# Patient Record
Sex: MALE | Race: WHITE | Employment: OTHER | URBAN - METROPOLITAN AREA
[De-identification: names, ages, dates, MRNs, and addresses within clinical notes are randomized per-mention and may not be internally consistent; named-entity substitution may affect disease eponyms.]

---

## 2017-04-16 PROBLEM — I20.9 ISCHEMIC CHEST PAIN (HCC): Status: ACTIVE | Noted: 2017-04-16

## 2017-04-16 PROBLEM — I21.4 NSTEMI (NON-ST ELEVATED MYOCARDIAL INFARCTION) (HCC): Status: ACTIVE | Noted: 2017-04-16

## 2017-05-08 ENCOUNTER — OFFICE VISIT (OUTPATIENT)
Dept: CARDIOLOGY CLINIC | Age: 72
End: 2017-05-08

## 2017-05-08 ENCOUNTER — TELEPHONE (OUTPATIENT)
Dept: PULMONOLOGY | Age: 72
End: 2017-05-08

## 2017-05-08 VITALS
HEIGHT: 69 IN | HEART RATE: 80 BPM | WEIGHT: 315 LBS | SYSTOLIC BLOOD PRESSURE: 130 MMHG | DIASTOLIC BLOOD PRESSURE: 84 MMHG | OXYGEN SATURATION: 95 % | BODY MASS INDEX: 46.65 KG/M2

## 2017-05-08 DIAGNOSIS — R06.2 WHEEZING WITHOUT DIAGNOSIS OF ASTHMA: Primary | ICD-10-CM

## 2017-05-08 DIAGNOSIS — E66.01 MORBID OBESITY, UNSPECIFIED OBESITY TYPE (HCC): ICD-10-CM

## 2017-05-08 DIAGNOSIS — R06.2 WHEEZING: ICD-10-CM

## 2017-05-08 DIAGNOSIS — I25.5 ISCHEMIC CARDIOMYOPATHY: ICD-10-CM

## 2017-05-08 DIAGNOSIS — I25.10 CORONARY ARTERY DISEASE INVOLVING NATIVE CORONARY ARTERY OF NATIVE HEART WITHOUT ANGINA PECTORIS: ICD-10-CM

## 2017-05-08 DIAGNOSIS — I10 ESSENTIAL HYPERTENSION: ICD-10-CM

## 2017-05-08 DIAGNOSIS — I50.21 ACUTE SYSTOLIC HEART FAILURE (HCC): ICD-10-CM

## 2017-05-08 DIAGNOSIS — Z87.891 FORMER SMOKER: Primary | ICD-10-CM

## 2017-05-08 PROCEDURE — 1123F ACP DISCUSS/DSCN MKR DOCD: CPT | Performed by: INTERNAL MEDICINE

## 2017-05-08 PROCEDURE — G8598 ASA/ANTIPLAT THER USED: HCPCS | Performed by: INTERNAL MEDICINE

## 2017-05-08 PROCEDURE — 3017F COLORECTAL CA SCREEN DOC REV: CPT | Performed by: INTERNAL MEDICINE

## 2017-05-08 PROCEDURE — 1036F TOBACCO NON-USER: CPT | Performed by: INTERNAL MEDICINE

## 2017-05-08 PROCEDURE — 4040F PNEUMOC VAC/ADMIN/RCVD: CPT | Performed by: INTERNAL MEDICINE

## 2017-05-08 PROCEDURE — 99214 OFFICE O/P EST MOD 30 MIN: CPT | Performed by: INTERNAL MEDICINE

## 2017-05-08 PROCEDURE — 1111F DSCHRG MED/CURRENT MED MERGE: CPT | Performed by: INTERNAL MEDICINE

## 2017-05-08 PROCEDURE — G8427 DOCREV CUR MEDS BY ELIG CLIN: HCPCS | Performed by: INTERNAL MEDICINE

## 2017-05-08 PROCEDURE — G8417 CALC BMI ABV UP PARAM F/U: HCPCS | Performed by: INTERNAL MEDICINE

## 2017-08-07 RX ORDER — METOPROLOL SUCCINATE 100 MG/1
100 TABLET, EXTENDED RELEASE ORAL DAILY
Qty: 30 TABLET | Refills: 3 | Status: SHIPPED | OUTPATIENT
Start: 2017-08-07 | End: 2017-12-08 | Stop reason: SDUPTHER

## 2017-11-07 RX ORDER — TICAGRELOR 90 MG/1
TABLET ORAL
Qty: 60 TABLET | Refills: 5 | Status: SHIPPED | OUTPATIENT
Start: 2017-11-07 | End: 2018-05-04 | Stop reason: SDUPTHER

## 2017-11-09 ENCOUNTER — OFFICE VISIT (OUTPATIENT)
Dept: CARDIOLOGY CLINIC | Age: 72
End: 2017-11-09

## 2017-11-09 VITALS
DIASTOLIC BLOOD PRESSURE: 90 MMHG | OXYGEN SATURATION: 95 % | WEIGHT: 315 LBS | SYSTOLIC BLOOD PRESSURE: 154 MMHG | HEART RATE: 82 BPM | HEIGHT: 69 IN | BODY MASS INDEX: 46.65 KG/M2

## 2017-11-09 DIAGNOSIS — Z87.891 FORMER SMOKER: ICD-10-CM

## 2017-11-09 DIAGNOSIS — I25.5 ISCHEMIC CARDIOMYOPATHY: ICD-10-CM

## 2017-11-09 DIAGNOSIS — I10 ESSENTIAL HYPERTENSION: ICD-10-CM

## 2017-11-09 DIAGNOSIS — I50.22 CHRONIC SYSTOLIC HEART FAILURE (HCC): ICD-10-CM

## 2017-11-09 DIAGNOSIS — N18.9 CHRONIC KIDNEY DISEASE, UNSPECIFIED CKD STAGE: ICD-10-CM

## 2017-11-09 DIAGNOSIS — I25.10 CORONARY ARTERY DISEASE INVOLVING NATIVE CORONARY ARTERY OF NATIVE HEART WITHOUT ANGINA PECTORIS: Primary | ICD-10-CM

## 2017-11-09 PROCEDURE — 3017F COLORECTAL CA SCREEN DOC REV: CPT | Performed by: INTERNAL MEDICINE

## 2017-11-09 PROCEDURE — G8427 DOCREV CUR MEDS BY ELIG CLIN: HCPCS | Performed by: INTERNAL MEDICINE

## 2017-11-09 PROCEDURE — G8598 ASA/ANTIPLAT THER USED: HCPCS | Performed by: INTERNAL MEDICINE

## 2017-11-09 PROCEDURE — 99214 OFFICE O/P EST MOD 30 MIN: CPT | Performed by: INTERNAL MEDICINE

## 2017-11-09 PROCEDURE — 1036F TOBACCO NON-USER: CPT | Performed by: INTERNAL MEDICINE

## 2017-11-09 PROCEDURE — 4040F PNEUMOC VAC/ADMIN/RCVD: CPT | Performed by: INTERNAL MEDICINE

## 2017-11-09 PROCEDURE — G8417 CALC BMI ABV UP PARAM F/U: HCPCS | Performed by: INTERNAL MEDICINE

## 2017-11-09 PROCEDURE — 1123F ACP DISCUSS/DSCN MKR DOCD: CPT | Performed by: INTERNAL MEDICINE

## 2017-11-09 PROCEDURE — G8484 FLU IMMUNIZE NO ADMIN: HCPCS | Performed by: INTERNAL MEDICINE

## 2017-11-09 NOTE — PROGRESS NOTES
3    lisinopril (PRINIVIL;ZESTRIL) 5 MG tablet Take 5 mg by mouth daily      NIFEdipine (ADALAT CC) 30 MG CR tablet Take 60 mg by mouth 2 times daily       aspirin 81 MG chewable tablet Take 81 mg by mouth daily.  furosemide (LASIX) 40 MG tablet Take 40 mg by mouth daily.  atorvastatin (LIPITOR) 20 MG tablet Take 20 mg by mouth daily.  Multiple Vitamins-Minerals (THERAPEUTIC MULTIVITAMIN-MINERALS) tablet Take 1 tablet by mouth nightly.  Glucosamine-Chondroit-Vit C-Mn (GLUCOSAMINE CHONDR 1500 COMPLX PO) Take 1 each by mouth 2 times daily Indications: 2700 mg        No current facility-administered medications for this visit. Review of Systems:    Constitutional: negative  Eyes: negative  Ears, nose, mouth, throat, and face: negative  Respiratory: negative  Cardiovascular: negative  Gastrointestinal: negative  Genitourinary:negative  Integument/breast: negative  Hematologic/lymphatic: negative  Musculoskeletal:negative  Neurological: negative  Behavioral/Psych: negative  Endocrine: negative  Allergic/Immunologic: negative     Physical Exam:   Vitals:    11/09/17 1446   BP: (!) 154/90   Site: Right Arm   Position: Sitting   Pulse: 82   SpO2: 95%   Weight: (!) 344 lb (156 kg)   Height: 5' 9\" (1.753 m)     Wt Readings from Last 3 Encounters:   05/08/17 (!) 340 lb (154.2 kg)   04/18/17 (!) 350 lb 5 oz (158.9 kg)   04/03/15 (!) 328 lb 0.7 oz (148.8 kg)       Constitutional: He is oriented to person, place, and time. He appears well-developed and well-nourished. In no acute distress. Head: Normocephalic and atraumatic. Pupils equal and round. Neck: Neck supple. No JVP or carotid bruit appreciated. No mass and no thyromegaly present. No lymphadenopathy present. Cardiovascular: Normal rate. Normal heart sounds. Exam reveals no gallop and no friction rub. No murmur heard. Pulmonary/Chest: Effort normal and breath sounds normal. No respiratory distress.  He has no wheezes, rhonchi or rales.     Abdominal: Soft, non-tender. Bowel sounds are normal. He exhibits no organomegaly, mass or bruit. Extremities: mil BLE edema, no cyanosis, or clubbing. Pulses are 2+ radial/dorsalis pedis/posterior tibial/carotid bilaterally. Neurological: Awake  alert and orientedNo gross cranial nerve deficit. Coordination normal.     Skin: Skin is warm and dry. There is no rash or diaphoresis. Psychiatric: He has a normal mood and affect. His speech is normal and behavior is normal.     Lab Review:    Lab Results   Component Value Date    TRIG 152 04/17/2017    HDL 31 04/17/2017    LDLCALC 76 04/17/2017    LABVLDL 30 04/17/2017      Lab Results   Component Value Date    BUN 25 04/18/2017    CREATININE 2.3 04/18/2017     Lab Results   Component Value Date    WBC 11.5 04/17/2017    RBC 4.21 04/17/2017    HGB 13.5 04/17/2017    HCT 40.8 04/17/2017    MCV 96.9 04/17/2017    MCH 32.1 04/17/2017    MCHC 33.2 04/17/2017    RDW 15.1 04/17/2017     04/17/2017    MPV 9.0 04/17/2017     Lab Results   Component Value Date     04/18/2017    K 3.7 04/18/2017     04/18/2017    CO2 27 04/18/2017    BUN 25 04/18/2017    LABALBU 2.9 04/17/2017    CREATININE 2.3 04/18/2017    CALCIUM 8.6 04/18/2017    GFRAA 34 04/18/2017    LABGLOM 28 04/18/2017    GLUCOSE 123 04/18/2017       Lab Results   Component Value Date    TROPONINI 0.34 04/16/2017    TROPONINI <0.01 04/02/2015    TROPONINI <0.01 01/30/2015       Image Review:     ECHO:4/17/17   Summary   Appears to have normal left ventricular size and wall thickness. Mildly   decreased left ventricular systolic function with an estimated ejection fraction of 40%.   Mid distal anterior wall hypokinesis. The apex is akinetic.   Normal right ventricular size and function. Cath:4/16/17  ANGIOGRAPHIC FINDINGS:  1. Left main coronary comes from the left coronary cusp, has FERMÍN-3 flow, no significant stenosis.   2. The left anterior descending artery comes from the left main coronary  artery, is occluded in the proximal portion and mid portion has around 70%  stenosis present and distal also 70% stenosis present. 3. The left circumflex comes from the left main coronary artery and gives  rise to obtuse marginal branches. The mid portion has approximately 60% stenosis. 4. The right coronary artery comes from the right coronary cusp, giving rise  to posterior descending artery and posterolateral branch. The proximal mid  portion has 60% stenosis present. INTERVENTION PERFORMED: We intervened on the proximal left anterior  descending artery, placed a stent 3.0 x 18 post-dilated to 3.9 mm. A DRUG COATED XIENCE ALPINE stent was placed in the LAD      Assessment/Plan:     -CAD: Ischemic CP with NSTEMI and dynamic ECG changes of evolving MI  involving anterior wall   4/16/17 We intervened on the proximal left anterior descending artery, placed a stent 3.0 x 18 post-dilated to 3.9 mm  No further SOB or chest pain   Dual antiplatelet therapy with aspirin and Brilinta  No abnormal bruising or bleeding   Risk factor modification again discussed   Aerobic exercise daily working up to a minimum of 30 minutes daily  Currently walking for exercise at home. He did not participate in Cardiac Rehab, Phase II and joined the [facility]  He is going 2-3 times per week, walking on off days   Uses cane for walking aid     -Ischemic Cardiomyopathy/systolic heart failure:   Appears to be euvolemic today   Weight has been stable   Discussed heart failure education and given in AVS  Call with weight gain, worsening GIRALDO, PND, orthopnea, edema  Will need to repeat echocardiogram now, we will call with results     -HTN: BP control. Elevated today. Home SBP averging 130's and refuses to have the MA recheck his BP today before he leaves.      -CKD: Managed per Dr. Neris Olson.     -Morbid obesity: Needs weight reduction. We have again discussed this at length. Education given.      -Former Smoker: quit 1984    Return in follow up in 6 months     Thank you very much for allowing me to participate in the care of your patient. Please do not hesitate to contact me if you have any questions.     Sincerely,    Catrina Smith MD, MPH      [address], [address] [place] [address] [address]  Ph: [phone]  Fax: [phone]

## 2017-11-09 NOTE — PATIENT INSTRUCTIONS
will not have any pain from an echocardiogram. You may have a brief, sharp pain if an intravenous (IV) needle is placed in a vein in your arm. · No electricity passes through your body during the test. There is no danger of getting an electrical shock. · You do not receive any radiation. What happens after the test?  · You will probably be able to go home right away. · You can go back to your usual activities right away. Follow-up care is a key part of your treatment and safety. Be sure to make and go to all appointments, and call your doctor if you are having problems. It's also a good idea to keep a list of the medicines you take. Ask your doctor when you can expect to have your test results. Where can you learn more? Go to [email or web address] org and sign in to your [employer] account. Enter E130 in the [employer] box to learn more about \"Transthoracic Echocardiogram: About This Test.\"     If you do not have an account, please click on the \"Sign Up Now\" link. Current as of: April 3, 2017  Content Version: 11.3  © 3580-6552 [employer]. Care instructions adapted under license by [facility] ([facility]). If you have questions about a medical condition or this instruction, always ask your healthcare professional. [address] any warranty or liability for your use of this information.

## 2017-11-14 ENCOUNTER — HOSPITAL ENCOUNTER (OUTPATIENT)
Dept: NON INVASIVE DIAGNOSTICS | Age: 72
Discharge: HOME | End: 2017-11-14
Attending: INTERNAL MEDICINE | Admitting: INTERNAL MEDICINE

## 2017-11-14 DIAGNOSIS — I50.22 CHRONIC SYSTOLIC CONGESTIVE HEART FAILURE (HCC): ICD-10-CM

## 2017-11-14 DIAGNOSIS — I25.5 ISCHEMIC CARDIOMYOPATHY: ICD-10-CM

## 2017-11-14 DIAGNOSIS — I50.22 CHRONIC SYSTOLIC HEART FAILURE (HCC): ICD-10-CM

## 2017-11-14 LAB
LV EF: 50 %
LVEF MODALITY: NORMAL

## 2017-12-08 RX ORDER — METOPROLOL SUCCINATE 100 MG/1
100 TABLET, EXTENDED RELEASE ORAL DAILY
Qty: 30 TABLET | Refills: 5 | Status: SHIPPED | OUTPATIENT
Start: 2017-12-08 | End: 2018-06-04 | Stop reason: SDUPTHER

## 2018-05-04 RX ORDER — TICAGRELOR 90 MG/1
TABLET ORAL
Qty: 60 TABLET | Refills: 0 | Status: SHIPPED | OUTPATIENT
Start: 2018-05-04 | End: 2018-06-04 | Stop reason: SDUPTHER

## 2018-06-05 RX ORDER — METOPROLOL SUCCINATE 100 MG/1
100 TABLET, EXTENDED RELEASE ORAL DAILY
Qty: 90 TABLET | Refills: 0 | Status: SHIPPED | OUTPATIENT
Start: 2018-06-05 | End: 2018-09-01 | Stop reason: SDUPTHER

## 2018-06-06 RX ORDER — TICAGRELOR 90 MG/1
TABLET ORAL
Qty: 60 TABLET | Refills: 3 | Status: SHIPPED | OUTPATIENT
Start: 2018-06-06 | End: 2018-06-14 | Stop reason: ALTCHOICE

## 2018-06-14 ENCOUNTER — TELEPHONE (OUTPATIENT)
Dept: CARDIOLOGY CLINIC | Age: 73
End: 2018-06-14

## 2018-06-14 DIAGNOSIS — R60.9 EDEMA, UNSPECIFIED TYPE: ICD-10-CM

## 2018-06-14 DIAGNOSIS — I25.5 ISCHEMIC CARDIOMYOPATHY: ICD-10-CM

## 2018-06-14 DIAGNOSIS — I50.23 ACUTE ON CHRONIC SYSTOLIC HEART FAILURE (HCC): ICD-10-CM

## 2018-06-14 DIAGNOSIS — R06.02 SOB (SHORTNESS OF BREATH): Primary | ICD-10-CM

## 2018-06-14 DIAGNOSIS — R79.89 ELEVATED BRAIN NATRIURETIC PEPTIDE (BNP) LEVEL: ICD-10-CM

## 2018-06-14 RX ORDER — CLOPIDOGREL BISULFATE 75 MG/1
75 TABLET ORAL DAILY
Qty: 30 TABLET | Refills: 3 | Status: SHIPPED | OUTPATIENT
Start: 2018-06-14 | End: 2018-10-02 | Stop reason: SDUPTHER

## 2018-06-15 DIAGNOSIS — I42.9 CARDIOMYOPATHY, UNSPECIFIED TYPE (HCC): Primary | ICD-10-CM

## 2018-06-15 DIAGNOSIS — R60.9 EDEMA, UNSPECIFIED TYPE: ICD-10-CM

## 2018-06-15 DIAGNOSIS — R06.02 SOB (SHORTNESS OF BREATH): ICD-10-CM

## 2018-06-15 LAB
ANION GAP SERPL CALCULATED.3IONS-SCNC: 21 MMOL/L (ref 3–16)
BUN BLDV-MCNC: 29 MG/DL (ref 7–20)
CALCIUM SERPL-MCNC: 9 MG/DL (ref 8.3–10.6)
CHLORIDE BLD-SCNC: 99 MMOL/L (ref 99–110)
CO2: 24 MMOL/L (ref 21–32)
CREAT SERPL-MCNC: 2.8 MG/DL (ref 0.8–1.3)
GFR AFRICAN AMERICAN: 27
GFR NON-AFRICAN AMERICAN: 22
GLUCOSE BLD-MCNC: 126 MG/DL (ref 70–99)
HCT VFR BLD CALC: 44.6 % (ref 40.5–52.5)
HEMOGLOBIN: 15.1 G/DL (ref 13.5–17.5)
MCH RBC QN AUTO: 31.9 PG (ref 26–34)
MCHC RBC AUTO-ENTMCNC: 33.8 G/DL (ref 31–36)
MCV RBC AUTO: 94.4 FL (ref 80–100)
PDW BLD-RTO: 15.1 % (ref 12.4–15.4)
PLATELET # BLD: 264 K/UL (ref 135–450)
PMV BLD AUTO: 8.6 FL (ref 5–10.5)
POTASSIUM SERPL-SCNC: 3.8 MMOL/L (ref 3.5–5.1)
PRO-BNP: 1353 PG/ML (ref 0–124)
RBC # BLD: 4.72 M/UL (ref 4.2–5.9)
SODIUM BLD-SCNC: 144 MMOL/L (ref 136–145)
WBC # BLD: 9.1 K/UL (ref 4–11)

## 2018-08-07 ENCOUNTER — HOSPITAL ENCOUNTER (OUTPATIENT)
Dept: NON INVASIVE DIAGNOSTICS | Age: 73
Discharge: HOME | End: 2018-08-07
Attending: INTERNAL MEDICINE | Admitting: INTERNAL MEDICINE

## 2018-08-07 DIAGNOSIS — R06.02 SOB (SHORTNESS OF BREATH): ICD-10-CM

## 2018-08-07 DIAGNOSIS — I50.23 ACUTE ON CHRONIC SYSTOLIC HEART FAILURE (HCC): ICD-10-CM

## 2018-08-07 DIAGNOSIS — R60.9 EDEMA, UNSPECIFIED TYPE: ICD-10-CM

## 2018-08-07 DIAGNOSIS — I50.23 ACUTE ON CHRONIC SYSTOLIC CONGESTIVE HEART FAILURE (HCC): ICD-10-CM

## 2018-08-07 DIAGNOSIS — I25.5 ISCHEMIC CARDIOMYOPATHY: ICD-10-CM

## 2018-08-07 DIAGNOSIS — R79.89 ELEVATED BRAIN NATRIURETIC PEPTIDE (BNP) LEVEL: ICD-10-CM

## 2018-08-07 LAB
LV EF: 53 %
LVEF MODALITY: NORMAL

## 2018-09-06 RX ORDER — METOPROLOL SUCCINATE 100 MG/1
TABLET, EXTENDED RELEASE ORAL
Qty: 90 TABLET | Refills: 0 | Status: SHIPPED | OUTPATIENT
Start: 2018-09-06 | End: 2018-11-20 | Stop reason: SDUPTHER

## 2018-10-05 ENCOUNTER — TELEPHONE (OUTPATIENT)
Dept: CARDIOLOGY CLINIC | Age: 73
End: 2018-10-05

## 2018-10-05 RX ORDER — CLOPIDOGREL BISULFATE 75 MG/1
TABLET ORAL
Qty: 30 TABLET | Refills: 0 | Status: SHIPPED | OUTPATIENT
Start: 2018-10-05 | End: 2018-10-31 | Stop reason: SDUPTHER

## 2018-10-05 NOTE — TELEPHONE ENCOUNTER
Medication Refill    When was your last appointment with cardiology?  (if 1year or longer, please schedule an appointment)    Medication needing refilled: Plavix     Doseage of the medication: 75 mg     How are you taking this medication (QD, BID, TID, QID, PRN):  Take 1 tablet by mouth daily    Patient want a 30 or 90 day supply called in:    Which Pharmacy are we sending the medication to:    [facility]/pharmacy [address] [place] [address], [address] [place] [address] [phone]      Jeremías wanted to know if we could put 4 refills on it, so he doesn't have to call every month.

## 2018-11-01 RX ORDER — CLOPIDOGREL BISULFATE 75 MG/1
TABLET ORAL
Qty: 30 TABLET | Refills: 2 | Status: SHIPPED | OUTPATIENT
Start: 2018-11-01 | End: 2019-01-11 | Stop reason: SDUPTHER

## 2018-11-21 RX ORDER — METOPROLOL SUCCINATE 100 MG/1
TABLET, EXTENDED RELEASE ORAL
Qty: 30 TABLET | Refills: 0 | Status: SHIPPED | OUTPATIENT
Start: 2018-11-21 | End: 2018-12-27 | Stop reason: SDUPTHER

## 2018-12-27 RX ORDER — METOPROLOL SUCCINATE 100 MG/1
TABLET, EXTENDED RELEASE ORAL
Qty: 30 TABLET | Refills: 0 | Status: SHIPPED | OUTPATIENT
Start: 2018-12-27 | End: 2019-01-11 | Stop reason: SDUPTHER

## 2019-01-11 ENCOUNTER — OFFICE VISIT (OUTPATIENT)
Dept: CARDIOLOGY CLINIC | Age: 74
End: 2019-01-11
Payer: MEDICARE

## 2019-01-11 VITALS
WEIGHT: 315 LBS | DIASTOLIC BLOOD PRESSURE: 74 MMHG | HEART RATE: 73 BPM | HEIGHT: 69 IN | BODY MASS INDEX: 46.65 KG/M2 | OXYGEN SATURATION: 94 % | SYSTOLIC BLOOD PRESSURE: 128 MMHG

## 2019-01-11 DIAGNOSIS — I25.10 CORONARY ARTERY DISEASE INVOLVING NATIVE CORONARY ARTERY OF NATIVE HEART WITHOUT ANGINA PECTORIS: Primary | ICD-10-CM

## 2019-01-11 PROCEDURE — G8417 CALC BMI ABV UP PARAM F/U: HCPCS | Performed by: INTERNAL MEDICINE

## 2019-01-11 PROCEDURE — 1123F ACP DISCUSS/DSCN MKR DOCD: CPT | Performed by: INTERNAL MEDICINE

## 2019-01-11 PROCEDURE — 4040F PNEUMOC VAC/ADMIN/RCVD: CPT | Performed by: INTERNAL MEDICINE

## 2019-01-11 PROCEDURE — 1036F TOBACCO NON-USER: CPT | Performed by: INTERNAL MEDICINE

## 2019-01-11 PROCEDURE — G8427 DOCREV CUR MEDS BY ELIG CLIN: HCPCS | Performed by: INTERNAL MEDICINE

## 2019-01-11 PROCEDURE — 99213 OFFICE O/P EST LOW 20 MIN: CPT | Performed by: INTERNAL MEDICINE

## 2019-01-11 PROCEDURE — G8484 FLU IMMUNIZE NO ADMIN: HCPCS | Performed by: INTERNAL MEDICINE

## 2019-01-11 PROCEDURE — 93000 ELECTROCARDIOGRAM COMPLETE: CPT | Performed by: INTERNAL MEDICINE

## 2019-01-11 PROCEDURE — 1101F PT FALLS ASSESS-DOCD LE1/YR: CPT | Performed by: INTERNAL MEDICINE

## 2019-01-11 PROCEDURE — G8598 ASA/ANTIPLAT THER USED: HCPCS | Performed by: INTERNAL MEDICINE

## 2019-01-11 PROCEDURE — 3017F COLORECTAL CA SCREEN DOC REV: CPT | Performed by: INTERNAL MEDICINE

## 2019-01-11 RX ORDER — METOPROLOL SUCCINATE 100 MG/1
100 TABLET, EXTENDED RELEASE ORAL DAILY
Qty: 90 TABLET | Refills: 3 | Status: SHIPPED | OUTPATIENT
Start: 2019-01-11 | End: 2020-01-14

## 2019-01-11 RX ORDER — CLOPIDOGREL BISULFATE 75 MG/1
75 TABLET ORAL DAILY
Qty: 90 TABLET | Refills: 3 | Status: SHIPPED | OUTPATIENT
Start: 2019-01-11 | End: 2020-01-14

## 2019-06-06 ENCOUNTER — HOSPITAL ENCOUNTER (EMERGENCY)
Age: 74
Discharge: HOME | End: 2019-06-06
Payer: MEDICARE

## 2019-06-06 VITALS
RESPIRATION RATE: 18 BRPM | SYSTOLIC BLOOD PRESSURE: 166 MMHG | TEMPERATURE: 97 F | DIASTOLIC BLOOD PRESSURE: 76 MMHG | HEART RATE: 76 BPM | OXYGEN SATURATION: 95 %

## 2019-06-06 DIAGNOSIS — R04.0 EPISTAXIS: Primary | ICD-10-CM

## 2019-06-06 DIAGNOSIS — R03.0 ELEVATED BLOOD PRESSURE READING: ICD-10-CM

## 2019-06-06 LAB
BASOPHILS ABSOLUTE: 0.1 K/UL (ref 0–0.2)
BASOPHILS RELATIVE PERCENT: 0.9 %
EOSINOPHILS ABSOLUTE: 0.3 K/UL (ref 0–0.6)
EOSINOPHILS RELATIVE PERCENT: 3.5 %
HCT VFR BLD CALC: 41.3 % (ref 40.5–52.5)
HEMOGLOBIN: 14 G/DL (ref 13.5–17.5)
LYMPHOCYTES ABSOLUTE: 0.7 K/UL (ref 1–5.1)
LYMPHOCYTES RELATIVE PERCENT: 7 %
MCH RBC QN AUTO: 31.5 PG (ref 26–34)
MCHC RBC AUTO-ENTMCNC: 33.8 G/DL (ref 31–36)
MCV RBC AUTO: 93.1 FL (ref 80–100)
MONOCYTES ABSOLUTE: 0.7 K/UL (ref 0–1.3)
MONOCYTES RELATIVE PERCENT: 6.7 %
NEUTROPHILS ABSOLUTE: 8.1 K/UL (ref 1.7–7.7)
NEUTROPHILS RELATIVE PERCENT: 81.9 %
PDW BLD-RTO: 14.5 % (ref 12.4–15.4)
PLATELET # BLD: 249 K/UL (ref 135–450)
PMV BLD AUTO: 8.6 FL (ref 5–10.5)
RBC # BLD: 4.44 M/UL (ref 4.2–5.9)
WBC # BLD: 9.9 K/UL (ref 4–11)

## 2019-06-06 PROCEDURE — 99283 EMERGENCY DEPT VISIT LOW MDM: CPT

## 2019-06-06 PROCEDURE — 85025 COMPLETE CBC W/AUTO DIFF WBC: CPT

## 2019-06-06 PROCEDURE — 4500000023 HC ED LEVEL 3 PROCEDURE

## 2019-06-06 PROCEDURE — 6370000000 HC RX 637 (ALT 250 FOR IP): Performed by: PHYSICIAN ASSISTANT

## 2019-06-06 RX ORDER — OXYMETAZOLINE HYDROCHLORIDE 0.05 G/100ML
2 SPRAY NASAL ONCE
Status: COMPLETED | OUTPATIENT
Start: 2019-06-06 | End: 2019-06-06

## 2019-06-06 RX ORDER — OXYMETAZOLINE HYDROCHLORIDE 0.05 G/100ML
SPRAY NASAL
Qty: 1 BOTTLE | Refills: 0 | Status: SHIPPED | OUTPATIENT
Start: 2019-06-06 | End: 2020-09-25

## 2019-06-06 RX ADMIN — SILVER NITRATE APPLICATORS 1 EACH: 25; 75 STICK TOPICAL at 11:37

## 2019-06-06 RX ADMIN — OXYMETAZOLINE HYDROCHLORIDE 2 SPRAY: 5 SPRAY NASAL at 10:32

## 2019-06-06 ASSESSMENT — ENCOUNTER SYMPTOMS
VOMITING: 0
NAUSEA: 0

## 2019-06-06 NOTE — ED PROVIDER NOTES
[facility]  eMERGENCY dEPARTMENT eNCOUnter        Evaluated by advanced practice provider    Pt Name: Jose Ramon Dominguez  MRN: [id number]  [place] 1945  Date of evaluation: 6/6/2019  Provider: Bertin Alexander Dr       Chief Complaint   Patient presents with    Epistaxis     Right side nose bleed for 2.5 hours. HISTORY OF PRESENT ILLNESS  (Location/Symptom, Timing/Onset, Context/Setting, Quality, Duration, Modifying Factors, Severity.)   Jose Ramon Dominguez is a 69 y.o. male who presents to the emergency department or nosebleed from the right nostril. He reports his been intermittently going on for 2 weeks. He can usually get it to stop by putting a cotton ball up there. Reports at 7:30 AM this morning though he took a shower and he was getting the water out of his nose and it started bleeding. He reports he can't get it to stop. He does take aspirin 81 and Plavix. Denies fever chills nausea vomiting. Nursing Notes were reviewed and I agree. REVIEW OF SYSTEMS    (2-9 systems for level 4, 10 or more for level 5)     Review of Systems   Constitutional: Negative for chills and fever. HENT: Positive for nosebleeds. Gastrointestinal: Negative for nausea and vomiting. Except as noted above the remainder of the review of systems was reviewed and negative. PAST MEDICAL HISTORY         Diagnosis Date    Arthritis     Bell's palsy     Cancer ([facility] [address]     Hypertension     Influenza B 4/2/15    Kidney failure     sees Dr Shasta Miller     History reviewed. No pertinent surgical history.     CURRENT MEDICATIONS       Discharge Medication List as of 6/6/2019 11:38 AM      CONTINUE these medications which have NOT CHANGED    Details   clopidogrel (PLAVIX) 75 MG tablet Take 1 tablet by mouth daily, Disp-90 tablet, R-3Normal      metoprolol succinate (TOPROL XL) 100 MG extended release tablet Take 1 tablet by mouth daily, Disp-90 tablet, R-3Normal      lisinopril (PRINIVIL;ZESTRIL) 5 MG tablet Take 5 mg by mouth dailyHistorical Med      NIFEdipine (ADALAT CC) 30 MG CR tablet Take 60 mg by mouth 2 times daily Historical Med      aspirin 81 MG chewable tablet Take 81 mg by mouth daily. furosemide (LASIX) 40 MG tablet Take 40 mg by mouth daily. atorvastatin (LIPITOR) 20 MG tablet Take 20 mg by mouth daily. Multiple Vitamins-Minerals (THERAPEUTIC MULTIVITAMIN-MINERALS) tablet Take 1 tablet by mouth nightly. Glucosamine-Chondroit-Vit C-Mn (GLUCOSAMINE CHONDR 1500 COMPLX PO) Take 1 each by mouth 2 times daily Indications: 2700 mg Historical Med             ALLERGIES     Patient has no known allergies. FAMILY HISTORY     History reviewed. No pertinent family history. No family status information on file. SOCIAL HISTORY      reports that he quit smoking about 33 years ago. He does not have any smokeless tobacco history on file. He reports that he does not drink alcohol or use drugs. PHYSICAL EXAM    (up to 7 for level 4, 8 or more for level 5)     ED Triage Vitals [06/06/19 0947]   BP Temp Temp Source Pulse Resp SpO2 Height Weight   (!) 166/76 97 °F (36.1 °C) Oral 76 18 95 % -- --       Physical Exam   Constitutional: He is oriented to person, place, and time. He appears well-developed and well-nourished. No distress. HENT:   Head: Normocephalic and atraumatic. Nose: Nose normal.   Small amount of active bleeding from the anterior right nasal septum. No posterior bleeding. No bleeding from left nasal septum    No blood in posterior oropharynx   Eyes: EOM are normal.   Neck: Normal range of motion. Neck supple. Pulmonary/Chest: Effort normal. No respiratory distress. Musculoskeletal: Normal range of motion. Neurological: He is alert and oriented to person, place, and time. Skin: Skin is warm and dry. He is not diaphoretic. Psychiatric: He has a normal mood and affect.  His bleeding. Discussed the patient to stick nothing up his nose until he sees ENT. Follow-up next few days with ENT. Return for worsening. He agreed and understood. CONSULTS:  None    PROCEDURES:  Epistaxis Mgmt  Date/Time: 6/6/2019 7:11 PM  Performed by: SIGRID Arvizu  Authorized by: SIGRID Arvizu     Consent:     Consent obtained:  Verbal    Consent given by:  Patient    Risks discussed:  Bleeding and pain  Anesthesia (see MAR for exact dosages): Anesthesia method:  None  Procedure details:     Treatment site:  R septum    Treatment method:  Silver nitrate (afrin and nasal clamp)    Treatment complexity:  Limited  Post-procedure details:     Assessment:  Bleeding stopped    Patient tolerance of procedure: Tolerated well, no immediate complications        FINAL IMPRESSION      1. Epistaxis    2.  Elevated blood pressure reading          DISPOSITION/PLAN   DISPOSITION Decision To Discharge 06/06/2019 11:35:23 AM      PATIENT REFERRED TO:  Nicholas Alvarenga MD  [address] [facility] [address] [facility]  [phone]    Schedule an appointment as soon as possible for a visit in 2 days  for reevaluation    Iraida Brewer MD  [facility] [facility] [address]  [facility] [phone]    Schedule an appointment as soon as possible for a visit   for reevaluation of your blood pressure      DISCHARGE MEDICATIONS:  Discharge Medication List as of 6/6/2019 11:38 AM      START taking these medications    Details   oxymetazoline (12 HOUR NASAL SPRAY) 0.05 % nasal spray Spray 2 sprays in nostril if bleeding returns and apply nasal clamp for 15 minutes, Disp-1 Bottle, R-0Print             (Please note that portions of this note werecompleted with a voice recognition program.  Efforts were made to edit the dictations but occasionally words are mis-transcribed.)    Luis Carlos Huitron, [address] [place] [address]  06/06/19 9887

## 2019-06-06 NOTE — ED NOTES
Ambulated patient in department with no nose bleed. [place], [place] made aware.      Kaley Mendosa RN  06/06/19 0257

## 2019-06-06 NOTE — ED NOTES
D/C: Order noted for d/c. Pt confirmed d/c paperwork and one prescription have correct name. Discharge and education instructions reviewed with patient. Teach-back successful. Pt verbalized understanding and signed d/c papers. Pt denied questions at this time. No acute distress noted. Patient instructed to follow-up as noted - return to emergency department if symptoms worsen. Patient verbalized understanding. Discharged per EDMD with discharge instructions. Pt discharged to private vehicle. Patient stable upon departure. Thanked patient for choosing [facility] for care.         Rufina Crowe RN  06/06/19 1502

## 2019-06-11 ENCOUNTER — OFFICE VISIT (OUTPATIENT)
Dept: ENT CLINIC | Age: 74
End: 2019-06-11
Payer: MEDICARE

## 2019-06-11 VITALS
DIASTOLIC BLOOD PRESSURE: 80 MMHG | SYSTOLIC BLOOD PRESSURE: 128 MMHG | HEIGHT: 69 IN | TEMPERATURE: 98.6 F | HEART RATE: 74 BPM | WEIGHT: 315 LBS | BODY MASS INDEX: 46.65 KG/M2

## 2019-06-11 DIAGNOSIS — R04.0 ANTERIOR EPISTAXIS: Primary | ICD-10-CM

## 2019-06-11 DIAGNOSIS — Z79.01 LONG TERM CURRENT USE OF ANTICOAGULANT THERAPY: ICD-10-CM

## 2019-06-11 PROCEDURE — G8427 DOCREV CUR MEDS BY ELIG CLIN: HCPCS | Performed by: OTOLARYNGOLOGY

## 2019-06-11 PROCEDURE — 1123F ACP DISCUSS/DSCN MKR DOCD: CPT | Performed by: OTOLARYNGOLOGY

## 2019-06-11 PROCEDURE — G8598 ASA/ANTIPLAT THER USED: HCPCS | Performed by: OTOLARYNGOLOGY

## 2019-06-11 PROCEDURE — 3017F COLORECTAL CA SCREEN DOC REV: CPT | Performed by: OTOLARYNGOLOGY

## 2019-06-11 PROCEDURE — 30901 CONTROL OF NOSEBLEED: CPT | Performed by: OTOLARYNGOLOGY

## 2019-06-11 PROCEDURE — 4004F PT TOBACCO SCREEN RCVD TLK: CPT | Performed by: OTOLARYNGOLOGY

## 2019-06-11 PROCEDURE — 99203 OFFICE O/P NEW LOW 30 MIN: CPT | Performed by: OTOLARYNGOLOGY

## 2019-06-11 PROCEDURE — 4040F PNEUMOC VAC/ADMIN/RCVD: CPT | Performed by: OTOLARYNGOLOGY

## 2019-06-11 PROCEDURE — G8417 CALC BMI ABV UP PARAM F/U: HCPCS | Performed by: OTOLARYNGOLOGY

## 2019-06-11 NOTE — PROGRESS NOTES
CHIEF COMPLAINT: Epistaxis    HISTORY OF PRESENT ILLNESS:  69 y.o. male who presents with several weeks of epistaxis. Right sided. Intermittent. Mostly anterior. Usually stops on it own. 3 days ago had to go to ED for cautery. No nasal obstruction. No facial pain. On plavix for ASCVD with stent. PAST MEDICAL HISTORY:   Social History     Tobacco Use   Smoking Status Former Smoker    Last attempt to quit: 1986    Years since quittin.3   Tobacco Comment    will not start                                                     Social History     Substance and Sexual Activity   Alcohol Use No                                                    Current Outpatient Medications:     oxymetazoline (12 HOUR NASAL SPRAY) 0.05 % nasal spray, Spray 2 sprays in nostril if bleeding returns and apply nasal clamp for 15 minutes, Disp: 1 Bottle, Rfl: 0    clopidogrel (PLAVIX) 75 MG tablet, Take 1 tablet by mouth daily, Disp: 90 tablet, Rfl: 3    metoprolol succinate (TOPROL XL) 100 MG extended release tablet, Take 1 tablet by mouth daily, Disp: 90 tablet, Rfl: 3    lisinopril (PRINIVIL;ZESTRIL) 5 MG tablet, Take 5 mg by mouth daily, Disp: , Rfl:     NIFEdipine (ADALAT CC) 30 MG CR tablet, Take 60 mg by mouth 2 times daily , Disp: , Rfl:     aspirin 81 MG chewable tablet, Take 81 mg by mouth daily. , Disp: , Rfl:     furosemide (LASIX) 40 MG tablet, Take 40 mg by mouth daily. , Disp: , Rfl:     atorvastatin (LIPITOR) 20 MG tablet, Take 20 mg by mouth daily. , Disp: , Rfl:     Multiple Vitamins-Minerals (THERAPEUTIC MULTIVITAMIN-MINERALS) tablet, Take 1 tablet by mouth nightly., Disp: , Rfl:     Glucosamine-Chondroit-Vit C-Mn (GLUCOSAMINE CHONDR 1500 COMPLX PO), Take 1 each by mouth 2 times daily Indications: 2700 mg , Disp: , Rfl:                                                  Past Medical History:   Diagnosis Date    Arthritis     Bell's palsy     Cancer ([facility] [address]     Hypertension     Influenza B 4/2/15   

## 2019-06-12 ENCOUNTER — TELEPHONE (OUTPATIENT)
Dept: ENT CLINIC | Age: 74
End: 2019-06-12

## 2019-06-12 NOTE — TELEPHONE ENCOUNTER
Patient was seen at [address] [place] [address],[address] on 6/11/19. Patient had cauterization for nosebleeds. Patient called today that he is still having a small nosebleed and is running down the back of the throat. Please return call.  Patient is setting up a follow up for next tues at [address] [place] [address]

## 2020-01-14 RX ORDER — CLOPIDOGREL BISULFATE 75 MG/1
TABLET ORAL
Qty: 90 TABLET | Refills: 0 | Status: SHIPPED | OUTPATIENT
Start: 2020-01-14 | End: 2020-04-01

## 2020-01-14 RX ORDER — METOPROLOL SUCCINATE 100 MG/1
TABLET, EXTENDED RELEASE ORAL
Qty: 90 TABLET | Refills: 0 | Status: SHIPPED | OUTPATIENT
Start: 2020-01-14 | End: 2020-04-01

## 2020-04-02 RX ORDER — METOPROLOL SUCCINATE 100 MG/1
TABLET, EXTENDED RELEASE ORAL
Qty: 90 TABLET | Refills: 0 | Status: SHIPPED | OUTPATIENT
Start: 2020-04-02 | End: 2020-07-01

## 2020-04-02 RX ORDER — CLOPIDOGREL BISULFATE 75 MG/1
TABLET ORAL
Qty: 90 TABLET | Refills: 0 | Status: SHIPPED | OUTPATIENT
Start: 2020-04-02 | End: 2020-07-01

## 2020-07-01 RX ORDER — METOPROLOL SUCCINATE 100 MG/1
TABLET, EXTENDED RELEASE ORAL
Qty: 90 TABLET | Refills: 3 | Status: ON HOLD | OUTPATIENT
Start: 2020-07-01 | End: 2021-01-27 | Stop reason: HOSPADM

## 2020-07-01 RX ORDER — CLOPIDOGREL BISULFATE 75 MG/1
TABLET ORAL
Qty: 90 TABLET | Refills: 3 | Status: SHIPPED | OUTPATIENT
Start: 2020-07-01 | End: 2021-06-30

## 2020-09-25 ENCOUNTER — APPOINTMENT (OUTPATIENT)
Dept: GENERAL RADIOLOGY | Age: 75
DRG: 291 | End: 2020-09-25
Payer: MEDICARE

## 2020-09-25 ENCOUNTER — HOSPITAL ENCOUNTER (INPATIENT)
Age: 75
LOS: 2 days | Discharge: HOME | DRG: 291 | End: 2020-09-27
Attending: HOSPITALIST | Admitting: HOSPITALIST
Payer: MEDICARE

## 2020-09-25 DIAGNOSIS — I50.9 ACUTE CONGESTIVE HEART FAILURE, UNSPECIFIED HEART FAILURE TYPE (HCC): Primary | ICD-10-CM

## 2020-09-25 DIAGNOSIS — N18.4 CHRONIC KIDNEY DISEASE, STAGE IV (SEVERE) (HCC): ICD-10-CM

## 2020-09-25 DIAGNOSIS — I50.43 ACUTE ON CHRONIC COMBINED SYSTOLIC AND DIASTOLIC CHF (CONGESTIVE HEART FAILURE) (HCC): ICD-10-CM

## 2020-09-25 PROBLEM — R06.02 SOB (SHORTNESS OF BREATH): Status: ACTIVE | Noted: 2020-09-25

## 2020-09-25 LAB
A/G RATIO: 1.1 (ref 1.1–2.2)
ALBUMIN SERPL-MCNC: 3.7 G/DL (ref 3.4–5)
ALP BLD-CCNC: 100 U/L (ref 40–129)
ALT SERPL-CCNC: 17 U/L (ref 10–40)
ANION GAP SERPL CALCULATED.3IONS-SCNC: 12 MMOL/L (ref 3–16)
AST SERPL-CCNC: 15 U/L (ref 15–37)
BASOPHILS ABSOLUTE: 0.1 K/UL (ref 0–0.2)
BASOPHILS RELATIVE PERCENT: 0.9 %
BILIRUB SERPL-MCNC: <0.2 MG/DL (ref 0–1)
BUN BLDV-MCNC: 48 MG/DL (ref 7–20)
CALCIUM SERPL-MCNC: 9.1 MG/DL (ref 8.3–10.6)
CHLORIDE BLD-SCNC: 104 MMOL/L (ref 99–110)
CO2: 26 MMOL/L (ref 21–32)
CREAT SERPL-MCNC: 4.3 MG/DL (ref 0.8–1.3)
EKG ATRIAL RATE: 89 BPM
EKG DIAGNOSIS: NORMAL
EKG P AXIS: 70 DEGREES
EKG P-R INTERVAL: 168 MS
EKG Q-T INTERVAL: 398 MS
EKG QRS DURATION: 76 MS
EKG QTC CALCULATION (BAZETT): 484 MS
EKG R AXIS: -36 DEGREES
EKG T AXIS: 68 DEGREES
EKG VENTRICULAR RATE: 89 BPM
EOSINOPHILS ABSOLUTE: 0.4 K/UL (ref 0–0.6)
EOSINOPHILS RELATIVE PERCENT: 4.7 %
GFR AFRICAN AMERICAN: 16
GFR NON-AFRICAN AMERICAN: 14
GLOBULIN: 3.3 G/DL
GLUCOSE BLD-MCNC: 104 MG/DL (ref 70–99)
HCT VFR BLD CALC: 34.3 % (ref 40.5–52.5)
HEMOGLOBIN: 11.3 G/DL (ref 13.5–17.5)
LYMPHOCYTES ABSOLUTE: 0.5 K/UL (ref 1–5.1)
LYMPHOCYTES RELATIVE PERCENT: 5 %
MCH RBC QN AUTO: 30.9 PG (ref 26–34)
MCHC RBC AUTO-ENTMCNC: 33 G/DL (ref 31–36)
MCV RBC AUTO: 93.7 FL (ref 80–100)
MONOCYTES ABSOLUTE: 0.6 K/UL (ref 0–1.3)
MONOCYTES RELATIVE PERCENT: 6.4 %
NEUTROPHILS ABSOLUTE: 7.9 K/UL (ref 1.7–7.7)
NEUTROPHILS RELATIVE PERCENT: 83 %
PDW BLD-RTO: 15.8 % (ref 12.4–15.4)
PLATELET # BLD: 278 K/UL (ref 135–450)
PMV BLD AUTO: 8.3 FL (ref 5–10.5)
POTASSIUM REFLEX MAGNESIUM: 3.8 MMOL/L (ref 3.5–5.1)
PRO-BNP: 5889 PG/ML (ref 0–449)
RBC # BLD: 3.66 M/UL (ref 4.2–5.9)
SODIUM BLD-SCNC: 142 MMOL/L (ref 136–145)
TOTAL PROTEIN: 7 G/DL (ref 6.4–8.2)
TROPONIN: 0.03 NG/ML
TSH SERPL DL<=0.05 MIU/L-ACNC: 1.6 UIU/ML (ref 0.27–4.2)
WBC # BLD: 9.5 K/UL (ref 4–11)

## 2020-09-25 PROCEDURE — 6360000002 HC RX W HCPCS: Performed by: PHYSICIAN ASSISTANT

## 2020-09-25 PROCEDURE — 93005 ELECTROCARDIOGRAM TRACING: CPT | Performed by: PHYSICIAN ASSISTANT

## 2020-09-25 PROCEDURE — 84484 ASSAY OF TROPONIN QUANT: CPT

## 2020-09-25 PROCEDURE — 99223 1ST HOSP IP/OBS HIGH 75: CPT | Performed by: INTERNAL MEDICINE

## 2020-09-25 PROCEDURE — 94761 N-INVAS EAR/PLS OXIMETRY MLT: CPT

## 2020-09-25 PROCEDURE — 83880 ASSAY OF NATRIURETIC PEPTIDE: CPT

## 2020-09-25 PROCEDURE — 6360000002 HC RX W HCPCS: Performed by: INTERNAL MEDICINE

## 2020-09-25 PROCEDURE — 84443 ASSAY THYROID STIM HORMONE: CPT

## 2020-09-25 PROCEDURE — 2580000003 HC RX 258: Performed by: HOSPITALIST

## 2020-09-25 PROCEDURE — 93010 ELECTROCARDIOGRAM REPORT: CPT | Performed by: INTERNAL MEDICINE

## 2020-09-25 PROCEDURE — 6370000000 HC RX 637 (ALT 250 FOR IP): Performed by: HOSPITALIST

## 2020-09-25 PROCEDURE — 85025 COMPLETE CBC W/AUTO DIFF WBC: CPT

## 2020-09-25 PROCEDURE — 80053 COMPREHEN METABOLIC PANEL: CPT

## 2020-09-25 PROCEDURE — 99285 EMERGENCY DEPT VISIT HI MDM: CPT

## 2020-09-25 PROCEDURE — 71046 X-RAY EXAM CHEST 2 VIEWS: CPT

## 2020-09-25 PROCEDURE — 2700000000 HC OXYGEN THERAPY PER DAY

## 2020-09-25 PROCEDURE — 6370000000 HC RX 637 (ALT 250 FOR IP): Performed by: INTERNAL MEDICINE

## 2020-09-25 PROCEDURE — 1200000000 HC SEMI PRIVATE

## 2020-09-25 RX ORDER — ATORVASTATIN CALCIUM 20 MG/1
20 TABLET, FILM COATED ORAL NIGHTLY
Status: DISCONTINUED | OUTPATIENT
Start: 2020-09-25 | End: 2020-09-27 | Stop reason: HOSPADM

## 2020-09-25 RX ORDER — NIFEDIPINE 60 MG/1
60 TABLET, EXTENDED RELEASE ORAL 2 TIMES DAILY
Status: DISCONTINUED | OUTPATIENT
Start: 2020-09-25 | End: 2020-09-27 | Stop reason: HOSPADM

## 2020-09-25 RX ORDER — FUROSEMIDE 10 MG/ML
20 INJECTION INTRAMUSCULAR; INTRAVENOUS ONCE
Status: COMPLETED | OUTPATIENT
Start: 2020-09-25 | End: 2020-09-25

## 2020-09-25 RX ORDER — LISINOPRIL 5 MG/1
5 TABLET ORAL DAILY
Status: DISCONTINUED | OUTPATIENT
Start: 2020-09-25 | End: 2020-09-25

## 2020-09-25 RX ORDER — ACETAMINOPHEN 650 MG/1
650 SUPPOSITORY RECTAL EVERY 6 HOURS PRN
Status: DISCONTINUED | OUTPATIENT
Start: 2020-09-25 | End: 2020-09-27 | Stop reason: HOSPADM

## 2020-09-25 RX ORDER — CLOPIDOGREL BISULFATE 75 MG/1
75 TABLET ORAL DAILY
Status: DISCONTINUED | OUTPATIENT
Start: 2020-09-26 | End: 2020-09-27 | Stop reason: HOSPADM

## 2020-09-25 RX ORDER — ACETAMINOPHEN 325 MG/1
650 TABLET ORAL EVERY 6 HOURS PRN
Status: DISCONTINUED | OUTPATIENT
Start: 2020-09-25 | End: 2020-09-27 | Stop reason: HOSPADM

## 2020-09-25 RX ORDER — POLYETHYLENE GLYCOL 3350 17 G/17G
17 POWDER, FOR SOLUTION ORAL DAILY PRN
Status: DISCONTINUED | OUTPATIENT
Start: 2020-09-25 | End: 2020-09-27 | Stop reason: HOSPADM

## 2020-09-25 RX ORDER — ASPIRIN 81 MG/1
243 TABLET, CHEWABLE ORAL ONCE
Status: DISCONTINUED | OUTPATIENT
Start: 2020-09-25 | End: 2020-09-25

## 2020-09-25 RX ORDER — METOPROLOL SUCCINATE 50 MG/1
100 TABLET, EXTENDED RELEASE ORAL DAILY
Status: DISCONTINUED | OUTPATIENT
Start: 2020-09-25 | End: 2020-09-27 | Stop reason: HOSPADM

## 2020-09-25 RX ORDER — ASPIRIN 81 MG/1
81 TABLET, CHEWABLE ORAL DAILY
Status: DISCONTINUED | OUTPATIENT
Start: 2020-09-26 | End: 2020-09-27 | Stop reason: HOSPADM

## 2020-09-25 RX ORDER — HEPARIN SODIUM 5000 [USP'U]/ML
5000 INJECTION, SOLUTION INTRAVENOUS; SUBCUTANEOUS EVERY 8 HOURS
Status: DISCONTINUED | OUTPATIENT
Start: 2020-09-26 | End: 2020-09-27 | Stop reason: HOSPADM

## 2020-09-25 RX ORDER — ONDANSETRON 2 MG/ML
4 INJECTION INTRAMUSCULAR; INTRAVENOUS EVERY 6 HOURS PRN
Status: DISCONTINUED | OUTPATIENT
Start: 2020-09-25 | End: 2020-09-27 | Stop reason: HOSPADM

## 2020-09-25 RX ORDER — FUROSEMIDE 10 MG/ML
80 INJECTION INTRAMUSCULAR; INTRAVENOUS ONCE
Status: COMPLETED | OUTPATIENT
Start: 2020-09-25 | End: 2020-09-25

## 2020-09-25 RX ORDER — M-VIT,TX,IRON,MINS/CALC/FOLIC 27MG-0.4MG
1 TABLET ORAL NIGHTLY
Status: DISCONTINUED | OUTPATIENT
Start: 2020-09-25 | End: 2020-09-27 | Stop reason: HOSPADM

## 2020-09-25 RX ORDER — METOPROLOL SUCCINATE 25 MG/1
25 TABLET, EXTENDED RELEASE ORAL DAILY
Status: CANCELLED | OUTPATIENT
Start: 2020-09-25

## 2020-09-25 RX ORDER — METOPROLOL SUCCINATE 50 MG/1
100 TABLET, EXTENDED RELEASE ORAL DAILY
Status: DISCONTINUED | OUTPATIENT
Start: 2020-09-26 | End: 2020-09-25

## 2020-09-25 RX ORDER — PROMETHAZINE HYDROCHLORIDE 25 MG/1
12.5 TABLET ORAL EVERY 6 HOURS PRN
Status: DISCONTINUED | OUTPATIENT
Start: 2020-09-25 | End: 2020-09-27 | Stop reason: HOSPADM

## 2020-09-25 RX ORDER — SODIUM CHLORIDE 0.9 % (FLUSH) 0.9 %
10 SYRINGE (ML) INJECTION PRN
Status: DISCONTINUED | OUTPATIENT
Start: 2020-09-25 | End: 2020-09-27 | Stop reason: HOSPADM

## 2020-09-25 RX ORDER — SODIUM CHLORIDE 0.9 % (FLUSH) 0.9 %
10 SYRINGE (ML) INJECTION EVERY 12 HOURS SCHEDULED
Status: DISCONTINUED | OUTPATIENT
Start: 2020-09-25 | End: 2020-09-27 | Stop reason: HOSPADM

## 2020-09-25 RX ADMIN — MULTIPLE VITAMINS W/ MINERALS TAB 1 TABLET: TAB at 21:46

## 2020-09-25 RX ADMIN — NIFEDIPINE 60 MG: 60 TABLET, EXTENDED RELEASE ORAL at 21:46

## 2020-09-25 RX ADMIN — ATORVASTATIN CALCIUM 20 MG: 20 TABLET, FILM COATED ORAL at 21:46

## 2020-09-25 RX ADMIN — METOPROLOL SUCCINATE 100 MG: 50 TABLET, EXTENDED RELEASE ORAL at 17:21

## 2020-09-25 RX ADMIN — FUROSEMIDE 20 MG: 10 INJECTION, SOLUTION INTRAMUSCULAR; INTRAVENOUS at 11:15

## 2020-09-25 RX ADMIN — Medication 10 ML: at 21:48

## 2020-09-25 RX ADMIN — FUROSEMIDE 80 MG: 10 INJECTION, SOLUTION INTRAMUSCULAR; INTRAVENOUS at 17:21

## 2020-09-25 ASSESSMENT — PAIN SCALES - GENERAL
PAINLEVEL_OUTOF10: 0

## 2020-09-25 ASSESSMENT — ENCOUNTER SYMPTOMS
NAUSEA: 0
SHORTNESS OF BREATH: 1
COUGH: 0
VOMITING: 0
CHEST TIGHTNESS: 1
ABDOMINAL PAIN: 0

## 2020-09-25 NOTE — PROGRESS NOTES
Aware of HF RN consult. Pt just getting admitted Friday afternoon. Cardiology consult pending. Echo pending. Will defer meeting with patient at this time. HF measures initiated: daily weights, I/O, and sodium / fluid restricted diet. HF careplan is current. HF instructions have been added to AVS.     A follow up has been arranged for Wed 9/30 at 3:20pm with Dawna Ford NP in the event the pt would be discharged over the weekend. HF RNs will continue to follow and assist with transition of care.

## 2020-09-25 NOTE — H&P
Hospitalist  History and Physical    Patient:  Sujata Hudson  MRN: [id number]  PCP: Dianne Steward MD    CHIEF COMPLAINT:  Chest pain, SOB      HISTORY OF PRESENT ILLNESS:   The patient Sujata Hudson is a 77 y.o.male with medical history significant for CHF, coronary artery disease, hypertension, chronic kidney disease. Patient presented to the emergency room with worsening shortness of breath and lower extremity swelling. Patient reports that his shortness of breath has been getting worse over the last 3 weeks. Patient denies significant weight gain. Patient reports that his pulse ox 78% at home. Patient developed pressure-like substernal chest pain today and decided to come to the emergency room. Past Medical History:        Diagnosis Date    Arthritis     Bell's palsy     Cancer ([facility] [address]     Hypertension     Influenza B 4/2/15    Kidney failure     sees Dr Vira Hutson        Past Surgical History:    History reviewed. No pertinent surgical history. Medications Prior to Admission:    Prior to Admission medications    Medication Sig Start Date End Date Taking? Authorizing Provider   clopidogrel (PLAVIX) 75 MG tablet TAKE 1 TABLET BY MOUTH EVERY DAY 7/1/20  Yes Edi Jones MD   metoprolol succinate (TOPROL XL) 100 MG extended release tablet TAKE 1 TABLET BY MOUTH EVERY DAY 7/1/20  Yes Edi Jones MD   lisinopril (PRINIVIL;ZESTRIL) 5 MG tablet Take 5 mg by mouth daily   Yes Historical Provider, MD   NIFEdipine (ADALAT CC) 60 MG extended release tablet Take 60 mg by mouth 2 times daily    Yes Historical Provider, MD   aspirin 81 MG chewable tablet Take 81 mg by mouth daily. Yes Historical Provider, MD   furosemide (LASIX) 40 MG tablet Take 40 mg by mouth daily. Yes Historical Provider, MD   atorvastatin (LIPITOR) 20 MG tablet Take 20 mg by mouth daily.    Yes Historical Provider, MD   Multiple Vitamins-Minerals (THERAPEUTIC MULTIVITAMIN-MINERALS) tablet Take 1 tablet by mouth nightly. Yes Historical Provider, MD   Glucosamine-Chondroit-Vit C-Mn (GLUCOSAMINE CHONDR 1500 COMPLX PO) Take 1 each by mouth 2 times daily Indications: 2700 mg    Yes Historical Provider, MD       Allergies:  Patient has no known allergies. Social History:   TOBACCO:   reports that he quit smoking about 34 years ago. He has never used smokeless tobacco.  ETOH:   reports no history of alcohol use. Family History:   Patient denies history of CHF in the family        REVIEW OF SYSTEMS:     patients reported symptoms are in BOLD all other symptoms are negative. CONSTITUTIONAL:      fatigue, fever, chills or night sweats, recent weight gain, recent wt loss, insomnia,  General weakness, poor appetite, muscle aches and pains    HEAD: headache, dizziness    EYES:      blurriness,  double vision, dryness,  discharge, irritation,diplopia    EARS:      hearing loss, vertigo, ear discharge,  Earache. Ringing in the ears. NOSE:      Rhinorrhea, sneezing, epistaxis. Discharge, sinusitis,     MOUTH/THROAT:         sore throat, mouth ulcers, Hoarseness    RESPIRATORY:        Shortness of breath, wheezing,  cough, sputum, hemoptysis, obstructive sleep apnea,    CARDIOVASCULAR :      chest pain, palpitations, dyspnea on exercise, Lower extrimity edema (swelling), chest tightness    GASTROINTESTINAL:       Dysphagia, Poor appetite,  Nausea, Vomiting, diarrhea, heartburn, abdominal pain. Blood in the stools, hematemesis. Pain with swallowing, constipation    GENITOURINARY:       Urinary frequency, hesitancy,  urgency, Dysuria, hematuria,  Urinary Incontinence. Urinary Retention. GYNECOLOGICAL: vaginal bleeding , vaginal discharge, menopause    MUSCULOSKELETAL:       joint swelling or stiffness, joint pain, muscle pain, balance problems, low back pain. NEUROLOGICAL:      Gait problems. Tremor. Dizziness. Pain and paresthesias, weakness in extremities.  Seizures, memory loss    PSYCHLOGICAL:        Anxiety, depression    SKIN :      Rashes ulcers, skin color changes, easy bruisability, lymphadenopathy      Physical Exam:      Vitals: /75   Pulse 81   Temp 98.4 °F (36.9 °C) (Oral)   Resp 20   Ht 5' 9.02\" (1.753 m)   Wt (!) 340 lb 13.3 oz (154.6 kg)   SpO2 94%   BMI 50.31 kg/m²     Gen:          Alert and oriented x 3  Eyes: PERRL. No sclera icterus. No conjunctival injection. ENT: No discharge. Pharynx clear. External appearance of ears and nose normal.  Neck: Trachea midline. No obvious mass. Resp: Decreased breath sounds bilaterally  CV: Regular rate. Regular rhythm. No murmur or rub. No edema. GI: Non-tender. Non-distended. No hernia. Skin: Warm, dry, normal texture and turgor. Lymph: No cervical LAD. No supraclavicular LAD. M/S: / Ext. No cyanosis. No clubbing. No joint deformity. Neuro: Moves all four extremities. CN 2-12 tested, no deficits noted. Peripheral pulses and capillary refill is intact. CBC:   Recent Labs     09/25/20  1000   WBC 9.5   HGB 11.3*        BMP:    Recent Labs     09/25/20  1000      K 3.8      CO2 26   BUN 48*   CREATININE 4.3*   GLUCOSE 104*     Hepatic:   Recent Labs     09/25/20  1000   AST 15   ALT 17   BILITOT <0.2   ALKPHOS 100     Troponin:   Recent Labs     09/25/20  1000   TROPONINI 0.03*     BNP: No results for input(s): BNP in the last 72 hours. INR: No results for input(s): INR in the last 72 hours. Lab Results   Component Value Date    LABA1C 6.2 08/15/2019           No results for input(s): CKTOTAL in the last 72 hours. -----------------------------------------------------------------    XR CHEST (2 VW)   Bilateral pulmonary disease most likely due to edema,   less likely infection. Small amount of pleural fluid.            Assessment / Plan     Acute on chronic diastolic CHF  Continue on diuretics  Strict intake output maintenance  Fluid and salt restriction and daily weight  Echocardiogram  Consult to cardiology    Essential primary hypertension I10  Continue patient on home medication    Hyperlipidemia  E78.5  No current evidence of Rhabdomyolysis or other adverse effects. Continue statin therapy while in the hospital    Coronary artery disease  Continue on aspirin statin and Plavix    DVT and GI prophylaxis      Full Code      Timur Zapata M.D    This note was transcribed using [id number] [facility]. Please disregard any translational errors.

## 2020-09-25 NOTE — ED PROVIDER NOTES
and shortness of breath. Negative for cough. Cardiovascular: Positive for chest pain. Gastrointestinal: Negative for abdominal pain, nausea and vomiting. Genitourinary: Negative. Musculoskeletal: Negative for arthralgias and myalgias. Skin: Negative. Neurological: Negative for dizziness and light-headedness. Psychiatric/Behavioral: Negative for behavioral problems and confusion. Except as noted above the remainder of the review of systems was reviewed and negative. PAST MEDICAL HISTORY         Diagnosis Date    Arthritis     Bell's palsy     Cancer ([facility] [address]     Hypertension     Influenza B 4/2/15    Kidney failure     sees Dr Hugo Knight     History reviewed. No pertinent surgical history. CURRENT MEDICATIONS     [unfilled]    ALLERGIES     Patient has no known allergies. FAMILY HISTORY     History reviewed. No pertinent family history. No family status information on file. SOCIAL HISTORY      reports that he quit smoking about 34 years ago. He has never used smokeless tobacco. He reports that he does not drink alcohol or use drugs. PHYSICAL EXAM    (up to 7 for level 4, 8 or more for level 5)     ED Triage Vitals [09/25/20 0934]   Enc Vitals Group      BP (!) 158/89      Pulse 90      Resp 20      Temp 97.4 °F (36.3 °C)      Temp Source Oral      SpO2 100 %      Weight       Height       Head Circumference       Peak Flow       Pain Score       Pain Loc       Pain Edu? Excl. in [address]? Physical Exam  Vitals signs reviewed. Constitutional:       Appearance: He is well-developed. HENT:      Head: Normocephalic and atraumatic. Neck:      Musculoskeletal: Normal range of motion and neck supple. Cardiovascular:      Rate and Rhythm: Normal rate and regular rhythm. Heart sounds: Murmur present.    Pulmonary:      Comments: Diminished breath sounds throughout, mild tachypnea  Musculoskeletal: Normal range of motion. Skin:     General: Skin is warm. Neurological:      General: No focal deficit present. Mental Status: He is alert and oriented to person, place, and time. Psychiatric:         Mood and Affect: Mood normal.         Behavior: Behavior normal.           DIAGNOSTIC RESULTS     EKG: All EKG's are interpreted by the Emergency Department Physician who either signs or Co-signs this chart in the absence of a cardiologist.    RADIOLOGY:   Non-plain film images such as CT, Ultrasound and MRI are read by the radiologist. Plain radiographic images are visualized and preliminarilyinterpreted by the emergency physician with the below findings:    Interpretation per the Radiologist below,if available at the time of this note:    XR CHEST (2 VW)   Final Result   Bilateral pulmonary disease most likely due to edema, less likely infection. Small amount of pleural fluid. Hilar enlargement favored to be due to vascular congestion and enlargement.                LABS:  Labs Reviewed   CBC WITH AUTO DIFFERENTIAL - Abnormal; Notable for the following components:       Result Value    RBC 3.66 (*)     Hemoglobin 11.3 (*)     Hematocrit 34.3 (*)     RDW 15.8 (*)     Neutrophils Absolute 7.9 (*)     Lymphocytes Absolute 0.5 (*)     All other components within normal limits    Narrative:     Performed at:  [facility]  [address] [place], [place] [address]   Phone [phone]   COMPREHENSIVE METABOLIC PANEL W/ REFLEX TO MG FOR LOW K - Abnormal; Notable for the following components:    Glucose 104 (*)     BUN 48 (*)     CREATININE 4.3 (*)     GFR Non- 14 (*)     GFR  16 (*)     All other components within normal limits    Narrative:     Performed at:  [facility]  [address] [place], [place] [address]   Phone [phone]   BRAIN NATRIURETIC PEPTIDE - Abnormal; Notable for the following components: Pro-BNP 5,889 (*)     All other components within normal limits    Narrative:     Performed at:  [facility]  [address] [place] [address]   Phone [phone]   TROPONIN - Abnormal; Notable for the following components:    Troponin 0.03 (*)     All other components within normal limits    Narrative:     Performed at:  [facility]  [address] [place] [address]   Phone [phone]   TSH WITHOUT REFLEX       All other labs were within normal range or not returned as of this dictation. EMERGENCY DEPARTMENT COURSE and DIFFERENTIAL DIAGNOSIS/MDM:   Vitals:    Vitals:    09/25/20 1045 09/25/20 1215 09/25/20 1309 09/25/20 1333   BP: 133/73 (!) 142/85 132/75    Pulse: 96 80 81    Resp: 28 18 19 20   Temp:  97.5 °F (36.4 °C) 98.4 °F (36.9 °C)    TempSrc:  Oral Oral    SpO2: 98% 96% 97% 94%   Weight:       Height:           MDM     Patient presents the ED with HPI noted above. Oxygen saturation had a percent on room air upon arrival however patient is on 4 L supplemental oxygen. The patient titrated to 2 L supplemental oxygen. He is afebrile and nontoxic-appearing. Differential diagnosis includes ACS, arrhythmia, CHF, lung cancer, pneumonia, COVID 23, PE, pneumothorax, other. EKG showed no STEMI. Please see my attendings note regarding official interpretation. Troponin elevated 0.03. BNP elevated at 5889. Patient has a document history of CHF. Chest x-ray shows bilateral pulmonary disease most likely due to edema, less likely infection. Small amount of pleural fluid. Hilar enlargement favored to be due to vascular congestion. Suspect CHF exacerbation do not suspect acute history and exam. Patient has acute on chronic renal disease with a creatinine of 4.3. Baseline creatinine documented at 3.3 per his PCPs note. Given this patient only given 20 iv Lasix. Remainder of labs as above.   Patient admitted for further inpatient treatment, workup and evaluation regarding CHF exacerbation and acute on chronic kidney disease. Dr. Cee Otero kindly admitted patient. CONSULTS:  IP CONSULT TO CARDIOLOGY  IP CONSULT TO HEART FAILURE NURSE/COORDINATOR  IP CONSULT TO DIETITIAN    PROCEDURES:  Procedures    FINAL IMPRESSION      1. Acute congestive heart failure, unspecified heart failure type ([facility] [address]    2. Acute on chronic combined systolic and diastolic CHF (congestive heart failure) ([facility] [address]          DISPOSITION/PLAN   [unfilled]    PATIENT REFERRED TO:  No follow-up provider specified.     DISCHARGE MEDICATIONS:  Current Discharge Medication List          (Please note that portions of this note were completed with a voice recognition program.  Efforts were made to edit the dictations but occasionally words are mis-transcribed.)    [address] [facility]          [address] [place], [place]  09/25/20 8207

## 2020-09-25 NOTE — ED NOTES
O2 down to 2L/min nasal cannula per [facility]. SpO2 at 98% on 2 L/min nasal cannula.      Lori Carbone RN  09/25/20 2902

## 2020-09-25 NOTE — CONSULTS
Nephrology ([facility]) Consult Note    Marco Hendrickson is a 77 y.o. male whom we were asked to see for chest discomfort x 2 weeks and increased dyspnea x 3 weeks. He was worried about O2 sat 78% on RA at home; however, EMS found him to be 90% on RA. The patient is just ambivalent and inconsistent as to what he wants to do. He isn't concerned about living or dying and yet he completely self-isolated himself for 6 months; one would almost interpret this action as someone who really wants to live and not get sick. Apparently, he has been indecisive about whether or not to pursue dialysis. Yet, he accuses Dr. Dejuan Alvarez of \"not doing anything\" even though it sounds like they were trying to clarify what he is willing or not willing to do. He is really focused on the cost of tests and procedures, but  I explained we cannot manage differently (better or worse) depending upon someone's financial abilities,and while I completely understand that perspective, he isn't even willing to see how much something would cost him (I.e. how much insurance would cover). Past Medical History:  CKD4   - sees Dr. Dejuan Alvarez   - last seen 11/19 Cr 3.8  CAD  HTN  HLP  MGUS   - apparently, he refused followed with Dr. Prudencio Quintana    Review of System:  Otherwise unremarkable    Allergies:  Patient has no known allergies. Medications:  Current medications reviewed. Social History:  former tobacco  Family Medical History:  Negative for kidney disase    Physical Exam:  Blood pressure 132/75, pulse 81, temperature 98.4 °F (36.9 °C), temperature source Oral, resp. rate 20, height 5' 9.02\" (1.753 m), weight (!) 340 lb 13.3 oz (154.6 kg), SpO2 94 %.     General:  NAD, A+Ox3, ill-appearing, obese body habitus  HEENT:  deferred due to pandemic  Neck:  deferred  Chest:  deferred  CV:  deferred  Abdomen:  deferred  Extremities:  2+ edema  Neurological:  deferred  Lymphatics:  deferred  Skin:  No rash, no jaundice  Psychiatric:  poor insight and judgement, moderate recall    Laboratory Studies:  Lab Results   Component Value Date/Time     09/25/2020 10:00 AM    K 3.8 09/25/2020 10:00 AM     09/25/2020 10:00 AM    CO2 26 09/25/2020 10:00 AM    BUN 48 (H) 09/25/2020 10:00 AM    CREATININE 4.3 (H) 09/25/2020 10:00 AM    CALCIUM 9.1 09/25/2020 10:00 AM    PHOS 4.6 11/05/2019 07:12 AM    MG 2.20 11/05/2019 07:12 AM     Lab Results   Component Value Date/Time    WBC 9.5 09/25/2020 10:00 AM    HGB 11.3 (L) 09/25/2020 10:00 AM    HCT 34.3 (L) 09/25/2020 10:00 AM     09/25/2020 10:00 AM       Assessment/Plan:  Reviewed old records and labs. 1) KAIDEN on CKD4 vs. progression of CKD4   - sees Dr. Nicole Carlson   - patient needs to clearly decide upon whether or not he wants to do dialysis and live because he needs to be prepared for dialysis, but because \"for now\" he is not willing to proceed, I will not proceed with any preparations   - he refuses to be make definitive statements, so \"for now\", he is not willing to do dialysis.    - d/c lisinopril as he is a poor candidate given his non-compliance with follow-up   - I attempted to explain the benefit of routine follow-up with Dr. Nicole Carlson, but it is ultimately up to the patient    2) dyspnea   - repeat echo   - 08/07/18 echo pretty unremarkable   - lasix 20 mg IV x 1 is pretty insignificant given his severe CKD, and d/w Dr. Cecilia Carbajal, and I am okay with some diuretics tonight until echo result is available as he is total body volume overloaded   - given his subacute presentation, he likely has venous stasis vs. obesity-related hypoventilation syndrome vs. JUAN C vs. deconditioning   - cardiology consulted    3) morbid obesity   - needs to lose weight    4) MGUS   - reinforced follow-up with hematology    5) non-compliance   - poor prognosis    I explained that we will respect whatever limitations he places on his care  and that I am not here to persuade him to do dialysis or not, but he needs to make clear where and what those boundaries are; qualifying statements with \"for now\" does in no way help him or us because it introduces uncertainty and how are we to manage his care if he might change his mind tomorrow. Does he have major depression?

## 2020-09-25 NOTE — PROGRESS NOTES
Fall assessment completed. Pt steady on feet but is dyspneic with exertion. Explained risks of falling due to ambulating without assistance. Pt states he understands. Pt refusing bed/chair alarm at this time but states he will call if he needs help. Will continue to reassess, closely monitor and perform hourly safety checks.

## 2020-09-25 NOTE — ED NOTES
Bed: B-10  Expected date: 9/25/20  Expected time: 9:16 AM  Means of arrival: [facility]  Comments:  SOB/chest Pain     Jake Marinelli RN  09/25/20 9648

## 2020-09-26 LAB
ANION GAP SERPL CALCULATED.3IONS-SCNC: 13 MMOL/L (ref 3–16)
BUN BLDV-MCNC: 47 MG/DL (ref 7–20)
CALCIUM SERPL-MCNC: 9 MG/DL (ref 8.3–10.6)
CHLORIDE BLD-SCNC: 103 MMOL/L (ref 99–110)
CHOLESTEROL, TOTAL: 107 MG/DL (ref 0–199)
CO2: 25 MMOL/L (ref 21–32)
CREAT SERPL-MCNC: 4.5 MG/DL (ref 0.8–1.3)
GFR AFRICAN AMERICAN: 16
GFR NON-AFRICAN AMERICAN: 13
GLUCOSE BLD-MCNC: 107 MG/DL (ref 70–99)
HCT VFR BLD CALC: 35.2 % (ref 40.5–52.5)
HDLC SERPL-MCNC: 34 MG/DL (ref 40–60)
HEMOGLOBIN: 11.4 G/DL (ref 13.5–17.5)
LDL CHOLESTEROL CALCULATED: 59 MG/DL
LV EF: 40 %
LVEF MODALITY: NORMAL
MAGNESIUM: 2.3 MG/DL (ref 1.8–2.4)
MCH RBC QN AUTO: 30.3 PG (ref 26–34)
MCHC RBC AUTO-ENTMCNC: 32.3 G/DL (ref 31–36)
MCV RBC AUTO: 93.8 FL (ref 80–100)
PDW BLD-RTO: 15.7 % (ref 12.4–15.4)
PLATELET # BLD: 286 K/UL (ref 135–450)
PMV BLD AUTO: 8.5 FL (ref 5–10.5)
POTASSIUM SERPL-SCNC: 3.7 MMOL/L (ref 3.5–5.1)
RBC # BLD: 3.75 M/UL (ref 4.2–5.9)
SODIUM BLD-SCNC: 141 MMOL/L (ref 136–145)
TRIGL SERPL-MCNC: 71 MG/DL (ref 0–150)
VLDLC SERPL CALC-MCNC: 14 MG/DL
WBC # BLD: 9 K/UL (ref 4–11)

## 2020-09-26 PROCEDURE — 80048 BASIC METABOLIC PNL TOTAL CA: CPT

## 2020-09-26 PROCEDURE — 6370000000 HC RX 637 (ALT 250 FOR IP): Performed by: HOSPITALIST

## 2020-09-26 PROCEDURE — 6370000000 HC RX 637 (ALT 250 FOR IP): Performed by: INTERNAL MEDICINE

## 2020-09-26 PROCEDURE — 99232 SBSQ HOSP IP/OBS MODERATE 35: CPT | Performed by: NURSE PRACTITIONER

## 2020-09-26 PROCEDURE — 36415 COLL VENOUS BLD VENIPUNCTURE: CPT

## 2020-09-26 PROCEDURE — 1200000000 HC SEMI PRIVATE

## 2020-09-26 PROCEDURE — 83735 ASSAY OF MAGNESIUM: CPT

## 2020-09-26 PROCEDURE — 6360000002 HC RX W HCPCS: Performed by: HOSPITALIST

## 2020-09-26 PROCEDURE — 94761 N-INVAS EAR/PLS OXIMETRY MLT: CPT

## 2020-09-26 PROCEDURE — 2580000003 HC RX 258: Performed by: HOSPITALIST

## 2020-09-26 PROCEDURE — 6360000002 HC RX W HCPCS: Performed by: INTERNAL MEDICINE

## 2020-09-26 PROCEDURE — 93306 TTE W/DOPPLER COMPLETE: CPT

## 2020-09-26 PROCEDURE — 2700000000 HC OXYGEN THERAPY PER DAY

## 2020-09-26 PROCEDURE — 85027 COMPLETE CBC AUTOMATED: CPT

## 2020-09-26 PROCEDURE — 80061 LIPID PANEL: CPT

## 2020-09-26 RX ORDER — FUROSEMIDE 10 MG/ML
40 INJECTION INTRAMUSCULAR; INTRAVENOUS DAILY
Status: DISCONTINUED | OUTPATIENT
Start: 2020-09-26 | End: 2020-09-27

## 2020-09-26 RX ADMIN — HEPARIN SODIUM 5000 UNITS: 5000 INJECTION INTRAVENOUS; SUBCUTANEOUS at 06:28

## 2020-09-26 RX ADMIN — HEPARIN SODIUM 5000 UNITS: 5000 INJECTION INTRAVENOUS; SUBCUTANEOUS at 22:09

## 2020-09-26 RX ADMIN — CLOPIDOGREL BISULFATE 75 MG: 75 TABLET ORAL at 09:37

## 2020-09-26 RX ADMIN — FUROSEMIDE 40 MG: 10 INJECTION, SOLUTION INTRAMUSCULAR; INTRAVENOUS at 15:40

## 2020-09-26 RX ADMIN — ASPIRIN 81 MG: 81 TABLET, CHEWABLE ORAL at 09:37

## 2020-09-26 RX ADMIN — Medication 10 ML: at 22:13

## 2020-09-26 RX ADMIN — HEPARIN SODIUM 5000 UNITS: 5000 INJECTION INTRAVENOUS; SUBCUTANEOUS at 14:36

## 2020-09-26 RX ADMIN — Medication 10 ML: at 09:37

## 2020-09-26 RX ADMIN — MULTIPLE VITAMINS W/ MINERALS TAB 1 TABLET: TAB at 22:09

## 2020-09-26 RX ADMIN — NIFEDIPINE 60 MG: 60 TABLET, EXTENDED RELEASE ORAL at 22:11

## 2020-09-26 RX ADMIN — ATORVASTATIN CALCIUM 20 MG: 20 TABLET, FILM COATED ORAL at 22:09

## 2020-09-26 RX ADMIN — NIFEDIPINE 60 MG: 60 TABLET, EXTENDED RELEASE ORAL at 09:37

## 2020-09-26 RX ADMIN — METOPROLOL SUCCINATE 100 MG: 50 TABLET, EXTENDED RELEASE ORAL at 09:37

## 2020-09-26 ASSESSMENT — PAIN SCALES - GENERAL
PAINLEVEL_OUTOF10: 0

## 2020-09-26 NOTE — PROGRESS NOTES
This nurse along with a second nurse atempted to perform admission skin assessment. Pt declined. Pt states \"my skin is good, I have no wounds, just take my word for it. \" Educated patient on the importance of admission skin assessment. Pt states he understands. Will continue to monitor.

## 2020-09-26 NOTE — CONSULTS
[facility]           [address] [place] [address]                                  CONSULTATION    PATIENT NAME: Surekha Timmons                          :        1945  MED REC NO:   [id number]                          ROOM:       [id number]  ACCOUNT NO:   [de-identified]                           ADMIT DATE: 2020  PROVIDER:     Kang Gerardo MD    CARDIOLOGY CONSULTATION    CONSULT DATE:  2020    HISTORY OF PRESENT ILLNESS:  This is a 50-year-old morbidly obese male  with a history of chronic kidney disease, hypertension, presented to the  hospital with worsening shortness of breath for the last two to three  weeks. He said he has been noticing trouble breathing with minimal  amount of exertion and he also has noticed his heart racing and he feels  his heartbeat in his ears. His symptoms got progressively worse to the  point that he decided to come to the hospital.  In the emergency room,  his workup raised the suspicion of heart failure. He also was found to  have a worsening of his already abnormal renal function. He apparently  had some chest tightness though he denied any diaphoresis, any radiation  of his pain to his arm or to his jaws. He does have some difficulty  sleeping, though he attributes this to him taking frequent naps during  the day time. He had no dizziness, lightheadedness or syncope. REVIEW OF SYSTEMS:  Please see HPI. All other systems are reviewed and  they are negative. PAST MEDICAL HISTORY:  1. History of chronic kidney disease stage IV. He was seen by Dr. Deonte Larsen in the past.  2.  History of hypertension. 3.  History of hyperlipidemia. 4.  Obesity with BMI of 50.31 kg/m2. SOCIAL HISTORY:  Denies any smoking or alcohol abuse. FAMILY HISTORY:  One brother  with a congenital heart disease at a  young age. PAST SURGICAL HISTORY:  Negative.     MEDICINES AND ALLERGIES:  Have been reviewed. PHYSICAL EXAMINATION:  VITAL SIGNS:  His pulse is 96, blood pressure 173/90, respirations are  21, oxygen saturation 94%. CONSTITUTIONAL:  He is alert and oriented. HEENT EXAMINATION:  His neck is short but supple. I am unable to  appreciate any jugular venous pulse. He does have bilateral carotid  bruit versus a transmitted murmur from the chest.  CARDIAC EXAMINATION:  Reveals normal S1. S2 is slightly soft. He has a  loud 5-4/2 systolic murmur across the whole precordium. The A2  component of the second heart sound is slightly soft but present. LUNG EXAMINATION:  Reveals diminished breath sounds in the lower lung  fields. ABDOMEN:  Distended. Bowel sounds are diminished but present. I could  not appreciate any organomegaly. EXTREMITIES:  Show 2+ lower extremity edema. NEUROLOGICAL:  He is alert, oriented. Cranial nerves II through XII  intact. No focal deficit. SKIN:  Shows no rashes. LABORATORY DATA:  Sodium is 142, potassium is 3.8, chloride 104, bicarb  26, his BUN is 48, creatinine 4.3. His proBNP is 5889. Troponin is  0.03. Hemoglobin 11.3, hematocrit is 34.3.  TSH is 1.6. His EKG showed sinus rhythm, prior anteroseptal myocardial infarction,  rule out prior inferior wall myocardial infarction with isolated PVC. IMPRESSION:  1. This is a 71-year-old male who has presented with worsening  shortness of breath which is probably multifactorial.  He does have  evidence of fluid overload by chest x-ray and congestive heart failure  is a certain possibility. He is morbidly obese and deconditioned which  certainly contributes also to his shortness of breath. He also has a  systolic murmur which raises the possibility of aortic valve disease  which all certainly can contribute to his shortness of breath. Sleep  apnea is another possibility. 2.  Systolic murmur suspicious for significant aortic valve disease. 3.  Palpitation of undetermined cause.   Underlying cardiac

## 2020-09-26 NOTE — PLAN OF CARE
Problem: Falls - Risk of:  Goal: Will remain free from falls  Description: Will remain free from falls  9/26/2020 0012 by Joycelyn Galvan RN  Outcome: Ongoing  9/25/2020 2016 by Isabela Goff  Outcome: Ongoing  Goal: Absence of physical injury  Description: Absence of physical injury  9/26/2020 0012 by Joycelyn Galvan RN  Outcome: Ongoing  9/25/2020 2016 by Isabela Goff  Outcome: Ongoing     Problem: OXYGENATION/RESPIRATORY FUNCTION  Goal: Patient will maintain patent airway  9/26/2020 0012 by Joycelyn Galvan RN  Outcome: Ongoing  9/25/2020 2016 by Isabela Goff  Outcome: Ongoing  Goal: Patient will achieve/maintain normal respiratory rate/effort  Description: Respiratory rate and effort will be within normal limits for the patient  9/26/2020 0012 by Joycelyn Galvan RN  Outcome: Ongoing  9/25/2020 2016 by Isabela Goff  Outcome: Ongoing     Problem: HEMODYNAMIC STATUS  Goal: Patient has stable vital signs and fluid balance  9/26/2020 0012 by Joycelyn Galvan RN  Outcome: Ongoing  9/25/2020 2016 by Isabela Goff  Outcome: Ongoing     Problem: FLUID AND ELECTROLYTE IMBALANCE  Goal: Fluid and electrolyte balance are achieved/maintained  9/26/2020 0012 by Joycelyn Galvan RN  Outcome: Ongoing  9/25/2020 2016 by Isabela Goff  Outcome: Ongoing     Problem: ACTIVITY INTOLERANCE/IMPAIRED MOBILITY  Goal: Mobility/activity is maintained at optimum level for patient  9/26/2020 0012 by Joycelyn Galvan RN  Outcome: Ongoing  9/25/2020 2016 by Isabela Goff  Outcome: Ongoing

## 2020-09-26 NOTE — PROGRESS NOTES
[facility]   Daily Progress Note      Admit Date:  9/25/2020    CC: SOB    HPI:   Rosemary Crespo is a 77 y.o. male with PMH CAD, SHF/ICM CKD4 (Dr. Analy Cordova), HTN, HLP, DM, former smoker,  obesity. Admitted to [facility] with SOB and chest discomfort for several weeks. Occurs with exertion. Associated with \"racing heart. \"  CXR indicates pulm edema and HF. He was given IV lasix and Neph consulted. ECHO pending. Today he ambulates in the room. He remains on 2L NC. He C/O SOB with exertion and feels he is unable to take a deep breath. He denies CP. He has BLE edema which he says has been present for about 3 yrs. Review of Systems:   General: Denies fever, chills, +fatigue  Skin: Denies skin changes, rash, itching, lesions.   HEENT: Denies headache, dizziness, vision changes, nosebleeds, sore throat, nasal drainage  RESP: Denies cough, sputum, dyspnea, wheeze, snoring  CARD: Denies palpitations,  murmur  GI:Denies nausea, vomiting, heartburn, loss of appetite, change in bowels  : Denies frequency, pain, incontinence, polyuria  VASC: Denies claudication, leg cramps, clots  MUSC/SKEL: Denies pain, stiffness, arthritis  PSYCH: Denies anxiety, depression, stress  NEURO: Denies numbness, tingling, weakness,change in mood or memory  HEME: Denies abn bruising, bleeding, anemia  ENDO: Denies intolerance to heat, cold, excessive thirst or hunger, hx thyroid disease    Objective:   BP (!) 151/80   Pulse 86   Temp 98.6 °F (37 °C) (Oral)   Resp 21   Ht 5' 9.02\" (1.753 m)   Wt (!) 326 lb 4.5 oz (148 kg)   SpO2 95%   BMI 48.16 kg/m²        Intake/Output Summary (Last 24 hours) at 9/26/2020 1005  Last data filed at 9/26/2020 5555  Gross per 24 hour   Intake 100 ml   Output 2425 ml   Net -2325 ml     I/O since adm: Neg 2.1L    WEIGHT:Admit Weight: (!) 340 lb 13.3 oz (154.6 kg)         Today  Weight: (!) 326 lb 4.5 oz (148 kg)   DRY WEIGHT:  Wt Readings from Last 3 Encounters:   09/26/20 (!) 326 lb 4.5 oz (148 kg) 06/11/19 (!) 338 lb (153.3 kg)   01/11/19 (!) 334 lb (151.5 kg)       Physical Exam:  GEN: Appears obese, no acute distress  SKIN: Pink, warm, dry. Nails without clubbing. HEENT: PERRLA. Normocephalic, atraumatic. Neck supple. No adenopathy. LUNG: AP diameter normal. Diminished bases with fine crackles. Respiratory effort normal.  HEART: S1S2 A/R. No JVD. No carotid bruit. + systolic murmur, No rub or gallop. ABD: Soft, nontender. +BS X 4 quads. No hepatomegaly. EXT: Radial and pedal pulses 2+ and symmetric. Without varicosities. 2+ pedal to knees bilateral edema. MUSCSKEL: Good ROM X4 extremities. No deformity. NEURO: A/O X3. Calm and cooperative. Telemetry: NSR HR 74    Medications:    aspirin  81 mg Oral Daily    atorvastatin  20 mg Oral Nightly    clopidogrel  75 mg Oral Daily    therapeutic multivitamin-minerals  1 tablet Oral Nightly    NIFEdipine  60 mg Oral BID    sodium chloride flush  10 mL Intravenous 2 times per day    heparin (porcine)  5,000 Units Subcutaneous Q8H    metoprolol succinate  100 mg Oral Daily       sodium chloride flush, acetaminophen **OR** acetaminophen, polyethylene glycol, promethazine **OR** ondansetron    Lab Data: I have reviewed all labs below today.    CBC:   Recent Labs     09/25/20  1000 09/26/20  0603   WBC 9.5 9.0   HGB 11.3* 11.4*   HCT 34.3* 35.2*   MCV 93.7 93.8    286     BMP:   Recent Labs     09/25/20  1000 09/26/20  0603    141   K 3.8 3.7    103   CO2 26 25   BUN 48* 47*   CREATININE 4.3* 4.5*     GLUCOSE:   Recent Labs     09/25/20  1000 09/26/20  0603   GLUCOSE 104* 107*     LIVER PROFILE:   Lab Results   Component Value Date    AST 15 09/25/2020    ALT 17 09/25/2020    LABALBU 3.7 09/25/2020    BILIDIR <0.2 11/05/2019    BILITOT <0.2 09/25/2020    ALKPHOS 100 09/25/2020     PT/INR:   Lab Results   Component Value Date    PROTIME 10.9 01/27/2015    INR 1.01 01/27/2015     APTT:   Lab Results   Component Value Date    APTT 24.4 04/16/2017     Pro-BNP:    Lab Results   Component Value Date    PROBNP 5,889 09/25/2020    PROBNP 1,353 06/15/2018    PROBNP 259 04/02/2015     Parameters:   > 450 pg/mL under age 48  > 900 pg/mL ages 54-65  > 1800 pg/mL over age 76    ENZYMES:  Lab Results   Component Value Date    KHLNXUT 480 01/30/2015    TROPONINI 0.03 09/25/2020     FASTING LIPID PANEL:  Lab Results   Component Value Date    CHOL 107 09/26/2020    HDL 34 09/26/2020    LDLCALC 59 09/26/2020    TRIG 71 09/26/2020       Diagnostics:    EKG: Sinus rhythm with occasional Premature ventricular complexes and Fusion complexesLeft axis deviationLow voltage QRSInferior infarct (cited on or before 16-APR-2017)Cannot rule out Anterior infarct (cited on or before 16-APR-2017)Prolonged QTAbnormal ECGWhen compared with ECG of 16-APR-2017 10:46,Questionable change in initial forces of Septal leadspremature ventricular complex is now seenQT has lengthenedConfirmed by Vernon, [address] [place] [address] ([id number]) on 9/25/2020 9:47:18 PM     ECHO: Pending     8/2018:  Summary   Suboptimal parasternal image quality. Ejection fraction is visually estimated to be 50-55%. There is hypokinesis of the distal anteroseptal wall. Right ventricular size and function are normal.   Mildly dilated left atrium. There are no significant valvular abnormalities appreciated in this study. 4/17/17   Summary   Appears to have normal left ventricular size and wall thickness. Mildly   decreased left ventricular systolic function with an estimated ejection fraction of 40%.   Mid distal anterior wall hypokinesis. The apex is akinetic.   Normal right ventricular size and function. Stress Test:     Cardiac Angiography: 4/19/2017  ANGIOGRAPHIC FINDINGS:  1. Left main coronary comes from the left coronary cusp, has FERMÍN-3 flow, no  significant stenosis.   2.  The left anterior descending artery comes from the left main coronary  artery, is occluded in the proximal portion and mid portion has exercise, weight loss, smoking cessation, stress reduction  Cardiac Rehab: Phase 2    3.) Hypertension:   Goal BP <130/80.  Continue med management  Non pharmacologic interventions include:   -weight loss  -heart healthy low sodium and low fat diet that consist of mostly fruits, vegetables and grains (Dash diet)  -limited amount of alcohol (no more than 1 drink/day for women, 2 drinks/day for men)  -regular physical activity  -no smoking  -stress reduction    Electronically signed by DOC Rutledge CNP on 9/26/2020 at 10:05 AM

## 2020-09-26 NOTE — PLAN OF CARE
Problem: Falls - Risk of:  Goal: Will remain free from falls  Description: Will remain free from falls  Outcome: Ongoing  Goal: Absence of physical injury  Description: Absence of physical injury  Outcome: Ongoing     Problem: OXYGENATION/RESPIRATORY FUNCTION  Goal: Patient will maintain patent airway  Outcome: Ongoing  Goal: Patient will achieve/maintain normal respiratory rate/effort  Description: Respiratory rate and effort will be within normal limits for the patient  Outcome: Ongoing     Problem: HEMODYNAMIC STATUS  Goal: Patient has stable vital signs and fluid balance  Outcome: Ongoing     Problem: FLUID AND ELECTROLYTE IMBALANCE  Goal: Fluid and electrolyte balance are achieved/maintained  Outcome: Ongoing     Problem: ACTIVITY INTOLERANCE/IMPAIRED MOBILITY  Goal: Mobility/activity is maintained at optimum level for patient  Outcome: Ongoing

## 2020-09-26 NOTE — PROGRESS NOTES
Hospitalist Progress Note      PCP: Gisel Moore MD    Chief Complaint. Presented to hospital for shortness of breath, bilateral lower extremity swelling    Date of Admission: 9/25/2020      Subjective:   denies chest pain, nausea, vomiting, shortness of breath, fever or chills. mention feels overall better    Medications:  Reviewed    Infusion Medications   Scheduled Medications    aspirin  81 mg Oral Daily    atorvastatin  20 mg Oral Nightly    clopidogrel  75 mg Oral Daily    therapeutic multivitamin-minerals  1 tablet Oral Nightly    NIFEdipine  60 mg Oral BID    sodium chloride flush  10 mL Intravenous 2 times per day    heparin (porcine)  5,000 Units Subcutaneous Q8H    metoprolol succinate  100 mg Oral Daily     PRN Meds: sodium chloride flush, acetaminophen **OR** acetaminophen, polyethylene glycol, promethazine **OR** ondansetron      Intake/Output Summary (Last 24 hours) at 9/26/2020 1454  Last data filed at 9/26/2020 0383  Gross per 24 hour   Intake 100 ml   Output 2175 ml   Net -2075 ml       Physical Exam Performed:    BP (!) 143/75   Pulse 66   Temp 99 °F (37.2 °C) (Oral)   Resp 18   Ht 5' 9.02\" (1.753 m)   Wt (!) 326 lb 4.5 oz (148 kg)   SpO2 94%   BMI 48.16 kg/m²     General appearance: No apparent distress  HEENT:  Conjunctivae/corneas clear. Neck: Supple, with full range of motion. Respiratory: Left lower lung crackles noted, right lung clear, no wheezing bilaterally  Cardiovascular: Regular rate and rhythm with normal S1/S2 without murmurs or rubs  Abdomen: Soft, non-tender, non-distended, normal bowel sounds. Musculoskeletal: Significant bilateral lower extremity edema noted  Neurologic:  without any focal sensory/motor deficits.  grossly non-focal.  Psychiatric: Alert and oriented, Normal mood  Peripheral Pulses: +2 palpable, equal bilaterally       Labs:   Recent Labs     09/25/20  1000 09/26/20  0603   WBC 9.5 9.0   HGB 11.3* 11.4*   HCT 34.3* 35.2*

## 2020-09-26 NOTE — CONSULTS
(please see cardiology consult note from Dr. Saundra Park on 9/25 and progress note from Erwin Montano NP on 9/26).

## 2020-09-26 NOTE — PROGRESS NOTES
[facility]  Nephrology   Progress Note        We are following the patient for CKD Stage 4 Fluid overload     SUBJECTIVE:  Feels better Notes edema is improving  Still requiring O2   UOP 2100 ml/24 hours  Family at bedside    OBJECTIVE:     PHYSICAL:    TEMPERATURE:  Current - Temp: 99 °F (37.2 °C);  Max - Temp  Av.6 °F (37 °C)  Min: 98.3 °F (36.8 °C)  Max: 99 °F (37.2 °C)  RESPIRATIONS RANGE: Resp  Av.8  Min: 16  Max: 21  PULSE RANGE: Pulse  Av  Min: 65  Max: 87  BLOOD PRESSURE RANGE:  Systolic (52RKI), MSM:513 , Min:143 , EHU:000   ; Diastolic (35TXI), XT, Min:75, Max:92    PULSE OXIMETRY RANGE: SpO2  Av.7 %  Min: 92 %  Max: 97 %  24HR INTAKE/OUTPUT:      Intake/Output Summary (Last 24 hours) at 2020 1514  Last data filed at 2020 5928  Gross per 24 hour   Intake 100 ml   Output 2025 ml   Net -1925 ml       CONSTITUTIONAL:  awake, alert, cooperative, no apparent distress, and appears stated age  HEENT:  Lids and lashes normal, pupils equal, round and reactive to light  NECK:  Supple, symmetrical, trachea midline, no adenopathy  LUNGS:  No increased work of breathing, good air exchange, clear to auscultation bilaterally, no crackles or wheezing  CARDIOVASCULAR:  Normal apical impulse, regular rate and rhythm, normal S1 and S2  ABDOMEN:  No scars, normal bowel sounds, soft, non-distended, non-tender  NEUROLOGIC:  alert, oriented, normal speech, no focal findings or movement disorder noted  SKIN: no bruising or bleeding  EXTREMITIES: 1+ bilateral pedal edema    Medications         Data      CBC:   Recent Labs     20  1000 20  0603   WBC 9.5 9.0   RBC 3.66* 3.75*   HGB 11.3* 11.4*   HCT 34.3* 35.2*    286     BMP:    Recent Labs     20  1000 20  0603    141   K 3.8 3.7    103   CO2 26 25   BUN 48* 47*   CREATININE 4.3* 4.5*   CALCIUM 9.1 9.0   GLUCOSE 104* 107*     Phosphorus:  No results for input(s): PHOS in the last 72 hours.   Magnesium:    Recent Labs     09/26/20  0603   MG 2.30         ASSESSMENT     Patient Active Problem List   Diagnosis    [facility]STEMI (non-ST elevated myocardial infarction) ([facility]    Ischemic chest pain (HCC)    SOB (shortness of breath)       PLAN    1) KAIDEN on CKD4 vs. progression of CKD4              - sees Dr. Nicole Carlson   Cr stable at 4.5 mg He has decided not to pursue dialysis and understands the consequences Will continue conservative Rx      2) dyspnea ECHO noted EF 40% severe LVH and diastolic dysfunction              - cardiology consulted Will continue IV Lasix Avoid ACE-I/ARB     3) morbid obesity              - needs to lose weight     4) MGUS              - reinforced follow-up with hematology      Josiane Fox MD, [address]

## 2020-09-27 VITALS
HEART RATE: 80 BPM | OXYGEN SATURATION: 91 % | TEMPERATURE: 98.5 F | SYSTOLIC BLOOD PRESSURE: 132 MMHG | DIASTOLIC BLOOD PRESSURE: 77 MMHG | WEIGHT: 315 LBS | RESPIRATION RATE: 20 BRPM | BODY MASS INDEX: 46.65 KG/M2 | HEIGHT: 69 IN

## 2020-09-27 PROBLEM — I10 HYPERTENSION, ESSENTIAL, BENIGN: Status: ACTIVE | Noted: 2019-11-07

## 2020-09-27 PROBLEM — I50.43 ACUTE ON CHRONIC COMBINED SYSTOLIC AND DIASTOLIC CONGESTIVE HEART FAILURE (HCC): Status: ACTIVE | Noted: 2020-09-27

## 2020-09-27 PROBLEM — I50.33 ACUTE ON CHRONIC DIASTOLIC CHF (CONGESTIVE HEART FAILURE) (HCC): Status: ACTIVE | Noted: 2020-09-27

## 2020-09-27 PROBLEM — E66.01 MORBID OBESITY WITH BMI OF 45.0-49.9, ADULT (HCC): Status: ACTIVE | Noted: 2020-09-27

## 2020-09-27 PROBLEM — N18.4 CHRONIC KIDNEY DISEASE, STAGE IV (SEVERE) (HCC): Status: ACTIVE | Noted: 2019-11-07

## 2020-09-27 LAB
ANION GAP SERPL CALCULATED.3IONS-SCNC: 15 MMOL/L (ref 3–16)
BUN BLDV-MCNC: 58 MG/DL (ref 7–20)
CALCIUM SERPL-MCNC: 8.8 MG/DL (ref 8.3–10.6)
CHLORIDE BLD-SCNC: 105 MMOL/L (ref 99–110)
CO2: 23 MMOL/L (ref 21–32)
CREAT SERPL-MCNC: 4.7 MG/DL (ref 0.8–1.3)
GFR AFRICAN AMERICAN: 15
GFR NON-AFRICAN AMERICAN: 12
GLUCOSE BLD-MCNC: 111 MG/DL (ref 70–99)
POTASSIUM SERPL-SCNC: 3.8 MMOL/L (ref 3.5–5.1)
PRO-BNP: 5854 PG/ML (ref 0–449)
SODIUM BLD-SCNC: 143 MMOL/L (ref 136–145)

## 2020-09-27 PROCEDURE — 36415 COLL VENOUS BLD VENIPUNCTURE: CPT

## 2020-09-27 PROCEDURE — 83880 ASSAY OF NATRIURETIC PEPTIDE: CPT

## 2020-09-27 PROCEDURE — 2580000003 HC RX 258: Performed by: HOSPITALIST

## 2020-09-27 PROCEDURE — 6370000000 HC RX 637 (ALT 250 FOR IP): Performed by: INTERNAL MEDICINE

## 2020-09-27 PROCEDURE — 6360000002 HC RX W HCPCS: Performed by: HOSPITALIST

## 2020-09-27 PROCEDURE — 99232 SBSQ HOSP IP/OBS MODERATE 35: CPT | Performed by: NURSE PRACTITIONER

## 2020-09-27 PROCEDURE — 80048 BASIC METABOLIC PNL TOTAL CA: CPT

## 2020-09-27 PROCEDURE — 6360000002 HC RX W HCPCS: Performed by: INTERNAL MEDICINE

## 2020-09-27 PROCEDURE — 6370000000 HC RX 637 (ALT 250 FOR IP): Performed by: HOSPITALIST

## 2020-09-27 PROCEDURE — 94761 N-INVAS EAR/PLS OXIMETRY MLT: CPT

## 2020-09-27 RX ORDER — FUROSEMIDE 40 MG/1
40 TABLET ORAL 2 TIMES DAILY
Status: DISCONTINUED | OUTPATIENT
Start: 2020-09-28 | End: 2020-09-27 | Stop reason: HOSPADM

## 2020-09-27 RX ORDER — FUROSEMIDE 40 MG/1
40 TABLET ORAL 2 TIMES DAILY
Qty: 60 TABLET | Refills: 3 | Status: ON HOLD | OUTPATIENT
Start: 2020-09-27 | End: 2021-01-27 | Stop reason: HOSPADM

## 2020-09-27 RX ADMIN — METOPROLOL SUCCINATE 100 MG: 50 TABLET, EXTENDED RELEASE ORAL at 08:13

## 2020-09-27 RX ADMIN — HEPARIN SODIUM 5000 UNITS: 5000 INJECTION INTRAVENOUS; SUBCUTANEOUS at 05:57

## 2020-09-27 RX ADMIN — FUROSEMIDE 40 MG: 10 INJECTION, SOLUTION INTRAMUSCULAR; INTRAVENOUS at 08:13

## 2020-09-27 RX ADMIN — Medication 10 ML: at 08:13

## 2020-09-27 RX ADMIN — CLOPIDOGREL BISULFATE 75 MG: 75 TABLET ORAL at 08:13

## 2020-09-27 RX ADMIN — ASPIRIN 81 MG: 81 TABLET, CHEWABLE ORAL at 08:13

## 2020-09-27 RX ADMIN — NIFEDIPINE 60 MG: 60 TABLET, EXTENDED RELEASE ORAL at 08:13

## 2020-09-27 ASSESSMENT — PAIN SCALES - GENERAL
PAINLEVEL_OUTOF10: 0
PAINLEVEL_OUTOF10: 0

## 2020-09-27 NOTE — PLAN OF CARE
Problem: Falls - Risk of:  Goal: Will remain free from falls  Description: Will remain free from falls  9/27/2020 0110 by Cisco Mackey RN  Outcome: Ongoing  9/26/2020 2009 by Tyrone Burger  Outcome: Ongoing  Goal: Absence of physical injury  Description: Absence of physical injury  9/27/2020 0110 by Cisco Mackey RN  Outcome: Ongoing  9/26/2020 2009 by Tyrone Burger  Outcome: Ongoing     Problem: OXYGENATION/RESPIRATORY FUNCTION  Goal: Patient will maintain patent airway  9/27/2020 0110 by Cisco Mackey RN  Outcome: Ongoing  9/26/2020 2009 by Tyrone Burger  Outcome: Ongoing  Goal: Patient will achieve/maintain normal respiratory rate/effort  Description: Respiratory rate and effort will be within normal limits for the patient  9/27/2020 0110 by Cisco Mackey RN  Outcome: Ongoing  9/26/2020 2009 by Tyrone Burger  Outcome: Ongoing     Problem: HEMODYNAMIC STATUS  Goal: Patient has stable vital signs and fluid balance  9/27/2020 0110 by Cisco Mackey RN  Outcome: Ongoing  9/26/2020 2009 by Tyrone Burger  Outcome: Ongoing     Problem: FLUID AND ELECTROLYTE IMBALANCE  Goal: Fluid and electrolyte balance are achieved/maintained  9/27/2020 0110 by Cisco Mackey RN  Outcome: Ongoing  9/26/2020 2009 by Tyrone Burger  Outcome: Ongoing     Problem: ACTIVITY INTOLERANCE/IMPAIRED MOBILITY  Goal: Mobility/activity is maintained at optimum level for patient  9/27/2020 0110 by Cisco Mackey RN  Outcome: Ongoing  9/26/2020 2009 by Tyrone Burger  Outcome: Ongoing

## 2020-09-27 NOTE — PROGRESS NOTES
[facility]  Nephrology   Progress Note        We are following the patient for CKD Stage 4 Fluid overload     SUBJECTIVE:  Feels better Notes edema is improved  On RA  UOP 1500 ml/24 hours  Wants to go home    OBJECTIVE:     PHYSICAL:    TEMPERATURE:  Current - Temp: 98.5 °F (36.9 °C);  Max - Temp  Av.3 °F (36.8 °C)  Min: 97.9 °F (36.6 °C)  Max: 98.7 °F (37.1 °C)  RESPIRATIONS RANGE: Resp  Av.1  Min: 16  Max: 21  PULSE RANGE: Pulse  Av.1  Min: 71  Max: 80  BLOOD PRESSURE RANGE:  Systolic (98BYX), IML:594 , Min:123 , PZK:253   ; Diastolic (55YYA), YADIEL:01, Min:65, Max:77    PULSE OXIMETRY RANGE: SpO2  Av.8 %  Min: 91 %  Max: 95 %  24HR INTAKE/OUTPUT:      Intake/Output Summary (Last 24 hours) at 2020 1433  Last data filed at 2020 0548  Gross per 24 hour   Intake 355 ml   Output 1300 ml   Net -945 ml       CONSTITUTIONAL:  awake, alert, cooperative, no apparent distress, and appears stated age  HEENT:  Lids and lashes normal, pupils equal, round and reactive to light  NECK:  Supple, symmetrical, trachea midline, no adenopathy  LUNGS:  No increased work of breathing, good air exchange, clear to auscultation bilaterally, no crackles or wheezing  CARDIOVASCULAR:  Normal apical impulse, regular rate and rhythm, normal S1 and S2  ABDOMEN:  No scars, normal bowel sounds, soft, non-distended, non-tender  NEUROLOGIC:  alert, oriented, normal speech, no focal findings or movement disorder noted  SKIN: no bruising or bleeding  EXTREMITIES: trace bilateral pedal edema ace wraps in place    Medications         Data      CBC:   Recent Labs     20  1000 20  0603   WBC 9.5 9.0   RBC 3.66* 3.75*   HGB 11.3* 11.4*   HCT 34.3* 35.2*    286     BMP:    Recent Labs     20  1000 20  0603 20  0545    141 143   K 3.8 3.7 3.8    103 105   CO2 26 25 23   BUN 48* 47* 58*   CREATININE 4.3* 4.5* 4.7*   CALCIUM 9.1 9.0 8.8   GLUCOSE 104* 107* 111* Phosphorus:  No results for input(s): PHOS in the last 72 hours.   Magnesium:    Recent Labs     09/26/20  0603   MG 2.30         ASSESSMENT     Patient Active Problem List   Diagnosis    [facility] ([facility]    Ischemic chest pain (HCC)    SOB (shortness of breath)    Acute on chronic diastolic [facility] (congestive heart failure) ([facility])    Chronic kidney disease, stage IV (severe) ([facility]    Hypertension, essential, benign    Morbid obesity with BMI of 45.0-49.9, adult ([facility]    Acute on chronic combined systolic and diastolic congestive heart failure (HCC)       PLAN    1) KAIDEN on CKD4 vs. progression of CKD4              - sees Dr. Ryne Gordon   Cr stable at 4.7 mg He has decided not to pursue dialysis and understands the consequences Will continue conservative Rx OK for discharge from renal SP Follow up with Dr Ryne Gordon 1-2 weeks He wants a virtual visit     2) dyspnea ECHO noted EF 40% severe LVH and diastolic dysfunction              - cardiology consulted Wt down to 326 lbs  Avoid ACE-I/ARB Stressed FR      3) morbid obesity              - needs to lose weight     4) MGUS              - reinforced follow-up with hematology      Fredy Farias MD, [address]

## 2020-09-27 NOTE — PROGRESS NOTES
Weight: (!) 326 lb 4.5 oz (148 kg)   DRY WEIGHT:  Wt Readings from Last 3 Encounters:   09/27/20 (!) 326 lb 4.5 oz (148 kg)   06/11/19 (!) 338 lb (153.3 kg)   01/11/19 (!) 334 lb (151.5 kg)       Physical Exam:  GEN: Appears obese, no acute distress  SKIN: Pink, warm, dry. Nails without clubbing. HEENT: PERRLA. Normocephalic, atraumatic. Neck supple. No adenopathy. LUNG: AP diameter normal. Diminished bases with fine crackles. Respiratory effort normal.  HEART: S1S2 A/R. No JVD. No carotid bruit. + systolic murmur, No rub or gallop. ABD: Soft, nontender. +BS X 4 quads. No hepatomegaly. EXT: Radial and pedal pulses 2+ and symmetric. Without varicosities. 2+ pedal to knees bilateral edema. MUSCSKEL: Good ROM X4 extremities. No deformity. NEURO: A/O X3. Calm and cooperative. Telemetry: NSR HR 74    Medications:    furosemide  40 mg Intravenous Daily    aspirin  81 mg Oral Daily    atorvastatin  20 mg Oral Nightly    clopidogrel  75 mg Oral Daily    therapeutic multivitamin-minerals  1 tablet Oral Nightly    NIFEdipine  60 mg Oral BID    sodium chloride flush  10 mL Intravenous 2 times per day    heparin (porcine)  5,000 Units Subcutaneous Q8H    metoprolol succinate  100 mg Oral Daily       sodium chloride flush, acetaminophen **OR** acetaminophen, polyethylene glycol, promethazine **OR** ondansetron    Lab Data: I have reviewed all labs below today.    CBC:   Recent Labs     09/25/20  1000 09/26/20  0603   WBC 9.5 9.0   HGB 11.3* 11.4*   HCT 34.3* 35.2*   MCV 93.7 93.8    286     BMP:   Recent Labs     09/25/20  1000 09/26/20  0603 09/27/20  0545    141 143   K 3.8 3.7 3.8    103 105   CO2 26 25 23   BUN 48* 47* 58*   CREATININE 4.3* 4.5* 4.7*     GLUCOSE:   Recent Labs     09/25/20  1000 09/26/20  0603 09/27/20  0545   GLUCOSE 104* 107* 111*     LIVER PROFILE:   Lab Results   Component Value Date    AST 15 09/25/2020    ALT 17 09/25/2020    LABALBU 3.7 09/25/2020    BILIDIR <0.2 11/05/2019    BILITOT <0.2 09/25/2020    ALKPHOS 100 09/25/2020     PT/INR:   Lab Results   Component Value Date    PROTIME 10.9 01/27/2015    INR 1.01 01/27/2015     APTT:   Lab Results   Component Value Date    APTT 24.4 04/16/2017     Pro-BNP:    Lab Results   Component Value Date    PROBNP 5,854 09/27/2020    PROBNP 5,889 09/25/2020    PROBNP 1,353 06/15/2018     Parameters:   > 450 pg/mL under age 48  > 900 pg/mL ages 54-65  > 1800 pg/mL over age 76    ENZYMES:  Lab Results   Component Value Date    SSXSJEK 190 01/30/2015    TROPONINI 0.03 09/25/2020     FASTING LIPID PANEL:  Lab Results   Component Value Date    CHOL 107 09/26/2020    HDL 34 09/26/2020    LDLCALC 59 09/26/2020    TRIG 71 09/26/2020       Diagnostics:    EKG: Sinus rhythm with occasional Premature ventricular complexes and Fusion complexesLeft axis deviationLow voltage QRSInferior infarct (cited on or before 16-APR-2017)Cannot rule out Anterior infarct (cited on or before 16-APR-2017)Prolonged QTAbnormal ECGWhen compared with ECG of 16-APR-2017 10:46,Questionable change in initial forces of Septal leadspremature ventricular complex is now seenQT has lengthenedConfirmed by Vernon, [address] [place] [address] ([id number]) on 9/25/2020 9:47:18 PM     ECHO: 9/26/20   Summary   Ejection fraction is visually estimated to be 40%. There is severe hypokinesis of the entire apical myocardium and and   hypokinesis of the distal septal wall. There is severe concentric left ventricular hypertrophy. Grade II diastolic dysfunction with elevated LV filling pressures. Mild mitral regurgitation is present. No evidence of mitral stenosis. Mild calcification of the mitral valve noted. Moderate to severe aortic stenosis with a peak velocity of 437m/s and a mean   pressure gradient of 50mmHg. Mild aortic regurgitation. Moderately dilated right ventricle. Right ventricular systolic function is normal .     8/2018:  Summary   Suboptimal parasternal image quality. difficult to manage long term with CKD and declining HD. NYHA Class III   Stage 3  Diuretic: defer to Neph  Beta Blocker: Toprol XL  ACEi/ARB/ARNI: none - CKD  Aldosterone Antagonist: none CKD  SGLT2 Inhibitor: none CKD  2gm Na diet, daily weight, 64 oz fluid restriction  Avoid NSAIDS and other nephrotoxic meds  Cardiac Rehab:   ICD: Not indicated for EF>35%  [facility] Referral: outpatient  HF RN referral for education YES  Palliative Care consult for re admit <30 days or Stage IV: NA       2.) CAD s/p [place] to LAD 2018: Stable, denies CP today. Mild troponin elevation X1 could also be R/T HF. Difficult to do ischemic work up/ LHC with creat 4.7. DAPT: asa, plavix  Beta Blocker: Toprol XL  Lipid management/high intensity statin: lipitor  Risk factor management: high blood pressure, high cholesterol, Diabetes, smoking, obesity, family hx  Lifestyle modification: Heart healthy diet, regular exercise, weight loss, smoking cessation, stress reduction  Cardiac Rehab: Phase 2    3.) Hypertension:   Goal BP <130/80. Continue med management  Non pharmacologic interventions include:   -weight loss  -heart healthy low sodium and low fat diet that consist of mostly fruits, vegetables and grains (Dash diet)  -limited amount of alcohol (no more than 1 drink/day for women, 2 drinks/day for men)  -regular physical activity  -no smoking  -stress reduction    Patient is requesting to leave today. He has agreed to 7 day follow up if virtual visit. He is scheduled on 9/30. I will request our office change to virtual either with myself or PCP.      Electronically signed by DOC Le CNP on 9/27/2020 at 1:33 PM

## 2020-09-27 NOTE — DISCHARGE SUMMARY
Hospitalist Discharge Summary    Patient ID:  Rosemary Crespo  [id number]  77 y.o.  1945    Admit date: 9/25/2020    Discharge date: 9/27/2020    Disposition: home    Admission Diagnoses:   Patient Active Problem List   Diagnosis    NSTEMI (non-ST elevated myocardial infarction) ([facility]    Ischemic chest pain (HCC)    SOB (shortness of breath)    Acute on chronic diastolic CHF (congestive heart failure) ([facility]    Chronic kidney disease, stage IV (severe) ([facility]    Hypertension, essential, benign    Morbid obesity with BMI of 45.0-49.9, adult ([facility]    Acute on chronic combined systolic and diastolic congestive heart failure ([facility]       Discharge Diagnoses: Principal Problem:    Acute on chronic combined systolic and diastolic congestive heart failure (HCC)  Active Problems:    SOB (shortness of breath)    Acute on chronic diastolic CHF (congestive heart failure) (HCC)    Chronic kidney disease, stage IV (severe) ([facility])    Hypertension, essential, benign    Morbid obesity with BMI of 45.0-49.9, adult ([facility]  Resolved Problems:    * No resolved hospital problems. *      Code Status:  Full Code    Condition:  Stable    Discharge Diet: Diet:  DIET LOW SODIUM 2 GM; 1500 ml    PCP to do list:  Follow CKDIV closely as well as CHF compliance    Hospital Course: 67yo WM with PMHX sig for CHF, chronic diastolic, HT, CKDIV presents with worsening SOB and lower ext swelling. His SOB has been worse over last 3 weeks and pt did have acute resp failure with hypoxia as well as chest pressure and CHF exacerbation on day of admission. PT did have aggressive diuresis and lost 3.2L of fluid since admission. He is still on IV lasix, but states that he is going home today no matter what. He still has leg edema, but is off of oxygen. ECHO shows EF decreased to 40% with grade II diastolic dysfunction as well as hypokinesis of anterior wall which is not new.   PT has CKDIV with creatinine of 4.7 and is refusing to entertain HD at this time. He will be changed to oral lasix and discharged home otherwise he is threatening to leave AMA. He is on room air at this time and is close to discharge. Will increase lasix to 40mg bid, but suspect pt will present again as outpt. Unsure of follow up and his refusal of dialysis, which he reiterated to me is concerning. Discharge Medications:   Current Discharge Medication List        Current Discharge Medication List      CONTINUE these medications which have CHANGED    Details   furosemide (LASIX) 40 MG tablet Take 1 tablet by mouth 2 times daily  Qty: 60 tablet, Refills: 3           Current Discharge Medication List      CONTINUE these medications which have NOT CHANGED    Details   clopidogrel (PLAVIX) 75 MG tablet TAKE 1 TABLET BY MOUTH EVERY DAY  Qty: 90 tablet, Refills: 3      metoprolol succinate (TOPROL XL) 100 MG extended release tablet TAKE 1 TABLET BY MOUTH EVERY DAY  Qty: 90 tablet, Refills: 3      NIFEdipine (ADALAT CC) 60 MG extended release tablet Take 60 mg by mouth 2 times daily       aspirin 81 MG chewable tablet Take 81 mg by mouth daily. atorvastatin (LIPITOR) 20 MG tablet Take 20 mg by mouth daily. Multiple Vitamins-Minerals (THERAPEUTIC MULTIVITAMIN-MINERALS) tablet Take 1 tablet by mouth nightly.       Glucosamine-Chondroit-Vit C-Mn (GLUCOSAMINE CHONDR 1500 COMPLX PO) Take 1 each by mouth 2 times daily Indications: 2700 mg            Current Discharge Medication List      STOP taking these medications       oxymetazoline (12 HOUR NASAL SPRAY) 0.05 % nasal spray Comments:   Reason for Stopping:         lisinopril (PRINIVIL;ZESTRIL) 5 MG tablet Comments:   Reason for Stopping:                   Procedures: none    Assessment on Discharge: Stable, improved     Discharge ROS:  A complete review of systems was asked and negative except for denies SOB, still with leg edema    Discharge Exam:  /77   Pulse 80   Temp 98.5 °F (36.9 °C) Resp 20   Ht 5' 9.02\" (1.753 m)   Wt (!) 326 lb 4.5 oz (148 kg)   SpO2 91%   BMI 48.16 kg/m²     Gen: NAD  HEENT: NC/AT, moist mucous membranes, no oropharyngeal erythema or exudate  Neck: supple, trachea midline, no anterior cervical or SC LAD  Heart:  Normal s1/s2, RRR, no murmurs, gallops, or rubs. 3+ leg edema  Lungs:  diminished bilaterally, no wheeze,no rales or rhonchi, no use of accessory muscles, few basilar crackles  Abd: bowel sounds present, soft, nontender, nondistended, no masses  Extrem:  No clubbing, cyanosis,  3+ leg edema apparently improved  Skin: no lesion or masses  Psych:  A & O x3  Neuro: grossly intact, moves all four extremities    Pertinent Studies During Hospital Stay:  Radiology:  Xr Chest (2 Vw)    Result Date: 9/25/2020  EXAMINATION: TWO XRAY VIEWS OF THE CHEST 9/25/2020 10:16 am COMPARISON: April 16, 2017 HISTORY: ORDERING SYSTEM PROVIDED HISTORY: sob TECHNOLOGIST PROVIDED HISTORY: Reason for exam:->sob Reason for Exam: SOB Acuity: Acute Type of Exam: Initial FINDINGS: Diffuse reticulonodular opacity is present in both lungs. There is a small amount of pleural fluid with slight blunting of the costophrenic angles. No pneumothorax. There is prominence of the hilar shadows similar to the comparison most compatible with vascular enlargement. Bilateral pulmonary disease most likely due to edema, less likely infection. Small amount of pleural fluid. Hilar enlargement favored to be due to vascular congestion and enlargement.            Last Labs on Discharge:     Recent Results (from the past 24 hour(s))   Basic Metabolic Panel    Collection Time: 09/27/20  5:45 AM   Result Value Ref Range    Sodium 143 136 - 145 mmol/L    Potassium 3.8 3.5 - 5.1 mmol/L    Chloride 105 99 - 110 mmol/L    CO2 23 21 - 32 mmol/L    Anion Gap 15 3 - 16    Glucose 111 (H) 70 - 99 mg/dL    BUN 58 (H) 7 - 20 mg/dL    CREATININE 4.7 (H) 0.8 - 1.3 mg/dL    GFR Non- 12 (A) >60    GFR  American 15 (A) >60    Calcium 8.8 8.3 - 10.6 mg/dL   Brain Natriuretic Peptide    Collection Time: 09/27/20  5:45 AM   Result Value Ref Range    Pro-BNP 5,854 (H) 0 - 449 pg/mL         Follow up: with Osmar Quintana MD    Note that over 30 minutes was spent in preparing discharge papers, discussing discharge with patient, medication review, etc.    Thank you Osmar Quintana MD for the opportunity to be involved in this patient's care. If you have any questions or concerns please feel free to contact me at [phone].     Electronically signed by Jerad Ayala MD on 9/27/2020 at 2:31 PM

## 2020-09-28 ENCOUNTER — TELEPHONE (OUTPATIENT)
Dept: CARDIOLOGY CLINIC | Age: 75
End: 2020-09-28

## 2020-09-28 NOTE — TELEPHONE ENCOUNTER
Patient discharged from [facility]. Has appointment scheduled with Ardean Aschoff. Patient does NOT want to come in for appointment. Please call patient to change to virtual appointment with Ardean Aschoff or confirm he has follow up appointment with PCP. Patient needs to be followed for abnormal labs.     Thanks

## 2020-09-29 PROBLEM — R80.9 PROTEINURIA: Status: ACTIVE | Noted: 2019-11-07

## 2020-09-29 PROBLEM — G51.0 BELL'S PALSY: Status: ACTIVE | Noted: 2019-11-07

## 2020-09-29 PROBLEM — E78.5 HYPERLIPEMIA: Status: ACTIVE | Noted: 2019-11-07

## 2020-09-29 RX ORDER — ALBUTEROL SULFATE 90 UG/1
1 AEROSOL, METERED RESPIRATORY (INHALATION) PRN
COMMUNITY
End: 2020-09-30

## 2020-09-29 RX ORDER — LISINOPRIL 5 MG/1
5 TABLET ORAL DAILY
COMMUNITY
Start: 2020-05-15 | End: 2020-09-30

## 2020-09-30 ENCOUNTER — VIRTUAL VISIT (OUTPATIENT)
Dept: CARDIOLOGY CLINIC | Age: 75
End: 2020-09-30
Payer: MEDICARE

## 2020-09-30 PROCEDURE — 99214 OFFICE O/P EST MOD 30 MIN: CPT | Performed by: NURSE PRACTITIONER

## 2020-09-30 NOTE — PROGRESS NOTES
[address]  [address] [facility] [id number]  [phone]    PrimaryCare Doctor:  Cami Guzman MD  Primary Cardiologist: Dr. Christy Del Castillo    Type of visit: Virtual visit using HIPAA-compliant videoconferencing technology, limited physical exam.     Chief Complaint   Patient presents with    Follow-Up from Hospital     no cardiac issues      History of Present Illness:  Marielle Kiser is a 77 y.o. male with PMH CAD, SHF/ICM CKD4 (Dr. Tessa Baltazar), HTN, HLP, DM, former smoker,  obesity. Admitted to [facility] with SOB and chest discomfort for several weeks. Occurs with exertion. Associated with \"racing heart. \"  CXR indicates pulm edema and HF. He was given IV lasix and Neph consulted. + systolic murmur> ECHO showed mod to severe AS. Patient scheduled for virtual visit with [facility] cardiology for heart failure and aortic stenosis. He is feeling much better. Energy level improved. Continues with mild SOB that occurs with activity. Improves with rest.   His weight is down 5 lbs since discharge. 326lbs>321lbs. He says he is trying to lose weight. Denies any associated LH or dizziness. His BLE edema is significantly improved. Home /68. He is planning on following up with Dr. Licha Montiel in 1-2 weeks. Review of Systems:   General: Denies fever, chills, +fatigue  Skin: Denies skin changes, rash, itching, lesions.   HEENT: Denies headache, dizziness, vision changes, nosebleeds, sore throat, nasal drainage  RESP: Denies cough, sputum, dyspnea, wheeze, snoring  CARD: Denies palpitations,  Murmur, chest pain  GI:Denies nausea, vomiting, heartburn, loss of appetite, change in bowels  : Denies frequency, pain, incontinence, polyuria  VASC: Denies claudication, leg cramps, clots  MUSC/SKEL: Denies pain, stiffness, arthritis  PSYCH: Denies anxiety, depression, stress  NEURO: Denies numbness, tingling, weakness,change in mood or memory  HEME: Denies abn bruising, bleeding, anemia  ENDO: Denies intolerance to heat, cold, excessive thirst or hunger, hx thyroid disease      Wt Readings from Last 3 Encounters:   09/27/20 (!) 326 lb 4.5 oz (148 kg)   06/11/19 (!) 338 lb (153.3 kg)   01/11/19 (!) 334 lb (151.5 kg)        Past Medical History:   has a past medical history of Arthritis, Bell's palsy, Cancer ([facility] CHF (congestive heart failure) ([facility] Hyperlipidemia, Hypertension, Influenza B, Kidney failure, Nosebleed, and Radiation. Surgical History:   has no past surgical history on file. Social History:   reports that he quit smoking about 34 years ago. He has never used smokeless tobacco. He reports that he does not drink alcohol or use drugs. Family History:   No family history on file. HomeMedications:  Prior to Admission medications    Medication Sig Start Date End Date Taking? Authorizing Provider   furosemide (LASIX) 40 MG tablet Take 1 tablet by mouth 2 times daily 9/27/20  Yes Lon Rojas MD   clopidogrel (PLAVIX) 75 MG tablet TAKE 1 TABLET BY MOUTH EVERY DAY 7/1/20  Yes Nanci Zimmerman MD   metoprolol succinate (TOPROL XL) 100 MG extended release tablet TAKE 1 TABLET BY MOUTH EVERY DAY 7/1/20  Yes Nanci Zimmerman MD   NIFEdipine (ADALAT CC) 60 MG extended release tablet Take 60 mg by mouth 2 times daily    Yes Historical Provider, MD   aspirin 81 MG chewable tablet Take 81 mg by mouth daily. Yes Historical Provider, MD   atorvastatin (LIPITOR) 20 MG tablet Take 20 mg by mouth daily. Yes Historical Provider, MD   Multiple Vitamins-Minerals (THERAPEUTIC MULTIVITAMIN-MINERALS) tablet Take 1 tablet by mouth nightly. Yes Historical Provider, MD   Glucosamine-Chondroit-Vit C-Mn (GLUCOSAMINE CHONDR 1500 COMPLX PO) Take 1 each by mouth 2 times daily Indications: 2700 mg    Yes Historical Provider, MD        Allergies:  Patient has no known allergies. Physical Exam:   GEN: Appears well, no acute distress  SKIN: Pink, warm, dry.    LUNG: Respiratory effort normal. No visualized signs of difficulty breathing or respiratory distress  ABD: Nondistended. EXT: Mild edema. MUSCSKEL: Good ROM X4 extremities. No deformity. NEURO: A/O X3. Calm and cooperative. LABS: Results reviewed with patient today. CBC:   Lab Results   Component Value Date    WBC 9.0 09/26/2020    WBC 9.5 09/25/2020    WBC 8.5 11/05/2019    RBC 3.75 09/26/2020    RBC 3.66 09/25/2020    RBC 4.50 11/05/2019    HGB 11.4 09/26/2020    HGB 11.3 09/25/2020    HGB 14.0 11/05/2019    HCT 35.2 09/26/2020    HCT 34.3 09/25/2020    HCT 42.2 11/05/2019    MCV 93.8 09/26/2020    MCV 93.7 09/25/2020    MCV 93.7 11/05/2019    RDW 15.7 09/26/2020    RDW 15.8 09/25/2020    RDW 14.5 11/05/2019     09/26/2020     09/25/2020     11/05/2019     BMP:  Lab Results   Component Value Date     09/27/2020     09/26/2020     09/25/2020    K 3.8 09/27/2020    K 3.7 09/26/2020    K 3.8 09/25/2020    K 4.1 11/05/2019     09/27/2020     09/26/2020     09/25/2020    CO2 23 09/27/2020    CO2 25 09/26/2020    CO2 26 09/25/2020    PHOS 4.6 11/05/2019    PHOS 5.0 08/15/2019    PHOS 4.6 05/15/2019    BUN 58 09/27/2020    BUN 47 09/26/2020    BUN 48 09/25/2020    CREATININE 4.7 09/27/2020    CREATININE 4.5 09/26/2020    CREATININE 4.3 09/25/2020     BNP:   Lab Results   Component Value Date    PROBNP 5,854 09/27/2020    PROBNP 5,889 09/25/2020    PROBNP 1,353 06/15/2018     Iron Studies:  No results found for: TIBC, FERRITIN  GLUCOSE: No results for input(s): GLUCOSE in the last 72 hours.   LIVER PROFILE:   Lab Results   Component Value Date    AST 15 09/25/2020    ALT 17 09/25/2020    LABALBU 3.7 09/25/2020    BILIDIR <0.2 11/05/2019    BILITOT <0.2 09/25/2020    ALKPHOS 100 09/25/2020     PT/INR:   Lab Results   Component Value Date    PROTIME 10.9 01/27/2015    INR 1.01 01/27/2015     Cardiac Enzymes:  Lab Results   Component Value Date    CKTOTAL 477 01/30/2015 TROPONINI 0.03 09/25/2020     FASTING LIPID PANEL:  Lab Results   Component Value Date    CHOL 107 09/26/2020    HDL 34 09/26/2020    LDLCALC 59 09/26/2020    TRIG 71 09/26/2020       Cardiac Imaging: Reports reviewed with patient today. EKG: Sinus rhythm with occasional Premature ventricular complexes and Fusion complexesLeft axis deviationLow voltage QRSInferior infarct (cited on or before 16-APR-2017)Cannot rule out Anterior infarct (cited on or before 16-APR-2017)Prolonged QTAbnormal ECGWhen compared with ECG of 16-APR-2017 10:46,Questionable change in initial forces of Septal leadspremature ventricular complex is now seenQT has lengthenedConfirmed by Vernon, [address] [place] [address] [id number]) on 9/25/2020 9:47:18 PM      ECHO: 9/26/20   Summary   Ejection fraction is visually estimated to be 40%.  Brena Sovereign is severe hypokinesis of the entire apical myocardium and and   hypokinesis of the distal septal wall.   There is severe concentric left ventricular hypertrophy.   Grade II diastolic dysfunction with elevated LV filling pressures.   Mild mitral regurgitation is present.   No evidence of mitral stenosis.   Mild calcification of the mitral valve noted.   Moderate to severe aortic stenosis with a peak velocity of 437m/s and a mean   pressure gradient of 50mmHg.   Mild aortic regurgitation.   Moderately dilated right ventricle.   Right ventricular systolic function is normal .      8/2018:  Summary   Suboptimal parasternal image quality.   Ejection fraction is visually estimated to be 50-55%.   There is hypokinesis of the distal anteroseptal wall.   Right ventricular size and function are normal.   Mildly dilated left atrium.   There are no significant valvular abnormalities appreciated in this study.     4/17/17   Summary   Appears to have normal left ventricular size and wall thickness. Mildly   decreased left ventricular systolic function with an estimated ejection fraction of 40%.   Mid distal anterior wall hypokinesis.  The apex is akinetic.   Normal right ventricular size and function.     Stress Test:      Cardiac Angiography: 4/19/2017  ANGIOGRAPHIC FINDINGS:  1.  Left main coronary comes from the left coronary cusp, has FERMÍN-3 flow, no  significant stenosis. 2.  The left anterior descending artery comes from the left main coronary  artery, is occluded in the proximal portion and mid portion has around 70%  stenosis present and distal also 70% stenosis present. 3.  The left circumflex comes from the left main coronary artery and gives  rise to obtuse marginal branches.  The mid portion has approximately 60%  stenosis. 4.  The right coronary artery comes from the right coronary cusp, giving rise  to posterior descending artery and posterolateral branch.  The proximal mid  portion has 60% stenosis present.     INTERVENTION PERFORMED:  We intervened on the proximal left anterior  descending artery, placed a stent 3.0 x 18 post-dilated to 3.9 mm. A DRUG COATED XIENCE ALPINE stent was placed in the LAD       Assessment/Plan:    1.) Acute on chronic combined diastolic and Systolic heart failure, EF 40% mod - severe AS. + Systolic murmur. Improved. Decreased SOB, edema and weight down 5 lbs. Will be difficult to manage long term with CKD and declining HD. he will follow up with Neph. BMP. BNP ordered today but will wait to get done until he sees Neph. NYHA Class III              Stage 3  Diuretic: defer to Neph- lasix 40mg BID  Beta Blocker: Toprol XL  ACEi/ARB/ARNI: none - CKD  Aldosterone Antagonist: none CKD  SGLT2 Inhibitor: none CKD  2gm Na diet, daily weight, 64 oz fluid restriction  Avoid NSAIDS and other nephrotoxic meds  Cardiac Rehab:   ICD: Not indicated for EF>35%  [facility] Referral: outpatient  HF RN referral for education YES  Palliative Care consult for re admit <30 days or Stage IV: NA        2.) CAD s/p EVERETT to LAD 2018: Stable, denies CP today. Mild troponin elevation X1 could also be R/T HF.  Difficult to do ischemic work up/ LHC with creat 4.7. DAPT: asa, plavix  Beta Blocker: Toprol XL  Lipid management/high intensity statin: lipitor  Risk factor management: high blood pressure, high cholesterol, Diabetes, smoking, obesity, family hx  Lifestyle modification: Heart healthy diet, regular exercise, weight loss, smoking cessation, stress reduction  Cardiac Rehab: Phase 2     3.) Hypertension:   Goal BP <130/80. Met. Continue med management  Non pharmacologic interventions include:   -weight loss  -heart healthy low sodium and low fat diet that consist of mostly fruits, vegetables and grains (Dash diet)  -limited amount of alcohol (no more than 1 drink/day for women, 2 drinks/day for men)  -regular physical activity  -no smoking  -stress reduction      Jeremías Cooley is a 77 y.o. male being evaluated by a Virtual Visit (video visit) encounter to address concerns as mentioned above. A caregiver was present when appropriate. Due to this being a TeleHealth encounter (During [facility]- public health emergency), evaluation of the following organ systems was limited: Vitals/Constitutional/EENT/Resp/CV/GI//MS/Neuro/Skin/Heme-Lymph-Imm. Pursuant to the emergency declaration under the [facility], [address] [place] [address] authority and the [employer] Act, this Virtual Visit was conducted with patient's (and/or legal guardian's) consent, to reduce the patient's risk of exposure to COVID-19 and provide necessary medical care. The patient (and/or legal guardian) has also been advised to contact this office for worsening conditions or problems, and seek emergency medical treatment and/or call 911 if deemed necessary. Instructions:   1. Medications: Continue current meds  2. Labs: BNP, BMP  3. Follow up: 1 month  4. Daily weight: Call for increase 3 lbs/day or 5 lbs/week. 5. 2 gm sodium diet:  6. Fluid Restriction: 64 oz.   7.Referred to [facility] for Education:  Declines    I appreciate the opportunity of cooperating in the care of this individual.    STEPHANY Metcalf CNP, 9/30/2020,4:33 PM

## 2020-10-02 NOTE — CONSULTS
HF RN consult was received from Dr Tracy Vasquez as part of HF order set. Pt presented to ED with c/o CP x 2 weeks. He endorsed SOB x 3 weeks. His sat was 78% on RA at home per his report. EMS found it to be 90% upon their arrival.  Pt endorsed orthopnea and exhibited 2+ LE edema. Pt has known systolic HF and follows with [facility]. He was last seen in office on 1/11/2019 by Dr Ray Clancy. Weight at that time was 334#  ProBNP on admission was 5889. CXR showed edema. Weight was 340# (ED). Cardiology and nephrology were consulted. Dr Bernadine Cali noted venous stasis vs obesity related hypoventilation syndrome vs JUAN C vs deconditioning. At close of office hours on Friday, 9/25, cardiology and echo was still pending. Echo was completed Sat 9/26 showing EF 40% with severe LVH and grade 2 DD. Pt also had moderate to severe AS noted. Pt was treated with IV bolus Lasix. He opts for conservative treatment. He requested to be discharged on Sun 9/27/2020. He agreed to follow up visits only if they could be done virtually. HF RN had confirmed HF measures in place : daily weights, I/O, and sodium/ fluid restricted diet. HF careplan was current. HF instructions were added to AVS.  Pt received >60 mins of documented HF education provided by bedside staff. Follow up had been arranged in advance and  NP converted it to a VV on 9/30 which was acceptable to the patient.

## 2020-11-03 ENCOUNTER — VIRTUAL VISIT (OUTPATIENT)
Dept: CARDIOLOGY CLINIC | Age: 75
End: 2020-11-03
Payer: MEDICARE

## 2020-11-03 PROBLEM — I50.42 CHRONIC COMBINED SYSTOLIC AND DIASTOLIC CONGESTIVE HEART FAILURE (HCC): Status: ACTIVE | Noted: 2020-09-27

## 2020-11-03 PROCEDURE — 1036F TOBACCO NON-USER: CPT | Performed by: NURSE PRACTITIONER

## 2020-11-03 PROCEDURE — G8417 CALC BMI ABV UP PARAM F/U: HCPCS | Performed by: NURSE PRACTITIONER

## 2020-11-03 PROCEDURE — 1123F ACP DISCUSS/DSCN MKR DOCD: CPT | Performed by: NURSE PRACTITIONER

## 2020-11-03 PROCEDURE — G8427 DOCREV CUR MEDS BY ELIG CLIN: HCPCS | Performed by: NURSE PRACTITIONER

## 2020-11-03 PROCEDURE — 4040F PNEUMOC VAC/ADMIN/RCVD: CPT | Performed by: NURSE PRACTITIONER

## 2020-11-03 PROCEDURE — 99214 OFFICE O/P EST MOD 30 MIN: CPT | Performed by: NURSE PRACTITIONER

## 2020-11-03 PROCEDURE — 3017F COLORECTAL CA SCREEN DOC REV: CPT | Performed by: NURSE PRACTITIONER

## 2020-11-03 PROCEDURE — G8484 FLU IMMUNIZE NO ADMIN: HCPCS | Performed by: NURSE PRACTITIONER

## 2020-11-03 NOTE — PATIENT INSTRUCTIONS
Instructions:   1. Medications: Continue current meds  2. Labs: BNP, BMP  3. Follow up: 1 month- agrees to VV. 4. Daily weight: Call for increase 3 lbs/day or 5 lbs/week. 5. 2 gm sodium diet:  6. Fluid Restriction: 64 oz. 7.Referred to [facility] for Education:  Declines  8.  Event monitor

## 2020-11-10 ENCOUNTER — TELEPHONE (OUTPATIENT)
Dept: CARDIOLOGY CLINIC | Age: 75
End: 2020-11-10

## 2020-11-10 NOTE — TELEPHONE ENCOUNTER
Called and spoke with patient and explained Goldie had ordered an event monitor due to patient not able to come into the office. Spoke with Goldie and she was okay with patient only wearing the monitor for 2 weeks. Explained this to patient but he was very upset stating this is an inconvenience. Okay to only enroll patient for 48 hours?

## 2020-11-10 NOTE — TELEPHONE ENCOUNTER
Patient was not able to leave his house to come into office. Therefore, only option was for event monitor. Fine to keep on for 2 weeks or he can decline. Other option is to come not office for 48 hr monitor.

## 2020-11-10 NOTE — TELEPHONE ENCOUNTER
Lon called and wanted to talk to Goldie about his event monitor and did not elaborate any further.      Jeremías can be reached at [phone]

## 2020-11-16 PROCEDURE — 93228 REMOTE 30 DAY ECG REV/REPORT: CPT | Performed by: INTERNAL MEDICINE

## 2020-11-20 DIAGNOSIS — I50.22 CHRONIC SYSTOLIC HEART FAILURE (HCC): ICD-10-CM

## 2020-11-20 LAB
ANION GAP SERPL CALCULATED.3IONS-SCNC: 14 MMOL/L (ref 3–16)
BUN BLDV-MCNC: 48 MG/DL (ref 7–20)
CALCIUM SERPL-MCNC: 8.6 MG/DL (ref 8.3–10.6)
CHLORIDE BLD-SCNC: 102 MMOL/L (ref 99–110)
CO2: 25 MMOL/L (ref 21–32)
CREAT SERPL-MCNC: 4.6 MG/DL (ref 0.8–1.3)
GFR AFRICAN AMERICAN: 15
GFR NON-AFRICAN AMERICAN: 13
GLUCOSE BLD-MCNC: 106 MG/DL (ref 70–99)
POTASSIUM SERPL-SCNC: 3.9 MMOL/L (ref 3.5–5.1)
PRO-BNP: 8262 PG/ML (ref 0–449)
SODIUM BLD-SCNC: 141 MMOL/L (ref 136–145)

## 2020-12-02 NOTE — PROGRESS NOTES
[facility]      Cardiology Progress Note    Jeremías Shipman  1945    December 3, 2020      CC: \"I have been in self isolation. \"     HPI:  The patient is 80 y.o. male with a past medical history significant for HTN, CKD with Cr of 2.0 came into [facility] with chest pain started 4/15/17. Today, he is here to be evaluated for his increased BNP. He does have to rest with walking due to shortness of breath. He has been contemplating dialysis but is deciding on palliative care. Patient denies exertional chest pain/pressure, dyspnea at rest, GIRALDO, PND, orthopnea, palpitations, lightheadedness, weight changes, changes in LE edema, and syncope. Past Medical History:   Diagnosis Date    Arthritis     Bell's palsy     [facility] ([facility] [address] [facility]     CHF (congestive heart failure) ([facility] [address] [facility]     Hyperlipidemia     Hypertension     Influenza B 4/2/15    Kidney failure     sees Dr Bridger Perdomo      History reviewed. No pertinent surgical history. Family History   Problem Relation Age of Onset    Cancer Mother     Cancer Father      Social History     Tobacco Use    Smoking status: Former Smoker     Last attempt to quit: 1986     Years since quittin.8    Smokeless tobacco: Never Used    Tobacco comment: will not start    Substance Use Topics    Alcohol use: No    Drug use: No       No Known Allergies  Current Outpatient Medications   Medication Sig Dispense Refill    furosemide (LASIX) 40 MG tablet Take 1 tablet by mouth 2 times daily 60 tablet 3    clopidogrel (PLAVIX) 75 MG tablet TAKE 1 TABLET BY MOUTH EVERY DAY 90 tablet 3    metoprolol succinate (TOPROL XL) 100 MG extended release tablet TAKE 1 TABLET BY MOUTH EVERY DAY 90 tablet 3    NIFEdipine (ADALAT CC) 60 MG extended release tablet Take 60 mg by mouth 2 times daily       aspirin 81 MG chewable tablet Take 81 mg by mouth daily.  atorvastatin (LIPITOR) 20 MG tablet Take 20 mg by mouth daily.       Multiple Vitamins-Minerals (THERAPEUTIC MULTIVITAMIN-MINERALS) tablet Take 1 tablet by mouth nightly.  Glucosamine-Chondroit-Vit C-Mn (GLUCOSAMINE CHONDR 1500 COMPLX PO) Take 1 each by mouth 2 times daily Indications: 2700 mg        No current facility-administered medications for this visit. Review of Systems:    Constitutional: negative  Eyes: negative  Ears, nose, mouth, throat, and face: negative  Respiratory: negative  Cardiovascular: negative  Gastrointestinal: negative  Genitourinary:negative  Integument/breast: negative  Hematologic/lymphatic: negative  Musculoskeletal:negative  Neurological: negative  Behavioral/Psych: negative  Endocrine: negative  Allergic/Immunologic: negative     Physical Exam:   Vitals:    12/03/20 1402   BP: 134/70   Pulse: 73   Temp: 97.7 °F (36.5 °C)   SpO2: 92%   Weight: (!) 313 lb 3.2 oz (142.1 kg)   Height: 5' (1.524 m)     Wt Readings from Last 3 Encounters:   12/03/20 (!) 313 lb 3.2 oz (142.1 kg)   09/27/20 (!) 326 lb 4.5 oz (148 kg)   06/11/19 (!) 338 lb (153.3 kg)       Constitutional: He is oriented to person, place, and time. He appears well-developed and well-nourished. In no acute distress. Head: Normocephalic and atraumatic. Pupils equal and round. Neck: Neck supple. No JVP or carotid bruit appreciated. No mass and no thyromegaly present. No lymphadenopathy present. Cardiovascular: Normal rate. Normal heart sounds. Exam reveals no gallop and no friction rub. No murmur heard. Pulmonary/Chest: Effort normal and breath sounds normal. No respiratory distress. He has no wheezes, rhonchi or rales. Abdominal: Soft, non-tender. Bowel sounds are normal. He exhibits no organomegaly, mass or bruit. Extremities: mil BLE edema, no cyanosis, or clubbing. Pulses are 2+ radial/dorsalis pedis/posterior tibial/carotid bilaterally. Neurological: Awake  alert and orientedNo gross cranial nerve deficit. Coordination normal.     Skin: Skin is warm and dry. There is no rash or diaphoresis. Psychiatric: He has a normal mood and affect. His speech is normal and behavior is normal.     Lab Review: all labs reviewed   Lab Results   Component Value Date    TRIG 71 09/26/2020    HDL 34 09/26/2020    LDLCALC 59 09/26/2020    LABVLDL 14 09/26/2020      Lab Results   Component Value Date    BUN 48 11/20/2020    CREATININE 4.6 11/20/2020     Lab Results   Component Value Date    WBC 9.0 09/26/2020    RBC 3.75 09/26/2020    HGB 11.4 09/26/2020    HCT 35.2 09/26/2020    MCV 93.8 09/26/2020    MCH 30.3 09/26/2020    MCHC 32.3 09/26/2020    RDW 15.7 09/26/2020     09/26/2020    MPV 8.5 09/26/2020     Lab Results   Component Value Date     11/20/2020    K 3.9 11/20/2020    K 3.8 09/25/2020     11/20/2020    CO2 25 11/20/2020    BUN 48 11/20/2020    LABALBU 3.7 09/25/2020    CREATININE 4.6 11/20/2020    CALCIUM 8.6 11/20/2020    GFRAA 15 11/20/2020    LABGLOM 13 11/20/2020    GLUCOSE 106 11/20/2020       Lab Results   Component Value Date    TROPONINI 0.03 09/25/2020    TROPONINI 0.34 04/16/2017    TROPONINI <0.01 04/02/2015       Image Review: all imaging reviewed     ECHO:4/17/17   Summary   Appears to have normal left ventricular size and wall thickness. Mildly   decreased left ventricular systolic function with an estimated ejection fraction of 40%.   Mid distal anterior wall hypokinesis. The apex is akinetic.   Normal right ventricular size and function. Cath:4/16/17  ANGIOGRAPHIC FINDINGS:  1. Left main coronary comes from the left coronary cusp, has FERMÍN-3 flow, no significant stenosis. 2. The left anterior descending artery comes from the left main coronary  artery, is occluded in the proximal portion and mid portion has around 70%  stenosis present and distal also 70% stenosis present. 3. The left circumflex comes from the left main coronary artery and gives  rise to obtuse marginal branches. The mid portion has approximately 60% stenosis.   4. The right coronary artery comes from the right coronary cusp, giving rise  to posterior descending artery and posterolateral branch. The proximal mid  portion has 60% stenosis present. INTERVENTION PERFORMED: We intervened on the proximal left anterior  descending artery, placed a stent 3.0 x 18 post-dilated to 3.9 mm. A DRUG COATED XIENCE ALPINE stent was placed in the LAD    ECHO: 8/7/18  Summary  Suboptimal parasternal image quality. Ejection fraction is visually estimated to be 50-55%. There is hypokinesis of the distal anteroseptal wall. Right ventricular size and function are normal.  Mildly dilated left atrium. There are no significant valvular abnormalities appreciated in this study.     ECHO  9/26/2020  Ejection fraction is visually estimated to be 40%. There is severe hypokinesis of the entire apical myocardium and and  hypokinesis of the distal septal wall. There is severe concentric left ventricular hypertrophy. Grade II diastolic dysfunction with elevated LV filling pressures. Mild mitral regurgitation is present. No evidence of mitral stenosis. Mild calcification of the mitral valve noted. Moderate to severe aortic stenosis with a peak velocity of 437m/s and a mean pressure gradient of 50mmHg. Mild aortic regurgitation. Moderately dilated right ventricle. Right ventricular systolic function is normal .     Assessment/Plan:     Coronary artery disease Ischemic CP with NSTEMI  MI involving anterior wall   --4/16/17 We intervened on the proximal left anterior descending artery, placed a stent 3.0 x 18 post-dilated to 3.9 mm  Asymptomatic. Continue Asa, Plavix, B-blocker and statin therapy. Ischemic Cardiomyopathy/systolic heart failure:   Elevated BNP most like due to kidney disease. Appears compensated on exam today. Continue B-blocker and Asa. Will repeat echocardiogram, BMP and BNP. He may benefit from stress test in the future. Hypertension  Controlled.  Continue current medical

## 2020-12-03 ENCOUNTER — OFFICE VISIT (OUTPATIENT)
Dept: CARDIOLOGY CLINIC | Age: 75
End: 2020-12-03
Payer: MEDICARE

## 2020-12-03 VITALS
TEMPERATURE: 97.7 F | HEART RATE: 73 BPM | OXYGEN SATURATION: 92 % | BODY MASS INDEX: 61.49 KG/M2 | SYSTOLIC BLOOD PRESSURE: 134 MMHG | DIASTOLIC BLOOD PRESSURE: 70 MMHG | WEIGHT: 313.2 LBS | HEIGHT: 60 IN

## 2020-12-03 PROCEDURE — 4040F PNEUMOC VAC/ADMIN/RCVD: CPT | Performed by: INTERNAL MEDICINE

## 2020-12-03 PROCEDURE — G8484 FLU IMMUNIZE NO ADMIN: HCPCS | Performed by: INTERNAL MEDICINE

## 2020-12-03 PROCEDURE — G8427 DOCREV CUR MEDS BY ELIG CLIN: HCPCS | Performed by: INTERNAL MEDICINE

## 2020-12-03 PROCEDURE — 1123F ACP DISCUSS/DSCN MKR DOCD: CPT | Performed by: INTERNAL MEDICINE

## 2020-12-03 PROCEDURE — G8417 CALC BMI ABV UP PARAM F/U: HCPCS | Performed by: INTERNAL MEDICINE

## 2020-12-03 PROCEDURE — 99213 OFFICE O/P EST LOW 20 MIN: CPT | Performed by: INTERNAL MEDICINE

## 2020-12-03 PROCEDURE — 3017F COLORECTAL CA SCREEN DOC REV: CPT | Performed by: INTERNAL MEDICINE

## 2020-12-03 PROCEDURE — 1036F TOBACCO NON-USER: CPT | Performed by: INTERNAL MEDICINE

## 2020-12-03 NOTE — PATIENT INSTRUCTIONS
Patient Education        Heart-Healthy Diet: Care Instructions  Your Care Instructions     A heart-healthy diet has lots of vegetables, fruits, nuts, beans, and whole grains, and is low in salt. It limits foods that are high in saturated fat, such as meats, cheeses, and fried foods. It may be hard to change your diet, but even small changes can lower your risk of heart attack and heart disease. Follow-up care is a key part of your treatment and safety. Be sure to make and go to all appointments, and call your doctor if you are having problems. It's also a good idea to know your test results and keep a list of the medicines you take. How can you care for yourself at home? Watch your portions  · Learn what a serving is. A \"serving\" and a \"portion\" are not always the same thing. Make sure that you are not eating larger portions than are recommended. For example, a serving of pasta is ½ cup. A serving size of meat is 2 to 3 ounces. A 3-ounce serving is about the size of a deck of cards. Measure serving sizes until you are good at [place]" them. Keep in mind that restaurants often serve portions that are 2 or 3 times the size of one serving. · To keep your energy level up and keep you from feeling hungry, eat often but in smaller portions. · Eat only the number of calories you need to stay at a healthy weight. If you need to lose weight, eat fewer calories than your body burns (through exercise and other physical activity). Eat more fruits and vegetables  · Eat a variety of fruit and vegetables every day. Dark green, deep orange, red, or yellow fruits and vegetables are especially good for you. Examples include spinach, carrots, peaches, and berries. · Keep carrots, celery, and other veggies handy for snacks. Buy fruit that is in season and store it where you can see it so that you will be tempted to eat it. · Cook dishes that have a lot of veggies in them, such as stir-fries and soups.   Limit saturated and trans fat  · Read food labels, and try to avoid saturated and trans fats. They increase your risk of heart disease. · Use olive or canola oil when you cook. · Bake, broil, grill, or steam foods instead of frying them. · Choose lean meats instead of high-fat meats such as hot dogs and sausages. Cut off all visible fat when you prepare meat. · Eat fish, skinless poultry, and meat alternatives such as soy products instead of high-fat meats. Soy products, such as tofu, may be especially good for your heart. · Choose low-fat or fat-free milk and dairy products. Eat foods high in fiber  · Eat a variety of grain products every day. Include whole-grain foods that have lots of fiber and nutrients. Examples of whole-grain foods include oats, whole wheat bread, and brown rice. · Buy whole-grain breads and cereals, instead of white bread or pastries. Limit salt and sodium  · Limit how much salt and sodium you eat to help lower your blood pressure. · Taste food before you salt it. Add only a little salt when you think you need it. With time, your taste buds will adjust to less salt. · Eat fewer snack items, fast foods, and other high-salt, processed foods. Check food labels for the amount of sodium in packaged foods. · Choose low-sodium versions of canned goods (such as soups, vegetables, and beans). Limit sugar  · Limit drinks and foods with added sugar. These include candy, desserts, and soda pop. Limit alcohol  · Limit alcohol to no more than 2 drinks a day for men and 1 drink a day for women. Too much alcohol can cause health problems. When should you call for help? Watch closely for changes in your health, and be sure to contact your doctor if:    · You would like help planning heart-healthy meals. Where can you learn more? Go to https://[email or web address].[email or web address]. org and sign in to your [employer] account.  Enter V137 in the nothingGrinder box to learn more about \"Heart-Healthy Diet: Care Instructions. \"     If you do not have an account, please click on the \"Sign Up Now\" link. Current as of: August 22, 2019               Content Version: 12.6  © 2584-3048 [employer]. Care instructions adapted under license by [facility] ([facility]). If you have questions about a medical condition or this instruction, always ask your healthcare professional. [address] any warranty or liability for your use of this information.

## 2021-01-18 ENCOUNTER — APPOINTMENT (OUTPATIENT)
Dept: GENERAL RADIOLOGY | Age: 76
DRG: 319 | End: 2021-01-18
Payer: MEDICARE

## 2021-01-18 ENCOUNTER — HOSPITAL ENCOUNTER (INPATIENT)
Age: 76
LOS: 9 days | Discharge: HOME | DRG: 319 | End: 2021-01-27
Attending: INTERNAL MEDICINE | Admitting: INTERNAL MEDICINE
Payer: MEDICARE

## 2021-01-18 DIAGNOSIS — N28.9 ACUTE ON CHRONIC RENAL INSUFFICIENCY: ICD-10-CM

## 2021-01-18 DIAGNOSIS — J81.0 ACUTE PULMONARY EDEMA (HCC): ICD-10-CM

## 2021-01-18 DIAGNOSIS — E87.6 HYPOKALEMIA: ICD-10-CM

## 2021-01-18 DIAGNOSIS — I50.9 ACUTE ON CHRONIC CONGESTIVE HEART FAILURE, UNSPECIFIED HEART FAILURE TYPE (HCC): Primary | ICD-10-CM

## 2021-01-18 DIAGNOSIS — N18.9 ACUTE ON CHRONIC RENAL INSUFFICIENCY: ICD-10-CM

## 2021-01-18 DIAGNOSIS — J90 PLEURAL EFFUSION: ICD-10-CM

## 2021-01-18 DIAGNOSIS — J96.01 ACUTE RESPIRATORY FAILURE WITH HYPOXIA (HCC): ICD-10-CM

## 2021-01-18 PROBLEM — I50.43 CHF (CONGESTIVE HEART FAILURE), NYHA CLASS I, ACUTE ON CHRONIC, COMBINED (HCC): Status: ACTIVE | Noted: 2021-01-18

## 2021-01-18 LAB
A/G RATIO: 1 (ref 1.1–2.2)
ALBUMIN SERPL-MCNC: 3.4 G/DL (ref 3.4–5)
ALBUMIN SERPL-MCNC: 3.6 G/DL (ref 3.4–5)
ALBUMIN SERPL-MCNC: 3.6 G/DL (ref 3.4–5)
ALP BLD-CCNC: 98 U/L (ref 40–129)
ALT SERPL-CCNC: 10 U/L (ref 10–40)
ANION GAP SERPL CALCULATED.3IONS-SCNC: 14 MMOL/L (ref 3–16)
ANION GAP SERPL CALCULATED.3IONS-SCNC: 16 MMOL/L (ref 3–16)
ANION GAP SERPL CALCULATED.3IONS-SCNC: 17 MMOL/L (ref 3–16)
AST SERPL-CCNC: 12 U/L (ref 15–37)
BASE EXCESS VENOUS: -1.5 MMOL/L
BASOPHILS ABSOLUTE: 0.1 K/UL (ref 0–0.2)
BASOPHILS RELATIVE PERCENT: 1 %
BILIRUB SERPL-MCNC: 0.4 MG/DL (ref 0–1)
BUN BLDV-MCNC: 63 MG/DL (ref 7–20)
BUN BLDV-MCNC: 64 MG/DL (ref 7–20)
BUN BLDV-MCNC: 66 MG/DL (ref 7–20)
CALCIUM SERPL-MCNC: 8.9 MG/DL (ref 8.3–10.6)
CALCIUM SERPL-MCNC: 9 MG/DL (ref 8.3–10.6)
CALCIUM SERPL-MCNC: 9.1 MG/DL (ref 8.3–10.6)
CARBOXYHEMOGLOBIN: 1.7 %
CHLORIDE BLD-SCNC: 102 MMOL/L (ref 99–110)
CHLORIDE BLD-SCNC: 96 MMOL/L (ref 99–110)
CHLORIDE BLD-SCNC: 99 MMOL/L (ref 99–110)
CO2: 24 MMOL/L (ref 21–32)
CO2: 25 MMOL/L (ref 21–32)
CO2: 28 MMOL/L (ref 21–32)
CREAT SERPL-MCNC: 5 MG/DL (ref 0.8–1.3)
CREAT SERPL-MCNC: 5.2 MG/DL (ref 0.8–1.3)
CREAT SERPL-MCNC: 5.5 MG/DL (ref 0.8–1.3)
EKG ATRIAL RATE: 81 BPM
EKG DIAGNOSIS: NORMAL
EKG P AXIS: 84 DEGREES
EKG P-R INTERVAL: 174 MS
EKG Q-T INTERVAL: 436 MS
EKG QRS DURATION: 112 MS
EKG QTC CALCULATION (BAZETT): 506 MS
EKG R AXIS: -25 DEGREES
EKG T AXIS: 59 DEGREES
EKG VENTRICULAR RATE: 81 BPM
EOSINOPHILS ABSOLUTE: 0.4 K/UL (ref 0–0.6)
EOSINOPHILS RELATIVE PERCENT: 3.8 %
GFR AFRICAN AMERICAN: 12
GFR AFRICAN AMERICAN: 13
GFR AFRICAN AMERICAN: 14
GFR NON-AFRICAN AMERICAN: 10
GFR NON-AFRICAN AMERICAN: 11
GFR NON-AFRICAN AMERICAN: 11
GLOBULIN: 3.6 G/DL
GLUCOSE BLD-MCNC: 103 MG/DL (ref 70–99)
GLUCOSE BLD-MCNC: 116 MG/DL (ref 70–99)
GLUCOSE BLD-MCNC: 119 MG/DL (ref 70–99)
HCO3 VENOUS: 24 MMOL/L (ref 23–29)
HCT VFR BLD CALC: 30.3 % (ref 40.5–52.5)
HEMOGLOBIN: 9.6 G/DL (ref 13.5–17.5)
LYMPHOCYTES ABSOLUTE: 0.7 K/UL (ref 1–5.1)
LYMPHOCYTES RELATIVE PERCENT: 6.4 %
MAGNESIUM: 2.3 MG/DL (ref 1.8–2.4)
MCH RBC QN AUTO: 28.3 PG (ref 26–34)
MCHC RBC AUTO-ENTMCNC: 31.8 G/DL (ref 31–36)
MCV RBC AUTO: 88.9 FL (ref 80–100)
METHEMOGLOBIN VENOUS: 0.6 %
MONOCYTES ABSOLUTE: 0.9 K/UL (ref 0–1.3)
MONOCYTES RELATIVE PERCENT: 9 %
NEUTROPHILS ABSOLUTE: 8.3 K/UL (ref 1.7–7.7)
NEUTROPHILS RELATIVE PERCENT: 79.8 %
O2 CONTENT, VEN: 8 ML/DL
O2 SAT, VEN: 71 %
O2 THERAPY: NORMAL
PCO2, VEN: 42 MMHG (ref 40–50)
PDW BLD-RTO: 16.6 % (ref 12.4–15.4)
PH VENOUS: 7.36 (ref 7.35–7.45)
PHOSPHORUS: 4.5 MG/DL (ref 2.5–4.9)
PHOSPHORUS: 5.2 MG/DL (ref 2.5–4.9)
PLATELET # BLD: 310 K/UL (ref 135–450)
PMV BLD AUTO: 8.4 FL (ref 5–10.5)
PO2, VEN: 40 MMHG
POTASSIUM REFLEX MAGNESIUM: 2.9 MMOL/L (ref 3.5–5.1)
POTASSIUM SERPL-SCNC: 3.4 MMOL/L (ref 3.5–5.1)
POTASSIUM SERPL-SCNC: 3.5 MMOL/L (ref 3.5–5.1)
PRO-BNP: ABNORMAL PG/ML (ref 0–449)
RBC # BLD: 3.41 M/UL (ref 4.2–5.9)
SARS-COV-2, NAAT: NOT DETECTED
SODIUM BLD-SCNC: 138 MMOL/L (ref 136–145)
SODIUM BLD-SCNC: 139 MMOL/L (ref 136–145)
SODIUM BLD-SCNC: 144 MMOL/L (ref 136–145)
TCO2 CALC VENOUS: 25 MMOL/L
TOTAL PROTEIN: 7.2 G/DL (ref 6.4–8.2)
TROPONIN: 0.04 NG/ML
WBC # BLD: 10.4 K/UL (ref 4–11)

## 2021-01-18 PROCEDURE — 83735 ASSAY OF MAGNESIUM: CPT

## 2021-01-18 PROCEDURE — 6370000000 HC RX 637 (ALT 250 FOR IP): Performed by: INTERNAL MEDICINE

## 2021-01-18 PROCEDURE — 2700000000 HC OXYGEN THERAPY PER DAY

## 2021-01-18 PROCEDURE — 6370000000 HC RX 637 (ALT 250 FOR IP): Performed by: PHYSICIAN ASSISTANT

## 2021-01-18 PROCEDURE — 83880 ASSAY OF NATRIURETIC PEPTIDE: CPT

## 2021-01-18 PROCEDURE — 84484 ASSAY OF TROPONIN QUANT: CPT

## 2021-01-18 PROCEDURE — 93010 ELECTROCARDIOGRAM REPORT: CPT | Performed by: INTERNAL MEDICINE

## 2021-01-18 PROCEDURE — 99223 1ST HOSP IP/OBS HIGH 75: CPT | Performed by: INTERNAL MEDICINE

## 2021-01-18 PROCEDURE — 2060000000 HC ICU INTERMEDIATE R&B

## 2021-01-18 PROCEDURE — U0002 COVID-19 LAB TEST NON-CDC: HCPCS

## 2021-01-18 PROCEDURE — 93005 ELECTROCARDIOGRAM TRACING: CPT | Performed by: PHYSICIAN ASSISTANT

## 2021-01-18 PROCEDURE — 85025 COMPLETE CBC W/AUTO DIFF WBC: CPT

## 2021-01-18 PROCEDURE — 94760 N-INVAS EAR/PLS OXIMETRY 1: CPT

## 2021-01-18 PROCEDURE — 36415 COLL VENOUS BLD VENIPUNCTURE: CPT

## 2021-01-18 PROCEDURE — 6360000002 HC RX W HCPCS: Performed by: INTERNAL MEDICINE

## 2021-01-18 PROCEDURE — 82803 BLOOD GASES ANY COMBINATION: CPT

## 2021-01-18 PROCEDURE — 6360000002 HC RX W HCPCS: Performed by: PHYSICIAN ASSISTANT

## 2021-01-18 PROCEDURE — 80053 COMPREHEN METABOLIC PANEL: CPT

## 2021-01-18 PROCEDURE — 99285 EMERGENCY DEPT VISIT HI MDM: CPT

## 2021-01-18 PROCEDURE — 71045 X-RAY EXAM CHEST 1 VIEW: CPT

## 2021-01-18 PROCEDURE — 2580000003 HC RX 258: Performed by: INTERNAL MEDICINE

## 2021-01-18 RX ORDER — SODIUM CHLORIDE 0.9 % (FLUSH) 0.9 %
10 SYRINGE (ML) INJECTION EVERY 12 HOURS SCHEDULED
Status: DISCONTINUED | OUTPATIENT
Start: 2021-01-18 | End: 2021-01-25 | Stop reason: SDUPTHER

## 2021-01-18 RX ORDER — METOLAZONE 5 MG/1
5 TABLET ORAL DAILY
Status: DISCONTINUED | OUTPATIENT
Start: 2021-01-18 | End: 2021-01-22

## 2021-01-18 RX ORDER — POTASSIUM CHLORIDE 7.45 MG/ML
10 INJECTION INTRAVENOUS ONCE
Status: DISCONTINUED | OUTPATIENT
Start: 2021-01-18 | End: 2021-01-18

## 2021-01-18 RX ORDER — FUROSEMIDE 10 MG/ML
40 INJECTION INTRAMUSCULAR; INTRAVENOUS ONCE
Status: COMPLETED | OUTPATIENT
Start: 2021-01-18 | End: 2021-01-18

## 2021-01-18 RX ORDER — POLYETHYLENE GLYCOL 3350 17 G/17G
17 POWDER, FOR SOLUTION ORAL DAILY PRN
Status: DISCONTINUED | OUTPATIENT
Start: 2021-01-18 | End: 2021-01-27 | Stop reason: HOSPADM

## 2021-01-18 RX ORDER — HEPARIN SODIUM 5000 [USP'U]/ML
5000 INJECTION, SOLUTION INTRAVENOUS; SUBCUTANEOUS EVERY 8 HOURS SCHEDULED
Status: DISCONTINUED | OUTPATIENT
Start: 2021-01-18 | End: 2021-01-27 | Stop reason: HOSPADM

## 2021-01-18 RX ORDER — ONDANSETRON 2 MG/ML
4 INJECTION INTRAMUSCULAR; INTRAVENOUS EVERY 6 HOURS PRN
Status: DISCONTINUED | OUTPATIENT
Start: 2021-01-18 | End: 2021-01-27 | Stop reason: HOSPADM

## 2021-01-18 RX ORDER — METOPROLOL SUCCINATE 25 MG/1
25 TABLET, EXTENDED RELEASE ORAL DAILY
Status: DISCONTINUED | OUTPATIENT
Start: 2021-01-19 | End: 2021-01-27 | Stop reason: HOSPADM

## 2021-01-18 RX ORDER — ASPIRIN 81 MG/1
81 TABLET, CHEWABLE ORAL DAILY
Status: DISCONTINUED | OUTPATIENT
Start: 2021-01-19 | End: 2021-01-27 | Stop reason: HOSPADM

## 2021-01-18 RX ORDER — PROMETHAZINE HYDROCHLORIDE 25 MG/1
12.5 TABLET ORAL EVERY 6 HOURS PRN
Status: DISCONTINUED | OUTPATIENT
Start: 2021-01-18 | End: 2021-01-27 | Stop reason: HOSPADM

## 2021-01-18 RX ORDER — CLOPIDOGREL BISULFATE 75 MG/1
75 TABLET ORAL DAILY
Status: DISCONTINUED | OUTPATIENT
Start: 2021-01-19 | End: 2021-01-27 | Stop reason: HOSPADM

## 2021-01-18 RX ORDER — FUROSEMIDE 10 MG/ML
60 INJECTION INTRAMUSCULAR; INTRAVENOUS 2 TIMES DAILY
Status: DISCONTINUED | OUTPATIENT
Start: 2021-01-18 | End: 2021-01-22

## 2021-01-18 RX ORDER — POTASSIUM CHLORIDE 20 MEQ/1
40 TABLET, EXTENDED RELEASE ORAL ONCE
Status: COMPLETED | OUTPATIENT
Start: 2021-01-18 | End: 2021-01-18

## 2021-01-18 RX ORDER — SODIUM CHLORIDE 0.9 % (FLUSH) 0.9 %
10 SYRINGE (ML) INJECTION PRN
Status: DISCONTINUED | OUTPATIENT
Start: 2021-01-18 | End: 2021-01-25 | Stop reason: SDUPTHER

## 2021-01-18 RX ORDER — ACETAMINOPHEN 650 MG/1
650 SUPPOSITORY RECTAL EVERY 6 HOURS PRN
Status: DISCONTINUED | OUTPATIENT
Start: 2021-01-18 | End: 2021-01-27 | Stop reason: HOSPADM

## 2021-01-18 RX ORDER — ATORVASTATIN CALCIUM 20 MG/1
20 TABLET, FILM COATED ORAL DAILY
Status: DISCONTINUED | OUTPATIENT
Start: 2021-01-19 | End: 2021-01-27 | Stop reason: HOSPADM

## 2021-01-18 RX ORDER — ACETAMINOPHEN 325 MG/1
650 TABLET ORAL EVERY 6 HOURS PRN
Status: DISCONTINUED | OUTPATIENT
Start: 2021-01-18 | End: 2021-01-26 | Stop reason: SDUPTHER

## 2021-01-18 RX ADMIN — POTASSIUM CHLORIDE 40 MEQ: 20 TABLET, EXTENDED RELEASE ORAL at 08:30

## 2021-01-18 RX ADMIN — METOLAZONE 5 MG: 5 TABLET ORAL at 16:47

## 2021-01-18 RX ADMIN — FUROSEMIDE 40 MG: 10 INJECTION, SOLUTION INTRAMUSCULAR; INTRAVENOUS at 08:30

## 2021-01-18 RX ADMIN — HEPARIN SODIUM 5000 UNITS: 5000 INJECTION INTRAVENOUS; SUBCUTANEOUS at 20:47

## 2021-01-18 RX ADMIN — SODIUM CHLORIDE, PRESERVATIVE FREE 10 ML: 5 INJECTION INTRAVENOUS at 20:47

## 2021-01-18 RX ADMIN — FUROSEMIDE 60 MG: 10 INJECTION, SOLUTION INTRAMUSCULAR; INTRAVENOUS at 16:47

## 2021-01-18 ASSESSMENT — PAIN SCALES - GENERAL
PAINLEVEL_OUTOF10: 0
PAINLEVEL_OUTOF10: 0

## 2021-01-18 ASSESSMENT — ENCOUNTER SYMPTOMS
SHORTNESS OF BREATH: 1
SORE THROAT: 0
BACK PAIN: 0
ABDOMINAL PAIN: 0
NAUSEA: 0
VOMITING: 0
COUGH: 0

## 2021-01-18 NOTE — ED NOTES
Report called to [facility], RN.  Okay to transport at this time     Lisa Estrella RN  01/18/21 3615

## 2021-01-18 NOTE — PROGRESS NOTES
4 Eyes Skin Assessment     NAME:  Kim Ramires OF BIRTH:  1945  MEDICAL RECORD NUMBER:  [id number]    The patient is being assess for  Admission    I agree that 2 RN's have performed a thorough Head to Toe Skin Assessment on the patient. ALL assessment sites listed below have been assessed. Areas assessed by both nurses:    Head, Face, Ears, Shoulders, Back, Chest, Arms, Elbows, Hands, Sacrum. Buttock, Coccyx, Ischium and Legs. Feet and Heels        Does the Patient have a Wound?  No noted wound(s)       Bryn Prevention initiated:  No   Wound Care Orders initiated:  No    Pressure Injury (Stage 3,4, Unstageable, DTI, NWPT, and Complex wounds) if present place consult order under [de-identified] No    New and Established Ostomies if present place consult order under : No      Nurse 1 eSignature: Electronically signed by Loyda De RN on 1/18/21 at 5:20 PM EST    **SHARE this note so that the co-signing nurse is able to place an eSignature**    Nurse 2 eSignature: Electronically signed by Tracy Whitfield RN on 1/18/21 at 5:22 PM EST       '

## 2021-01-18 NOTE — ED PROVIDER NOTES
Per my interpretation:    Electrocardiogram (ECG) 1/18/2021 5:45 AM  RATE: normal  RHYTHM: normal sinus with PACs  AXIS: normal  INTERVALS:  ms  ST-T WAVE CHANGES: No evidence of ST segment elevation, nonspecific ST changes  Prior for comparison 9/25/2020     Ang Gutiérrez MD  01/18/21 2250

## 2021-01-18 NOTE — ACP (ADVANCE CARE PLANNING)
Advance Care Planning     Advance Care Planning Activator (Inpatient)  Conversation Note      Date of ACP Conversation: 1/18/2021    Conversation Conducted with: Patient with Decision Making Capacity    ACP Activator: [address] Maker:     Current Designated Health Care Decision Maker:   Primary Decision Maker: Kellie Campbell Child - [phone]    Secondary Decision Maker: Catarino Batres - Brother/Sister - [phone]  Validated with patient    Care Preferences    Ventilation: \"If you were in your present state of health and suddenly became very ill and were unable to breathe on your own, what would your preference be about the use of a ventilator (breathing machine) if it were available to you? \"      Would the patient desire the use of ventilator (breathing machine)?: NO    \"If your health worsens and it becomes clear that your chance of recovery is unlikely, what would your preference be about the use of a ventilator (breathing machine) if it were available to you? \"     Would the patient desire the use of ventilator (breathing machine)?: NO      Resuscitation  \"CPR works best to restart the heart when there is a sudden event, like a heart attack, in someone who is otherwise healthy. Unfortunately, CPR does not typically restart the heart for people who have serious health conditions or who are very sick. \"    \"In the event your heart stopped as a result of an underlying serious health condition, would you want attempts to be made to restart your heart (answer \"yes\" for attempt to resuscitate) or would you prefer a natural death (answer \"no\" for do not attempt to resuscitate)? \" no       [x] Yes   [] No   Educated Patient / [facility] regarding differences between Advance Directives and portable DNR orders.     Length of ACP Conversation in minutes:  10  Conversation Outcomes:  [x] ACP discussion completed  [] Existing advance directive reviewed with patient; no changes to patient's previously recorded wishes  [x] New Advance Directive completed  [] Portable Do Not Rescitate prepared for Provider review and signature  [] POLST/POST/MOLST/MOST prepared for Provider review and signature      Follow-up plan:    [] Schedule follow-up conversation to continue planning  [] Referred individual to Provider for additional questions/concerns   [] Advised patient/agent/surrogate to review completed ACP document and update if needed with changes in condition, patient preferences or care setting    [x] This note routed to one or more involved healthcare providers

## 2021-01-18 NOTE — CARE COORDINATION
INITIAL CASE MANAGEMENT ASSESSMENT    Reviewed chart, met with patient to assess possible discharge needs. Explained Case Management role/services. Living Situation: Lives alone in a house with 3 GERMAIN. ADLs: Independent     DME: [address] [place]( has walker that was his wife's, he doesn't use)    PT/OT Recs: TBD     Active Services: None     Transportation: Patient is active , will have friend pick him up at discharge. [facility]ations[facility] on [address] and [address]    PCP: Charito Mitchell MD      HD/PD: trying to decide if he wants to start dialysis. PLAN/COMMENTS: patient wants to return home at discharge. May need dialysis spot arranged. List of [facility] [place] [facility] provided. Completed Advanced Directives with patient. Advanced Care Planning completed. SW/CM provided contact information for patient or family to call with any questions. SW/CM will follow and assist as needed. The Plan for Transition of Care is related to the following treatment goals: return home. The Patient  was provided with a choice of provider and agrees   with the discharge plan. [x] Yes [] No    Freedom of choice list was provided with basic dialogue that supports the patient's individualized plan of care/goals, treatment preferences and shares the quality data associated with the providers.  [x] Yes [] No

## 2021-01-18 NOTE — PROGRESS NOTES
Medication Reconciliation    List of medications patient is currently taking is complete. Source of information: 1. Conversation with patient at bedside                                       2. EPIC records      Allergies  Patient has no known allergies. Notes regarding home medications:   1. Patient received all of his morning home medications prior to arrival to the emergency department this AM at ~ 0500.     Gucci Veloz PharmD, BCPS  1/18/2021 11:44 AM

## 2021-01-18 NOTE — CONSULTS
[facility]  Cardiology Consult Note        CC:    Shortness of breath         HPI:   This is a 77 y.o. male with history of renal insufficiency, coronary artery disease congestive heart failure presents with several days history of shortness of breath orthopnea hypoxia on ambulation. 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 Severe aortic stenosis by recent echo. He also has severe renal insufficiency with a creatinine of 5      Past Medical History:   Diagnosis Date    Arthritis     Bell's palsy     Cancer ([address]     CHF (congestive heart failure) (HCC)     Hyperlipidemia     Hypertension     Influenza B 4/2/15    Kidney failure     sees Dr Dangelo Lord       No past surgical history on file.    Family History   Problem Relation Age of Onset    Cancer Mother     Cancer Father       Social History     Tobacco Use    Smoking status: Former Smoker     Quit date: 1986     Years since quittin.9    Smokeless tobacco: Never Used    Tobacco comment: will not start    Substance Use Topics    Alcohol use: No    Drug use: No     No Known Allergies   [START ON 2021] aspirin  81 mg Oral Daily    [START ON 2021] atorvastatin  20 mg Oral Daily    [START ON 2021] clopidogrel  75 mg Oral Daily    [START ON 2021] metoprolol succinate  25 mg Oral Daily    furosemide  60 mg Intravenous BID    metOLazone  5 mg Oral Daily    sodium chloride flush  10 mL Intravenous 2 times per day    heparin (porcine)  5,000 Units Subcutaneous 3 times per day       Review of Systems -   Constitutional: Negative for weight gain/loss; malaise, fever  Respiratory: Negative for Asthma;  cough and hemoptysis  Cardiovascular: Negative for palpitations,dizziness   Gastrointestinal: Negative for abd.pain; constipation/diarrhea;    Genitourinary: Negative for stones; hematuria; frequency hesitancy  Integumentt: Negative for rash or pruritis  Hematologic/lymphatic: Negative for blood dyscrasia; leukemia/lymphoma  Musculoskeletal: Negative for Connective tissue disease  Neurological:  Negative for Seizure   Behavioral/Psych:Negative for Bipolar disorder, Schizophrenia; Dementia  Endocrine: negative for thyroid, parathyroid disease      Intake/Output Summary (Last 24 hours) at 1/18/2021 1802  Last data filed at 1/18/2021 1717  Gross per 24 hour   Intake --   Output 300 ml   Net -300 ml       Physical Examination:    /79   Pulse 78   Temp 97.4 °F (36.3 °C) (Oral)   Resp 18   Ht 5' 9\" (1.753 m)   Wt (!) 310 lb 13.6 oz (141 kg)   SpO2 (!) 87% Comment: pt started on 2L/min  BMI 45.90 kg/m²    HEENT:  Face: Atraumatic, Conjunctiva: Pale; non icteric,  Mucous Memb:  Moist, No thyromegaly or Lymphadenopathy  Respiratory:  Resp Assessment: normal, Resp Auscultation: clear   Cardiovascular:   Auscultation:  loud systolic murmur aortic area  Abdomen: Soft, non-tender, Normal bowel sounds,  No organomegaly  Extremities: No Cyanosis or Clubbing; Edema none  Neurological: Oriented to time, place, and person, Non-anxious  Psychiatric: Normal mood and affect  Skin: Warm and dry,  No rash seen      Current Facility-Administered Medications: [START ON 1/19/2021] aspirin chewable tablet 81 mg, 81 mg, Oral, Daily  [START ON 1/19/2021] atorvastatin (LIPITOR) tablet 20 mg, 20 mg, Oral, Daily  [START ON 1/19/2021] clopidogrel (PLAVIX) tablet 75 mg, 75 mg, Oral, Daily  [START ON 1/19/2021] metoprolol succinate (TOPROL XL) extended release tablet 25 mg, 25 mg, Oral, Daily  furosemide (LASIX) injection 60 mg, 60 mg, Intravenous, BID  metOLazone (ZAROXOLYN) tablet 5 mg, 5 mg, Oral, Daily  sodium chloride flush 0.9 % injection 10 mL, 10 mL, Intravenous, 2 times per day  sodium chloride flush 0.9 % injection 10 mL, 10 mL, Intravenous, PRN  promethazine (PHENERGAN) tablet 12.5 mg, 12.5 mg, Oral, Q6H PRN **OR** ondansetron (ZOFRAN) injection 4 mg, 4 mg, Intravenous, Q6H PRN  polyethylene glycol (GLYCOLAX) packet 17 g, 17 g, Oral, Daily PRN  acetaminophen (TYLENOL) tablet 650 mg, 650 mg, Oral, Q6H PRN **OR** acetaminophen (TYLENOL) suppository 650 mg, 650 mg, Rectal, Q6H PRN  heparin (porcine) injection 5,000 Units, 5,000 Units, Subcutaneous, 3 times per day      Labs:   Recent Labs     01/18/21  0540   WBC 10.4   HGB 9.6*   HCT 30.3*        Recent Labs     01/18/21  0540 01/18/21  1619    139   K 2.9* 3.5   CO2 25 24   BUN 66* 63*   CREATININE 5.0* 5.2*   GLUCOSE 119* 103*     No results for input(s): BNP in the last 72 hours. No results for input(s): PROTIME, INR in the last 72 hours. No results for input(s): APTT in the last 72 hours. Recent Labs     01/18/21  0540   TROPONINI 0.04*     Lab Results   Component Value Date    HDL 34 09/26/2020    LDLCALC 59 09/26/2020    TRIG 71 09/26/2020     Recent Labs     01/18/21  0540 01/18/21  1619   AST 12*  --    ALT 10  --    LABALBU 3.6 3.6         EKG:   Sinus rhythm with Premature atrial complexes with Aberrant conduction  Anterior infarct    Chest X-Ray:      ECHO:   Ejection fraction is visually estimated to be 40%. There is severe hypokinesis of the entire apical myocardium and and   hypokinesis of the distal septal wall. There is severe concentric left ventricular hypertrophy. Grade II diastolic dysfunction with elevated LV filling pressures. Mild mitral regurgitation is present. No evidence of mitral stenosis. Mild calcification of the mitral valve noted. Moderate to severe aortic stenosis with a peak velocity of 437m/s and a mean  pressure gradient of 50mmHg. Mild aortic regurgitation. Moderately dilated right ventricle. Right ventricular systolic function is normal .    Corornary angiogram  & Intervention: 4/2017  1. Left main coronary comes from the left coronary cusp, has FERMÍN-3 flow, no significant stenosis.   2. The left anterior descending artery comes from the left main coronary  artery, is occluded in the proximal portion and mid portion has around 70%  stenosis present and distal also 70% stenosis present. 3.  The left circumflex comes from the left main coronary artery and gives  rise to obtuse marginal branches. The mid portion has approximately 60%  stenosis. 4.  The right coronary artery comes from the right coronary cusp, giving rise  to posterior descending artery and posterolateral branch. The proximal mid  portion has 60% stenosis present.     INTERVENTION PERFORMED:  We intervened on the proximal left anterior  descending artery, placed a stent 3.0 x 18 post-dilated to 3.9 mm.     ASSESSMENT AND PLAN:      Acute on chronic combined systolic and diastolic heart failure  Severe aortic stenosis  Severe renal insufficiency  Profound anemia  History of previous MI and known CAD      78-year-old patient with a previous anterior infarct,  known coronary disease,  presenting with shortness of breath  Patient known to have EF of 40%  Last echo also showed severe aortic stenosis with a mean gradient of 50 mmHg  And at the patient has severe renal failure with a creatinine of 5  Anemia with hematocrit of 30    Patient has heart failure due to aortic stenosis and LV dysfunction and renal failure  Agree with high-dose IV Lasix for diuresis  Will need to have right and left heart catheterization followed by TAVR procedure,  once the heart failure is resolved      Chris Cyr M.D  1/18/2021

## 2021-01-18 NOTE — ED PROVIDER NOTES
[address] [facility]      Pt Name: Home Carrion  MRN: [id number]  [place] 1945  Date of evaluation: 1/18/2021  Provider: Areli Gonzales PA-C    This patient was not seen and evaluated by the attending physician Luci Nielson MD.    [address] [place] [address]       Chief Complaint   Patient presents with    Shortness of Breath         CRITICAL CARE TIME   Total Critical Care time was 35 minutes, excluding separately reportable procedures. There was a high probability of clinically significant/life threatening deterioration in the patient's condition which required my urgent intervention. HISTORYOF PRESENT ILLNESS  (Location/Symptom, Timing/Onset, Context/Setting, Quality, Duration, Modifying Factors, Severity.)   Home Carrion is a 77 y.o. male with a past medical history of CHF, hyperlipidemia, hypertension, CKD, NSTEMI, obesity who presents to the emergency department complaining of shortness of breath. The patient reports for the past 3 to 4 days he has had progressively worsening shortness of breath especially on exertion and with laying flat. He states he actually never lays flat due to his history of CHF. He has been checking his oxygen and finding it to be 88% at rest and lower with exertion. He reports increased swelling in his legs bilaterally. He takes 40 mg Lasix twice daily and has not contacted his cardiologist or increased the dose. This morning the patient had an episode where he had a really hard time catching his breath and had very shallow breathing and this scared him and so he called 911. He denies any chest pain, abdominal pain, nausea, vomiting, cough or fever. Nursing Notes were reviewedand I agree. REVIEW OF SYSTEMS    (2-9 systems for level 4, 10 or more forlevel 5)     Review of Systems   Constitutional: Negative for chills and fever. HENT: Negative for sore throat.     Respiratory: Positive for shortness of about 34 years ago. He has never used smokeless tobacco. He reports that he does not drink alcohol or use drugs. PHYSICAL EXAM    (up to 7 for level 4, 8 or more for level 5)     ED Triage Vitals   BP Temp Temp Source Pulse Resp SpO2 Height Weight   21 0600 21 0600 21 0600 21 0600 21 0600 21 0530 -- --   (!) 112/58 98.6 °F (37 °C) Oral 74 17 (!) 88 %         Physical Exam  Vitals signs and nursing note reviewed. Constitutional:       General: He is not in acute distress. Appearance: He is well-developed. He is obese. HENT:      Head: Normocephalic and atraumatic. Neck:      Musculoskeletal: Neck supple. Cardiovascular:      Rate and Rhythm: Normal rate and regular rhythm. Heart sounds: Normal heart sounds. Pulmonary:      Effort: Pulmonary effort is normal. No respiratory distress. Breath sounds: Examination of the right-lower field reveals decreased breath sounds. Examination of the left-lower field reveals decreased breath sounds and rales. Decreased breath sounds and rales present. Musculoskeletal: Normal range of motion. Right lower leg: Edema (3+) present. Left lower leg: Edema (3+) present. Skin:     General: Skin is warm and dry. Neurological:      Mental Status: He is alert and oriented to person, place, and time. Psychiatric:         Behavior: Behavior normal.            DIAGNOSTIC RESULTS     EK-lead EKG interpreted as sinus rhythm with PACs and QT prolongation with a rate of 81 bpm. No ST elevation or depression on my review. Please see Dr. Simental Artist note for full interpretation.      RADIOLOGY:   Non-plain film images such as CT, Ultrasound and MRI are read by the radiologist.Plain radiographic images are visualized and preliminarily interpreted by Nani Mcintyre PA-C with the below findings:        Interpretation per the Radiologist below, if available at the time of this note:    XR CHEST PORTABLE   Final Result 1. Right pleural effusion with right basilar atelectasis or pneumonia. 2. Pulmonary edema.              LABS:  Labs Reviewed   CBC WITH AUTO DIFFERENTIAL - Abnormal; Notable for the following components:       Result Value    RBC 3.41 (*)     Hemoglobin 9.6 (*)     Hematocrit 30.3 (*)     RDW 16.6 (*)     Neutrophils Absolute 8.3 (*)     Lymphocytes Absolute 0.7 (*)     All other components within normal limits    Narrative:     Performed at:  [address] [address]   Phone [phone]   COMPREHENSIVE METABOLIC PANEL W/ REFLEX TO MG FOR LOW K - Abnormal; Notable for the following components:    Potassium reflex Magnesium 2.9 (*)     Chloride 96 (*)     Anion Gap 17 (*)     Glucose 119 (*)     BUN 66 (*)     CREATININE 5.0 (*)     GFR Non- 11 (*)     GFR African American 14 (*)     Albumin/Globulin Ratio 1.0 (*)     AST 12 (*)     All other components within normal limits    Narrative:     Claro tel. [phone],  Chemistry results called to and read back by Yaneth Tracy RN, 01/18/2021  07:26, by Courtney De La Cruz  Performed at:  [facility]  [address] [facility] [address]   Phone [phone]   BRAIN NATRIURETIC PEPTIDE - Abnormal; Notable for the following components:    Pro-BNP 14,429 (*)     All other components within normal limits    Narrative:     Claro tel. [phone],  Chemistry results called to and read back by Yaneth Tracy RN, 01/18/2021  07:26, by Courtney De La Cruz  Performed at:  [address] [address]   Phone [phone]   TROPONIN - Abnormal; Notable for the following components:    Troponin 0.04 (*)     All other components within normal limits    Narrative:     Claro tel. [phone],  Chemistry results called to and read back by Yaneth Tracy RN, 01/18/2021  07:26, by Courtney De La Cruz  Performed at:  [facility]  [address] [address]   Phone [phone]   BLOOD GAS, VENOUS    Narrative:     Performed at:  [facility]  [address] [address]   Phone [phone]    Narrative:     Performed at:  [facility]  [address] [address]   Phone [phone]   MAGNESIUM    Narrative:     Sanjuana Mayte tel. [phone],  Chemistry results called to and read back by Nicole Guthrie RN, 01/18/2021  07:26, by Alison Lane  Performed at:  [facility]  [address] [address]   Phone [phone]       All other labs were within normal range or not returned as of this dictation. EMERGENCY DEPARTMENT COURSE and DIFFERENTIAL DIAGNOSIS/MDM:   Vitals:    Vitals:    01/18/21 0600 01/18/21 0615 01/18/21 0630 01/18/21 0645   BP: (!) 112/58 (!) 114/58 (!) 105/51 (!) 115/56   Pulse: 74 69 66 71   Resp: 17 21 16 14   Temp: 98.6 °F (37 °C)      TempSrc: Oral      SpO2: 99% 98% 98% 99%        I have evaluated this patient. My supervising physician was available for consultation. The patient was hypoxic on arrival and placed on 4 L nasal cannula oxygen. He has been stable throughout his stay. His vitals are otherwise normal.  His rapid Covid was negative. The patient was noted to have CHF exacerbation with acute pulmonary edema and pleural effusion on chest x-ray and BNP has nearly doubled up over 14,000. His troponin is slightly elevated 0.03. He does have chronic renal insufficiency and this does appear worse. His BUN is 66 and creatinine is 5.0. His potassium was noted to be low at 2.9 and this was replaced orally. Given new oxygen requirement and acute CHF exacerbation, I recommended admission to the hospital.  The hospitalist was reached via perfect serve.   The patient was updated on plan for admission. PROCEDURES:  None    FINAL IMPRESSION      1. Acute on chronic congestive heart failure, unspecified heart failure type ([address]    2. Acute respiratory failure with hypoxia (HCC)    3. Acute pulmonary edema (HCC)    4. Pleural effusion    5. Acute on chronic renal insufficiency    6. Hypokalemia          DISPOSITION/PLAN   DISPOSITION Admitted 01/18/2021 08:09:45 AM      PATIENT REFERRED TO:  No follow-up provider specified.     MEDICATIONS:  New Prescriptions    No medications on file       (Please note that portions of this note were completed with a voice recognition program.  Efforts were made toedit the dictations but occasionally words are mis-transcribed.)    SUSAN Brizuela PA-C  01/18/21 0890

## 2021-01-18 NOTE — PROGRESS NOTES
Pt admitted to rm 5129. Vitals, and admission completed. Pt alert and oriented x4 and calls appropriately. Pt up to chair independently without complications. Pr remains in chair, call light within reach and expresses no further needs at this time.

## 2021-01-18 NOTE — H&P
Hospital Medicine History & Physical      PCP: Adam Mccracken MD    Date of Admission: 1/18/2021    Date of Service: Pt seen/examined on 1/18/2021 and Admitted to Inpatient     Chief Complaint:  progressively worsening dyspnea. History Of Present Illness: The patient is a 77 y.o. male w hx of chronic systolic CHF EF 79% and advanced CKDIV to V, who presents to [facility] with worsening dyspnea. Pt reports symptoms worsening gradually over weeks and worse in the past few days. Reports severe orthopnea. Worsening bilateral leg edema. Denies any chest pain and no cough, No fever. In ED pt found with pulm edema and worsening R pleural effusion. Also with worsening renl function Cr of 5, albeit his Bicarb is normal and his K is low. At the time of my examination, pt reports that he is leaning against aggressive measures and does not think he wants to consider dialysis as the next step in his treatment. Past Medical History:        Diagnosis Date    Arthritis     Bell's palsy     Cancer ([facility]     CHF (congestive heart failure) ([facility] [facility]     Hyperlipidemia     Hypertension     Influenza B 4/2/15    Kidney failure     sees Dr Elvie Plunkett        Past Surgical History:    No past surgical history on file. Medications Prior to Admission:    Prior to Admission medications    Medication Sig Start Date End Date Taking?  Authorizing Provider   furosemide (LASIX) 40 MG tablet Take 1 tablet by mouth 2 times daily 9/27/20  Yes Hesham Cee MD   clopidogrel (PLAVIX) 75 MG tablet TAKE 1 TABLET BY MOUTH EVERY DAY 7/1/20  Yes Martin Richardson MD   metoprolol succinate (TOPROL XL) 100 MG extended release tablet TAKE 1 TABLET BY MOUTH EVERY DAY 7/1/20  Yes Martin Richardson MD   NIFEdipine (ADALAT CC) 60 MG extended release tablet Take 60 mg by mouth 2 times daily    Yes Historical Provider, MD   aspirin 81 MG chewable tablet Take 81 mg by mouth daily. Yes Historical Provider, MD   atorvastatin (LIPITOR) 20 MG tablet Take 20 mg by mouth daily. Yes Historical Provider, MD   Multiple Vitamins-Minerals (THERAPEUTIC MULTIVITAMIN-MINERALS) tablet Take 1 tablet by mouth nightly. Yes Historical Provider, MD   Glucosamine-Chondroit-Vit C-Mn (GLUCOSAMINE CHONDR 1500 COMPLX PO) Take 1 each by mouth 2 times daily Indications: 2700 mg    Yes Historical Provider, MD       Allergies:  Patient has no known allergies. Social History:  The patient currently lives at home. TOBACCO:   reports that he quit smoking about 34 years ago. He has never used smokeless tobacco.  ETOH:   reports no history of alcohol use. Family History:  Reviewed in detail and negative for DM, Early CAD, Cancer, CVA. Positive as follows:        Problem Relation Age of Onset    Cancer Mother     Cancer Father        REVIEW OF SYSTEMS:   As noted in the HPI. All other systems reviewed and negative. PHYSICAL EXAM:    /60   Pulse 75   Temp 98.6 °F (37 °C) (Oral)   Resp 16   Ht 5' 11\" (1.803 m)   Wt (!) 313 lb (142 kg)   SpO2 100%   BMI 43.65 kg/m²     General appearance: No apparent distress appears stated age and cooperative. HEENT Normal cephalic, atraumatic without obvious deformity. Pupils equal, round, and reactive to light. Extra ocular muscles intact. Conjunctivae/corneas clear. Neck: Supple, No jugular venous distention/bruits. Trachea midline without thyromegaly or adenopathy with full range of motion. Lungs: R lung diminished to mid lung level. + rales bilateral bases. Heart: Regular rate and rhythm with Normal S1/S2 without murmurs, rubs or gallops, point of maximum impulse non-displaced  Abdomen: Soft, non-tender or non-distended without rigidity or guarding and positive bowel sounds all four quadrants. Extremities: 2+ pitting edema. Skin: Skin color, texture, turgor normal.  No rashes or lesions. Neurologic: Alert and oriented X 3, neurovascularly intact with sensory/motor intact upper extremities/lower extremities, bilaterally. Cranial nerves: II-XII intact, grossly non-focal.  Mental status: Alert, oriented, thought content appropriate. Capillary Refill: Acceptable  < 3 seconds  Peripheral Pulses: +3 Easily felt, not easily obliterated with pressure      CBC   Recent Labs     01/18/21  0540   WBC 10.4   HGB 9.6*   HCT 30.3*         RENAL  Recent Labs     01/18/21  0540      K 2.9*   CL 96*   CO2 25   BUN 66*   CREATININE 5.0*     LFT'S  Recent Labs     01/18/21  0540   AST 12*   ALT 10   BILITOT 0.4   ALKPHOS 98     COAG  No results for input(s): INR in the last 72 hours. CARDIAC ENZYMES  Recent Labs     01/18/21 0540   TROPONINI 0.04*       U/A:    Lab Results   Component Value Date    COLORU YELLOW 11/05/2019    WBCUA 4 11/05/2019    RBCUA 2 11/05/2019    MUCUS 1+ 01/27/2015    BACTERIA RARE 11/05/2019    CLARITYU Clear 11/05/2019    SPECGRAV 1.010 11/05/2019    LEUKOCYTESUR Negative 11/05/2019    BLOODU SMALL 11/05/2019    GLUCOSEU Negative 11/05/2019       ABG    Lab Results   Component Value Date    LAQ0JNC 32.2 01/30/2015    BEART 7.2 01/30/2015    G7JUGIFB 95.6 01/30/2015    PHART 7.443 01/30/2015    GQR5YAI 47.8 01/30/2015    PO2ART 76.8 01/30/2015    QVM5TJK 33.7 01/30/2015           Active Hospital Problems    Diagnosis Date Noted    CHF (congestive heart failure), NYHA class I, acute on chronic, combined ([facility] [address] [I50.43] 01/18/2021         PHYSICIANS CERTIFICATION:    I certify that Morley Siemens is expected to be hospitalized for more than 2 midnights based on the following assessment and plan:      ASSESSMENT/PLAN:    Acute on Chronic Systolic CHF in setting of progressive renal disease. EF 40% on ECHO in Sept.   Acute Pulm Edema and R Pleural Effusion. Cr up to 5 today. Plan:    - lasix 60 Iv BID.     - add metolazone. - decrease dose BB.    - cont ASA and statin. - No ACEI/ARB with severe progressive renal disease. KAIDEN -   Cr up to 5 today. Pt is leaning against considering dialysis. Plan:    - nephrology    - palliative care consult. - monitor closely with diuretics - renal q4 to q6hrs        Morbid Obesity - . Hypokalemia -   Replaced PO in ED. Will follow up renal panel prior to additional replacement due to Cr of 5. DVT Prophylaxis: heparin  Diet: NPO. Code Status: Prior      Dispo - PCU. If unable to mobilize fluids with medication, looking at dialysis. If pt refuses dialysis, likely looking at palliative care and hospice. Norma Rowe MD    Thank you Maxi Morales MD for the opportunity to be involved in this patient's care. If you have any questions or concerns please feel free to contact me at [phone].

## 2021-01-19 LAB
ALBUMIN SERPL-MCNC: 3.3 G/DL (ref 3.4–5)
ALBUMIN SERPL-MCNC: 3.5 G/DL (ref 3.4–5)
ANION GAP SERPL CALCULATED.3IONS-SCNC: 14 MMOL/L (ref 3–16)
ANION GAP SERPL CALCULATED.3IONS-SCNC: 18 MMOL/L (ref 3–16)
BUN BLDV-MCNC: 64 MG/DL (ref 7–20)
BUN BLDV-MCNC: 70 MG/DL (ref 7–20)
CALCIUM SERPL-MCNC: 8.8 MG/DL (ref 8.3–10.6)
CALCIUM SERPL-MCNC: 9 MG/DL (ref 8.3–10.6)
CHLORIDE BLD-SCNC: 100 MMOL/L (ref 99–110)
CHLORIDE BLD-SCNC: 99 MMOL/L (ref 99–110)
CO2: 24 MMOL/L (ref 21–32)
CO2: 26 MMOL/L (ref 21–32)
CREAT SERPL-MCNC: 5.4 MG/DL (ref 0.8–1.3)
CREAT SERPL-MCNC: 6 MG/DL (ref 0.8–1.3)
GFR AFRICAN AMERICAN: 11
GFR AFRICAN AMERICAN: 13
GFR NON-AFRICAN AMERICAN: 10
GFR NON-AFRICAN AMERICAN: 9
GLUCOSE BLD-MCNC: 102 MG/DL (ref 70–99)
GLUCOSE BLD-MCNC: 118 MG/DL (ref 70–99)
PHOSPHORUS: 5.3 MG/DL (ref 2.5–4.9)
PHOSPHORUS: 5.4 MG/DL (ref 2.5–4.9)
POTASSIUM SERPL-SCNC: 3.3 MMOL/L (ref 3.5–5.1)
POTASSIUM SERPL-SCNC: 3.8 MMOL/L (ref 3.5–5.1)
SODIUM BLD-SCNC: 140 MMOL/L (ref 136–145)
SODIUM BLD-SCNC: 141 MMOL/L (ref 136–145)

## 2021-01-19 PROCEDURE — 2700000000 HC OXYGEN THERAPY PER DAY

## 2021-01-19 PROCEDURE — 36415 COLL VENOUS BLD VENIPUNCTURE: CPT

## 2021-01-19 PROCEDURE — 6360000002 HC RX W HCPCS: Performed by: INTERNAL MEDICINE

## 2021-01-19 PROCEDURE — 93325 DOPPLER ECHO COLOR FLOW MAPG: CPT

## 2021-01-19 PROCEDURE — 2580000003 HC RX 258: Performed by: INTERNAL MEDICINE

## 2021-01-19 PROCEDURE — 94760 N-INVAS EAR/PLS OXIMETRY 1: CPT

## 2021-01-19 PROCEDURE — 80069 RENAL FUNCTION PANEL: CPT

## 2021-01-19 PROCEDURE — 2060000000 HC ICU INTERMEDIATE R&B

## 2021-01-19 PROCEDURE — 6370000000 HC RX 637 (ALT 250 FOR IP): Performed by: INTERNAL MEDICINE

## 2021-01-19 PROCEDURE — 93308 TTE F-UP OR LMTD: CPT

## 2021-01-19 PROCEDURE — 93320 DOPPLER ECHO COMPLETE: CPT

## 2021-01-19 RX ORDER — POTASSIUM CHLORIDE 20 MEQ/1
20 TABLET, EXTENDED RELEASE ORAL ONCE
Status: COMPLETED | OUTPATIENT
Start: 2021-01-19 | End: 2021-01-19

## 2021-01-19 RX ADMIN — ATORVASTATIN CALCIUM 20 MG: 20 TABLET, FILM COATED ORAL at 08:01

## 2021-01-19 RX ADMIN — FUROSEMIDE 60 MG: 10 INJECTION, SOLUTION INTRAMUSCULAR; INTRAVENOUS at 08:02

## 2021-01-19 RX ADMIN — SODIUM CHLORIDE, PRESERVATIVE FREE 10 ML: 5 INJECTION INTRAVENOUS at 08:02

## 2021-01-19 RX ADMIN — POTASSIUM CHLORIDE 20 MEQ: 1500 TABLET, EXTENDED RELEASE ORAL at 11:14

## 2021-01-19 RX ADMIN — METOLAZONE 5 MG: 5 TABLET ORAL at 08:01

## 2021-01-19 RX ADMIN — ASPIRIN 81 MG: 81 TABLET, CHEWABLE ORAL at 08:01

## 2021-01-19 RX ADMIN — SODIUM CHLORIDE, PRESERVATIVE FREE 10 ML: 5 INJECTION INTRAVENOUS at 20:03

## 2021-01-19 RX ADMIN — HEPARIN SODIUM 5000 UNITS: 5000 INJECTION INTRAVENOUS; SUBCUTANEOUS at 20:51

## 2021-01-19 RX ADMIN — FUROSEMIDE 60 MG: 10 INJECTION, SOLUTION INTRAMUSCULAR; INTRAVENOUS at 18:29

## 2021-01-19 RX ADMIN — HEPARIN SODIUM 5000 UNITS: 5000 INJECTION INTRAVENOUS; SUBCUTANEOUS at 14:17

## 2021-01-19 RX ADMIN — CLOPIDOGREL BISULFATE 75 MG: 75 TABLET ORAL at 08:01

## 2021-01-19 RX ADMIN — HEPARIN SODIUM 5000 UNITS: 5000 INJECTION INTRAVENOUS; SUBCUTANEOUS at 05:56

## 2021-01-19 RX ADMIN — METOPROLOL SUCCINATE 25 MG: 25 TABLET, EXTENDED RELEASE ORAL at 08:01

## 2021-01-19 ASSESSMENT — PAIN SCALES - GENERAL
PAINLEVEL_OUTOF10: 0
PAINLEVEL_OUTOF10: 0

## 2021-01-19 NOTE — PLAN OF CARE
Problem: OXYGENATION/RESPIRATORY FUNCTION  Goal: Patient will maintain patent airway  Outcome: Ongoing  Goal: Patient will achieve/maintain normal respiratory rate/effort  Description: Respiratory rate and effort will be within normal limits for the patient  Outcome: Ongoing     Problem: HEMODYNAMIC STATUS  Goal: Patient has stable vital signs and fluid balance  Outcome: Ongoing     Problem: FLUID AND ELECTROLYTE IMBALANCE  Goal: Fluid and electrolyte balance are achieved/maintained  1/19/2021 1306 by Tawnya Mathews RN  Outcome: Ongoing    Problem: ACTIVITY INTOLERANCE/IMPAIRED MOBILITY  Goal: Mobility/activity is maintained at optimum level for patient  Outcome: Ongoing     Problem: SAFETY  Goal: Free from accidental physical injury  1/19/2021 1306 by Tawnya Mathews RN  Outcome: Ongoing    Goal: Free from intentional harm  Outcome: Ongoing     Problem: DAILY CARE  Goal: Daily care needs are met  1/19/2021 1306 by Tawnya Mathews RN  Outcome: Ongoing    Problem: PAIN  Goal: Patient's pain/discomfort is manageable  Outcome: Ongoing     Problem: SKIN INTEGRITY  Goal: Skin integrity is maintained or improved  Outcome: Ongoing     Problem: KNOWLEDGE DEFICIT  Goal: Patient/S.O. demonstrates understanding of disease process, treatment plan, medications, and discharge instructions.   Outcome: Ongoing     Problem: DISCHARGE BARRIERS  Goal: Patient's continuum of care needs are met  Outcome: Ongoing

## 2021-01-19 NOTE — CONSULTS
[address] [address]                                  CONSULTATION    PATIENT NAME: Vickey Philip                          :        1945  MED REC NO:   [id number]                          ROOM:       [id number]  ACCOUNT NO:   [de-identified]                           ADMIT DATE: 2021  PROVIDER:     Aleida Estevez MD    CONSULT DATE:  2021    REASON FOR CONSULTATION:  Acute kidney injury. HISTORY OF PRESENTING ILLNESS:  He is a 80-year-old  gentleman  with past medical history significant for morbid obesity, hypertension,  hypercholesterolemia, chronic kidney disease stage IV, follows up with  me but has not kept appointment for over a year, came to ER complaining  of increased shortness of breath, dyspnea on exertion, and weight gain. The patient was found to be in acute congestive heart failure with acute  kidney injury. Initially, he was thinking about not pursuing dialysis  but he changed his mind overnight. Renal consultation has been called  for acute kidney injury. At the time of consultation, the patient is feeling and breathing  better, denies any chest pain, admits improved shortness of breath and  dyspnea on exertion. Feeling weak, tired, decreased level of energy but  denies any urinary symptoms. PAST MEDICAL HISTORY:  Significant for,  1. Bell's palsy. 2.  Arthritis. 3.  Morbid obesity. 4.  Chronic kidney disease stage IV. 5.  Anemia of chronic kidney disease. 6.  Hypercholesterolemia. 7.  Obstructive sleep apnea. PAST SURGICAL HISTORY:  None. MEDICATIONS:  Include Lasix, Plavix, Adalat, Lipitor, vitamin  supplement. ALLERGIES:  None. SOCIAL HISTORY:  He does not smoke or drink at this point. FAMILY HISTORY:  Positive for diabetes and coronary artery disease. REVIEW OF SYSTEMS:  Hard of hearing. No vision problem.   No nausea, vomiting, or diarrhea. No chest pain but has some shortness of breath,  dyspnea on exertion with orthopnea and PND. Worsening lower extremity  edema. Has weight gain. Feeling a bit anxious and depressed. Denies  any hematuria, dysuria, hematemesis, or melena. PHYSICAL EXAMINATION:  GENERAL:  Sitting up in chair, alert, awake, and oriented, responding  properly. VITAL SIGNS:  Blood pressure 130/71, pulse 67, temperature 97.9. HEENT EXAMINATION:  Pupils are reactive to light. CHEST:  Decreased breath sounds on both bases. Few rhonchi. CVS:  S1, S2 audible. Regular rate and rhythm. ABDOMEN:  Soft, nontender. Positive bowel sounds. Distended. EXTREMITIES:  2 to 3+ edema. CNS:  Nonfocal.    LABORATORY DATA:  Sodium 141, potassium 3.3, chloride 99, bicarb 24, BUN  70, creatinine 5.4, calcium 9. WBC is 10.4, hemoglobin 9.6. IMPRESSION:  1. Acute kidney injury most likely secondary to cardiorenal.  2.  Congestive heart failure. 3.  Hypokalemia. 4.  History of chronic kidney disease stage IV. 5.  Anemia of chronic disease. 6.  Renal osteodystrophy. PLAN:  1. Daily weight. 2.  Strict I's and O's.  3.  Fluid restriction advised. 4.  Supplement potassium. 5.  Check magnesium. 6.  Check the phosphorus and PTH. 7.  Check the iron studies and ferritin. 8.  I had a long discussion with the patient as well as admitting team.   At this point, the patient is willing to pursue dialysis if needed. No  acute indication to start dialysis at this point. We will follow.         Kenny Chang MD    D: 01/19/2021 11:35:49       T: 01/19/2021 15:49:37     [email or web address]  Job#: [id number]     Doc#: [id number]    CC:

## 2021-01-19 NOTE — PROGRESS NOTES
Brief Nutrition Note     Pt screened per nutrition SOC for CHF diet education. Pt reports he doesn't follow diet at home. He doesn't cook and relies on frozen, microwave items. Did briefly discuss some lower sodium frozen options. Also discussed fluid restriction. Will leave written materials in d/c paperwork with contact information.       Electronically signed by Jessica Okeefe RD, LD on 1/19/2021 at 12:30 PM

## 2021-01-19 NOTE — PROGRESS NOTES
tablet by mouth nightly. Yes Historical Provider, MD   Glucosamine-Chondroit-Vit C-Mn (GLUCOSAMINE CHONDR 1500 COMPLX PO) Take 1 each by mouth 2 times daily Indications: 2700 mg    Yes Historical Provider, MD        CURRENT Medications:      aspirin chewable tablet 81 mg, Daily      atorvastatin (LIPITOR) tablet 20 mg, Daily      clopidogrel (PLAVIX) tablet 75 mg, Daily      metoprolol succinate (TOPROL XL) extended release tablet 25 mg, Daily      furosemide (LASIX) injection 60 mg, BID      metOLazone (ZAROXOLYN) tablet 5 mg, Daily      sodium chloride flush 0.9 % injection 10 mL, 2 times per day      sodium chloride flush 0.9 % injection 10 mL, PRN      promethazine (PHENERGAN) tablet 12.5 mg, Q6H PRN    Or      ondansetron (ZOFRAN) injection 4 mg, Q6H PRN      polyethylene glycol (GLYCOLAX) packet 17 g, Daily PRN      acetaminophen (TYLENOL) tablet 650 mg, Q6H PRN    Or      acetaminophen (TYLENOL) suppository 650 mg, Q6H PRN      heparin (porcine) injection 5,000 Units, 3 times per day        Allergies:  Patient has no known allergies. Review of Systems: SEE HPI   · Constitutional: no unanticipated weight loss. There's been no change in energy level, sleep pattern, or activity level. No fevers, chills. · Eyes: No visual changes or diplopia. No scleral icterus. · ENT: No Headaches, hearing loss or vertigo. No mouth sores or sore throat. · Cardiovascular: No Chest pain, tightness or discomfort.  Patient has shortness of breath. No Dyspnea on exertion, Orthopnea, Paroxysmal nocturnal dyspnea or breathlessness at rest.   No Palpitations.  No Syncope ('blackouts', 'faints', 'collapse') or dizziness. · Respiratory: No cough or wheezing, no sputum production. No hematemesis. · Gastrointestinal: No abdominal pain, appetite loss, blood in stools. No change in bowel or bladder habits. · Genitourinary: No dysuria, trouble voiding, or hematuria.   · Musculoskeletal:  No gait disturbance, no joint complaints. · Integumentary: No rash or pruritis. · Neurological: No headache, diplopia, change in muscle strength, numbness or tingling. · Psychiatric: No anxiety or depression. · Endocrine: No temperature intolerance. No excessive thirst, fluid intake, or urination. No tremor. · Hematologic/Lymphatic: No abnormal bruising or bleeding, blood clots or swollen lymph nodes. · Allergic/Immunologic: No nasal congestion or hives. Objective:     PHYSICAL EXAM:      Vitals:    01/19/21 1141   BP: 130/70   Pulse: 65   Resp: 18   Temp: 98.2 °F (36.8 °C)   SpO2: 96%    Weight: (!) 310 lb 13.6 oz (141 kg)       General Appearance:  Alert, cooperative, no distress, appears stated age. Head:  Normocephalic, without obvious abnormality, atraumatic. Eyes:  Pupils equal and round. No scleral icterus. Mouth: Moist mucosa, no pharyngeal erythema. Nose: Nares normal. No drainage or sinus tenderness. Neck: Supple, symmetrical, trachea midline. No adenopathy. No tenderness/mass/nodules. No carotid bruit or elevated JVD. Lungs:   Respiratory Effort: Normal   Auscultation: Clear to auscultation bilaterally, respirations unlabored. No wheeze, rales   Chest Wall:  No tenderness or deformity. Cardiovascular:    Pulses  Palpation: normal   Ascultation: Regular rate, S1/ S2 normal. No murmur, rub, or gallop. 2+ radial and pedal pulses, symmetric  Carotid  Femoral   Abdomen and Gastrointestinal:   Soft, non-tender, bowel sounds active. Liver and Spleen  Masses   Musculoskeletal: No muscle wasting  Back  Gait   Extremities: Extremities normal, atraumatic. At least 3+ edema. No cyanosis clubbing       Skin: Inspection and palpation performed, no rashes or lesions. Pysch: Normal mood and affect.  Alert and oriented to time place person   Neurologic: Normal gross motor and sensory exam.       Labs     All labs have been reviewed    Lab Results   Component Value Date    WBC 10.4 01/18/2021    RBC 3.41 01/18/2021    HGB 9.6 01/18/2021    HCT 30.3 01/18/2021    MCV 88.9 01/18/2021    RDW 16.6 01/18/2021     01/18/2021     Lab Results   Component Value Date     01/19/2021    K 3.3 01/19/2021    K 2.9 01/18/2021    CL 99 01/19/2021    CO2 24 01/19/2021    BUN 70 01/19/2021    CREATININE 5.4 01/19/2021    GFRAA 13 01/19/2021    AGRATIO 1.0 01/18/2021    LABGLOM 10 01/19/2021    GLUCOSE 118 01/19/2021    PROT 7.2 01/18/2021    CALCIUM 9.0 01/19/2021    BILITOT 0.4 01/18/2021    ALKPHOS 98 01/18/2021    AST 12 01/18/2021    ALT 10 01/18/2021     No results found for: PTINR  Lab Results   Component Value Date    LABA1C 6.2 08/15/2019     Lab Results   Component Value Date    DCJKROC 977 (H) 01/30/2015    TROPONINI 0.04 (H) 01/18/2021       Cardiac, Vascular and Imaging Data all Personally Reviewed in Detail by Myself      EKG:   Sinus rhythm with Premature atrial complexes with Aberrant conductionAnterior infarct (cited on or before 16-APR-2017)Prolonged QTConfirmed by Yoana SMITH MD ([id number]) on 1/18/2021 8:02:27 AM  Echocardiogram:  Ejection fraction is visually estimated to be 40%. There is severe hypokinesis of the entire apical myocardium and and   hypokinesis of the distal septal wall. There is severe concentric left ventricular hypertrophy. Grade II diastolic dysfunction with elevated LV filling pressures. Mild mitral regurgitation is present. No evidence of mitral stenosis. Mild calcification of the mitral valve noted. Moderate to severe aortic stenosis with a peak velocity of 437m/s and a mean   pressure gradient of 50mmHg. Mild aortic regurgitation. Moderately dilated right ventricle. Right ventricular systolic function is normal .      Assessment and Plan     -Acute on chronic systolic heart failure. Continue diuretic therapy. Heart failure education. Continue guideline based therapy.     Severe aortic valve stenosis will need repeat echocardiogram.  We will need a repeat cardiac cath. We will also need aortic valve replacement most likely TAVR. Acute on chronic kidney disease nephrology has been consulted. Thank you for allowing us to participate in the care of [address]. Please do not hesitate to contact me if you have any questions.     May Adorno MD, MPH    [facility], [address] [facility]  Ph: [phone]  Fax: [phone]    1/19/2021 12:11 PM

## 2021-01-19 NOTE — DISCHARGE INSTR - DIET
Goal Sodium Intake: less than 2000 mg (unless your physician tells you a different number)   Goal Fluid Intake: less than 64 oz (unless your physician tells you a different number)    This nutrition therapy will help you feel better and support your heart. This plan focuses on:   Limiting sodium in your diet. Salt (sodium) makes your body hold water. When your body holds too much water, you can feel shortness of breath and swelling. You can prevent these symptoms by eating less salt.  Limiting fluid in your diet. For some patients, drinking too much fluid can make heart failure worse. It can cause symptoms such as shortness of breath and swelling. Limiting fluids can help relieve some of your symptoms. You can achieve these goals by:   Reading food labels to keep track of how much sodium is in the foods you eat.  Limiting foods that are high in sodium.  Checking your weight to make sure youre not retaining too much fluid. The nutrition plan for heart failure usually limits the sodium you get from food and drinks to 2,000 milligrams per day. Salt is the main source of sodium. Read the nutrition label to find out how much sodium is in 1 serving of a food.  Select foods with 140 milligrams of sodium or less per serving.  Foods with more than 300 milligrams of sodium per serving may not fit into a reduced-sodium meal plan.  Check serving sizes. If you eat more than 1 serving, you will get more sodium than the amount listed. Cutting Back on Sodium   Avoid processed foods. Eat more fresh foods. o Fresh and frozen fruits and vegetables without added juices or sauces are naturally low in sodium. o Fresh meats are lower in sodium than processed meats, such as romero, sausage, and hot dogs. Read the nutrition label or ask your  to help you find a fresh meat that is low in sodium.  Eat less salt, at the table and when cooking.    o Just 1 teaspoon of table salt has 2,300 milligrams of sodium. o Leave the salt out of recipes for pasta, casseroles, and soups.  Be a smart . o Look for food packages that say salt-free or sodium-free.  These items contain less than 5 milligrams of sodium per serving. o Very-low-sodium products contain less than 35 milligrams of sodium per serving. o Low-sodium products contain less than 140 milligrams of sodium per serving. o Unsalted or no added salt products may still be high in sodium. Check the nutrition label.  Add flavors to your food without adding sodium. o Try lemon juice, lime juice, fruit juice, or vinegar. o Dry or fresh herbs add flavor. Try basil, bay leaf, dill, rosemary, parsley, temi, dry mustard, nutmeg, thyme, and paprika.  o Pepper, red pepper flakes, and cayenne pepper can add spice to your meals without adding sodium. Hot sauce contains sodium, but if you use just a drop or two, it will not add up to much.  o Buy a sodium-free seasoning blend or make your own at home.  Use caution when you eat outside your home.   o Restaurant foods can be very high in sodium. o Ask for nutrition information. Many restaurants provide nutrition facts on their menus or websites. o Let your  know that you want your food to be cooked without salt. Ask for your salad dressing and sauces to come on the side.     Fluid Restriction  Your doctor may ask you to follow a fluid restriction in addition to taking diuretics (water pills). Ask your doctor how much fluid you can have. Foods that are liquid at room temperature are considered a fluid, such as popsicles, soup, ice cream, and Jell-O.      Foods Not Recommended   Breads or crackers topped with salt   Prepackaged bread crumbs    Self-rising flours    Canned vegetables (unless they are salt free or low sodium)   Sauerkraut and pickled vegetables    Canned or dried soups (unless they are salt free or low sodium)   Western [employer]oc[employer] fries, onion rings, and other fried foods  Processed cheeses    Cottage cheese    Cured meats: romero, ham, sausage, pepperoni, and hot dogs    Canned meats   Smoked fish and meats    Frozen meals (that have more than 600 mg sodium)   Salter butter or margarine    Any type of salt seasoning (sea salt, kosher salt, onion salt, garlic salt, seasoning blends made with salt)   Ketchup, BBQ sauce, soy sauce, [facility] sauce   Salsa, pickles, olives, relish   Salad dressings: ranch, blue cheese, [facility], [facility] Dietitian Office: [phone]

## 2021-01-19 NOTE — PLAN OF CARE
Problem: FLUID AND ELECTROLYTE IMBALANCE  Goal: Fluid and electrolyte balance are achieved/maintained  1/19/2021 0142 by En Montano RN  Outcome: Ongoing  Note: Educated patient/family on CHF signs/ symptoms, causes, treatments, limited fluid intake, daily weights, low sodium diet, and follow-up. Pt to call attending physician if weight gain of 3 pounds in one day or 5 pounds in one week. Problem: SAFETY  Goal: Free from accidental physical injury  Outcome: Ongoing  Note: Patient assessed for fall risk; fall precautions initiated. Patient and family instructed about safety devices. Environment kept free of clutter and adequate lighting provided. Bed locked and in lowest position. Call light within reach. Will continue to monitor. Problem: DAILY CARE  Goal: Daily care needs are met  Outcome: Ongoing  Note: Patient's ability assessed to perform self care and independent activity encouraged according to that ability. Assisted with ADL's as needed. Risk for skin breakdown assessed. Potential discharge needs assessed. Patient and family included in daily care decisions.

## 2021-01-19 NOTE — PROGRESS NOTES
Hospitalist Progress Note      PCP: Rg Paris MD    Date of Admission: 1/18/2021    Chief Complaint: SOB, orthopnea, leg edema. Subjective:     Fair diuresis on present regimen and feels better - dyspnea and orthopnea improved. Leg edema persisting. Little change in weight. Pt changed his mind and is now agreeable to dialysis if necessary - nephrology team aware. Medications:  Reviewed    Infusion Medications   Scheduled Medications    aspirin  81 mg Oral Daily    atorvastatin  20 mg Oral Daily    clopidogrel  75 mg Oral Daily    metoprolol succinate  25 mg Oral Daily    furosemide  60 mg Intravenous BID    metOLazone  5 mg Oral Daily    sodium chloride flush  10 mL Intravenous 2 times per day    heparin (porcine)  5,000 Units Subcutaneous 3 times per day     PRN Meds: sodium chloride flush, promethazine **OR** ondansetron, polyethylene glycol, acetaminophen **OR** acetaminophen      Intake/Output Summary (Last 24 hours) at 1/19/2021 1238  Last data filed at 1/19/2021 1114  Gross per 24 hour   Intake 780 ml   Output 3050 ml   Net -2270 ml       Physical Exam Performed:    /70   Pulse 65   Temp 98.2 °F (36.8 °C) (Oral)   Resp 18   Ht 5' 9\" (1.753 m)   Wt (!) 310 lb 13.6 oz (141 kg)   SpO2 96%   BMI 45.90 kg/m²     General appearance: No apparent distress appears stated age and cooperative. HEENT Normal cephalic, atraumatic without obvious deformity. Pupils equal, round, and reactive to light. Extra ocular muscles intact. Conjunctivae/corneas clear. Neck: Supple, No jugular venous distention/bruits. Trachea midline without thyromegaly or adenopathy with full range of motion. Lungs: R lung diminished to mid lung level. + rales bilateral bases.    Heart: Regular rate and rhythm with Normal S1/S2 without murmurs, rubs or gallops, point of maximum impulse non-displaced  Abdomen: Soft, non-tender or non-distended without rigidity or guarding and positive bowel sounds all four quadrants. Extremities: 2+ pitting edema. Skin: Skin color, texture, turgor normal.  No rashes or lesions. Neurologic: Alert and oriented X 3, neurovascularly intact with sensory/motor intact upper extremities/lower extremities, bilaterally. Cranial nerves: II-XII intact, grossly non-focal.  Mental status: Alert, oriented, thought content appropriate. Capillary Refill: Acceptable  < 3 seconds  Peripheral Pulses: +3 Easily felt, not easily obliterated with pressure         Labs:   Recent Labs     01/18/21  0540   WBC 10.4   HGB 9.6*   HCT 30.3*        Recent Labs     01/18/21  2131 01/19/21  0416 01/19/21  0935    140 141   K 3.4* 3.8 3.3*    100 99   CO2 28 26 24   BUN 64* 64* 70*   CREATININE 5.5* 6.0* 5.4*   CALCIUM 8.9 8.8 9.0   PHOS 4.5 5.4* 5.3*     Recent Labs     01/18/21  0540   AST 12*   ALT 10   BILITOT 0.4   ALKPHOS 98     No results for input(s): INR in the last 72 hours. Recent Labs     01/18/21  0540   TROPONINI 0.04*       Urinalysis:      Lab Results   Component Value Date    NITRU Negative 11/05/2019    WBCUA 4 11/05/2019    BACTERIA RARE 11/05/2019    RBCUA 2 11/05/2019    BLOODU SMALL 11/05/2019    SPECGRAV 1.010 11/05/2019    GLUCOSEU Negative 11/05/2019       Radiology:  XR CHEST PORTABLE   Final Result   1. Right pleural effusion with right basilar atelectasis or pneumonia. 2. Pulmonary edema. Assessment/Plan:    Active Hospital Problems    Diagnosis    CHF (congestive heart failure), NYHA class I, acute on chronic, combined ([facility] [address] [I50.43]       Acute on Chronic Systolic CHF in setting of progressive renal disease. EF 40% on ECHO in Sept.   Acute Pulm Edema and R Pleural Effusion. Cr up to 5 on presentation. Plan:               - lasix 60 Iv BID. - added metolazone. - decreased dose BB due to acute CHF. - cont ASA and statin.                - No ACEI/ARB with severe progressive renal disease.    - nephrology and cardiology involved. - likely headed towards dialysis.       KAIDEN -   Cr up to 5 on admit. Pt changed his mind and is agreeable to dialysis if necessary. Plan:               - nephrology involved               - palliative care consult. - monitor closely with diuretics - renal q4 to q6hrs - to daily                   Morbid Obesity - .            Hypokalemia -   Replete with caution considering severe renal insufficiency.                  DVT Prophylaxis: heparin  Diet: cardiac diet, fluid restrict. Code Status: Prior - limited code.         Dispo - PCU. If unable to mobilize fluids with medication, looking at dialysis.  If pt refuses dialysis, likely looking at palliative care and hospice.          Nakita Byrne MD

## 2021-01-19 NOTE — PROGRESS NOTES
Department of Internal Medicine  Nephrology Progress Note    SUBJECTIVE:  We are following this patient for melania. Patient progress reviewed. REVIEW OF SYSTEMS:  Improved SOB/GIRALDO. Feels weak and tired  . Appetite ok . ROS neg for rest of the system . Physical Exam:    VITALS:  /71   Pulse 67   Temp 97.9 °F (36.6 °C) (Oral)   Resp 18   Ht 5' 9\" (1.753 m)   Wt (!) 310 lb 13.6 oz (141 kg)   SpO2 95%   BMI 45.90 kg/m²   24HR INTAKE/OUTPUT:      Intake/Output Summary (Last 24 hours) at 1/19/2021 1126  Last data filed at 1/19/2021 1114  Gross per 24 hour   Intake 780 ml   Output 3050 ml   Net -2270 ml       Constitutional:  Looks comfortable   Respiratory:  Decrease BS at bases   Gastrointestinal:  No tenderness.   Normal Bowel Sounds  Cardiovascular:  S1, S2 RRR   Edema:  ++ edema    DATA:    CBC:  Lab Results   Component Value Date    WBC 10.4 01/18/2021    RBC 3.41 01/18/2021    HGB 9.6 01/18/2021    HCT 30.3 01/18/2021    MCV 88.9 01/18/2021    MCH 28.3 01/18/2021    MCHC 31.8 01/18/2021    RDW 16.6 01/18/2021     01/18/2021    MPV 8.4 01/18/2021     CMP:  Lab Results   Component Value Date     01/19/2021    K 3.3 01/19/2021    K 2.9 01/18/2021    CL 99 01/19/2021    CO2 24 01/19/2021    BUN 70 01/19/2021    CREATININE 5.4 01/19/2021    GFRAA 13 01/19/2021    AGRATIO 1.0 01/18/2021    LABGLOM 10 01/19/2021    GLUCOSE 118 01/19/2021    PROT 7.2 01/18/2021    CALCIUM 9.0 01/19/2021    BILITOT 0.4 01/18/2021    ALKPHOS 98 01/18/2021    AST 12 01/18/2021    ALT 10 01/18/2021      Hepatic Function Panel:   Lab Results   Component Value Date    ALKPHOS 98 01/18/2021    ALT 10 01/18/2021    AST 12 01/18/2021    PROT 7.2 01/18/2021    BILITOT 0.4 01/18/2021    BILIDIR <0.2 11/05/2019    IBILI see below 11/05/2019      Phosphorus:   Lab Results   Component Value Date    PHOS 5.3 01/19/2021       ASSESSMENT:  Active Problems:    CHF (congestive heart failure), NYHA class I, acute on chronic, combined [facility])  Resolved Problems:    * No resolved hospital problems. *      PLAN:  1. KAIDEN Most likely cardio renal. Clinically better . Increase UOP noted. 2. Willing  To do  RRT if needed . 3. CHF diuretics per cards . 4. HTN Controled   5. Anemia check iron studies / ferritin . 6. Hypokalemia will supp   dw pt and team in detail .    Nila Corona MD. [address]

## 2021-01-19 NOTE — PROGRESS NOTES
[facility] notified RN of frequent ventricular beats on telemetry monitor. Pt in ECHO and RAD. Will continue to monitor upon floor. HR 71 without other known complications. Dr. Sher Win MD aware.

## 2021-01-19 NOTE — CONSULTS
[facility]  Palliative Care   Consult Note    NAME:  Tracy Stapleton  RECORD NUMBER:  [id number]  AGE: 77 y.o. GENDER: male  : 1945  TODAY'S DATE:  2021    Subjective     Reason for Consult:  goals of care and code status   Visit Type: Initial Consult      Pradeep Luciano is a 77 y.o. male referred by:  [x] Physician     PAST MEDICAL HISTORY      Diagnosis Date    Arthritis     Bell's palsy     [facility]     CHF (congestive heart failure) ([facility]     Hyperlipidemia     Hypertension     Influenza B 4/2/15    Kidney failure     sees Dr Dayton Mccormack  No past surgical history on file. FAMILY HISTORY  Family History   Problem Relation Age of Onset    Cancer Mother     Cancer Father        SOCIAL HISTORY  Social History     Tobacco Use    Smoking status: Former Smoker     Quit date: 1986     Years since quittin.9    Smokeless tobacco: Never Used    Tobacco comment: will not start    Substance Use Topics    Alcohol use: No    Drug use: No       ALLERGIES  No Known Allergies    MEDICATIONS  No current facility-administered medications on file prior to encounter. Current Outpatient Medications on File Prior to Encounter   Medication Sig Dispense Refill    furosemide (LASIX) 40 MG tablet Take 1 tablet by mouth 2 times daily 60 tablet 3    clopidogrel (PLAVIX) 75 MG tablet TAKE 1 TABLET BY MOUTH EVERY DAY 90 tablet 3    metoprolol succinate (TOPROL XL) 100 MG extended release tablet TAKE 1 TABLET BY MOUTH EVERY DAY 90 tablet 3    NIFEdipine (ADALAT CC) 60 MG extended release tablet Take 60 mg by mouth 2 times daily       aspirin 81 MG chewable tablet Take 81 mg by mouth daily.  atorvastatin (LIPITOR) 20 MG tablet Take 20 mg by mouth daily.  Multiple Vitamins-Minerals (THERAPEUTIC MULTIVITAMIN-MINERALS) tablet Take 1 tablet by mouth nightly.       Glucosamine-Chondroit-Vit C-Mn (GLUCOSAMINE CHONDR 1500 COMPLX PO) Take 1 each by mouth 2 times daily Indications: 2700 mg          Objective         /71   Pulse 67   Temp 97.9 °F (36.6 °C) (Oral)   Resp 18   Ht 5' 9\" (1.753 m)   Wt (!) 310 lb 13.6 oz (141 kg)   SpO2 95%   BMI 45.90 kg/m²     Code Status: DNR CCA do not intubate    Advanced Directives: completed copy in soft chart jude Khan [phone] first agent, sister Alex Back [phone] alternate. Assessment        Management and Education    Persons available for education:     [x] Self     [] Caregiver       [] Spouse       [] Other Family Member   []  Other    Spiritual History:  notified: Yes,     Does the patient have a Primary Care Physician? Yes  Level of patient/caregiver understanding able to:       [x] Verbalize Understanding   [] Demonstrate Understanding       [] Teach Back       [] Needs Reinforcement     []  Other:      Teaching Time:  1hours  0 minutes     Plan        Social Service Consult Made:  Yes  Assistance filling out Living Will/HPOA:  Yes      Discharge Plan:  Education/support to patient  Providing support for coping/adaptation/distress of patient  Decision making regarding life prolonging treatment  Code status clarified: [facility]  Palliative care orders introduced    Discharge Environment:  [x] Home with home care. Discussion: Patient alert and oriented to person, place, time and situation. He lives alone still drives is able to care for self. He did state doing laundry has become difficult since it is in basement. We have discussed goals of care he is agreeable to doing dialysis if needed and understands process. We have disucssed code status and he does not want chest compressions/shocking or ventilator if his heart stops or he stops breathing he wants to die a natural death. He is hopeful to return home with min assistance. He has discussed above with his decision makers. Dr Suman Reno aware of above orders noted.      I will continue to follow Mr. Claudio Ling care as needed. Thank you for allowing me to participate in the care of Mr. Slick Yung .      Electronically signed by Yvan Matos RN, [facility] on 1/19/2021 at 11:41 AM  Palliative Care Nurse Psychiatric  Office: [phone]

## 2021-01-20 LAB
ALBUMIN SERPL-MCNC: 3.5 G/DL (ref 3.4–5)
ANION GAP SERPL CALCULATED.3IONS-SCNC: 15 MMOL/L (ref 3–16)
BUN BLDV-MCNC: 64 MG/DL (ref 7–20)
CALCIUM SERPL-MCNC: 8.9 MG/DL (ref 8.3–10.6)
CHLORIDE BLD-SCNC: 99 MMOL/L (ref 99–110)
CO2: 27 MMOL/L (ref 21–32)
CREAT SERPL-MCNC: 6.2 MG/DL (ref 0.8–1.3)
FERRITIN: 53.2 NG/ML (ref 30–400)
GFR AFRICAN AMERICAN: 11
GFR NON-AFRICAN AMERICAN: 9
GLUCOSE BLD-MCNC: 104 MG/DL (ref 70–99)
HCT VFR BLD CALC: 29.1 % (ref 40.5–52.5)
HEMOGLOBIN: 9.5 G/DL (ref 13.5–17.5)
IRON SATURATION: 10 % (ref 20–50)
IRON: 32 UG/DL (ref 59–158)
MAGNESIUM: 2.4 MG/DL (ref 1.8–2.4)
MCH RBC QN AUTO: 28.7 PG (ref 26–34)
MCHC RBC AUTO-ENTMCNC: 32.7 G/DL (ref 31–36)
MCV RBC AUTO: 87.8 FL (ref 80–100)
PARATHYROID HORMONE INTACT: 207.7 PG/ML (ref 14–72)
PDW BLD-RTO: 16.9 % (ref 12.4–15.4)
PHOSPHORUS: 5.4 MG/DL (ref 2.5–4.9)
PLATELET # BLD: 298 K/UL (ref 135–450)
PMV BLD AUTO: 8.1 FL (ref 5–10.5)
POTASSIUM SERPL-SCNC: 3 MMOL/L (ref 3.5–5.1)
RBC # BLD: 3.31 M/UL (ref 4.2–5.9)
SODIUM BLD-SCNC: 141 MMOL/L (ref 136–145)
TOTAL IRON BINDING CAPACITY: 310 UG/DL (ref 260–445)
WBC # BLD: 9.1 K/UL (ref 4–11)

## 2021-01-20 PROCEDURE — 80069 RENAL FUNCTION PANEL: CPT

## 2021-01-20 PROCEDURE — 2580000003 HC RX 258: Performed by: INTERNAL MEDICINE

## 2021-01-20 PROCEDURE — 94761 N-INVAS EAR/PLS OXIMETRY MLT: CPT

## 2021-01-20 PROCEDURE — 83540 ASSAY OF IRON: CPT

## 2021-01-20 PROCEDURE — 2060000000 HC ICU INTERMEDIATE R&B

## 2021-01-20 PROCEDURE — 6370000000 HC RX 637 (ALT 250 FOR IP): Performed by: INTERNAL MEDICINE

## 2021-01-20 PROCEDURE — 2700000000 HC OXYGEN THERAPY PER DAY

## 2021-01-20 PROCEDURE — 85027 COMPLETE CBC AUTOMATED: CPT

## 2021-01-20 PROCEDURE — 83970 ASSAY OF PARATHORMONE: CPT

## 2021-01-20 PROCEDURE — 82728 ASSAY OF FERRITIN: CPT

## 2021-01-20 PROCEDURE — 83550 IRON BINDING TEST: CPT

## 2021-01-20 PROCEDURE — 83735 ASSAY OF MAGNESIUM: CPT

## 2021-01-20 PROCEDURE — 36415 COLL VENOUS BLD VENIPUNCTURE: CPT

## 2021-01-20 PROCEDURE — 6360000002 HC RX W HCPCS: Performed by: INTERNAL MEDICINE

## 2021-01-20 RX ORDER — POTASSIUM CHLORIDE 7.45 MG/ML
10 INJECTION INTRAVENOUS PRN
Status: DISCONTINUED | OUTPATIENT
Start: 2021-01-20 | End: 2021-01-20

## 2021-01-20 RX ORDER — POTASSIUM CHLORIDE 20 MEQ/1
40 TABLET, EXTENDED RELEASE ORAL PRN
Status: DISCONTINUED | OUTPATIENT
Start: 2021-01-20 | End: 2021-01-20

## 2021-01-20 RX ORDER — POTASSIUM CHLORIDE 20 MEQ/1
20 TABLET, EXTENDED RELEASE ORAL ONCE
Status: COMPLETED | OUTPATIENT
Start: 2021-01-20 | End: 2021-01-20

## 2021-01-20 RX ADMIN — SODIUM CHLORIDE, PRESERVATIVE FREE 10 ML: 5 INJECTION INTRAVENOUS at 21:20

## 2021-01-20 RX ADMIN — CLOPIDOGREL BISULFATE 75 MG: 75 TABLET ORAL at 09:05

## 2021-01-20 RX ADMIN — SODIUM CHLORIDE, PRESERVATIVE FREE 10 ML: 5 INJECTION INTRAVENOUS at 09:04

## 2021-01-20 RX ADMIN — ATORVASTATIN CALCIUM 20 MG: 20 TABLET, FILM COATED ORAL at 09:05

## 2021-01-20 RX ADMIN — FUROSEMIDE 60 MG: 10 INJECTION, SOLUTION INTRAMUSCULAR; INTRAVENOUS at 17:29

## 2021-01-20 RX ADMIN — FUROSEMIDE 60 MG: 10 INJECTION, SOLUTION INTRAMUSCULAR; INTRAVENOUS at 09:05

## 2021-01-20 RX ADMIN — POTASSIUM CHLORIDE 20 MEQ: 1500 TABLET, EXTENDED RELEASE ORAL at 09:28

## 2021-01-20 RX ADMIN — HEPARIN SODIUM 5000 UNITS: 5000 INJECTION INTRAVENOUS; SUBCUTANEOUS at 14:27

## 2021-01-20 RX ADMIN — METOPROLOL SUCCINATE 25 MG: 25 TABLET, EXTENDED RELEASE ORAL at 09:04

## 2021-01-20 RX ADMIN — HEPARIN SODIUM 5000 UNITS: 5000 INJECTION INTRAVENOUS; SUBCUTANEOUS at 21:20

## 2021-01-20 RX ADMIN — METOLAZONE 5 MG: 5 TABLET ORAL at 09:05

## 2021-01-20 RX ADMIN — HEPARIN SODIUM 5000 UNITS: 5000 INJECTION INTRAVENOUS; SUBCUTANEOUS at 06:06

## 2021-01-20 RX ADMIN — ASPIRIN 81 MG: 81 TABLET, CHEWABLE ORAL at 09:05

## 2021-01-20 ASSESSMENT — PAIN SCALES - GENERAL: PAINLEVEL_OUTOF10: 0

## 2021-01-20 NOTE — PLAN OF CARE
Problem: OXYGENATION/RESPIRATORY FUNCTION  Goal: Patient will maintain patent airway  Outcome: Ongoing     Problem: HEMODYNAMIC STATUS  Goal: Patient has stable vital signs and fluid balance  Outcome: Ongoing     Problem: FLUID AND ELECTROLYTE IMBALANCE  Goal: Fluid and electrolyte balance are achieved/maintained  Outcome: Ongoing     Problem: ACTIVITY INTOLERANCE/IMPAIRED MOBILITY  Goal: Mobility/activity is maintained at optimum level for patient  Outcome: Ongoing     Problem: SAFETY  Goal: Free from accidental physical injury  Outcome: Ongoing     Problem: DAILY CARE  Goal: Daily care needs are met  Outcome: Ongoing     Problem: PAIN  Goal: Patient's pain/discomfort is manageable  Outcome: Ongoing     Problem: SKIN INTEGRITY  Goal: Skin integrity is maintained or improved  Outcome: Ongoing     Problem: KNOWLEDGE DEFICIT  Goal: Patient/S.O. demonstrates understanding of disease process, treatment plan, medications, and discharge instructions.   Outcome: Ongoing     Problem: DISCHARGE BARRIERS  Goal: Patient's continuum of care needs are met  Outcome: Ongoing

## 2021-01-20 NOTE — PLAN OF CARE
Problem: FLUID AND ELECTROLYTE IMBALANCE  Goal: Fluid and electrolyte balance are achieved/maintained  1/20/2021 0055 by Skyla Fish RN  Outcome: Ongoing  Note: Educated patient/family on CHF signs/ symptoms, causes, treatments, limited fluid intake, daily weights, low sodium diet, and follow-up. Pt to call attending physician if weight gain of 3 pounds in one day or 5 pounds in one week. Problem: SAFETY  Goal: Free from accidental physical injury  1/20/2021 0055 by Skyla Fish RN  Outcome: Ongoing  Note: Patient assessed for fall risk; fall precautions initiated. Patient and family instructed about safety devices. Environment kept free of clutter and adequate lighting provided. Bed locked and in lowest position. Call light within reach. Will continue to monitor. Fall risk assessment completed every shift. All precautions in place. Pt has call light within reach at all times. Room clear of clutter. Pt aware to call for assistance when getting up.        Problem: PAIN  Goal: Patient's pain/discomfort is manageable  1/20/2021 0055 by kSyla Fish RN  Outcome: Ongoing

## 2021-01-20 NOTE — PROGRESS NOTES
Hospitalist Progress Note      PCP: Osmar Quintana MD    Date of Admission: 1/18/2021    Chief Complaint:   Chief Complaint   Patient presents with    Shortness of Breath     Hospital Course: 77 y.o. male w hx of chronic systolic CHF EF 00% and advanced CKDIV to V, who presents to [facility] with worsening dyspnea. Pt reports symptoms worsening gradually over weeks and worse in the past few days. Reports severe orthopnea. Worsening bilateral leg edema. In ED pt found with pulm edema and worsening R pleural effusion. Also with worsening renl function Cr of 5, albeit his Bicarb is normal and his K is low. Subjective: Feeling okay, no acute complaints. Breathing improved. No chest pain. Medications:  Reviewed    Infusion Medications   Scheduled Medications    aspirin  81 mg Oral Daily    atorvastatin  20 mg Oral Daily    clopidogrel  75 mg Oral Daily    metoprolol succinate  25 mg Oral Daily    furosemide  60 mg Intravenous BID    metOLazone  5 mg Oral Daily    sodium chloride flush  10 mL Intravenous 2 times per day    heparin (porcine)  5,000 Units Subcutaneous 3 times per day     PRN Meds: sodium chloride flush, promethazine **OR** ondansetron, polyethylene glycol, acetaminophen **OR** acetaminophen      Intake/Output Summary (Last 24 hours) at 1/20/2021 1111  Last data filed at 1/20/2021 0934  Gross per 24 hour   Intake 1310 ml   Output 2925 ml   Net -1615 ml       Physical Exam Performed:    /73   Pulse 72   Temp 97.9 °F (36.6 °C) (Oral)   Resp 18   Ht 5' 9\" (1.753 m)   Wt (!) 310 lb 10.1 oz (140.9 kg)   SpO2 96%   BMI 45.87 kg/m²     General appearance: No apparent distress, appears stated age and cooperative. HEENT: Pupils equal, round, and reactive to light. Conjunctivae/corneas clear. Neck: Supple, with full range of motion. Trachea midline. Respiratory:  Normal respiratory effort.  Clear to auscultation, bilaterally without Rales/Wheezes/Rhonchi. Cardiovascular: Regular rate and rhythm with normal S1/S2 without murmurs, rubs or gallops. Abdomen: Soft, non-tender, non-distended with normal bowel sounds. Musculoskeletal: No clubbing, cyanosis or edema bilaterally. Full range of motion without deformity. Skin: Skin color, texture, turgor normal.  No rashes or lesions. Neurologic:  Neurovascularly intact without any focal sensory/motor deficits. Cranial nerves: II-XII intact, grossly non-focal.  Psychiatric: Alert and oriented, thought content appropriate, normal insight  Capillary Refill: Brisk,< 3 seconds   Peripheral Pulses: +2 palpable, equal bilaterally       Labs:   Recent Labs     01/18/21  0540 01/20/21  0504   WBC 10.4 9.1   HGB 9.6* 9.5*   HCT 30.3* 29.1*    298     Recent Labs     01/19/21  0416 01/19/21  0935 01/20/21  0504    141 141   K 3.8 3.3* 3.0*    99 99   CO2 26 24 27   BUN 64* 70* 64*   CREATININE 6.0* 5.4* 6.2*   CALCIUM 8.8 9.0 8.9   PHOS 5.4* 5.3* 5.4*     Recent Labs     01/18/21  0540   AST 12*   ALT 10   BILITOT 0.4   ALKPHOS 98     No results for input(s): INR in the last 72 hours. Recent Labs     01/18/21  0540   TROPONINI 0.04*       Urinalysis:      Lab Results   Component Value Date    NITRU Negative 11/05/2019    WBCUA 4 11/05/2019    BACTERIA RARE 11/05/2019    RBCUA 2 11/05/2019    BLOODU SMALL 11/05/2019    SPECGRAV 1.010 11/05/2019    GLUCOSEU Negative 11/05/2019       Radiology:  XR CHEST PORTABLE   Final Result   1. Right pleural effusion with right basilar atelectasis or pneumonia. 2. Pulmonary edema. Assessment/Plan:    Active Hospital Problems    Diagnosis    CHF (congestive heart failure), NYHA class I, acute on chronic, combined ([facility] [address] [I50.43]     Acute on Chronic Systolic CHF in setting of progressive renal disease. EF 40% on ECHO in Sept. Acute Pulm Edema and R Pleural Effusion. Cr up to 5 on presentation. Plan:               - lasix 60 Iv BID.             - added metolazone.               - decreased dose BB due to acute CHF.             - cont ASA and statin.               - No ACEI/ARB with severe progressive renal disease.               - nephrology and cardiology involved. - likely headed towards dialysis.      KAIDEN  Cr up to 5 on admit. Pt changed his mind and is agreeable to dialysis if necessary. Plan:               - nephrology involved               - palliative care consult.                        - monitor closely with diuretics - renal q4 to q6hrs - to daily    Severe AS  Will need further workup, TTE, LHC, likely TAVR. - management per cardiology                Morbid Obesity  Body mass index is 45.87 kg/m². Complicating assessment and treatment. Placing patient at risk for multiple co-morbidities as well as early death and contributing to the patient's presentation.  on weight loss when appropriate. Hypokalemia   Replete with caution considering severe renal insufficiency. DVT Prophylaxis: heparin  Diet: DIET RENAL; Low Sodium (2 GM); Daily Fluid Restriction: 1500 ml  Code Status: Limited    PT/OT Eval Status: ordered    Dispo - pending    Ang Joshua MD    This note was transcribed using [id number] [employer]. Please disregard any translational errors.

## 2021-01-20 NOTE — PROGRESS NOTES
Department of Internal Medicine  Nephrology Progress Note    SUBJECTIVE:  We are following this patient for melania. Patient progress reviewed. UOP/Labs and wt noted . REVIEW OF SYSTEMS:  Stable  SOB/GIRALDO. Feels weak and tired  . Appetite ok . ROS neg for rest of the system . Physical Exam:    VITALS:  /73   Pulse 72   Temp 97.9 °F (36.6 °C) (Oral)   Resp 18   Ht 5' 9\" (1.753 m)   Wt (!) 310 lb 10.1 oz (140.9 kg)   SpO2 96%   BMI 45.87 kg/m²   24HR INTAKE/OUTPUT:      Intake/Output Summary (Last 24 hours) at 1/20/2021 1136  Last data filed at 1/20/2021 0934  Gross per 24 hour   Intake 1310 ml   Output 2625 ml   Net -1315 ml       Constitutional:  Looks comfortable   Respiratory:  Decrease BS at bases   Gastrointestinal:  No tenderness.   Normal Bowel Sounds  Cardiovascular:  S1, S2 RRR   Edema:  ++ edema    DATA:    CBC:  Lab Results   Component Value Date    WBC 9.1 01/20/2021    RBC 3.31 01/20/2021    HGB 9.5 01/20/2021    HCT 29.1 01/20/2021    MCV 87.8 01/20/2021    MCH 28.7 01/20/2021    MCHC 32.7 01/20/2021    RDW 16.9 01/20/2021     01/20/2021    MPV 8.1 01/20/2021     CMP:  Lab Results   Component Value Date     01/20/2021    K 3.0 01/20/2021    K 2.9 01/18/2021    CL 99 01/20/2021    CO2 27 01/20/2021    BUN 64 01/20/2021    CREATININE 6.2 01/20/2021    GFRAA 11 01/20/2021    AGRATIO 1.0 01/18/2021    LABGLOM 9 01/20/2021    GLUCOSE 104 01/20/2021    PROT 7.2 01/18/2021    CALCIUM 8.9 01/20/2021    BILITOT 0.4 01/18/2021    ALKPHOS 98 01/18/2021    AST 12 01/18/2021    ALT 10 01/18/2021      Hepatic Function Panel:   Lab Results   Component Value Date    ALKPHOS 98 01/18/2021    ALT 10 01/18/2021    AST 12 01/18/2021    PROT 7.2 01/18/2021    BILITOT 0.4 01/18/2021    BILIDIR <0.2 11/05/2019    IBILI see below 11/05/2019      Phosphorus:   Lab Results   Component Value Date    PHOS 5.4 01/20/2021       ASSESSMENT:  Active Problems:    CHF (congestive heart failure), NYHA class I, acute on chronic, combined ([facility] [address]  Resolved Problems:    * No resolved hospital problems. *      PLAN:  1. KAIDEN Most likely cardio renal. Clinically better . Increase UOP noted. 2. Renal function declined . Pt still debating about RRT . 3. CHF diuretics per cards . May need to cut back ? 4. HTN Controled   5. Anemia stable . 6. Hypokalemia will supp   dw pt  in detail .        Yara Neumann MD. Vivian Malave

## 2021-01-21 LAB
ALBUMIN SERPL-MCNC: 3.5 G/DL (ref 3.4–5)
ANION GAP SERPL CALCULATED.3IONS-SCNC: 16 MMOL/L (ref 3–16)
APTT: 29.1 SEC (ref 24.2–36.2)
BUN BLDV-MCNC: 77 MG/DL (ref 7–20)
CALCIUM SERPL-MCNC: 8.7 MG/DL (ref 8.3–10.6)
CHLORIDE BLD-SCNC: 95 MMOL/L (ref 99–110)
CO2: 27 MMOL/L (ref 21–32)
CREAT SERPL-MCNC: 6.2 MG/DL (ref 0.8–1.3)
GFR AFRICAN AMERICAN: 11
GFR NON-AFRICAN AMERICAN: 9
GLUCOSE BLD-MCNC: 121 MG/DL (ref 70–99)
HCT VFR BLD CALC: 28.1 % (ref 40.5–52.5)
HEMOGLOBIN: 9 G/DL (ref 13.5–17.5)
INR BLD: 1.13 (ref 0.86–1.14)
MAGNESIUM: 2.3 MG/DL (ref 1.8–2.4)
MCH RBC QN AUTO: 28.3 PG (ref 26–34)
MCHC RBC AUTO-ENTMCNC: 32.2 G/DL (ref 31–36)
MCV RBC AUTO: 87.9 FL (ref 80–100)
PDW BLD-RTO: 16.6 % (ref 12.4–15.4)
PHOSPHORUS: 5.7 MG/DL (ref 2.5–4.9)
PLATELET # BLD: 310 K/UL (ref 135–450)
PMV BLD AUTO: 8 FL (ref 5–10.5)
POTASSIUM SERPL-SCNC: 3 MMOL/L (ref 3.5–5.1)
PRO-BNP: ABNORMAL PG/ML (ref 0–449)
PROTHROMBIN TIME: 13.1 SEC (ref 10–13.2)
RBC # BLD: 3.19 M/UL (ref 4.2–5.9)
SODIUM BLD-SCNC: 138 MMOL/L (ref 136–145)
WBC # BLD: 9.2 K/UL (ref 4–11)

## 2021-01-21 PROCEDURE — 97530 THERAPEUTIC ACTIVITIES: CPT

## 2021-01-21 PROCEDURE — 97535 SELF CARE MNGMENT TRAINING: CPT

## 2021-01-21 PROCEDURE — 2060000000 HC ICU INTERMEDIATE R&B

## 2021-01-21 PROCEDURE — 85610 PROTHROMBIN TIME: CPT

## 2021-01-21 PROCEDURE — 83735 ASSAY OF MAGNESIUM: CPT

## 2021-01-21 PROCEDURE — 6370000000 HC RX 637 (ALT 250 FOR IP): Performed by: INTERNAL MEDICINE

## 2021-01-21 PROCEDURE — 83880 ASSAY OF NATRIURETIC PEPTIDE: CPT

## 2021-01-21 PROCEDURE — 85027 COMPLETE CBC AUTOMATED: CPT

## 2021-01-21 PROCEDURE — 2700000000 HC OXYGEN THERAPY PER DAY

## 2021-01-21 PROCEDURE — 87340 HEPATITIS B SURFACE AG IA: CPT

## 2021-01-21 PROCEDURE — 6360000002 HC RX W HCPCS: Performed by: INTERNAL MEDICINE

## 2021-01-21 PROCEDURE — 86704 HEP B CORE ANTIBODY TOTAL: CPT

## 2021-01-21 PROCEDURE — 94761 N-INVAS EAR/PLS OXIMETRY MLT: CPT

## 2021-01-21 PROCEDURE — 97161 PT EVAL LOW COMPLEX 20 MIN: CPT | Performed by: PHYSICAL THERAPIST

## 2021-01-21 PROCEDURE — 80069 RENAL FUNCTION PANEL: CPT

## 2021-01-21 PROCEDURE — 97530 THERAPEUTIC ACTIVITIES: CPT | Performed by: PHYSICAL THERAPIST

## 2021-01-21 PROCEDURE — 86706 HEP B SURFACE ANTIBODY: CPT

## 2021-01-21 PROCEDURE — 97165 OT EVAL LOW COMPLEX 30 MIN: CPT

## 2021-01-21 PROCEDURE — 85730 THROMBOPLASTIN TIME PARTIAL: CPT

## 2021-01-21 PROCEDURE — 02H633Z INSERTION OF INFUSION DEVICE INTO RIGHT ATRIUM, PERCUTANEOUS APPROACH: ICD-10-PCS | Performed by: INTERNAL MEDICINE

## 2021-01-21 PROCEDURE — 97116 GAIT TRAINING THERAPY: CPT | Performed by: PHYSICAL THERAPIST

## 2021-01-21 PROCEDURE — 36415 COLL VENOUS BLD VENIPUNCTURE: CPT

## 2021-01-21 PROCEDURE — 99232 SBSQ HOSP IP/OBS MODERATE 35: CPT | Performed by: INTERNAL MEDICINE

## 2021-01-21 PROCEDURE — 2580000003 HC RX 258: Performed by: INTERNAL MEDICINE

## 2021-01-21 RX ORDER — POTASSIUM CHLORIDE 20 MEQ/1
40 TABLET, EXTENDED RELEASE ORAL ONCE
Status: COMPLETED | OUTPATIENT
Start: 2021-01-21 | End: 2021-01-21

## 2021-01-21 RX ADMIN — HEPARIN SODIUM 5000 UNITS: 5000 INJECTION INTRAVENOUS; SUBCUTANEOUS at 06:42

## 2021-01-21 RX ADMIN — HEPARIN SODIUM 5000 UNITS: 5000 INJECTION INTRAVENOUS; SUBCUTANEOUS at 20:26

## 2021-01-21 RX ADMIN — POTASSIUM CHLORIDE 40 MEQ: 1500 TABLET, EXTENDED RELEASE ORAL at 10:42

## 2021-01-21 RX ADMIN — SODIUM CHLORIDE, PRESERVATIVE FREE 10 ML: 5 INJECTION INTRAVENOUS at 20:24

## 2021-01-21 RX ADMIN — ASPIRIN 81 MG: 81 TABLET, CHEWABLE ORAL at 08:55

## 2021-01-21 RX ADMIN — ATORVASTATIN CALCIUM 20 MG: 20 TABLET, FILM COATED ORAL at 08:55

## 2021-01-21 RX ADMIN — METOPROLOL SUCCINATE 25 MG: 25 TABLET, EXTENDED RELEASE ORAL at 08:55

## 2021-01-21 RX ADMIN — FUROSEMIDE 60 MG: 10 INJECTION, SOLUTION INTRAMUSCULAR; INTRAVENOUS at 18:00

## 2021-01-21 RX ADMIN — FUROSEMIDE 60 MG: 10 INJECTION, SOLUTION INTRAMUSCULAR; INTRAVENOUS at 10:16

## 2021-01-21 RX ADMIN — METOLAZONE 5 MG: 5 TABLET ORAL at 08:55

## 2021-01-21 RX ADMIN — HEPARIN SODIUM 5000 UNITS: 5000 INJECTION INTRAVENOUS; SUBCUTANEOUS at 16:11

## 2021-01-21 RX ADMIN — SODIUM CHLORIDE, PRESERVATIVE FREE 10 ML: 5 INJECTION INTRAVENOUS at 08:55

## 2021-01-21 RX ADMIN — CLOPIDOGREL BISULFATE 75 MG: 75 TABLET ORAL at 08:55

## 2021-01-21 ASSESSMENT — PAIN SCALES - GENERAL: PAINLEVEL_OUTOF10: 0

## 2021-01-21 NOTE — FLOWSHEET NOTE
01/21/21 1533   Encounter Summary   Services provided to: Patient   Referral/Consult From: [facility] Children;Family members   Place of [facility]   Continue Visiting   (1/21 Support - new dx-dialysis,heart procedre, prayer )   Complexity of Encounter Moderate   Length of Encounter 45 minutes   Grief and Life Adjustment   Type New Diagnosis; Adjustment to illness;Palliative care   Assessment Coping   Intervention Active listening;Explored feelings, thoughts, concerns;Prayer   Outcome Shared life review;Expressed feelings/needs/concerns

## 2021-01-21 NOTE — CARE COORDINATION
[facility]  Palliative Care   Progress Note    NAME:  Tracy Stapleton  RECORD NUMBER:  [id number]  AGE: 77 y.o. GENDER: male  : 1945  TODAY'S DATE:  2021    Subjective: Patient up in chair, denies pain or shortness of breath. Objective:    Vitals:    21 1118   BP: (!) 106/59   Pulse: 69   Resp: 18   Temp: 98.2 °F (36.8 °C)   SpO2: 95%     Lab Results   Component Value Date    WBC 9.2 2021    HGB 9.0 (L) 2021    HCT 28.1 (L) 2021    MCV 87.9 2021     2021     Lab Results   Component Value Date    CREATININE 6.2 (HH) 2021    BUN 77 (H) 2021     2021    K 3.0 (L) 2021    CL 95 (L) 2021    CO2 27 2021     Lab Results   Component Value Date    ALT 10 2021    AST 12 (L) 2021    ALKPHOS 98 2021    BILITOT 0.4 2021       Plan: Patient stated he is starting dialysis tomorrow and at some point he will need heart valve replacement. He is ok with continued treatment expressed some concerns and agreed to have spiritual care come for prayer. Continues to plan to go home may consider home care if needed. Code Status: Limited  Discharge Environment:  [] Hospice Consult Agency:  [] Inpatient Hospice    [] Home with [address] [place] [address]   [] ECF with Hospice  [] ECF skilled care with Hospice to follow   [] Other:    Teaching Time:  0hours  40 min     I will continue to follow Mr. Bray's care as needed. Thank you for allowing me to participate in the care of Mr. Joey Bhat .      Electronically signed by Gerry Terrazas RN, [facility] on 2021 at 1:39 PM  [address] [place] [address]  Office: [phone]

## 2021-01-21 NOTE — PLAN OF CARE
Problem: OXYGENATION/RESPIRATORY FUNCTION  Goal: Patient will maintain patent airway  1/21/2021 0049 by Deysi Price RN  Outcome: Ongoing    Goal: Patient will achieve/maintain normal respiratory rate/effort  Description: Respiratory rate and effort will be within normal limits for the patient  1/21/2021 0049 by Deysi Price RN  Outcome: Ongoing       Problem: HEMODYNAMIC STATUS  Goal: Patient has stable vital signs and fluid balance  1/21/2021 0049 by Deysi Price RN  Outcome: Ongoing       Problem: FLUID AND ELECTROLYTE IMBALANCE  Goal: Fluid and electrolyte balance are achieved/maintained  1/21/2021 0049 by Deysi Price RN  Outcome: Ongoing       Problem: ACTIVITY INTOLERANCE/IMPAIRED MOBILITY  Goal: Mobility/activity is maintained at optimum level for patient  1/21/2021 0049 by Deysi Price RN  Outcome: Ongoing  Assess breath sounds, oxygen requirements and saturations, and activity tolerance. Monitor intake and output and daily weights and lab values. Encourage fluid and dietary restrictions. Problem: SAFETY  Goal: Free from accidental physical injury  1/21/2021 0049 by Deysi Price RN  Outcome: Ongoing    Goal: Free from intentional harm  1/21/2021 0049 by Deysi Price RN  Outcome: Ongoing     Patient assessed for fall risk. Patient and family instructed about safety devices. Environment kept free of clutter and adequate lighting provided. Bed locked and in lowest position. Call light within reach.      Problem: DAILY CARE  Goal: Daily care needs are met  1/21/2021 0049 by Deysi Price RN  Outcome: Ongoing       Problem: PAIN  Goal: Patient's pain/discomfort is manageable  1/21/2021 0049 by Deysi Price RN  Outcome: Ongoing       Problem: SKIN INTEGRITY  Goal: Skin integrity is maintained or improved  1/21/2021 0049 by Deysi Price RN  Outcome: Ongoing       Problem: KNOWLEDGE DEFICIT  Goal: Patient/S.O. demonstrates understanding of disease process, treatment plan, medications, and discharge instructions.   1/21/2021 0049 by Shawn Maldonado RN  Outcome: Ongoing       Problem: DISCHARGE BARRIERS  Goal: Patient's continuum of care needs are met  1/21/2021 0049 by Shawn Maldonado RN  Outcome: Ongoing

## 2021-01-21 NOTE — PROGRESS NOTES
Department of Internal Medicine  Nephrology Progress Note    SUBJECTIVE:  We are following this patient for melania. Patient progress reviewed. UOP/Labs and wt noted . REVIEW OF SYSTEMS:  No change clinically , still has  SOB/GIRALDO. Feels weak and tired  . Appetite ok . ROS neg for rest of the system . Physical Exam:    VITALS:  BP (!) 106/59   Pulse 69   Temp 98.2 °F (36.8 °C) (Oral)   Resp 18   Ht 5' 9\" (1.753 m)   Wt 298 lb 15.1 oz (135.6 kg) Comment: rechecked  SpO2 95%   BMI 44.15 kg/m²   24HR INTAKE/OUTPUT:      Intake/Output Summary (Last 24 hours) at 1/21/2021 1124  Last data filed at 1/21/2021 1105  Gross per 24 hour   Intake 1850 ml   Output 2825 ml   Net -975 ml       Constitutional:  Looks comfortable   Respiratory:  Decrease BS at bases   Gastrointestinal:  No tenderness.   Normal Bowel Sounds  Cardiovascular:  S1, S2 RRR   Edema:  ++ edema    DATA:    CBC:  Lab Results   Component Value Date    WBC 9.2 01/21/2021    RBC 3.19 01/21/2021    HGB 9.0 01/21/2021    HCT 28.1 01/21/2021    MCV 87.9 01/21/2021    MCH 28.3 01/21/2021    MCHC 32.2 01/21/2021    RDW 16.6 01/21/2021     01/21/2021    MPV 8.0 01/21/2021     CMP:  Lab Results   Component Value Date     01/21/2021    K 3.0 01/21/2021    K 2.9 01/18/2021    CL 95 01/21/2021    CO2 27 01/21/2021    BUN 77 01/21/2021    CREATININE 6.2 01/21/2021    GFRAA 11 01/21/2021    AGRATIO 1.0 01/18/2021    LABGLOM 9 01/21/2021    GLUCOSE 121 01/21/2021    PROT 7.2 01/18/2021    CALCIUM 8.7 01/21/2021    BILITOT 0.4 01/18/2021    ALKPHOS 98 01/18/2021    AST 12 01/18/2021    ALT 10 01/18/2021      Hepatic Function Panel:   Lab Results   Component Value Date    ALKPHOS 98 01/18/2021    ALT 10 01/18/2021    AST 12 01/18/2021    PROT 7.2 01/18/2021    BILITOT 0.4 01/18/2021    BILIDIR <0.2 11/05/2019    IBILI see below 11/05/2019      Phosphorus:   Lab Results   Component Value Date    PHOS 5.7 01/21/2021       ASSESSMENT:  Active Problems:

## 2021-01-21 NOTE — PLAN OF CARE
Problem: OXYGENATION/RESPIRATORY FUNCTION  Goal: Patient will maintain patent airway  1/21/2021 1029 by Johnn Schlatter, RN  Outcome: Ongoing     Problem: HEMODYNAMIC STATUS  Goal: Patient has stable vital signs and fluid balance  1/21/2021 1029 by Johnn Schlatter, RN  Outcome: Ongoing     Problem: FLUID AND ELECTROLYTE IMBALANCE  Goal: Fluid and electrolyte balance are achieved/maintained  1/21/2021 1029 by Johnn Schlatter, RN  Outcome: Ongoing     Problem: ACTIVITY INTOLERANCE/IMPAIRED MOBILITY  Goal: Mobility/activity is maintained at optimum level for patient  1/21/2021 1029 by Johnn Schlatter, RN  Outcome: Ongoing     Problem: DAILY CARE  Goal: Daily care needs are met  1/21/2021 1029 by Johnn Schlatter, RN  Outcome: Ongoing     Problem: SKIN INTEGRITY  Goal: Skin integrity is maintained or improved  1/21/2021 1029 by Johnn Schlatter, RN  Outcome: Ongoing     Problem: KNOWLEDGE DEFICIT  Goal: Patient/S.O. demonstrates understanding of disease process, treatment plan, medications, and discharge instructions.   1/21/2021 1029 by Johnn Schlatter, RN  Outcome: Ongoing

## 2021-01-21 NOTE — CARE COORDINATION
Received call from Dr Kalin Sanchez that patient will be here through weekend. She wanted us to check if spot for HD is being worked on. She said Dr Gavino Dailey already spoke w/ someone about outpatient dialysis but I see no notes from case management other than saying may need dialysis spot. Spoke w/ Claudia Centeno @ [facility] Intake [phone]. She will check w/ Davida Morrell to see if already being worked on and if not will get started. She will call CM back.  Electronically signed by Jef Arellano RN Case Management [phone] on 1/21/2021 at 4:16 PM

## 2021-01-21 NOTE — PROGRESS NOTES
Hospitalist Progress Note      PCP: Gabby Choudhury MD    Date of Admission: 1/18/2021    Chief Complaint:   Chief Complaint   Patient presents with    Shortness of Breath     Hospital Course: 77 y.o. male w hx of chronic systolic CHF EF 61% and advanced CKDIV to V, who presents to [facility] with worsening dyspnea. Pt reports symptoms worsening gradually over weeks and worse in the past few days. Reports severe orthopnea. Worsening bilateral leg edema. In ED pt found with pulm edema and worsening R pleural effusion. Also with worsening renl function Cr of 5, albeit his Bicarb is normal and his K is low. Subjective: Planning to start HD, anxious about having line placed. Feeling okay, SOB is better, no CP, nausea, vomiting. Medications:  Reviewed    Infusion Medications   Scheduled Medications    aspirin  81 mg Oral Daily    atorvastatin  20 mg Oral Daily    clopidogrel  75 mg Oral Daily    metoprolol succinate  25 mg Oral Daily    furosemide  60 mg Intravenous BID    metOLazone  5 mg Oral Daily    sodium chloride flush  10 mL Intravenous 2 times per day    heparin (porcine)  5,000 Units Subcutaneous 3 times per day     PRN Meds: sodium chloride flush, promethazine **OR** ondansetron, polyethylene glycol, acetaminophen **OR** acetaminophen      Intake/Output Summary (Last 24 hours) at 1/21/2021 0759  Last data filed at 1/21/2021 0600  Gross per 24 hour   Intake 1300 ml   Output 2825 ml   Net -1525 ml       Physical Exam Performed:    /70   Pulse 71   Temp 98 °F (36.7 °C) (Oral)   Resp 18   Ht 5' 9\" (1.753 m)   Wt 298 lb 15.1 oz (135.6 kg) Comment: rechecked  SpO2 93%   BMI 44.15 kg/m²     General appearance: No apparent distress, appears stated age and cooperative. HEENT: Pupils equal, round, and reactive to light. Conjunctivae/corneas clear. Neck: Supple, with full range of motion. Trachea midline. Respiratory:  Normal respiratory effort.  Clear to auscultation, bilaterally without Rales/Wheezes/Rhonchi. Cardiovascular: Regular rate and rhythm, 3/6 systolic murmur, rubs or gallops. Abdomen: Soft, non-tender, non-distended with normal bowel sounds. Musculoskeletal: No clubbing, cyanosis or edema bilaterally. Full range of motion without deformity. Skin: Skin color, texture, turgor normal.  No rashes or lesions. Neurologic:  Neurovascularly intact without any focal sensory/motor deficits. Cranial nerves: II-XII intact, grossly non-focal.  Psychiatric: Alert and oriented, thought content appropriate, normal insight  Capillary Refill: Brisk,< 3 seconds   Peripheral Pulses: +2 palpable, equal bilaterally       Labs:   Recent Labs     01/20/21  0504 01/21/21  0506   WBC 9.1 9.2   HGB 9.5* 9.0*   HCT 29.1* 28.1*    310     Recent Labs     01/19/21  0935 01/20/21  0504 01/21/21  0506    141 138   K 3.3* 3.0* 3.0*   CL 99 99 95*   CO2 24 27 27   BUN 70* 64* 77*   CREATININE 5.4* 6.2* 6.2*   CALCIUM 9.0 8.9 8.7   PHOS 5.3* 5.4* 5.7*     No results for input(s): AST, ALT, BILIDIR, BILITOT, ALKPHOS in the last 72 hours. Recent Labs     01/21/21  0506   INR 1.13     No results for input(s): Rachell Elon in the last 72 hours. Urinalysis:      Lab Results   Component Value Date    NITRU Negative 11/05/2019    WBCUA 4 11/05/2019    BACTERIA RARE 11/05/2019    RBCUA 2 11/05/2019    BLOODU SMALL 11/05/2019    SPECGRAV 1.010 11/05/2019    GLUCOSEU Negative 11/05/2019       Radiology:  XR CHEST PORTABLE   Final Result   1. Right pleural effusion with right basilar atelectasis or pneumonia. 2. Pulmonary edema. Assessment/Plan:    Active Hospital Problems    Diagnosis    CHF (congestive heart failure), NYHA class I, acute on chronic, combined ([facility] [I50.43]     Acute on Chronic Systolic CHF in setting of progressive renal disease. EF 40% on ECHO in Sept. Acute Pulm Edema and R Pleural Effusion. Cr up to 5 on presentation.    Plan:             - lasix 60 Iv BID.             - added metolazone.               - decreased dose BB due to acute CHF.             - cont ASA and statin.               - No ACEI/ARB with severe progressive renal disease.               - nephrology and cardiology involved. - likely headed towards dialysis.      KAIDEN on CKD 3  Cr up to 5 on admit. Pt changed his mind and is agreeable to dialysis if necessary.   - nephrology involved   - palliative care consult.            - monitor closely with diuretics - renal q4 to q6hrs - to daily  - line placement and plan to start HD 1/22      Severe AS  Will need further workup, TTE, LHC, likely TAVR. - management per cardiology                Morbid Obesity  Body mass index is 44.15 kg/m². Complicating assessment and treatment. Placing patient at risk for multiple co-morbidities as well as early death and contributing to the patient's presentation.  on weight loss when appropriate. Hypokalemia   Replete with caution considering severe renal insufficiency. DVT Prophylaxis: heparin  Diet: DIET RENAL; Low Sodium (2 GM); Daily Fluid Restriction: 1500 ml  Code Status: Limited    PT/OT Eval Status: ordered    Dispo - pending    Manjeet Dhillon MD    This note was transcribed using [id number] [address]. Please disregard any translational errors.

## 2021-01-21 NOTE — PROGRESS NOTES
Tolerance: pt now on 2 liters O2 which he was not on O2 at home; O2 sats 93% on 2 liters with short distance amb       Patient Diagnosis(es): The primary encounter diagnosis was Acute on chronic congestive heart failure, unspecified heart failure type ([address] Diagnoses of Acute respiratory failure with hypoxia ([address] Acute pulmonary edema ([address] Pleural effusion, Acute on chronic renal insufficiency, and Hypokalemia were also pertinent to this visit. has a past medical history of Arthritis, Bell's palsy, Cancer ([address] CHF (congestive heart failure) ([address] Hyperlipidemia, Hypertension, Influenza B, Kidney failure, Nosebleed, and Radiation. has no past surgical history on file. Restrictions  Position Activity Restriction  Other position/activity restrictions: renal diet, low sodium, 1500 ML fluid restriction, on 2L O2 per n/c  Vision/Hearing  Vision: Impaired  Hearing: Within functional limits     Subjective  General  Chart Reviewed: Yes  Additional Pertinent Hx: per MD note:  77 y.o. male w hx of chronic systolic CHF EF 96% and advanced CKDIV to V, who presents to [facility] with worsening dyspnea. Pt reports symptoms worsening gradually over weeks and worse in the past few days. Reports severe orthopnea. Worsening bilateral leg edema. In ED pt found with pulm edema and worsening R pleural effusion. Also with worsening renl function Cr of 5, albeit his Bicarb is normal and his K is low.           Orientation  Orientation  Overall Orientation Status: Within Functional Limits  Social/Functional History  Social/Functional History  Lives With: Alone  Type of Home: House  Home Layout: One level, Laundry in basement, Performs ADL's on one level  Home Access: Stairs to enter with rails  Entrance Stairs - Number of Steps: 3STE  Entrance Stairs - Rails: Right  Bathroom Shower/Tub: Curtain, Tub/Shower unit  Bathroom Toilet: Standard(sink counter near)  [employer]: Grab bars in [address]: Rolling walker, Cane, Grab bars  ADL Assistance: Independent  Homemaking Assistance: Independent  Homemaking Responsibilities: (Pt did all)  Ambulation Assistance: Independent(uses cane intermittently)  Transfer Assistance: Independent  Active : Yes  Leisure & Hobbies: enjoys being home, watches movies  Additional Comments: Pt took care of his wife w/brain tumor whom passed away in 2008, children live out of town, has supportive friends in the area  Cognition   Cognition  Overall Cognitive Status: WFL    Objective          AROM RLE (degrees)  RLE AROM: WFL  AROM LLE (degrees)  LLE AROM : WFL  Strength RLE  Strength RLE: WFL  Strength LLE  Strength LLE: WFL     Sensation  Overall Sensation Status: (intermittent tingling due to neuropathy in hands and feet)  Bed mobility  Comment: pt up in chair at beginnnig and end of session  Transfers  Sit to Stand: Modified independent  Stand to sit: Modified independent  Ambulation  Ambulation?: Yes  Ambulation 1  Device: No Device  Assistance: Modified Independent  Quality of Gait: slow pace; wide GHAZAL due to body habitus  Distance: 40' x2     Balance  Sitting - Static: Good  Sitting - Dynamic: Good  Standing - Static: Fair;+  Standing - Dynamic: Fair;+        Plan   Plan  Times per week: 3-5  Current Treatment Recommendations: Functional Mobility Training  Safety Devices  Type of devices: Call light within reach, Left in chair, Nurse notified, Gait belt      AM-PAC Score  AM-PAC Inpatient Mobility Raw Score : 23 (01/21/21 0826)  AM-PAC Inpatient T-Scale Score : 56.93 (01/21/21 0826)  Mobility Inpatient CMS 0-100% Score: 11.2 (01/21/21 0826)  Mobility Inpatient CMS G-Code Modifier : CI (01/21/21 0826)          Goals  Short term goals  Time Frame for Short term goals: by discharge  Short term goal 1: amb ' with or without SPC sup while maintaining O2 sats in 90's  Short term goal 2: negotiate stairs when able  Patient Goals   Patient goals : to return home and be able

## 2021-01-21 NOTE — PROGRESS NOTES
Occupational Therapy   Occupational Therapy Initial Assessment  Date: 2021   Patient Name: Marco Hendrickson  MRN: [id number]     : 1945    Date of Service: 2021    Discharge Recommendations:  Home independently  OT Equipment Recommendations  Other: suggest shower chair for energy conservation/safety   Marco Hendrickson scored a 23/24 on the AM-PAC ADL Inpatient form. Current research shows that an AM-PAC score of 18 or greater is typically associated with a discharge to the patient's home setting. Based on the patient's AM-PAC score, and their current ADL deficits, it is not recommended that the patient have further Occupational Therapy at d/c. Please see assessment section for further patient specific details. If patient discharges prior to next session this note will serve as a discharge summary. Please see below for the latest assessment towards goals. Assessment   Performance deficits / Impairments: Decreased functional mobility ; Decreased ADL status; Decreased endurance;Decreased high-level IADLs  Assessment: Pt is a 77 y.o. make admitted with CHF LE edema & dyspnea. 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 Today pt able to amb around room/transfer w/o device mod ind on 2 Liters O2, with cue for O2 line. Pt does report his O2 sats drop with activity. Pt able to dress himself except assist for edwardo hose which he has never worn before. Pt would benefit from 1-2 sessions of OT for ed in donning edwardo hose, energy conservation, UE ther ex HEP & O2 line management. Do not feel pt will require home OT post D/C.   Treatment Diagnosis: Impaired: ADL/IADL function, functional mob, transfers, activity tolerance  Prognosis: Good  Decision Making: Low Complexity  History: see assessment section above  Exam: ADL/IADL function, functional mob, transfers, activity tolerance, balance, UE strength/ROM/coord  Assistance / Modification: see assessment section above  OT Education: OT Role;Plan of Care;ADL Adaptive Strategies;Transfer Training  REQUIRES OT FOLLOW UP: Yes  Activity Tolerance  Activity Tolerance: Patient Tolerated treatment well  Activity Tolerance: O2 sats 95% on 2 liters O2 @ start & 93% after amb around room  Safety Devices  Safety Devices in place: Yes  Type of devices: Left in chair;Nurse notified;Call light within reach;Gait belt(no chair alarm needed per nursing)           Patient Diagnosis(es): The primary encounter diagnosis was Acute on chronic congestive heart failure, unspecified heart failure type ([facility] Diagnoses of Acute respiratory failure with hypoxia ([facility] Acute pulmonary edema ([address] Pleural effusion, Acute on chronic renal insufficiency, and Hypokalemia were also pertinent to this visit. has a past medical history of Arthritis, Bell's palsy, Cancer ([facility] CHF (congestive heart failure) ([facility] Hyperlipidemia, Hypertension, Influenza B, Kidney failure, Nosebleed, and Radiation. has no past surgical history on file. Treatment Diagnosis: Impaired: ADL/IADL function, functional mob, transfers, activity tolerance      Restrictions  Position Activity Restriction  Other position/activity restrictions: renal diet, low sodium, 1500 ML fluid restriction, on 2L O2 per n/c    Subjective   General  Chart Reviewed: Yes, Orders, Progress Notes, History and Physical  Additional Pertinent Hx: \"The patient is a 77 y.o. male w hx of chronic systolic CHF EF 58% and advanced CKDIV to V, who presents to [facility] with worsening dyspnea. Pt reports symptoms worsening gradually over weeks and worse in the past few days. Reports severe orthopnea. Worsening bilateral leg edema. \" Copied per Kannan Sarmiento MD 1/18/21 note  Family / Caregiver Present: No  Referring Practitioner: David Hilliard MD  Diagnosis: CHF  Subjective  Subjective: Pt met in room seated in recliner chair with 2 liters O2 in place. Pt agreeable to OT eval/tx. Pt denied pain.  Seen with PT for safety for initial visit. Social/Functional History  Social/Functional History  Lives With: Alone  Type of Home: House  Home Layout: One level, Laundry in basement, Performs ADL's on one level  Home Access: Stairs to enter with rails  Entrance Stairs - Number of Steps: 3STE  Entrance Stairs - Rails: Right  Bathroom Shower/Tub: Curtain, Tub/Shower unit  Bathroom Toilet: Standard(sink counter near)  [employer]: Grab bars in [address]: Rolling walker, Cane, Grab bars  ADL Assistance: [address]: Independent  Homemaking Responsibilities: (Pt did all)  Ambulation Assistance: Independent(uses cane intermittently)  Transfer Assistance: Independent  Active : Yes  Leisure & Hobbies: enjoys being home, watches movies  Additional Comments: Pt took care of his wife w/brain tumor whom passed away in 2008, children live out of town, has supportive friends in the area       Objective   Vision: Impaired  Vision Exceptions: Wears glasses for distance;Wears glasses for reading  Hearing: Within functional limits    Orientation  Overall Orientation Status: Within Normal Limits     Balance  Sitting Balance: Independent  Standing Balance: Modified independent   Functional Mobility  Functional - Mobility Device: No device  Activity: To/from bathroom; Other  Functional Mobility Comments: Pt amb from w/o device 40'x2 in room supervision/mod ind, therapist helped with O2 line. Toilet Transfers  Toilet - Technique: Ambulating  Equipment Used: Grab bars  Toilet Transfer: Modified independent  Toilet Transfers Comments: >< toilet with R rail, sink by toilet at home  Wheelchair Bed Transfers  Wheelchair/Bed - Technique: Ambulating  Equipment Used:  Other  Level of Asssistance: Modified independent   Wheelchair Transfers Comments: >< recliner 2x  ADL  LE Dressing: Minimal assistance(Pt props LEs on bed to don footies, required assistance to don edwardo hose, which is new to him)  Toileting: (Pt reported he is able to toilet ind)  Additional Comments: Based on currect activity tolerance level/balance/ROM expect pt able to perform all ADL tasks supervision to mod ind, except donning/doffing edwardo hose which is new to pt. since admit. Coordination  Movements Are Fluid And Coordinated: No  Coordination and Movement description: Fine motor impairments;Tremors  Quality of Movement Other  Comment: Bilateral hand tremors, difficulty with writing              Cognition  Overall Cognitive Status: WFL  Perception  Overall Perceptual Status: WFL     Sensation  Overall Sensation Status: (intermittent tingling due to neuropathy in hands and feet)        LUE AROM (degrees)  LUE AROM : WNL  Left Hand AROM (degrees)  Left Hand AROM: WNL  RUE AROM (degrees)  RUE AROM : WNL  Right Hand AROM (degrees)  Right Hand AROM: WNL  LUE Strength  Gross LUE Strength: WNL  L Hand General: 4/5  RUE Strength  Gross RUE Strength: WNL  R Hand General: 4/5                   Plan   Plan  Times per week: 3-5  Times per day: Daily  Plan weeks: 1-2 visits  Current Treatment Recommendations: Strengthening, Patient/Caregiver Education & Training, Home Management Training, Equipment Evaluation, Education, & procurement, Balance Training, Neuromuscular Re-education, Functional Mobility Training, Safety Education & Training, Self-Care / ADL, Endurance Training      AM-PAC Score        AM-[facility] Inpatient Daily Activity Raw Score: 23 (01/21/21 0843)  AM-PAC Inpatient ADL T-Scale Score : 51.12 (01/21/21 0843)  ADL Inpatient CMS 0-100% Score: 15.86 (01/21/21 0843)  ADL Inpatient CMS G-Code Modifier : CI (01/21/21 0843)    Goals  Short term goals  Time Frame for Short term goals: 1-2 visits  Short term goal 1: Pt will participate in ed for doffing/donning edwardo hose to perform mod ind. Short term goal 2: Pt will participate in BUE HEP to increase activity tolerance to perform standing ADL/IADL task for 5-7 minutes.   Long term goals  Time Frame for Long term goals : same as LTG  Patient Goals   Patient goals : Pt stated his goal is to strengthen his legs, get back to doing own laundry in the basement.        Therapy Time   Individual Concurrent Group Co-treatment   Time In 0740         Time Out 0830         Minutes 50         Timed Code Treatment Minutes: 35 Minutes(+ 15 eval)       Olga Suh OT   Electronically signed by JOYCE Andres/KELSIE  License # [id number]

## 2021-01-21 NOTE — PROGRESS NOTES
[address]  Cardiology Consult    Trina Davalos  1945    January 21, 2021      Reason for Referral: Heart failure    CC: Shortness of breath and lower extremity edema      Subjective:     History of Present Illness:    Trina Davalos is a 77 y.o. patient with a PMH significant for chronic kidney disease, heart failure, hypertension, hyperlipidemia presented with complaints of increasing shortness of breath and lower extremity edema. He feels his shortness of breath has improved lower extremity edema is still there. Past Medical History:   has a past medical history of Arthritis, Bell's palsy, Cancer ([facility] CHF (congestive heart failure) ([facility] Hyperlipidemia, Hypertension, Influenza B, Kidney failure, Nosebleed, and Radiation. Surgical History:   has no past surgical history on file. Social History:   reports that he quit smoking about 35 years ago. He has never used smokeless tobacco. He reports that he does not drink alcohol or use drugs. Family History:  family history includes Cancer in his father and mother. Home Medications:  Were reviewed and are listed in nursing record and/or below  Prior to Admission medications    Medication Sig Start Date End Date Taking? Authorizing Provider   furosemide (LASIX) 40 MG tablet Take 1 tablet by mouth 2 times daily 9/27/20  Yes Hesham Cee MD   clopidogrel (PLAVIX) 75 MG tablet TAKE 1 TABLET BY MOUTH EVERY DAY 7/1/20  Yes Martin Richardson MD   metoprolol succinate (TOPROL XL) 100 MG extended release tablet TAKE 1 TABLET BY MOUTH EVERY DAY 7/1/20  Yes Martin Richardson MD   NIFEdipine (ADALAT CC) 60 MG extended release tablet Take 60 mg by mouth 2 times daily    Yes Historical Provider, MD   aspirin 81 MG chewable tablet Take 81 mg by mouth daily. Yes Historical Provider, MD   atorvastatin (LIPITOR) 20 MG tablet Take 20 mg by mouth daily.    Yes Historical Provider, MD   Multiple Vitamins-Minerals (THERAPEUTIC MULTIVITAMIN-MINERALS) tablet Take 1 01/21/2021    RBC 3.19 01/21/2021    HGB 9.0 01/21/2021    HCT 28.1 01/21/2021    MCV 87.9 01/21/2021    RDW 16.6 01/21/2021     01/21/2021     Lab Results   Component Value Date     01/21/2021    K 3.0 01/21/2021    K 2.9 01/18/2021    CL 95 01/21/2021    CO2 27 01/21/2021    BUN 77 01/21/2021    CREATININE 6.2 01/21/2021    GFRAA 11 01/21/2021    AGRATIO 1.0 01/18/2021    LABGLOM 9 01/21/2021    GLUCOSE 121 01/21/2021    PROT 7.2 01/18/2021    CALCIUM 8.7 01/21/2021    BILITOT 0.4 01/18/2021    ALKPHOS 98 01/18/2021    AST 12 01/18/2021    ALT 10 01/18/2021     No results found for: PTINR  Lab Results   Component Value Date    LABA1C 6.2 08/15/2019     Lab Results   Component Value Date    ZJUGBAX 479 (H) 01/30/2015    TROPONINI 0.04 (H) 01/18/2021       Cardiac, Vascular and Imaging Data all Personally Reviewed in Detail by Myself      EKG:   Sinus rhythm with Premature atrial complexes with Aberrant conductionAnterior infarct (cited on or before 16-APR-2017)Prolonged QTConfirmed by SARAH WAGNER, Pilar Doyle ([id number]) on 1/18/2021 8:02:27 AM  Echocardiogram:  Ejection fraction is visually estimated to be 40%. There is severe hypokinesis of the entire apical myocardium and and   hypokinesis of the distal septal wall. There is severe concentric left ventricular hypertrophy. Grade II diastolic dysfunction with elevated LV filling pressures. Mild mitral regurgitation is present. No evidence of mitral stenosis. Mild calcification of the mitral valve noted. Moderate to severe aortic stenosis with a peak velocity of 437m/s and a mean   pressure gradient of 50mmHg. Mild aortic regurgitation. Moderately dilated right ventricle. Right ventricular systolic function is normal .      Assessment and Plan     -Acute on chronic systolic heart failure. Continue diuretic therapy. Heart failure education. Continue guideline based therapy. Probably nearing dialysis.   Agree with plans of nephrology. Severe aortic valve stenosis will need repeat echocardiogram.  We will need a repeat cardiac cath. We will also need aortic valve replacement most likely TAVR. After cardiac catheterization may consider balloon aortic valvoplasty as a bridge to TAVR. Mild cardiomyopathy with ejection fraction of 40%. Acute on chronic kidney disease nephrology has been consulted. Thank you for allowing us to participate in the [address]. Please do not hesitate to contact me if you have any questions.     Floyd Valero MD, MPH    [facility], [address]  Ph: [phone]  Fax: [phone]    1/21/2021 2:14 PM

## 2021-01-21 NOTE — PROGRESS NOTES
Pt states has rested well tonight. Has offered no complaints. Used bath wipes, per request to clean up.

## 2021-01-22 ENCOUNTER — APPOINTMENT (OUTPATIENT)
Dept: INTERVENTIONAL RADIOLOGY/VASCULAR | Age: 76
DRG: 319 | End: 2021-01-22
Payer: MEDICARE

## 2021-01-22 LAB
ALBUMIN SERPL-MCNC: 3.4 G/DL (ref 3.4–5)
ANION GAP SERPL CALCULATED.3IONS-SCNC: 18 MMOL/L (ref 3–16)
BASOPHILS ABSOLUTE: 0.1 K/UL (ref 0–0.2)
BASOPHILS RELATIVE PERCENT: 1 %
BUN BLDV-MCNC: 82 MG/DL (ref 7–20)
CALCIUM SERPL-MCNC: 8.7 MG/DL (ref 8.3–10.6)
CHLORIDE BLD-SCNC: 96 MMOL/L (ref 99–110)
CO2: 25 MMOL/L (ref 21–32)
CREAT SERPL-MCNC: 7.3 MG/DL (ref 0.8–1.3)
EOSINOPHILS ABSOLUTE: 0.5 K/UL (ref 0–0.6)
EOSINOPHILS RELATIVE PERCENT: 5.3 %
GFR AFRICAN AMERICAN: 9
GFR NON-AFRICAN AMERICAN: 7
GLUCOSE BLD-MCNC: 112 MG/DL (ref 70–99)
HBV SURFACE AB TITR SER: <3.5 MIU/ML
HCT VFR BLD CALC: 30.2 % (ref 40.5–52.5)
HEMOGLOBIN: 9.8 G/DL (ref 13.5–17.5)
HEPATITIS B SURFACE ANTIGEN INTERPRETATION: NORMAL
LYMPHOCYTES ABSOLUTE: 0.8 K/UL (ref 1–5.1)
LYMPHOCYTES RELATIVE PERCENT: 7.6 %
MAGNESIUM: 2.6 MG/DL (ref 1.8–2.4)
MCH RBC QN AUTO: 28.6 PG (ref 26–34)
MCHC RBC AUTO-ENTMCNC: 32.5 G/DL (ref 31–36)
MCV RBC AUTO: 88 FL (ref 80–100)
MONOCYTES ABSOLUTE: 1.1 K/UL (ref 0–1.3)
MONOCYTES RELATIVE PERCENT: 10.6 %
NEUTROPHILS ABSOLUTE: 7.5 K/UL (ref 1.7–7.7)
NEUTROPHILS RELATIVE PERCENT: 75.5 %
PDW BLD-RTO: 16 % (ref 12.4–15.4)
PHOSPHORUS: 6.2 MG/DL (ref 2.5–4.9)
PLATELET # BLD: 289 K/UL (ref 135–450)
PMV BLD AUTO: 8.2 FL (ref 5–10.5)
POTASSIUM SERPL-SCNC: 3.2 MMOL/L (ref 3.5–5.1)
RBC # BLD: 3.43 M/UL (ref 4.2–5.9)
SODIUM BLD-SCNC: 139 MMOL/L (ref 136–145)
WBC # BLD: 10 K/UL (ref 4–11)

## 2021-01-22 PROCEDURE — 85025 COMPLETE CBC W/AUTO DIFF WBC: CPT

## 2021-01-22 PROCEDURE — 90935 HEMODIALYSIS ONE EVALUATION: CPT

## 2021-01-22 PROCEDURE — 76937 US GUIDE VASCULAR ACCESS: CPT

## 2021-01-22 PROCEDURE — 83735 ASSAY OF MAGNESIUM: CPT

## 2021-01-22 PROCEDURE — 97530 THERAPEUTIC ACTIVITIES: CPT

## 2021-01-22 PROCEDURE — 77001 FLUOROGUIDE FOR VEIN DEVICE: CPT

## 2021-01-22 PROCEDURE — 2580000003 HC RX 258: Performed by: INTERNAL MEDICINE

## 2021-01-22 PROCEDURE — 80069 RENAL FUNCTION PANEL: CPT

## 2021-01-22 PROCEDURE — P9047 ALBUMIN (HUMAN), 25%, 50ML: HCPCS | Performed by: INTERNAL MEDICINE

## 2021-01-22 PROCEDURE — 36415 COLL VENOUS BLD VENIPUNCTURE: CPT

## 2021-01-22 PROCEDURE — 97530 THERAPEUTIC ACTIVITIES: CPT | Performed by: PHYSICAL THERAPIST

## 2021-01-22 PROCEDURE — 6370000000 HC RX 637 (ALT 250 FOR IP): Performed by: INTERNAL MEDICINE

## 2021-01-22 PROCEDURE — 97110 THERAPEUTIC EXERCISES: CPT

## 2021-01-22 PROCEDURE — C1752 CATH,HEMODIALYSIS,SHORT-TERM: HCPCS

## 2021-01-22 PROCEDURE — 5A1D70Z PERFORMANCE OF URINARY FILTRATION, INTERMITTENT, LESS THAN 6 HOURS PER DAY: ICD-10-PCS | Performed by: INTERNAL MEDICINE

## 2021-01-22 PROCEDURE — 97116 GAIT TRAINING THERAPY: CPT | Performed by: PHYSICAL THERAPIST

## 2021-01-22 PROCEDURE — 6360000002 HC RX W HCPCS: Performed by: INTERNAL MEDICINE

## 2021-01-22 PROCEDURE — 36556 INSERT NON-TUNNEL CV CATH: CPT

## 2021-01-22 PROCEDURE — 99232 SBSQ HOSP IP/OBS MODERATE 35: CPT | Performed by: NURSE PRACTITIONER

## 2021-01-22 PROCEDURE — 2060000000 HC ICU INTERMEDIATE R&B

## 2021-01-22 RX ORDER — MIDODRINE HYDROCHLORIDE 5 MG/1
5 TABLET ORAL
Status: DISCONTINUED | OUTPATIENT
Start: 2021-01-22 | End: 2021-01-26

## 2021-01-22 RX ORDER — ALBUMIN (HUMAN) 12.5 G/50ML
12.5 SOLUTION INTRAVENOUS PRN
Status: DISCONTINUED | OUTPATIENT
Start: 2021-01-22 | End: 2021-01-27 | Stop reason: HOSPADM

## 2021-01-22 RX ORDER — ALBUMIN (HUMAN) 12.5 G/50ML
25 SOLUTION INTRAVENOUS ONCE
Status: COMPLETED | OUTPATIENT
Start: 2021-01-22 | End: 2021-01-22

## 2021-01-22 RX ORDER — POTASSIUM CHLORIDE 20 MEQ/1
20 TABLET, EXTENDED RELEASE ORAL ONCE
Status: COMPLETED | OUTPATIENT
Start: 2021-01-22 | End: 2021-01-22

## 2021-01-22 RX ORDER — MIDODRINE HYDROCHLORIDE 5 MG/1
5 TABLET ORAL
Status: DISCONTINUED | OUTPATIENT
Start: 2021-01-22 | End: 2021-01-22

## 2021-01-22 RX ADMIN — HEPARIN SODIUM 5000 UNITS: 5000 INJECTION INTRAVENOUS; SUBCUTANEOUS at 21:04

## 2021-01-22 RX ADMIN — ACETAMINOPHEN 650 MG: 325 TABLET ORAL at 21:07

## 2021-01-22 RX ADMIN — SODIUM CHLORIDE, PRESERVATIVE FREE 10 ML: 5 INJECTION INTRAVENOUS at 13:21

## 2021-01-22 RX ADMIN — SODIUM CHLORIDE, PRESERVATIVE FREE 10 ML: 5 INJECTION INTRAVENOUS at 21:04

## 2021-01-22 RX ADMIN — ALBUMIN (HUMAN) 25 G: 0.25 INJECTION, SOLUTION INTRAVENOUS at 11:54

## 2021-01-22 RX ADMIN — ATORVASTATIN CALCIUM 20 MG: 20 TABLET, FILM COATED ORAL at 13:20

## 2021-01-22 RX ADMIN — ASPIRIN 81 MG: 81 TABLET, CHEWABLE ORAL at 13:20

## 2021-01-22 RX ADMIN — POTASSIUM CHLORIDE 20 MEQ: 1500 TABLET, EXTENDED RELEASE ORAL at 13:20

## 2021-01-22 ASSESSMENT — PAIN DESCRIPTION - PROGRESSION: CLINICAL_PROGRESSION: NOT CHANGED

## 2021-01-22 ASSESSMENT — PAIN SCALES - GENERAL
PAINLEVEL_OUTOF10: 0
PAINLEVEL_OUTOF10: 2

## 2021-01-22 ASSESSMENT — PAIN DESCRIPTION - ORIENTATION: ORIENTATION: RIGHT

## 2021-01-22 ASSESSMENT — PAIN DESCRIPTION - FREQUENCY: FREQUENCY: CONTINUOUS

## 2021-01-22 NOTE — PROGRESS NOTES
Occupational Therapy  Attempt Note        Ailin Mcghee  MRN: [id number]  [id number]    OT to room to see pt. Pt on stretcher getting ready to go to HD. Will re-attempt later as able.      Electronically signed by Jorge L Galvan OTR/L  License # [id number]

## 2021-01-22 NOTE — PROGRESS NOTES
Physical Therapy  Jeremías Tommy Moss  [id number]  [id number]  Attempted to see for PT session however pt out of room; will attempt later  [facility], [address] [facility], [facility]

## 2021-01-22 NOTE — CARE COORDINATION
Sw received telephone call from [place] at [facility]. Patient's chair time is Tuesday, Thursday, Saturday at 12:00PM (ARRIVE 20 MIN EARLY). They will need discharge summary, most recent dialysis flow sheet, and orders faxed at discharge.    [facility] Gwendolyn Lynda Aranza  [address]  Phone: [phone]  Fax: [phone]    ELSA Dunlap, [place], Social Work/Case Management   [phone]  Electronically signed by ELSA Dunlap, \[facility] [place] [facility]\"" on 1/22/2021 at 9:53 AM

## 2021-01-22 NOTE — PROGRESS NOTES
Occupational Therapy  Facility/Department: [address] PROGRESSIVE CARE  Daily Treatment Note  NAME: Natalya Devi  : 1945  MRN: [id number]    Date of Service: 2021    Discharge Recommendations:  Home independently  OT Equipment Recommendations  Other: suggest shower chair for energy conservation/safety   Natalya Devi scored a 23/24 on the AM-PAC ADL Inpatient form. Current research shows that an AM-PAC score of 18 or greater is typically associated with a discharge to the patient's home setting. Based on the patient's AM-PAC score, and their current ADL deficits, it is recommended that the patient have no further Occupational Therapy at d/c. Please see assessment section for further patient specific details. If patient discharges prior to next session this note will serve as a discharge summary. Please see below for the latest assessment towards goals. Assessment   Performance deficits / Impairments: Decreased functional mobility ; Decreased ADL status; Decreased endurance;Decreased high-level IADLs  Assessment: Pt is a 77 y.o. make admitted with CHF LE edema & dyspnea. Today pt able to amb in room/hallway supervision/mod ind on 2 Liters O2 with cane. Pt was ed in E red T-band HEP to increase activity tolerance for IADL/mob tasks. Pt would benefit from 1-2 sessions of OT for ed in saundra brooke hose, energy conservation, UE ther ex HEP & O2 line management. Do not feel pt will require home OT post D/C.   Treatment Diagnosis: Impaired: ADL/IADL function, functional mob, transfers, activity tolerance  Prognosis: Good  OT Education: OT Role;Plan of Care;Home Exercise Program  REQUIRES OT FOLLOW UP: Yes  Activity Tolerance  Activity Tolerance: Patient Tolerated treatment well  Activity Tolerance: O2 sats 98% on 2 liters O2 @ rest & 93% after amb in hallway 2x  Safety Devices  Safety Devices in place: Yes  Type of devices: Left in chair;Nurse notified;Call light within reach;Gait belt(no chair alarm needed per nursing)         Patient Diagnosis(es): The primary encounter diagnosis was Acute on chronic congestive heart failure, unspecified heart failure type ([facility] [address] Diagnoses of Acute respiratory failure with hypoxia ([address] Acute pulmonary edema ([address] Pleural effusion, Acute on chronic renal insufficiency, and Hypokalemia were also pertinent to this visit. has a past medical history of Arthritis, Bell's palsy, Cancer ([address] CHF (congestive heart failure) ([facility] [address] Coronary artery disease involving native coronary artery of native heart without angina pectoris, Hyperlipidemia, Hypertension, Influenza B, Kidney failure, Nosebleed, and Radiation. has no past surgical history on file. Restrictions  Position Activity Restriction  Other position/activity restrictions: renal diet, low sodium, 1500 ML fluid restriction, on 2L O2 per n/c  Subjective   General  Chart Reviewed: Yes, Orders, Progress Notes  Additional Pertinent Hx: \"The patient is a 77 y.o. male w hx of chronic systolic CHF EF 02% and advanced CKDIV to V, who presents to [facility] with worsening dyspnea. Pt reports symptoms worsening gradually over weeks and worse in the past few days. Reports severe orthopnea. Worsening bilateral leg edema. \" Copied per Roxanne Shaw MD 1/18/21 note  Response to previous treatment: Patient with no complaints from previous session  Family / Caregiver Present: No  Referring Practitioner: Jordan Shoemaker MD  Diagnosis: CHF  Subjective  Subjective: Pt met in room seated in recliner chair with 2 liters O2 in place. Pt agreeable to OT tx. Pt denied pain.       Orientation  Orientation  Overall Orientation Status: Within Normal Limits  Objective    ADL  LE Dressing: (planned to address donning edwardo hose this session but pt stated he does not plan on wearing at home & declined to work on.)        Balance  Sitting Balance: Independent  Standing Balance: Modified independent   Functional Mobility  Functional - Mobility Device: No device  Activity: Other  Assist Level: Modified independent   Functional Mobility Comments: Pt amb from room using cane  to end of hallway to solerium ~ 120' 2x supervision/mod ind, therapist helped with O2 tank but pt managed line. Wheelchair Bed Transfers  Wheelchair/Bed - Technique: Ambulating(with cane)  Level of Asssistance: Modified independent   Wheelchair Transfers Comments: >< recliner & low couch mod ind                             Cognition  Overall Cognitive Status: WNL                    Type of ROM/Therapeutic Exercise  Type of ROM/Therapeutic Exercise: Resistive Bands  Comment: Red T-band HEP issued & performed for 15 reps  6 sets of ex following hand out. Issued band/copy of HEP & ed pt to do as HEP over the weekend, but to stop ex if casuses pain. Plan   Plan  Times per week: 3-5  Times per day: Daily  Plan weeks: 1-2 visits  Current Treatment Recommendations: Strengthening, Patient/Caregiver Education & Training, Home Management Training, Equipment Evaluation, Education, & procurement, Balance Training, Neuromuscular Re-education, Functional Mobility Training, Safety Education & Training, Self-Care / ADL, Endurance Training    AM-PAC Score        AM-[facility] Inpatient Daily Activity Raw Score: 23 (01/22/21 1650)  AM-PAC Inpatient ADL T-Scale Score : 51.12 (01/22/21 1650)  ADL Inpatient CMS 0-100% Score: 15.86 (01/22/21 1650)  ADL Inpatient CMS G-Code Modifier : CI (01/22/21 1650)    Goals  Short term goals  Time Frame for Short term goals: 1-2 visits  Short term goal 1: Pt will participate in ed for doffing/donning edwardo hose to perform mod ind. Short term goal 2: Pt will participate in BUE HEP to increase activity tolerance to perform standing ADL/IADL task for 5-7 minutes. Long term goals  Time Frame for Long term goals : same as LTG  Patient Goals   Patient goals : Pt stated his goal is to strengthen his legs, get back to doing own laundry in the basement.        Therapy Time   Individual Concurrent Group Co-treatment   Time In 7128         Time Out 1650         Minutes 45         Timed Code Treatment Minutes: [facility]    Electronically signed by Etta Daniels OTR/L  License # [id number]

## 2021-01-22 NOTE — PROGRESS NOTES
Department of Internal Medicine  Nephrology Progress Note    SUBJECTIVE:  We are following this patient for melania. Patient progress reviewed. UOP/Labs and wt noted . S/p temp vas cath  . Seen on HD, low  BP noted . REVIEW OF SYSTEMS:  No change clinically , still has  SOB/GIRALDO. Feels weak and tired  . Appetite ok . ROS neg for rest of the system . Physical Exam:    VITALS:  /77   Pulse 57   Temp 98.4 °F (36.9 °C)   Resp 18   Ht 5' 9\" (1.753 m)   Wt (!) 305 lb 1.9 oz (138.4 kg)   SpO2 95%   BMI 45.06 kg/m²   24HR INTAKE/OUTPUT:      Intake/Output Summary (Last 24 hours) at 1/22/2021 1212  Last data filed at 1/22/2021 3168  Gross per 24 hour   Intake 670 ml   Output 2450 ml   Net -1780 ml       Constitutional:  Looks comfortable   Respiratory:  Decrease BS at bases   Gastrointestinal:  No tenderness.   Normal Bowel Sounds  Cardiovascular:  S1, S2 RRR   Edema:  ++ edema    DATA:    CBC:  Lab Results   Component Value Date    WBC 10.0 01/22/2021    RBC 3.43 01/22/2021    HGB 9.8 01/22/2021    HCT 30.2 01/22/2021    MCV 88.0 01/22/2021    MCH 28.6 01/22/2021    MCHC 32.5 01/22/2021    RDW 16.0 01/22/2021     01/22/2021    MPV 8.2 01/22/2021     CMP:  Lab Results   Component Value Date     01/22/2021    K 3.2 01/22/2021    K 2.9 01/18/2021    CL 96 01/22/2021    CO2 25 01/22/2021    BUN 82 01/22/2021    CREATININE 7.3 01/22/2021    GFRAA 9 01/22/2021    AGRATIO 1.0 01/18/2021    LABGLOM 7 01/22/2021    GLUCOSE 112 01/22/2021    PROT 7.2 01/18/2021    CALCIUM 8.7 01/22/2021    BILITOT 0.4 01/18/2021    ALKPHOS 98 01/18/2021    AST 12 01/18/2021    ALT 10 01/18/2021      Hepatic Function Panel:   Lab Results   Component Value Date    ALKPHOS 98 01/18/2021    ALT 10 01/18/2021    AST 12 01/18/2021    PROT 7.2 01/18/2021    BILITOT 0.4 01/18/2021    BILIDIR <0.2 11/05/2019    IBILI see below 11/05/2019      Phosphorus:   Lab Results   Component Value Date    PHOS 6.2 01/22/2021 ASSESSMENT:  Active Problems:    CHF (congestive heart failure), NYHA class I, acute on chronic, combined (HCC)    Acute on chronic congestive heart failure (HCC)    Acute on chronic systolic HF (heart failure) (HCC)    Hypokalemia    Severe aortic stenosis    Coronary artery disease involving native coronary artery of native heart without angina pectoris  Resolved Problems:    * No resolved hospital problems. *      PLAN:  1. KAIDEN Most likely cardio renal. Clinically better . Increase UOP noted. 2. Renal function no marked change . Pt agreeable to start dialysis . Long dw pt about [facility] and RRT. All complications explained . All questions answered . 1st tx in am.   3. CHF improved . 4. Hypotenion with HD, Meds adjusted . Add proamatine . 5. Anemia stable . 6. Hypokalemia will supp   7. Mod AS will need w/u and intervention per cards . dw pt  in detail .        Ramo Villagran MD. Minal Carrillo

## 2021-01-22 NOTE — BRIEF OP NOTE
Brief Postoperative Note    Jeremías Cantu  YOB: 1945  [id number]    Pre-operative Diagnosis: KAIDEN on CKD    Post-operative Diagnosis: Same    Procedure: Non-tunneled Hemodialysis Catheter Placement    Anesthesia: Local    Surgeons: Amara Blanco MD    Estimated Blood Loss: Less than 5 mL    Complications: None    Specimens: Was Not Obtained    Findings: Successful placement of a right IJ non-tunneled hemodialysis catheter.     Electronically signed by Amara Blanco MD on 1/22/2021 at 9:45 AM

## 2021-01-22 NOTE — PLAN OF CARE
Problem: FLUID AND ELECTROLYTE IMBALANCE  Goal: Fluid and electrolyte balance are achieved/maintained  1/22/2021 0013 by Corinne Shaikh RN  Outcome: Ongoing  Note: Educated patient/family on CHF signs/ symptoms, causes, treatments, limited fluid intake, daily weights, low sodium diet, and follow-up. Pt to call attending physician if weight gain of 3 pounds in one day or 5 pounds in one week. Problem: SAFETY  Goal: Free from accidental physical injury  1/22/2021 0013 by Corinne Shaikh RN  Outcome: Ongoing  Note: Patient assessed for fall risk; fall precautions initiated. Patient and family instructed about safety devices. Environment kept free of clutter and adequate lighting provided. Bed locked and in lowest position. Call light within reach. Will continue to monitor. Fall risk assessment completed every shift. All precautions in place. Pt has call light within reach at all times. Room clear of clutter. Pt aware to call for assistance when getting up.

## 2021-01-22 NOTE — FLOWSHEET NOTE
Treatment time: 2 hour    Net UF: 300 mls     Pre weight: 138.4 kg   Post weight: 138 kg   EDW: tbd    Access used: [facility]  Access function: good     Medications or blood products given: Albumin X 1, saline flushes for BP    Regular outpatient schedule: KAIDEN    Summary of response to treatment: 300 mls removed. Pt tolerated tx fair with stable hypotension during tx. Albumin and Saline flushes given for BP support. Post VSS.  Report given to primary nurse Nicolle CARPENTER    Copy of dialysis treatment record placed in chart, to be scanned into EMR.     01/22/21 1022 01/22/21 1235   Vital Signs   /77 (!) 130/53   Temp 98.4 °F (36.9 °C) 98.2 °F (36.8 °C)   Pulse 57 58   Weight (!) 305 lb 1.9 oz (138.4 kg) (!) 304 lb 3.8 oz (138 kg)   Weight Method Bed scale Bed scale   Post-Hemodialysis Assessment   Hemodialysis Intake (ml)  --  1200 ml   Hemodialysis Output (ml)  --  1500 ml   NET Removed (ml)  --  300 ml   Tolerated Treatment  --  Good

## 2021-01-22 NOTE — PROGRESS NOTES
Physical Therapy  Facility/Department: [address] PROGRESSIVE CARE  Daily Treatment Note  NAME: Marli Villegas  : 1945  MRN: [id number]    Date of Service: 2021    Discharge Recommendations:  Home with assist PRN, S Level 1     Jeremías Bray scored a 23/24 on the AM-PAC short mobility form. Current research shows that an AM-PAC score of 18 or greater is typically associated with a discharge to the patient's home setting. Based on the patient's AM-PAC score and their current functional mobility deficits, it is recommended that the patient have 2-3 sessions per week of Physical Therapy at d/c to increase the patient's independence. At this time, this patient demonstrates the endurance and safety to discharge home with home PT and a follow up treatment frequency of 2-3x/wk. Please see assessment section for further patient specific details. If patient discharges prior to next session this note will serve as a discharge summary. Please see below for the latest assessment towards goals. Assessment   Body structures, Functions, Activity limitations: Decreased functional mobility   Assessment: pt is a 78 yo male who was adm to hosp with SOB, LE edema. In ED pt found with pulm edema and worsening R pleural effusion. Also with worsening renl function Cr of 5. Pt currently is slightly below his baseline of being Ind with all functional and household tasks; Pt now on O2 which he was not prior. Anticipate pt will be able to return home with PRN assist and home PT to increase pt's overall strength/endurance. Prognosis: Good  PT Education: General Safety  Barriers to Learning: none  REQUIRES PT FOLLOW UP: Yes  Activity Tolerance  Activity Tolerance: Patient Tolerated treatment well     Patient Diagnosis(es): The primary encounter diagnosis was Acute on chronic congestive heart failure, unspecified heart failure type ([address]  Diagnoses of Acute respiratory failure with hypoxia ([address] Acute pulmonary edema ([address] Pleural effusion, Acute on chronic renal insufficiency, and Hypokalemia were also pertinent to this visit. has a past medical history of Arthritis, Bell's palsy, Cancer ([facility] [address] CHF (congestive heart failure) ([facility] [address] Coronary artery disease involving native coronary artery of native heart without angina pectoris, Hyperlipidemia, Hypertension, Influenza B, Kidney failure, Nosebleed, and Radiation. has no past surgical history on file. Restrictions  Position Activity Restriction  Other position/activity restrictions: renal diet, low sodium, 1500 ML fluid restriction, on 2L O2 per n/c  Subjective   General  Chart Reviewed: Yes  Additional Pertinent Hx: per MD note:  77 y.o. male w hx of chronic systolic CHF EF 36% and advanced CKDIV to V, who presents to [facility] with worsening dyspnea. Pt reports symptoms worsening gradually over weeks and worse in the past few days. Reports severe orthopnea. Worsening bilateral leg edema. In ED pt found with pulm edema and worsening R pleural effusion. Also with worsening renl function Cr of 5, albeit his Bicarb is normal and his K is low. Response To Previous Treatment: Patient with no complaints from previous session.   Family / Caregiver Present: No  Subjective  Subjective: pt very pleasant and agreeable to working with therapy; denies any pain but reports his calfs are a little sore as he was cramping up in dialysis  General Comment  Comments: pt reports he is going to have a heart valve replaced sometime next week          Orientation  Orientation  Overall Orientation Status: Within Functional Limits  Cognition   Cognition  Overall Cognitive Status: WFL  Objective   Bed mobility  Comment: pt up in chair at beginnnig and end of session  Transfers  Sit to Stand: Modified independent  Stand to sit: Modified independent  Ambulation  Ambulation?: Yes  Ambulation 1  Surface: level tile  Device: Single point cane  Assistance: Stand by assistance  Quality of Gait: slow pace; wide GHAZAL due to body habitus  Distance: 40', 100' and 130'  Comments: pt intitially feeling dizzy as he didn't have his distance glasses on; after getting his glasses he reported he felt better Pt's O2 sats 91% on 2 liters after walking                                 G-Code     OutComes Score                                                     AM-PAC Score  AM-PAC Inpatient Mobility Raw Score : 23 (01/21/21 0826)  AM-PAC Inpatient T-Scale Score : 56.93 (01/21/21 0826)  Mobility Inpatient CMS 0-100% Score: 11.2 (01/21/21 0826)  Mobility Inpatient CMS G-Code Modifier : CI (01/21/21 0826)          Goals  Short term goals  Time Frame for Short term goals: by discharge  Short term goal 1: amb ' with or without SPC sup while maintaining O2 sats in 90's  without O2  Short term goal 2: negotiate stairs when able  Patient Goals   Patient goals : to return home and be able to do his laundry again (in basement)    Plan    Plan  Times per week: 3-5  Current Treatment Recommendations: Functional Mobility Training  Safety Devices  Type of devices: Call light within reach, Left in chair, Nurse notified, Gait belt     Therapy Time   Individual Concurrent Group Co-treatment   Time In 5446         Time Out 8720         Minutes 54                 STEPHANY PALUMBO, [facility] [place], [place], [address]

## 2021-01-22 NOTE — PROGRESS NOTES
Pt back from HD. R neck vas cath dressing clean dry and intact. VSS, Bp 154/71. Pt denies any pain or complaints. Up in chair eating lunch. Call to Dr Miryam Hernandez to verify Bp medication administration, awaiting call back.  Pt denies needs, call light in reach, will monitor  Electronically signed by Shawn Byers RN on 1/22/2021 at 3:36 PM

## 2021-01-22 NOTE — PROGRESS NOTES
Hospitalist Progress Note      PCP: Karen Forrester MD    Date of Admission: 1/18/2021    Chief Complaint:   Chief Complaint   Patient presents with    Shortness of Breath     Hospital Course: 77 y.o. male w hx of chronic systolic CHF EF 89% and advanced CKDIV to V, who presents to [facility] with worsening dyspnea. Pt reports symptoms worsening gradually over weeks and worse in the past few days. Reports severe orthopnea. Worsening bilateral leg edema. In ED pt found with pulm edema and worsening R pleural effusion. Also with worsening renl function Cr of 5, albeit his Bicarb is normal and his K is low. Subjective: Seen in HD, [facility] cath this morning without complications. Tolerating HD. No CP, some GIRALDO. Medications:  Reviewed    Infusion Medications   Scheduled Medications    aspirin  81 mg Oral Daily    atorvastatin  20 mg Oral Daily    [Held by provider] clopidogrel  75 mg Oral Daily    metoprolol succinate  25 mg Oral Daily    furosemide  60 mg Intravenous BID    metOLazone  5 mg Oral Daily    sodium chloride flush  10 mL Intravenous 2 times per day    heparin (porcine)  5,000 Units Subcutaneous 3 times per day     PRN Meds: sodium chloride flush, promethazine **OR** ondansetron, polyethylene glycol, acetaminophen **OR** acetaminophen      Intake/Output Summary (Last 24 hours) at 1/22/2021 0924  Last data filed at 1/22/2021 0817  Gross per 24 hour   Intake 1220 ml   Output 2825 ml   Net -1605 ml       Physical Exam Performed:    BP (!) 121/53   Pulse 70   Temp 98.1 °F (36.7 °C) (Oral)   Resp 18   Ht 5' 9\" (1.753 m)   Wt (!) 300 lb 4.3 oz (136.2 kg)   SpO2 95%   BMI 44.34 kg/m²     General appearance: No apparent distress, appears stated age and cooperative. HEENT: Pupils equal, round, and reactive to light. Conjunctivae/corneas clear. Neck: Supple, with full range of motion. Trachea midline. Respiratory:  Normal respiratory effort.  Clear to auscultation, bilaterally without Rales/Wheezes/Rhonchi. Cardiovascular: Regular rate and rhythm, 3/6 systolic murmur, rubs or gallops. Abdomen: Soft, non-tender, non-distended with normal bowel sounds. Musculoskeletal: No clubbing, cyanosis or edema bilaterally. Full range of motion without deformity. Skin: Skin color, texture, turgor normal.  No rashes or lesions. Neurologic:  Neurovascularly intact without any focal sensory/motor deficits. Cranial nerves: II-XII intact, grossly non-focal.  Psychiatric: Alert and oriented, thought content appropriate, normal insight  Capillary Refill: Brisk,< 3 seconds   Peripheral Pulses: +2 palpable, equal bilaterally       Labs:   Recent Labs     01/20/21  0504 01/21/21  0506 01/22/21  0457   WBC 9.1 9.2 10.0   HGB 9.5* 9.0* 9.8*   HCT 29.1* 28.1* 30.2*    310 289     Recent Labs     01/20/21  0504 01/21/21  0506 01/22/21  0457    138 139   K 3.0* 3.0* 3.2*   CL 99 95* 96*   CO2 27 27 25   BUN 64* 77* 82*   CREATININE 6.2* 6.2* 7.3*   CALCIUM 8.9 8.7 8.7   PHOS 5.4* 5.7* 6.2*     No results for input(s): AST, ALT, BILIDIR, BILITOT, ALKPHOS in the last 72 hours. Recent Labs     01/21/21  0506   INR 1.13     No results for input(s): Esther Gutierrez in the last 72 hours. Urinalysis:      Lab Results   Component Value Date    NITRU Negative 11/05/2019    WBCUA 4 11/05/2019    BACTERIA RARE 11/05/2019    RBCUA 2 11/05/2019    BLOODU SMALL 11/05/2019    SPECGRAV 1.010 11/05/2019    GLUCOSEU Negative 11/05/2019       Radiology:  XR CHEST PORTABLE   Final Result   1. Right pleural effusion with right basilar atelectasis or pneumonia. 2. Pulmonary edema.          IR TUNNELED CVC PLACE WO SQ PORT/PUMP > 5 YEARS    (Results Pending)   IR FLUORO GUIDED CVA DEVICE PLMT/REPLACE/REMOVAL    (Results Pending)         Assessment/Plan:    Active Hospital Problems    Diagnosis    Acute on chronic congestive heart failure (HCC) [I50.9]    CHF (congestive heart failure), NYHA class I, acute on chronic, combined ([facility] [address] [I50.43]     Acute on Chronic Systolic CHF in setting of progressive renal disease. EF 40%. Acute Pulm Edema and R Pleural Effusion. Cr up to 5 on presentation. Plan:               - lasix 60 Iv BID.             - added metolazone.               - decreased dose BB due to acute CHF.             - cont ASA and statin.               - No ACEI/ARB with severe progressive renal disease.               - nephrology and cardiology involved. - planning to start HD 1/22     KAIDEN on CKD 3  Cr up to 5 on admit. Pt changed his mind and is agreeable to dialysis if necessary.   - nephrology involved   - palliative care consult.            - monitor closely with diuretics - renal q4 to q6hrs - to daily  - line placement and plan to start HD 1/22    Severe AS  Will need further workup--LHC, likely TAVR. - management per cardiology, LHC when volume status optimized                Morbid Obesity  Body mass index is 44.34 kg/m². Complicating assessment and treatment. Placing patient at risk for multiple co-morbidities as well as early death and contributing to the patient's presentation.  on weight loss when appropriate. Hypokalemia   Replete with caution considering severe renal insufficiency. DVT Prophylaxis: heparin  Diet: DIET RENAL; Low Sodium (2 GM); Daily Fluid Restriction: 1500 ml  Code Status: Limited    PT/OT Eval Status: ordered    Dispo - pending    Megan Miller MD    This note was transcribed using [id number] [employer]. Please disregard any translational errors.

## 2021-01-23 LAB
ALBUMIN SERPL-MCNC: 3.4 G/DL (ref 3.4–5)
ANION GAP SERPL CALCULATED.3IONS-SCNC: 17 MMOL/L (ref 3–16)
BUN BLDV-MCNC: 69 MG/DL (ref 7–20)
CALCIUM SERPL-MCNC: 8.7 MG/DL (ref 8.3–10.6)
CHLORIDE BLD-SCNC: 94 MMOL/L (ref 99–110)
CO2: 27 MMOL/L (ref 21–32)
CREAT SERPL-MCNC: 5.6 MG/DL (ref 0.8–1.3)
GFR AFRICAN AMERICAN: 12
GFR NON-AFRICAN AMERICAN: 10
GLUCOSE BLD-MCNC: 125 MG/DL (ref 70–99)
HCT VFR BLD CALC: 27.8 % (ref 40.5–52.5)
HEMOGLOBIN: 8.9 G/DL (ref 13.5–17.5)
HEPATITIS B CORE TOTAL ANTIBODY: NEGATIVE
MAGNESIUM: 2.4 MG/DL (ref 1.8–2.4)
MCH RBC QN AUTO: 28.2 PG (ref 26–34)
MCHC RBC AUTO-ENTMCNC: 31.9 G/DL (ref 31–36)
MCV RBC AUTO: 88.3 FL (ref 80–100)
PDW BLD-RTO: 16.6 % (ref 12.4–15.4)
PHOSPHORUS: 5 MG/DL (ref 2.5–4.9)
PLATELET # BLD: 291 K/UL (ref 135–450)
PMV BLD AUTO: 8.4 FL (ref 5–10.5)
POTASSIUM SERPL-SCNC: 3 MMOL/L (ref 3.5–5.1)
RBC # BLD: 3.15 M/UL (ref 4.2–5.9)
SODIUM BLD-SCNC: 138 MMOL/L (ref 136–145)
WBC # BLD: 9.3 K/UL (ref 4–11)

## 2021-01-23 PROCEDURE — 85027 COMPLETE CBC AUTOMATED: CPT

## 2021-01-23 PROCEDURE — 80069 RENAL FUNCTION PANEL: CPT

## 2021-01-23 PROCEDURE — 94761 N-INVAS EAR/PLS OXIMETRY MLT: CPT

## 2021-01-23 PROCEDURE — 6360000002 HC RX W HCPCS: Performed by: INTERNAL MEDICINE

## 2021-01-23 PROCEDURE — 2700000000 HC OXYGEN THERAPY PER DAY

## 2021-01-23 PROCEDURE — 2580000003 HC RX 258: Performed by: INTERNAL MEDICINE

## 2021-01-23 PROCEDURE — 83735 ASSAY OF MAGNESIUM: CPT

## 2021-01-23 PROCEDURE — 36415 COLL VENOUS BLD VENIPUNCTURE: CPT

## 2021-01-23 PROCEDURE — 90935 HEMODIALYSIS ONE EVALUATION: CPT

## 2021-01-23 PROCEDURE — 2060000000 HC ICU INTERMEDIATE R&B

## 2021-01-23 PROCEDURE — 6370000000 HC RX 637 (ALT 250 FOR IP): Performed by: INTERNAL MEDICINE

## 2021-01-23 RX ADMIN — ACETAMINOPHEN 650 MG: 325 TABLET ORAL at 13:11

## 2021-01-23 RX ADMIN — SODIUM CHLORIDE, PRESERVATIVE FREE 10 ML: 5 INJECTION INTRAVENOUS at 13:13

## 2021-01-23 RX ADMIN — HEPARIN SODIUM 5000 UNITS: 5000 INJECTION INTRAVENOUS; SUBCUTANEOUS at 06:02

## 2021-01-23 RX ADMIN — ACETAMINOPHEN 650 MG: 325 TABLET ORAL at 23:38

## 2021-01-23 RX ADMIN — HEPARIN SODIUM 5000 UNITS: 5000 INJECTION INTRAVENOUS; SUBCUTANEOUS at 13:13

## 2021-01-23 RX ADMIN — ASPIRIN 81 MG: 81 TABLET, CHEWABLE ORAL at 13:11

## 2021-01-23 RX ADMIN — SODIUM CHLORIDE, PRESERVATIVE FREE 10 ML: 5 INJECTION INTRAVENOUS at 21:56

## 2021-01-23 RX ADMIN — ATORVASTATIN CALCIUM 20 MG: 20 TABLET, FILM COATED ORAL at 13:11

## 2021-01-23 RX ADMIN — HEPARIN SODIUM 5000 UNITS: 5000 INJECTION INTRAVENOUS; SUBCUTANEOUS at 21:56

## 2021-01-23 ASSESSMENT — PAIN DESCRIPTION - LOCATION: LOCATION: HEAD;LEG

## 2021-01-23 ASSESSMENT — PAIN DESCRIPTION - ONSET: ONSET: ON-GOING

## 2021-01-23 ASSESSMENT — PAIN SCALES - GENERAL
PAINLEVEL_OUTOF10: 0
PAINLEVEL_OUTOF10: 1
PAINLEVEL_OUTOF10: 0
PAINLEVEL_OUTOF10: 6

## 2021-01-23 ASSESSMENT — PAIN DESCRIPTION - PAIN TYPE: TYPE: ACUTE PAIN

## 2021-01-23 ASSESSMENT — PAIN DESCRIPTION - FREQUENCY: FREQUENCY: CONTINUOUS

## 2021-01-23 ASSESSMENT — PAIN DESCRIPTION - ORIENTATION: ORIENTATION: LEFT

## 2021-01-23 ASSESSMENT — PAIN - FUNCTIONAL ASSESSMENT: PAIN_FUNCTIONAL_ASSESSMENT: ACTIVITIES ARE NOT PREVENTED

## 2021-01-23 ASSESSMENT — PAIN DESCRIPTION - DESCRIPTORS: DESCRIPTORS: HEADACHE

## 2021-01-23 NOTE — PROGRESS NOTES
Hospitalist Progress Note      PCP: Burgess Anjum MD    Date of Admission: 1/18/2021    Chief Complaint:   Chief Complaint   Patient presents with    Shortness of Breath     Hospital Course: 77 y.o. male w hx of chronic systolic CHF EF 00% and advanced CKDIV to V, who presents to [facility] with worsening dyspnea. Pt reports symptoms worsening gradually over weeks and worse in the past few days. Reports severe orthopnea. Worsening bilateral leg edema. In ED pt found with pulm edema and worsening R pleural effusion. Also with worsening renl function Cr of 5, albeit his Bicarb is normal and his K is low. Subjective: Seen in HD, tolerating well. Having some cramping in his legs. No CP, SOB improving. Medications:  Reviewed    Infusion Medications   Scheduled Medications    midodrine  5 mg Oral TID WC    aspirin  81 mg Oral Daily    atorvastatin  20 mg Oral Daily    [Held by provider] clopidogrel  75 mg Oral Daily    metoprolol succinate  25 mg Oral Daily    sodium chloride flush  10 mL Intravenous 2 times per day    heparin (porcine)  5,000 Units Subcutaneous 3 times per day     PRN Meds: albumin human, sodium chloride flush, promethazine **OR** ondansetron, polyethylene glycol, acetaminophen **OR** acetaminophen      Intake/Output Summary (Last 24 hours) at 1/23/2021 0950  Last data filed at 1/23/2021 0453  Gross per 24 hour   Intake 2280 ml   Output 2575 ml   Net -295 ml       Physical Exam Performed:    BP (!) 146/78   Pulse 63   Temp 98 °F (36.7 °C)   Resp 16   Ht 5' 9\" (1.753 m)   Wt 296 lb 4.8 oz (134.4 kg)   SpO2 98%   BMI 43.76 kg/m²     General appearance: No apparent distress, appears stated age and cooperative. HEENT: Pupils equal, round, and reactive to light. Conjunctivae/corneas clear. Neck: Supple, with full range of motion. Trachea midline. Respiratory:  Normal respiratory effort.  Clear to auscultation, bilaterally without Rales/Wheezes/Rhonchi. Cardiovascular: Regular rate and rhythm, 3/6 systolic murmur, rubs or gallops. Abdomen: Soft, non-tender, non-distended with normal bowel sounds. Musculoskeletal: No clubbing, cyanosis or edema bilaterally. Full range of motion without deformity. Skin: Skin color, texture, turgor normal.  No rashes or lesions. Neurologic:  Neurovascularly intact without any focal sensory/motor deficits. Cranial nerves: II-XII intact, grossly non-focal.  Psychiatric: Alert and oriented, thought content appropriate, normal insight  Capillary Refill: Brisk,< 3 seconds   Peripheral Pulses: +2 palpable, equal bilaterally       Labs:   Recent Labs     01/21/21  0506 01/22/21  0457 01/23/21  0453   WBC 9.2 10.0 9.3   HGB 9.0* 9.8* 8.9*   HCT 28.1* 30.2* 27.8*    289 291     Recent Labs     01/21/21  0506 01/22/21  0457 01/23/21  0453    139 138   K 3.0* 3.2* 3.0*   CL 95* 96* 94*   CO2 27 25 27   BUN 77* 82* 69*   CREATININE 6.2* 7.3* 5.6*   CALCIUM 8.7 8.7 8.7   PHOS 5.7* 6.2* 5.0*     No results for input(s): AST, ALT, BILIDIR, BILITOT, ALKPHOS in the last 72 hours. Recent Labs     01/21/21  0506   INR 1.13     No results for input(s): Kulwinder Salvador in the last 72 hours. Urinalysis:      Lab Results   Component Value Date    NITRU Negative 11/05/2019    WBCUA 4 11/05/2019    BACTERIA RARE 11/05/2019    RBCUA 2 11/05/2019    BLOODU SMALL 11/05/2019    SPECGRAV 1.010 11/05/2019    GLUCOSEU Negative 11/05/2019       Radiology:  IR FLUORO GUIDED CVA DEVICE PLMT/REPLACE/REMOVAL   Final Result   Successful ultrasound and fluoroscopy guided right IJ non-tunneled   hemodialysis catheter placement. XR CHEST PORTABLE   Final Result   1. Right pleural effusion with right basilar atelectasis or pneumonia. 2. Pulmonary edema.          IR TUNNELED CVC PLACE WO SQ PORT/PUMP > 5 YEARS    (Results Pending)         Assessment/Plan:    Active Hospital Problems    Diagnosis    Acute on chronic systolic HF (heart failure) ([facility]) [I50.23]    Hypokalemia [E87.6]    Severe aortic stenosis [I35.0]    Coronary artery disease involving native coronary artery of native heart without angina pectoris [I25.10]    Acute on chronic congestive heart failure (HCC) [I50.9]    CHF (congestive heart failure), NYHA class I, acute on chronic, combined ([facility]) [I50.43]     Acute on Chronic Systolic CHF in setting of progressive renal disease. EF 40%. Acute Pulm Edema and R Pleural Effusion. Cr up to 5 on presentation. Plan:               - lasix 60 Iv BID.             - added metolazone.               - decreased dose BB due to acute CHF.             - cont ASA and statin.               - No ACEI/ARB with severe progressive renal disease.               - nephrology and cardiology involved. - planning to start HD 1/22     KAIDEN on CKD 3 with new HD  Cr up to 5 on admit. Pt changed his mind and is agreeable to dialysis if necessary.   - nephrology involved   - palliative care consult.            - monitor closely with diuretics - renal q4 to q6hrs - to daily  - line placement and started HD 1/22    Severe AS  Will need further workup--LHC, likely TAVR. - management per cardiology, LHC when volume status optimized                Morbid Obesity  Body mass index is 43.76 kg/m². Complicating assessment and treatment. Placing patient at risk for multiple co-morbidities as well as early death and contributing to the patient's presentation.  on weight loss when appropriate. Hypokalemia   Replete with caution considering severe renal insufficiency. Defer to nephrology. Cramping in legs likely from hypoK. DVT Prophylaxis: heparin  Diet: DIET RENAL; Low Sodium (2 GM); Daily Fluid Restriction: 1500 ml  Code Status: Limited    PT/OT Eval Status: ordered    Dispo - pending    Jonelle Crow MD    This note was transcribed using [id number]. Please disregard any translational errors.

## 2021-01-23 NOTE — PROGRESS NOTES
Could not be seen today as he was away from the floor getting dialysis. We will see him tomorrow. Plan cardiac work-up next week.

## 2021-01-23 NOTE — PROGRESS NOTES
Pt back from HD, awake and alert, VSS. Pt reports slight headache and leg cramping. Lunch tray ordered for pt. Denies any other needs.  Up in chair with call light in reach  Electronically signed by Samra Mcbride RN on 1/23/2021 at 4:28 PM

## 2021-01-23 NOTE — FLOWSHEET NOTE
Treatment time: 3.5 hours    Net UF: 2 liters     Pre weight: 134.4 kg   Post weight: 132.2 kg   EDW: tbd     Access used: [facility]  Access function: good, lines reversed. Dressing Changed     Medications or blood products given: none     Regular outpatient schedule: KAIDEN,     Summary of response to treatment: 2 liters removed. Pt tolerated tx well. Hypotensive the last 10 minutes of tx, Post VSS after rinse back.  Report to primary nurse Nicolle CARPENTER     Copy of dialysis treatment record placed in chart, to be scanned into EMR.     01/23/21 0801 01/23/21 1125   Vital Signs   BP (!) 146/78 (!) 94/49   Temp 98 °F (36.7 °C) 98 °F (36.7 °C)   Pulse 63 63   Weight 296 lb 4.8 oz (134.4 kg) 291 lb 7.2 oz (132.2 kg)   Weight Method Standing scale Bed scale   Post-Hemodialysis Assessment   Hemodialysis Output (ml)  --  2700 ml   NET Removed (ml)  --  2000 ml   Tolerated Treatment  --  Good

## 2021-01-23 NOTE — PROGRESS NOTES
Department of Internal Medicine  Nephrology Progress Note    SUBJECTIVE:  We are following this patient for melania. Patient progress reviewed. UOP/Labs and wt noted . HPI:  Breathing stable. No CP. Lacie had HD earlier today. ROS:  In bed. [address]:  medications reviewed. Physical Exam:    VITALS:  /62   Pulse 80   Temp 98.8 °F (37.1 °C) (Oral)   Resp 16   Ht 5' 9\" (1.753 m)   Wt 291 lb 7.2 oz (132.2 kg)   SpO2 95%   BMI 43.04 kg/m²   24HR INTAKE/OUTPUT:      Intake/Output Summary (Last 24 hours) at 1/23/2021 1719  Last data filed at 1/23/2021 1313  Gross per 24 hour   Intake 1540 ml   Output 3425 ml   Net -1885 ml       Constitutional:  Looks comfortable   Respiratory:  Decrease BS at bases   Gastrointestinal:  No tenderness. Normal Bowel Sounds  Cardiovascular:  S1, S2 RRR   Edema:  ++ edema    DATA:    CBC:  Lab Results   Component Value Date    WBC 9.3 01/23/2021    RBC 3.15 01/23/2021    HGB 8.9 01/23/2021    HCT 27.8 01/23/2021    MCV 88.3 01/23/2021    MCH 28.2 01/23/2021    MCHC 31.9 01/23/2021    RDW 16.6 01/23/2021     01/23/2021    MPV 8.4 01/23/2021     CMP:  Lab Results   Component Value Date     01/23/2021    K 3.0 01/23/2021    K 2.9 01/18/2021    CL 94 01/23/2021    CO2 27 01/23/2021    BUN 69 01/23/2021    CREATININE 5.6 01/23/2021    GFRAA 12 01/23/2021    AGRATIO 1.0 01/18/2021    LABGLOM 10 01/23/2021    GLUCOSE 125 01/23/2021    PROT 7.2 01/18/2021    CALCIUM 8.7 01/23/2021    BILITOT 0.4 01/18/2021    ALKPHOS 98 01/18/2021    AST 12 01/18/2021    ALT 10 01/18/2021      Hepatic Function Panel:   Lab Results   Component Value Date    ALKPHOS 98 01/18/2021    ALT 10 01/18/2021    AST 12 01/18/2021    PROT 7.2 01/18/2021    BILITOT 0.4 01/18/2021    BILIDIR <0.2 11/05/2019    IBILI see below 11/05/2019      Phosphorus:   Lab Results   Component Value Date    PHOS 5.0 01/23/2021       ASSESSMENT/PLAN:  1.  MELANIA on CKD4   - Most likely cardio renal   - temporary dialysis catheter by IR 01/22/21   - HD started 01/22/21 and second today    2. ACSHF   - challenge EDW    3. Hypotenion with HD   - BP only low for the last 10 minutes   - proamatine available    4. Anemia stable . 5. Hypokalemia   - sliding scale on dialysis    6. Mod AS will need w/u and intervention per cards .

## 2021-01-23 NOTE — PROGRESS NOTES
Physical Therapy  Jeremías Tiffany Holder  [phone]  [id number]    Attempted to see for PT session however pt reports he was in dialysis all morning and had some leg discomfort during dialysis and does not want to get up and walk right now. Warm blanket given to pt for comfort and discussed if he wanted to walk later to ask nursing.   Will continue to follow  [place], [place], [address]

## 2021-01-23 NOTE — PLAN OF CARE
Problem: OXYGENATION/RESPIRATORY FUNCTION  Goal: Patient will maintain patent airway  Outcome: Ongoing  Goal: Patient will achieve/maintain normal respiratory rate/effort  Description: Respiratory rate and effort will be within normal limits for the patient  Outcome: Ongoing     Problem: HEMODYNAMIC STATUS  Goal: Patient has stable vital signs and fluid balance  Outcome: Ongoing     Problem: FLUID AND ELECTROLYTE IMBALANCE  Goal: Fluid and electrolyte balance are achieved/maintained  Outcome: Ongoing     Problem: ACTIVITY INTOLERANCE/IMPAIRED MOBILITY  Goal: Mobility/activity is maintained at optimum level for patient  Outcome: Ongoing   Assess breath sounds, oxygen requirements and saturations, and activity tolerance. Monitor intake and output and daily weights and lab values. Encourage fluid and dietary restrictions. Problem: SAFETY  Goal: Free from accidental physical injury  Outcome: Ongoing  Goal: Free from intentional harm  Outcome: Ongoing     Problem: DAILY CARE  Goal: Daily care needs are met  Outcome: Ongoing     Problem: PAIN  Goal: Patient's pain/discomfort is manageable  Outcome: Ongoing     Problem: SKIN INTEGRITY  Goal: Skin integrity is maintained or improved  Outcome: Ongoing     Problem: KNOWLEDGE DEFICIT  Goal: Patient/S.O. demonstrates understanding of disease process, treatment plan, medications, and discharge instructions. Outcome: Ongoing     Problem: DISCHARGE BARRIERS  Goal: Patient's continuum of care needs are met  Outcome: Ongoing     Problem: Pain:  Goal: Pain level will decrease  Description: Pain level will decrease  Outcome: Ongoing  Goal: Control of acute pain  Description: Control of acute pain  Outcome: Ongoing  Goal: Control of chronic pain  Description: Control of chronic pain  Outcome: Ongoing   Pain/discomfort being managed with PRN analgesics per MD orders. Pt able to express presence and absence of pain and rate pain appropriately using numerical scale.

## 2021-01-24 LAB
HCT VFR BLD CALC: 30 % (ref 40.5–52.5)
HEMOGLOBIN: 9.6 G/DL (ref 13.5–17.5)
MAGNESIUM: 2.2 MG/DL (ref 1.8–2.4)
MCH RBC QN AUTO: 27.9 PG (ref 26–34)
MCHC RBC AUTO-ENTMCNC: 32.1 G/DL (ref 31–36)
MCV RBC AUTO: 87.1 FL (ref 80–100)
PDW BLD-RTO: 16.7 % (ref 12.4–15.4)
PLATELET # BLD: 295 K/UL (ref 135–450)
PMV BLD AUTO: 8.3 FL (ref 5–10.5)
RBC # BLD: 3.44 M/UL (ref 4.2–5.9)
WBC # BLD: 9.6 K/UL (ref 4–11)

## 2021-01-24 PROCEDURE — 85027 COMPLETE CBC AUTOMATED: CPT

## 2021-01-24 PROCEDURE — 83735 ASSAY OF MAGNESIUM: CPT

## 2021-01-24 PROCEDURE — 6360000002 HC RX W HCPCS: Performed by: INTERNAL MEDICINE

## 2021-01-24 PROCEDURE — 6370000000 HC RX 637 (ALT 250 FOR IP): Performed by: INTERNAL MEDICINE

## 2021-01-24 PROCEDURE — 99232 SBSQ HOSP IP/OBS MODERATE 35: CPT | Performed by: INTERNAL MEDICINE

## 2021-01-24 PROCEDURE — 36415 COLL VENOUS BLD VENIPUNCTURE: CPT

## 2021-01-24 PROCEDURE — 2700000000 HC OXYGEN THERAPY PER DAY

## 2021-01-24 PROCEDURE — 2060000000 HC ICU INTERMEDIATE R&B

## 2021-01-24 PROCEDURE — 94761 N-INVAS EAR/PLS OXIMETRY MLT: CPT

## 2021-01-24 PROCEDURE — 2580000003 HC RX 258: Performed by: INTERNAL MEDICINE

## 2021-01-24 RX ADMIN — ASPIRIN 81 MG: 81 TABLET, CHEWABLE ORAL at 09:23

## 2021-01-24 RX ADMIN — MIDODRINE HYDROCHLORIDE 5 MG: 5 TABLET ORAL at 09:24

## 2021-01-24 RX ADMIN — HEPARIN SODIUM 5000 UNITS: 5000 INJECTION INTRAVENOUS; SUBCUTANEOUS at 14:49

## 2021-01-24 RX ADMIN — HEPARIN SODIUM 5000 UNITS: 5000 INJECTION INTRAVENOUS; SUBCUTANEOUS at 20:16

## 2021-01-24 RX ADMIN — SODIUM CHLORIDE, PRESERVATIVE FREE 10 ML: 5 INJECTION INTRAVENOUS at 20:10

## 2021-01-24 RX ADMIN — HEPARIN SODIUM 5000 UNITS: 5000 INJECTION INTRAVENOUS; SUBCUTANEOUS at 06:00

## 2021-01-24 RX ADMIN — ATORVASTATIN CALCIUM 20 MG: 20 TABLET, FILM COATED ORAL at 09:23

## 2021-01-24 ASSESSMENT — PAIN SCALES - GENERAL
PAINLEVEL_OUTOF10: 0

## 2021-01-24 NOTE — PLAN OF CARE
Problem: OXYGENATION/RESPIRATORY FUNCTION  Goal: Patient will maintain patent airway  Outcome: Ongoing  Goal: Patient will achieve/maintain normal respiratory rate/effort  Description: Respiratory rate and effort will be within normal limits for the patient  Outcome: Ongoing     Problem: HEMODYNAMIC STATUS  Goal: Patient has stable vital signs and fluid balance  Outcome: Ongoing     Problem: FLUID AND ELECTROLYTE IMBALANCE  Goal: Fluid and electrolyte balance are achieved/maintained  Outcome: Ongoing     Problem: ACTIVITY INTOLERANCE/IMPAIRED MOBILITY  Goal: Mobility/activity is maintained at optimum level for patient  Outcome: Ongoing   Assess breath sounds, oxygen requirements and saturations, and activity tolerance. Monitor intake and output and daily weights and lab values. Encourage fluid and dietary restrictions. Problem: SAFETY  Goal: Free from accidental physical injury  Outcome: Ongoing  Goal: Free from intentional harm  Outcome: Ongoing     Problem: DAILY CARE  Goal: Daily care needs are met  Outcome: Ongoing     Problem: PAIN  Goal: Patient's pain/discomfort is manageable  Outcome: Ongoing   Pain/discomfort being managed with PRN analgesics per MD orders. Pt able to express presence and absence of pain and rate pain appropriately using numerical scale. Problem: SKIN INTEGRITY  Goal: Skin integrity is maintained or improved  Outcome: Ongoing     Problem: KNOWLEDGE DEFICIT  Goal: Patient/S.O. demonstrates understanding of disease process, treatment plan, medications, and discharge instructions.   Outcome: Ongoing     Problem: DISCHARGE BARRIERS  Goal: Patient's continuum of care needs are met  Outcome: Ongoing     Problem: Pain:  Goal: Pain level will decrease  Description: Pain level will decrease  Outcome: Ongoing  Goal: Control of acute pain  Description: Control of acute pain  Outcome: Ongoing  Goal: Control of chronic pain  Description: Control of chronic pain  Outcome: Ongoing

## 2021-01-24 NOTE — PROGRESS NOTES
Department of Internal Medicine  Nephrology Progress Note    SUBJECTIVE:  We are following this patient for kaiden. Patient progress reviewed. UOP/Labs and wt noted . HPI:  Breathing stable. No CP.   ROS:  In bed. [address]:  medications reviewed. Physical Exam:    VITALS:  BP (!) 142/82   Pulse 75   Temp 98.7 °F (37.1 °C) (Oral)   Resp 16   Ht 5' 9\" (1.753 m)   Wt 292 lb 8.8 oz (132.7 kg)   SpO2 90%   BMI 43.20 kg/m²   24HR INTAKE/OUTPUT:      Intake/Output Summary (Last 24 hours) at 1/24/2021 1544  Last data filed at 1/24/2021 1400  Gross per 24 hour   Intake 2100 ml   Output 850 ml   Net 1250 ml       Constitutional:  Looks comfortable   Respiratory:  Decrease BS at bases   Gastrointestinal:  No tenderness. Normal Bowel Sounds  Cardiovascular:  S1, S2 RRR   Edema:  ++ edema    DATA:    CBC:  Lab Results   Component Value Date    WBC 9.6 01/24/2021    RBC 3.44 01/24/2021    HGB 9.6 01/24/2021    HCT 30.0 01/24/2021    MCV 87.1 01/24/2021    MCH 27.9 01/24/2021    MCHC 32.1 01/24/2021    RDW 16.7 01/24/2021     01/24/2021    MPV 8.3 01/24/2021     CMP:  Lab Results   Component Value Date     01/23/2021    K 3.0 01/23/2021    K 2.9 01/18/2021    CL 94 01/23/2021    CO2 27 01/23/2021    BUN 69 01/23/2021    CREATININE 5.6 01/23/2021    GFRAA 12 01/23/2021    AGRATIO 1.0 01/18/2021    LABGLOM 10 01/23/2021    GLUCOSE 125 01/23/2021    PROT 7.2 01/18/2021    CALCIUM 8.7 01/23/2021    BILITOT 0.4 01/18/2021    ALKPHOS 98 01/18/2021    AST 12 01/18/2021    ALT 10 01/18/2021      Hepatic Function Panel:   Lab Results   Component Value Date    ALKPHOS 98 01/18/2021    ALT 10 01/18/2021    AST 12 01/18/2021    PROT 7.2 01/18/2021    BILITOT 0.4 01/18/2021    BILIDIR <0.2 11/05/2019    IBILI see below 11/05/2019      Phosphorus:   Lab Results   Component Value Date    PHOS 5.0 01/23/2021       ASSESSMENT/PLAN:  1.  KAIDEN on CKD4   - most likely cardio renal   - temporary dialysis catheter by IR

## 2021-01-24 NOTE — PROGRESS NOTES
Hospitalist Progress Note      PCP: Snow Barr MD    Date of Admission: 1/18/2021    Chief Complaint:   Chief Complaint   Patient presents with    Shortness of Breath     Hospital Course: 77 y.o. male w hx of chronic systolic CHF EF 98% and advanced CKDIV to V, who presents to [facility] with worsening dyspnea. Pt reports symptoms worsening gradually over weeks and worse in the past few days. Reports severe orthopnea. Worsening bilateral leg edema. In ED pt found with pulm edema and worsening R pleural effusion. Also with worsening renl function Cr of 5, albeit his Bicarb is normal and his K is low. Diuresed, ultimately requiring HD which was started 1/22, tolerating well. Cardiology following for severe AS, planning C early next week for TAVR workup    Subjective: Feels little better today. Notes that he had some leg pain and cramping at the end of dialysis yesterday, did have lower blood pressures towards the end of dialysis. Denies chest pain, not feeling short of breath but has his own pulse ox and says that he desatted to 87 when going to the bathroom.     Medications:  Reviewed    Infusion Medications   Scheduled Medications    midodrine  5 mg Oral TID WC    aspirin  81 mg Oral Daily    atorvastatin  20 mg Oral Daily    [Held by provider] clopidogrel  75 mg Oral Daily    metoprolol succinate  25 mg Oral Daily    sodium chloride flush  10 mL Intravenous 2 times per day    heparin (porcine)  5,000 Units Subcutaneous 3 times per day     PRN Meds: albumin human, sodium chloride flush, promethazine **OR** ondansetron, polyethylene glycol, acetaminophen **OR** acetaminophen      Intake/Output Summary (Last 24 hours) at 1/24/2021 0913  Last data filed at 1/24/2021 0603  Gross per 24 hour   Intake 2320 ml   Output 3350 ml   Net -1030 ml       Physical Exam Performed:    BP (!) 108/58   Pulse 76   Temp 97.8 °F (36.6 °C) (Oral)   Resp 18   Ht 5' 9\" (1.753 m)   Wt 292 lb 8.8 oz (132.7 kg)   SpO2 94%   BMI 43.20 kg/m²     General appearance: No apparent distress, appears stated age and cooperative. HEENT: Pupils equal, round, and reactive to light. Conjunctivae/corneas clear. Neck: Supple, with full range of motion. Trachea midline. Respiratory:  Normal respiratory effort. Clear to auscultation, bilaterally without Rales/Wheezes/Rhonchi. Cardiovascular: Regular rate and rhythm, 3/6 systolic murmur, rubs or gallops. Abdomen: Soft, non-tender, non-distended with normal bowel sounds. Musculoskeletal: No clubbing, cyanosis or edema bilaterally. Full range of motion without deformity. Skin: Skin color, texture, turgor normal.  No rashes or lesions. Neurologic:  Neurovascularly intact without any focal sensory/motor deficits. Cranial nerves: II-XII intact, grossly non-focal.  Psychiatric: Alert and oriented, thought content appropriate, normal insight  Capillary Refill: Brisk,< 3 seconds   Peripheral Pulses: +2 palpable, equal bilaterally       Labs:   Recent Labs     01/22/21 0457 01/23/21 0453 01/24/21  0445   WBC 10.0 9.3 9.6   HGB 9.8* 8.9* 9.6*   HCT 30.2* 27.8* 30.0*    291 295     Recent Labs     01/22/21 0457 01/23/21 0453    138   K 3.2* 3.0*   CL 96* 94*   CO2 25 27   BUN 82* 69*   CREATININE 7.3* 5.6*   CALCIUM 8.7 8.7   PHOS 6.2* 5.0*     No results for input(s): AST, ALT, BILIDIR, BILITOT, ALKPHOS in the last 72 hours. No results for input(s): INR in the last 72 hours. No results for input(s): Lyndee Matt in the last 72 hours.     Urinalysis:      Lab Results   Component Value Date    NITRU Negative 11/05/2019    WBCUA 4 11/05/2019    BACTERIA RARE 11/05/2019    RBCUA 2 11/05/2019    BLOODU SMALL 11/05/2019    SPECGRAV 1.010 11/05/2019    GLUCOSEU Negative 11/05/2019       Radiology:  IR FLUORO GUIDED CVA DEVICE PLMT/REPLACE/REMOVAL   Final Result   Successful ultrasound and fluoroscopy guided right IJ non-tunneled   hemodialysis catheter placement. XR CHEST PORTABLE   Final Result   1. Right pleural effusion with right basilar atelectasis or pneumonia. 2. Pulmonary edema. IR TUNNELED CVC PLACE WO SQ PORT/PUMP > 5 YEARS    (Results Pending)         Assessment/Plan:    Active Hospital Problems    Diagnosis    Acute on chronic systolic HF (heart failure) ([facility]) [I50.23]    Hypokalemia [E87.6]    Severe aortic stenosis [I35.0]    Coronary artery disease involving native coronary artery of native heart without angina pectoris [I25.10]    Acute on chronic congestive heart failure (HCC) [I50.9]    CHF (congestive heart failure), NYHA class I, acute on chronic, combined ([address] [I50.43]     Acute on Chronic Systolic CHF in setting of progressive renal disease. EF 40%. Acute Pulm Edema and R Pleural Effusion. Cr up to 5 on presentation. Plan:               - lasix 60 Iv BID.             - added metolazone.               - decreased dose BB due to acute CHF.             - cont ASA and statin.               - No ACEI/ARB with severe progressive renal disease.               - nephrology and cardiology involved. - planning to start HD 1/22     KAIDEN on CKD 3 with new HD  Cr up to 5 on admit. Pt changed his mind and is agreeable to dialysis if necessary.   - nephrology involved   - palliative care consult.            - monitor closely with diuretics - renal q4 to q6hrs - to daily  - line placement and started HD 1/22  -Tunneled dialysis catheter scheduled for 1/25    Severe AS  Will need further workup--LHC, likely TAVR. - management per cardiology, LHC when volume status optimized    Acute hypoxic respiratory failure  Requiring 2 L, likely due to fluid overload.   Currently off oxygen but patient has a personal pulse oximeter in his room and says that he desatted to 87 when using the bathroom  -Continue to monitor oxygen saturation, may need home oxygen eval prior to discharge though anticipate improvement with dialysis                Morbid Obesity  Body mass index is 43.2 kg/m². Complicating assessment and treatment. Placing patient at risk for multiple co-morbidities as well as early death and contributing to the patient's presentation.  on weight loss when appropriate. Hypokalemia   Replete with caution considering severe renal insufficiency. Defer to nephrology. Cramping in legs likely from hypoK. DVT Prophylaxis: heparin  Diet: DIET RENAL; Low Sodium (2 GM); Daily Fluid Restriction: 1500 ml  Code Status: Limited    PT/OT Eval Status: ordered    Dispo - pending    Tanya Cooley MD    This note was transcribed using [id number] [address]. Please disregard any translational errors.

## 2021-01-24 NOTE — PLAN OF CARE
Problem: OXYGENATION/RESPIRATORY FUNCTION  Goal: Patient will maintain patent airway  1/24/2021 1110 by Dari Multani RN  Outcome: Ongoing  Note:       Problem: FLUID AND ELECTROLYTE IMBALANCE  Goal: Fluid and electrolyte balance are achieved/maintained  1/24/2021 1110 by Dari Multani RN  Outcome: Ongoing  Note:       Problem: DAILY CARE  Goal: Daily care needs are met  1/24/2021 1110 by Dari Multani RN  Outcome: Ongoing  Note: Patient's ability assessed to perform self care and independent activity encouraged according to that ability. Assisted with ADL's as needed. Risk for skin breakdown assessed. Potential discharge needs assessed. Patient included in daily care decisions. Problem: Pain:  Goal: Pain level will decrease  Description: Pain level will decrease  1/24/2021 1110 by Dari Multani RN  Outcome: Ongoing  Note: Pain/discomfort being managed with PRN analgesics per MD orders. Pt able to express presence and absence of pain and rate pain appropriately using numerical scale.

## 2021-01-24 NOTE — PROGRESS NOTES
[facility]  Cardiology Consult    Brittani Serrano  1945    January 24, 2021      Reason for Referral: Heart failure    CC: Shortness of breath and lower extremity edema      Subjective:     History of Present Illness:    Brittani Serrano is a 77 y.o. patient with a PMH significant for chronic kidney disease, heart failure, hypertension, hyperlipidemia presented with complaints of increasing shortness of breath and lower extremity edema. He feels his shortness of breath has improved lower extremity edema is still there. Past Medical History:   has a past medical history of Arthritis, Bell's palsy, Cancer ([facility] [address] CHF (congestive heart failure) ([facility] [address] Coronary artery disease involving native coronary artery of native heart without angina pectoris, Hyperlipidemia, Hypertension, Influenza B, Kidney failure, Nosebleed, and Radiation. Surgical History:   has no past surgical history on file. Social History:   reports that he quit smoking about 35 years ago. He has never used smokeless tobacco. He reports that he does not drink alcohol or use drugs. Family History:  family history includes Cancer in his father and mother. Home Medications:  Were reviewed and are listed in nursing record and/or below  Prior to Admission medications    Medication Sig Start Date End Date Taking? Authorizing Provider   furosemide (LASIX) 40 MG tablet Take 1 tablet by mouth 2 times daily 9/27/20  Yes Dat Trevino MD   clopidogrel (PLAVIX) 75 MG tablet TAKE 1 TABLET BY MOUTH EVERY DAY 7/1/20  Yes Marilyn Reynolds MD   metoprolol succinate (TOPROL XL) 100 MG extended release tablet TAKE 1 TABLET BY MOUTH EVERY DAY 7/1/20  Yes Marilyn Reynolsd MD   NIFEdipine (ADALAT CC) 60 MG extended release tablet Take 60 mg by mouth 2 times daily    Yes Historical Provider, MD   aspirin 81 MG chewable tablet Take 81 mg by mouth daily. Yes Historical Provider, MD   atorvastatin (LIPITOR) 20 MG tablet Take 20 mg by mouth daily.    Yes Historical Provider, MD   Multiple Vitamins-Minerals (THERAPEUTIC MULTIVITAMIN-MINERALS) tablet Take 1 tablet by mouth nightly. Yes Historical Provider, MD   Glucosamine-Chondroit-Vit C-Mn (GLUCOSAMINE CHONDR 1500 COMPLX PO) Take 1 each by mouth 2 times daily Indications: 2700 mg    Yes Historical Provider, MD        CURRENT Medications:      albumin human 25 % IV solution 12.5 g, PRN      midodrine (PROAMATINE) tablet 5 mg, TID WC      aspirin chewable tablet 81 mg, Daily      atorvastatin (LIPITOR) tablet 20 mg, Daily      [Held by provider] clopidogrel (PLAVIX) tablet 75 mg, Daily      metoprolol succinate (TOPROL XL) extended release tablet 25 mg, Daily      sodium chloride flush 0.9 % injection 10 mL, 2 times per day      sodium chloride flush 0.9 % injection 10 mL, PRN      promethazine (PHENERGAN) tablet 12.5 mg, Q6H PRN    Or      ondansetron (ZOFRAN) injection 4 mg, Q6H PRN      polyethylene glycol (GLYCOLAX) packet 17 g, Daily PRN      acetaminophen (TYLENOL) tablet 650 mg, Q6H PRN    Or      acetaminophen (TYLENOL) suppository 650 mg, Q6H PRN      heparin (porcine) injection 5,000 Units, 3 times per day        Allergies:  Patient has no known allergies. Review of Systems: SEE HPI   · Constitutional: no unanticipated weight loss. There's been no change in energy level, sleep pattern, or activity level. No fevers, chills. · Eyes: No visual changes or diplopia. No scleral icterus. · ENT: No Headaches, hearing loss or vertigo. No mouth sores or sore throat. · Cardiovascular: No Chest pain, tightness or discomfort.  Patient has shortness of breath. No Dyspnea on exertion, Orthopnea, Paroxysmal nocturnal dyspnea or breathlessness at rest.   No Palpitations.  No Syncope ('blackouts', 'faints', 'collapse') or dizziness. · Respiratory: No cough or wheezing, no sputum production. No hematemesis. · Gastrointestinal: No abdominal pain, appetite loss, blood in stools.  No change in bowel or bladder habits. · Genitourinary: No dysuria, trouble voiding, or hematuria. · Musculoskeletal:  No gait disturbance, no joint complaints. · Integumentary: No rash or pruritis. · Neurological: No headache, diplopia, change in muscle strength, numbness or tingling. · Psychiatric: No anxiety or depression. · Endocrine: No temperature intolerance. No excessive thirst, fluid intake, or urination. No tremor. · Hematologic/Lymphatic: No abnormal bruising or bleeding, blood clots or swollen lymph nodes. · Allergic/Immunologic: No nasal congestion or hives. Objective:     PHYSICAL EXAM:      Vitals:    01/24/21 0816   BP: (!) 108/58   Pulse: 76   Resp: 18   Temp: 97.8 °F (36.6 °C)   SpO2: 94%    Weight: 292 lb 8.8 oz (132.7 kg)       General Appearance:  Alert, cooperative, no distress, appears stated age. Head:  Normocephalic, without obvious abnormality, atraumatic. Eyes:  Pupils equal and round. No scleral icterus. Mouth: Moist mucosa, no pharyngeal erythema. Nose: Nares normal. No drainage or sinus tenderness. Neck: Supple, symmetrical, trachea midline. No adenopathy. No tenderness/mass/nodules. No carotid bruit or elevated JVD. Lungs:   Respiratory Effort: Normal   Auscultation: Clear to auscultation bilaterally, respirations unlabored. No wheeze, rales   Chest Wall:  No tenderness or deformity. Cardiovascular:    Pulses  Palpation: normal   Ascultation: Regular rate, S1/ S2 normal. No murmur, rub, or gallop. 2+ radial and pedal pulses, symmetric  Carotid  Femoral   Abdomen and Gastrointestinal:   Soft, non-tender, bowel sounds active. Liver and Spleen  Masses   Musculoskeletal: No muscle wasting  Back  Gait   Extremities: Extremities normal, atraumatic. At least 3+ edema. No cyanosis clubbing       Skin: Inspection and palpation performed, no rashes or lesions. Pysch: Normal mood and affect.  Alert and oriented to time place person   Neurologic: Normal gross motor and sensory exam.       Labs     All labs have been reviewed    Lab Results   Component Value Date    WBC 9.6 01/24/2021    RBC 3.44 01/24/2021    HGB 9.6 01/24/2021    HCT 30.0 01/24/2021    MCV 87.1 01/24/2021    RDW 16.7 01/24/2021     01/24/2021     Lab Results   Component Value Date     01/23/2021    K 3.0 01/23/2021    K 2.9 01/18/2021    CL 94 01/23/2021    CO2 27 01/23/2021    BUN 69 01/23/2021    CREATININE 5.6 01/23/2021    GFRAA 12 01/23/2021    AGRATIO 1.0 01/18/2021    LABGLOM 10 01/23/2021    GLUCOSE 125 01/23/2021    PROT 7.2 01/18/2021    CALCIUM 8.7 01/23/2021    BILITOT 0.4 01/18/2021    ALKPHOS 98 01/18/2021    AST 12 01/18/2021    ALT 10 01/18/2021     No results found for: PTINR  Lab Results   Component Value Date    LABA1C 6.2 08/15/2019     Lab Results   Component Value Date    OTSPZHF 668 (H) 01/30/2015    TROPONINI 0.04 (H) 01/18/2021       Cardiac, Vascular and Imaging Data all Personally Reviewed in Detail by Myself      EKG:   Sinus rhythm with Premature atrial complexes with Aberrant conductionAnterior infarct (cited on or before 16-APR-2017)Prolonged QTConfirmed by SARAH WAGNER, Noe Cavazos ([id number]) on 1/18/2021 8:02:27 AM  Echocardiogram:  Ejection fraction is visually estimated to be 40%. There is severe hypokinesis of the entire apical myocardium and and   hypokinesis of the distal septal wall. There is severe concentric left ventricular hypertrophy. Grade II diastolic dysfunction with elevated LV filling pressures. Mild mitral regurgitation is present. No evidence of mitral stenosis. Mild calcification of the mitral valve noted. Moderate to severe aortic stenosis with a peak velocity of 437m/s and a mean   pressure gradient of 50mmHg. Mild aortic regurgitation. Moderately dilated right ventricle. Right ventricular systolic function is normal .      Assessment and Plan     -Acute on chronic systolic heart failure. diuretic therapy.   Heart failure education. Continue guideline based therapy. On dialysis. Agree with plans of nephrology. Severe aortic valve stenosis  We will need a repeat cardiac cath. Possible BAV. We will also need aortic valve replacement most likely TAVR. After cardiac catheterization may consider balloon aortic valvoplasty as a bridge to TAVR. Mild cardiomyopathy with ejection fraction of 40%. Acute on chronic kidney disease nephrology has been consulted. Thank you for allowing us to participate in the care of [facility]. Please do not hesitate to contact me if you have any questions.     Tamie Guaman MD, MPH    [address], [address] [place] [address]   Juan Howell [address]  Ph: [phone]  Fax: [phone]    1/24/2021 10:50 AM

## 2021-01-25 ENCOUNTER — APPOINTMENT (OUTPATIENT)
Dept: INTERVENTIONAL RADIOLOGY/VASCULAR | Age: 76
DRG: 319 | End: 2021-01-25
Payer: MEDICARE

## 2021-01-25 LAB
ALBUMIN SERPL-MCNC: 3.5 G/DL (ref 3.4–5)
ALP BLD-CCNC: 78 U/L (ref 40–129)
ALT SERPL-CCNC: 25 U/L (ref 10–40)
ANION GAP SERPL CALCULATED.3IONS-SCNC: 17 MMOL/L (ref 3–16)
AST SERPL-CCNC: 25 U/L (ref 15–37)
BILIRUB SERPL-MCNC: <0.2 MG/DL (ref 0–1)
BILIRUBIN DIRECT: <0.2 MG/DL (ref 0–0.3)
BILIRUBIN, INDIRECT: NORMAL MG/DL (ref 0–1)
BUN BLDV-MCNC: 61 MG/DL (ref 7–20)
CALCIUM SERPL-MCNC: 8.8 MG/DL (ref 8.3–10.6)
CHLORIDE BLD-SCNC: 97 MMOL/L (ref 99–110)
CO2: 23 MMOL/L (ref 21–32)
CREAT SERPL-MCNC: 6.3 MG/DL (ref 0.8–1.3)
GFR AFRICAN AMERICAN: 11
GFR NON-AFRICAN AMERICAN: 9
GLUCOSE BLD-MCNC: 117 MG/DL (ref 70–99)
HCT VFR BLD CALC: 27.9 % (ref 40.5–52.5)
HEMOGLOBIN: 9 G/DL (ref 13.5–17.5)
MAGNESIUM: 2.4 MG/DL (ref 1.8–2.4)
MAGNESIUM: 2.5 MG/DL (ref 1.8–2.4)
MCH RBC QN AUTO: 28.4 PG (ref 26–34)
MCHC RBC AUTO-ENTMCNC: 32.3 G/DL (ref 31–36)
MCV RBC AUTO: 88.1 FL (ref 80–100)
PDW BLD-RTO: 16.7 % (ref 12.4–15.4)
PHOSPHORUS: 5.2 MG/DL (ref 2.5–4.9)
PLATELET # BLD: 291 K/UL (ref 135–450)
PMV BLD AUTO: 8.1 FL (ref 5–10.5)
POTASSIUM SERPL-SCNC: 3.5 MMOL/L (ref 3.5–5.1)
RBC # BLD: 3.17 M/UL (ref 4.2–5.9)
SODIUM BLD-SCNC: 137 MMOL/L (ref 136–145)
TOTAL PROTEIN: 7 G/DL (ref 6.4–8.2)
WBC # BLD: 9.3 K/UL (ref 4–11)

## 2021-01-25 PROCEDURE — 83735 ASSAY OF MAGNESIUM: CPT

## 2021-01-25 PROCEDURE — 36415 COLL VENOUS BLD VENIPUNCTURE: CPT

## 2021-01-25 PROCEDURE — 77001 FLUOROGUIDE FOR VEIN DEVICE: CPT

## 2021-01-25 PROCEDURE — 80076 HEPATIC FUNCTION PANEL: CPT

## 2021-01-25 PROCEDURE — 6360000002 HC RX W HCPCS: Performed by: RADIOLOGY

## 2021-01-25 PROCEDURE — 2709999900 IR TUNNELED CVC PLACE WO SQ PORT/PUMP > 5 YEARS

## 2021-01-25 PROCEDURE — 94761 N-INVAS EAR/PLS OXIMETRY MLT: CPT

## 2021-01-25 PROCEDURE — 97110 THERAPEUTIC EXERCISES: CPT | Performed by: PHYSICAL THERAPIST

## 2021-01-25 PROCEDURE — 97535 SELF CARE MNGMENT TRAINING: CPT

## 2021-01-25 PROCEDURE — 02HV33Z INSERTION OF INFUSION DEVICE INTO SUPERIOR VENA CAVA, PERCUTANEOUS APPROACH: ICD-10-PCS | Performed by: RADIOLOGY

## 2021-01-25 PROCEDURE — 2060000000 HC ICU INTERMEDIATE R&B

## 2021-01-25 PROCEDURE — 2580000003 HC RX 258: Performed by: NURSE PRACTITIONER

## 2021-01-25 PROCEDURE — 80069 RENAL FUNCTION PANEL: CPT

## 2021-01-25 PROCEDURE — 85027 COMPLETE CBC AUTOMATED: CPT

## 2021-01-25 PROCEDURE — 6370000000 HC RX 637 (ALT 250 FOR IP): Performed by: INTERNAL MEDICINE

## 2021-01-25 PROCEDURE — 6360000002 HC RX W HCPCS: Performed by: INTERNAL MEDICINE

## 2021-01-25 PROCEDURE — 97530 THERAPEUTIC ACTIVITIES: CPT | Performed by: PHYSICAL THERAPIST

## 2021-01-25 PROCEDURE — 36581 REPLACE TUNNELED CV CATH: CPT

## 2021-01-25 PROCEDURE — 99233 SBSQ HOSP IP/OBS HIGH 50: CPT | Performed by: NURSE PRACTITIONER

## 2021-01-25 PROCEDURE — 0JH63XZ INSERTION OF TUNNELED VASCULAR ACCESS DEVICE INTO CHEST SUBCUTANEOUS TISSUE AND FASCIA, PERCUTANEOUS APPROACH: ICD-10-PCS | Performed by: RADIOLOGY

## 2021-01-25 PROCEDURE — 97116 GAIT TRAINING THERAPY: CPT | Performed by: PHYSICAL THERAPIST

## 2021-01-25 PROCEDURE — 97530 THERAPEUTIC ACTIVITIES: CPT

## 2021-01-25 RX ORDER — SODIUM CHLORIDE 0.9 % (FLUSH) 0.9 %
10 SYRINGE (ML) INJECTION PRN
Status: DISCONTINUED | OUTPATIENT
Start: 2021-01-25 | End: 2021-01-26 | Stop reason: SDUPTHER

## 2021-01-25 RX ORDER — FENTANYL CITRATE 50 UG/ML
INJECTION, SOLUTION INTRAMUSCULAR; INTRAVENOUS DAILY PRN
Status: COMPLETED | OUTPATIENT
Start: 2021-01-25 | End: 2021-01-25

## 2021-01-25 RX ORDER — SODIUM CHLORIDE 0.9 % (FLUSH) 0.9 %
10 SYRINGE (ML) INJECTION EVERY 12 HOURS SCHEDULED
Status: DISCONTINUED | OUTPATIENT
Start: 2021-01-25 | End: 2021-01-26 | Stop reason: SDUPTHER

## 2021-01-25 RX ORDER — SODIUM CHLORIDE 9 MG/ML
INJECTION, SOLUTION INTRAVENOUS CONTINUOUS
Status: DISCONTINUED | OUTPATIENT
Start: 2021-01-26 | End: 2021-01-26

## 2021-01-25 RX ORDER — MIDAZOLAM HYDROCHLORIDE 1 MG/ML
INJECTION INTRAMUSCULAR; INTRAVENOUS DAILY PRN
Status: COMPLETED | OUTPATIENT
Start: 2021-01-25 | End: 2021-01-25

## 2021-01-25 RX ADMIN — FENTANYL CITRATE 50 MCG: 50 INJECTION INTRAMUSCULAR; INTRAVENOUS at 13:30

## 2021-01-25 RX ADMIN — HEPARIN SODIUM 5000 UNITS: 5000 INJECTION INTRAVENOUS; SUBCUTANEOUS at 20:23

## 2021-01-25 RX ADMIN — Medication 10 ML: at 20:23

## 2021-01-25 RX ADMIN — MIDAZOLAM 1 MG: 1 INJECTION INTRAMUSCULAR; INTRAVENOUS at 13:30

## 2021-01-25 RX ADMIN — CEFAZOLIN SODIUM 2000 MG: 10 INJECTION, POWDER, FOR SOLUTION INTRAVENOUS at 12:35

## 2021-01-25 RX ADMIN — MIDAZOLAM 1 MG: 1 INJECTION INTRAMUSCULAR; INTRAVENOUS at 13:26

## 2021-01-25 RX ADMIN — FENTANYL CITRATE 50 MCG: 50 INJECTION INTRAMUSCULAR; INTRAVENOUS at 13:26

## 2021-01-25 RX ADMIN — ACETAMINOPHEN 650 MG: 325 TABLET ORAL at 18:31

## 2021-01-25 ASSESSMENT — PAIN SCALES - GENERAL: PAINLEVEL_OUTOF10: 0

## 2021-01-25 NOTE — PROGRESS NOTES
Nutrition Assessment     Type and Reason for Visit: Initial, RD Nutrition Re-Screen/LOS    Nutrition Recommendations/Plan:   Monitor intake  Monitor labs, wt, skin, bowels       Nutrition Assessment:       Patient assessed for nutritional risk. Deemed to be at low risk at this time. Will continue to monitor for changes in status.      Malnutrition Assessment:  Malnutrition Status: Insufficient data    ENutrition Related Findings: BM 1/24,  BUE non pitting RLE +2 and LLE +3 pitting edema      Current Nutrition Therapies:    Diet NPO Effective Now  Diet NPO, After Midnight Exceptions are: Sips with Meds    Anthropometric Measures:  · Height: 5' 9\" (175.3 cm)  · Current Body Wt: 293 lb 14 oz (133.3 kg)   · BMI: 43.4    Nutrition Diagnosis:   · No nutrition diagnosis at this time related to   as evidenced by        Nutrition Interventions:   Food and/or Nutrient Delivery:  Continue Current Diet  Nutrition Education/Counseling:  Education completed   Coordination of Nutrition Care:  Continue to monitor while inpatient    Goals:  Continued intake of greater than 50% of meals       Nutrition Monitoring and Evaluation:   Behavioral-Environmental Outcomes:  None Identified   Food/Nutrient Intake Outcomes:  Food and Nutrient Intake  Physical Signs/Symptoms Outcomes:  Biochemical Data, Nutrition Focused Physical Findings, Skin, Weight     Discharge Planning:    No discharge needs at this time     Electronically signed by Noreen Ponce RD, LD on 1/25/21 at 12:15 PM EST    Contact: [phone]

## 2021-01-25 NOTE — PROGRESS NOTES
[facility]   Daily Progress Note      Admit Date:  1/18/2021    Reason for follow up visit: CHF    CC: \"I feel a lot better. \"    76 y/o male with PMH significant for chronic kidney disease, CHF, aortic stenosis,HTN, HLP admitted to [facility] on 1/18/2021 with increasing SOB and lower extremity edema. Has received diuretic and nephrology following. Underwent dialysis x 2 and awaiting permanent dialysis catheter placement today. Awaiting for further evaluation of his aortic stenosis (plan for cardiac cath and TAVR in future)    Interval History:  Pt. seen and examined; records reviewed  Feeling much better  Now on room air with O2 sats 92-95%  O2 sats down to 88-89% with walking to bathroom (improved from 70\"s)  SOB much improved. Edema gradually improving  Denies chest pain  BP stable. Net diuresis -7+L since admit  Hgb stable at 9.0    Subjective:  Pt with no acute overnight cardiac events. Denies chest pain, cough, palpitations or dizziness  + SOB much improved    Review of Systems:   · Constitutional: no unanticipated weight loss. There's been no change in energy level, sleep pattern, or activity level. No fevers, chills. · Eyes: No visual changes or diplopia. No scleral icterus. · ENT: No Headaches, hearing loss or vertigo. No mouth sores or sore throat. · Cardiovascular: as reviewed in HPI  · Respiratory: No cough or wheezing, no sputum production. No hematemesis. · Gastrointestinal: No abdominal pain, appetite loss, blood in stools. No change in bowel or bladder habits. · Genitourinary: No dysuria, trouble voiding, or hematuria. · Musculoskeletal:  No gait disturbance, no joint complaints. + uses cane for ambulation  · Integumentary: No rash or pruritis. · Neurological: No headache, diplopia, change in muscle strength, numbness or tingling. · Psychiatric: No anxiety or depression. · Endocrine: No temperature intolerance. No excessive thirst, fluid intake, or urination.  No regurgitation.   Severe aortic stenosis with a peak velocity of 4.4m/s and a mean pressure   gradient of 55mmHg.     Echo 9/25/2020:  Ejection fraction is visually estimated to be 40%.   There is severe hypokinesis of the entire apical myocardium and and   hypokinesis of the distal septal wall.   There is severe concentric left ventricular hypertrophy.   Grade II diastolic dysfunction with elevated LV filling pressures.   Mild mitral regurgitation is present.   No evidence of mitral stenosis.   Mild calcification of the mitral valve noted.   Moderate to severe aortic stenosis with a peak velocity of 437m/s and a mean   pressure gradient of 50mmHg.   Mild aortic regurgitation.   Moderately dilated right ventricle.   Right ventricular systolic function is normal .     4/2017 Coronary angiogram/PCI:  1.  Left main coronary comes from the left coronary cusp, has FERMÍN-3 flow, no  significant stenosis. 2.  The left anterior descending artery comes from the left main coronary  artery, is occluded in the proximal portion and mid portion has around 70%  stenosis present and distal also 70% stenosis present. 3.  The left circumflex comes from the left main coronary artery and gives  rise to obtuse marginal branches.  The mid portion has approximately 60%  stenosis. 4.  The right coronary artery comes from the right coronary cusp, giving rise  to posterior descending artery and posterolateral branch.  The proximal mid  portion has 60% stenosis present.     INTERVENTION PERFORMED:  We intervened on the proximal left anterior  descending artery, placed a stent 3.0 x 18 post-dilated to 3.9 mm. A DRUG COATED XIENCE ALPINE stent was placed in the LAD    Telemetry: Sinus rhythm    Assessment/Plan:    1.  Acute on chronic systolic heart failure/Cardiomyopathy  -improving after diuresis and dialysis  -continue Toprol  -now on dialysis; not on diuretic  -no ACE/ARB/ARNI/aldactone d/t elevated Cr  -CHF nurse education  -LVEF 40%;

## 2021-01-25 NOTE — PROGRESS NOTES
Physical Therapy  Facility/Department: [address] PROGRESSIVE CARE  Daily Treatment Note  NAME: Murtaza Gordillo  : 1945  MRN: [id number]    Date of Service: 2021    Discharge Recommendations:  Home with assist PRN, S Level 1   PT Equipment Recommendations  Equipment Needed: No  Jeremías Martina Kearns scored a 23/24 on the AM-PAC short mobility form. Current research shows that an AM-PAC score of 18 or greater is typically associated with a discharge to the patient's home setting. Based on the patient's AM-PAC score and their current functional mobility deficits, it is recommended that the patient have 2-3 sessions per week of Physical Therapy at d/c to increase the patient's independence. At this time, this patient demonstrates the endurance and safety to discharge home with home PT level 1 and a follow up treatment frequency of 2-3x/wk. Pt may refuse home therapy as he states he is used to being Ind and thinks he will feel much better at home. Pt does report he has a friend who can assist him, but doubtful that he will ask for assist.  Please see assessment section for further patient specific details. If patient discharges prior to next session this note will serve as a discharge summary. Please see below for the latest assessment towards goals. Assessment   Body structures, Functions, Activity limitations: Decreased functional mobility   Assessment: pt is a 76 yo male who was adm to hosp with SOB, LE edema. In ED pt found with pulm edema and worsening R pleural effusion. Also with worsening renl function Cr of 5. Pt currently is slightly below his baseline of being Ind with all functional and household tasks; Pt now on RA but has decreased saturation with longer walks. Anticipate pt will be able to return home with PRN assist, will recommend home PT to increase pt's overall strength/endurance however pt will likely refuse home therapy.   Prognosis: Good  Decision Making: Low Complexity  PT Education: General Safety  Patient Education: discussed in length safety issues at home regarding stairs/household tasks and endurance. Pt becomes irritated when therapist suggests that he may need assist at home for household tasks and negotiating stairs for laundry due to endurance/breathing issues  REQUIRES PT FOLLOW UP: Yes  Activity Tolerance  Activity Tolerance: Patient Tolerated treatment well  Activity Tolerance: now on RA but desaturates with longer walks to 87%     Patient Diagnosis(es): The primary encounter diagnosis was Acute on chronic congestive heart failure, unspecified heart failure type ([address] Diagnoses of Acute respiratory failure with hypoxia ([address] Acute pulmonary edema ([address] Pleural effusion, Acute on chronic renal insufficiency, and Hypokalemia were also pertinent to this visit. has a past medical history of Arthritis, Bell's palsy, Cancer ([address] CHF (congestive heart failure) ([address] Coronary artery disease involving native coronary artery of native heart without angina pectoris, Hyperlipidemia, Hypertension, Influenza B, Kidney failure, Nosebleed, and Radiation. has no past surgical history on file. Restrictions  Position Activity Restriction  Other position/activity restrictions: renal diet, low sodium, 1500 ML fluid restriction, on 2L O2 per n/c  Subjective   General  Chart Reviewed: Yes  Additional Pertinent Hx: per MD note:  77 y.o. male w hx of chronic systolic CHF EF 26% and advanced CKDIV to V, who presents to [facility] with worsening dyspnea. Pt reports symptoms worsening gradually over weeks and worse in the past few days. Reports severe orthopnea. Worsening bilateral leg edema. In ED pt found with pulm edema and worsening R pleural effusion. Also with worsening renl function Cr of 5, albeit his Bicarb is normal and his K is low. Response To Previous Treatment: Patient with no complaints from previous session.   Family / Caregiver Present: No  Subjective  Subjective: pt very pleasant and agreeable to working with therapy; denies any pain but reports tightness in his feet  General Comment  Comments: Pt scheduled for insertion of dialysis port this afternoon          Orientation  Orientation  Overall Orientation Status: Within Functional Limits  Cognition   Cognition  Overall Cognitive Status: WFL  Cognition Comment: pt has decreased insight into safety issues at home; becomes irritated when therapist suggests having someone assist with laundry initially or to have someone with him when he negotiates stairs for first time  Objective   Bed mobility  Comment: pt up in chair at beginning and end of session  Transfers  Sit to Stand: Modified independent  Stand to sit: Modified independent  Ambulation  Ambulation?: Yes  Ambulation 1  Surface: level tile  Device: Single point cane(intermittently uses)  Assistance: Stand by assistance(SBA for longer walks)  Quality of Gait: as he fatigues has increased lateral sway and uses his cane  Distance: 110'x2  Comments: O2 sats decreased to 87% with walking on RA but increased back to 90's with sitting        Exercises  Comments: performed B LE ex in sitting         Comment: suggested trying to use stairs but pt reported \"Lets do that next time\"           AM-PAC Score  AM-PAC Inpatient Mobility Raw Score : 23 (01/21/21 0826)  AM-PAC Inpatient T-Scale Score : 56.93 (01/21/21 0826)  Mobility Inpatient CMS 0-100% Score: 11.2 (01/21/21 0826)  Mobility Inpatient CMS G-Code Modifier : CI (01/21/21 0826)          Goals  Short term goals  Time Frame for Short term goals: by discharge  Short term goal 1: amb ' with or without SPC sup while maintaining O2 sats in 90's  without O2  Short term goal 2: negotiate stairs when able  Patient Goals   Patient goals : to return home and be able to do his laundry again (in basement)    Plan    Plan  Times per week: 3-5  Current Treatment Recommendations: Functional Mobility Training  Safety Devices  Type of devices: Call light within reach, Left in chair, Nurse notified, Gait belt     Therapy Time   Individual Concurrent Group Co-treatment   Time In 0745         Time Out 2757         Minutes 58                 STEPHANY PALUMBO, [address] [place], [place], [address]

## 2021-01-25 NOTE — PROGRESS NOTES
Patient with transport to IR. Offered to call any contacts for patient- patient declined need.     Will update with return

## 2021-01-25 NOTE — PRE SEDATION
Sedation Pre-Procedure Note    Patient Name: Pérez Mauricio   YOB: 1945  Room/Bed: U6A-5110/5129-01  Medical Record Number: [id number]  Date: 1/25/2021   Time: 1:42 PM       Indication:  KAIDEN on CKD here for conversion of a non-tunneled HD catheter to a tunneled HD catheter. Consent: I have discussed with the patient and/or the patient representative the indication, alternatives, and the possible risks and/or complications of the planned procedure and the anesthesia methods. The patient and/or patient representative appear to understand and agree to proceed. Vital Signs:   Vitals:    01/25/21 1337   BP: (!) 101/54   Pulse: 65   Resp: 17   Temp:    SpO2: (!) 89%       Past Medical History:   has a past medical history of Arthritis, Bell's palsy, Cancer ([facility] CHF (congestive heart failure) ([facility] [address] Coronary artery disease involving native coronary artery of native heart without angina pectoris, Hyperlipidemia, Hypertension, Influenza B, Kidney failure, Nosebleed, and Radiation. Past Surgical History:   has a past surgical history that includes Tunneled venous catheter placement (Right, 01/25/2021). Medications:   Scheduled Meds:    ceFAZolin  2,000 mg Intravenous 60 Min Pre-Op    sodium chloride flush  10 mL Intravenous 2 times per day    midodrine  5 mg Oral TID WC    aspirin  81 mg Oral Daily    atorvastatin  20 mg Oral Daily    [Held by provider] clopidogrel  75 mg Oral Daily    metoprolol succinate  25 mg Oral Daily    heparin (porcine)  5,000 Units Subcutaneous 3 times per day     Continuous Infusions:    [START ON 1/26/2021] sodium chloride       PRN Meds: sodium chloride flush, fentaNYL, midazolam, albumin human, promethazine **OR** ondansetron, polyethylene glycol, acetaminophen **OR** acetaminophen  Home Meds:   Prior to Admission medications    Medication Sig Start Date End Date Taking?  Authorizing Provider   furosemide (LASIX) 40 MG tablet Take 1 tablet by mouth 2 times daily 9/27/20  Yes bAbe Zaman MD   clopidogrel (PLAVIX) 75 MG tablet TAKE 1 TABLET BY MOUTH EVERY DAY 7/1/20  Yes Edi Jones MD   metoprolol succinate (TOPROL XL) 100 MG extended release tablet TAKE 1 TABLET BY MOUTH EVERY DAY 7/1/20  Yes Edi Jones MD   NIFEdipine (ADALAT CC) 60 MG extended release tablet Take 60 mg by mouth 2 times daily    Yes Historical Provider, MD   aspirin 81 MG chewable tablet Take 81 mg by mouth daily. Yes Historical Provider, MD   atorvastatin (LIPITOR) 20 MG tablet Take 20 mg by mouth daily. Yes Historical Provider, MD   Multiple Vitamins-Minerals (THERAPEUTIC MULTIVITAMIN-MINERALS) tablet Take 1 tablet by mouth nightly. Yes Historical Provider, MD   Glucosamine-Chondroit-Vit C-Mn (GLUCOSAMINE CHONDR 1500 COMPLX PO) Take 1 each by mouth 2 times daily Indications: 2700 mg    Yes Historical Provider, MD     Coumadin Use Last 7 Days:  no  Antiplatelet drug therapy use last 7 days: no  Other anticoagulant use last 7 days: no  Additional Medication Information:  n/a      Pre-Sedation Documentation and Exam:   I have reviewed the patient's history and review of systems.     Mallampati Airway Assessment:  Mallampati Class II - (soft palate, fauces & uvula are visible)    Prior History of Anesthesia Complications:   none    ASA Classification:  Class 3 - A patient with severe systemic disease that limits activity but is not incapacitating    Sedation/ Anesthesia Plan:   intravenous sedation    Medications Planned:   midazolam (Versed) intravenously and fentanyl intravenously    Patient is an appropriate candidate for plan of sedation: yes    Electronically signed by Tim Winkler MD on 1/25/2021 at 1:42 PM

## 2021-01-25 NOTE — PROGRESS NOTES
Department of Internal Medicine  Nephrology Progress Note    SUBJECTIVE:  We are following this patient for kaiden. Patient progress reviewed. UOP/Labs and wt noted . HPI:  Breathing stable. No CP. /SOB . ROS:  In bed. Feels better , no CP/SOB/GIRALDO; Eating better . ROS neg for the rest of the system . [address]:  medications reviewed. Physical Exam:    VITALS:  /72   Pulse 77   Temp 98.2 °F (36.8 °C) (Oral)   Resp 16   Ht 5' 9\" (1.753 m)   Wt 293 lb 14 oz (133.3 kg)   SpO2 95%   BMI 43.40 kg/m²   24HR INTAKE/OUTPUT:      Intake/Output Summary (Last 24 hours) at 1/25/2021 1231  Last data filed at 1/25/2021 0327  Gross per 24 hour   Intake 480 ml   Output 100 ml   Net 380 ml       Constitutional:  Looks comfortable   Respiratory:  Decrease BS at bases   Gastrointestinal:  No tenderness. Normal Bowel Sounds  Cardiovascular:  S1, S2 RRR   Edema:  ++ edema    DATA:    CBC:  Lab Results   Component Value Date    WBC 9.3 01/25/2021    RBC 3.17 01/25/2021    HGB 9.0 01/25/2021    HCT 27.9 01/25/2021    MCV 88.1 01/25/2021    MCH 28.4 01/25/2021    MCHC 32.3 01/25/2021    RDW 16.7 01/25/2021     01/25/2021    MPV 8.1 01/25/2021     CMP:  Lab Results   Component Value Date     01/23/2021    K 3.0 01/23/2021    K 2.9 01/18/2021    CL 94 01/23/2021    CO2 27 01/23/2021    BUN 69 01/23/2021    CREATININE 5.6 01/23/2021    GFRAA 12 01/23/2021    AGRATIO 1.0 01/18/2021    LABGLOM 10 01/23/2021    GLUCOSE 125 01/23/2021    PROT 7.2 01/18/2021    CALCIUM 8.7 01/23/2021    BILITOT 0.4 01/18/2021    ALKPHOS 98 01/18/2021    AST 12 01/18/2021    ALT 10 01/18/2021      Hepatic Function Panel:   Lab Results   Component Value Date    ALKPHOS 98 01/18/2021    ALT 10 01/18/2021    AST 12 01/18/2021    PROT 7.2 01/18/2021    BILITOT 0.4 01/18/2021    BILIDIR <0.2 11/05/2019    IBILI see below 11/05/2019      Phosphorus:   Lab Results   Component Value Date    PHOS 5.0 01/23/2021       ASSESSMENT/PLAN:  1.  KAIDEN on CKD4   - most likely cardio renal   - temporary dialysis catheter by IR 01/22/21   - TDC today by IR   - HD started 01/22/21 and second 01/23/21   - outpt dialysis arranged at [facility] on TTS schedule (time?)    2. ACSHF   - challenge EDW    3. Hypotenion with HD   - BP better  Controlled . - proamatine available    4. Anemia stable . 5. Hypokalemia   - sliding scale on dialysis    6.  Mod AS   - Cardiac cath in am .    - per cardiology      Sara Payne MD,FACP

## 2021-01-25 NOTE — PROGRESS NOTES
activity tolerance  Prognosis: Good  Decision Making: Low Complexity  History: see assessment section above  Exam: t/f, bed mob, fxl mob  Assistance / Modification: MI/Supervision w/ bed and recliner t/f, no AD w/ fxl mob  OT Education: OT Role;Energy Conservation  Patient Education: educated on CHF management, use of shower stool to conserve energy during bathing  REQUIRES OT FOLLOW UP: Yes  Activity Tolerance  Activity Tolerance: Patient Tolerated treatment well  Activity Tolerance: O2 sats at 95-9% on RA when sitting on EOB; after short distance ambulation in room, dropped to 92%--OT reapplied 1L O2 d/t having s/p surgical procedure today  Safety Devices  Type of devices: Left in chair;Nurse notified;Call light within reach;Gait belt         Patient Diagnosis(es): The primary encounter diagnosis was Acute on chronic congestive heart failure, unspecified heart failure type ([address] Diagnoses of Acute respiratory failure with hypoxia ([address] Acute pulmonary edema ([address] Pleural effusion, Acute on chronic renal insufficiency, and Hypokalemia were also pertinent to this visit. has a past medical history of Arthritis, Bell's palsy, Cancer ([address] CHF (congestive heart failure) ([address] Coronary artery disease involving native coronary artery of native heart without angina pectoris, Hyperlipidemia, Hypertension, Influenza B, Kidney failure, Nosebleed, and Radiation. has a past surgical history that includes Tunneled venous catheter placement (Right, 01/25/2021) and IR TUNNELED CVC PLACE WO SQ PORT/PUMP > 5 YEARS (1/25/2021).     Restrictions  Position Activity Restriction  Other position/activity restrictions: renal diet, low sodium, 1500 ML fluid restriction, on 1L O2 per n/c, teley, Picc line R upper chest  Subjective   General  Chart Reviewed: Yes, Orders, Progress Notes  Additional Pertinent Hx: \"The patient is a 77 y.o. male w hx of chronic systolic CHF EF 96% and advanced CKDIV to V, who presents [facility] with worsening dyspnea. Pt reports symptoms worsening gradually over weeks and worse in the past few days. Reports severe orthopnea. Worsening bilateral leg edema. \" Copied per Tabatha Quinn MD 1/18/21 note  Response to previous treatment: Patient with no complaints from previous session  Family / Caregiver Present: No  Referring Practitioner: Maurizio Yo MD  Diagnosis: CHF  Subjective  Subjective: met in room, RN present to take vitals; pt on 1L O2 & attempting to wean; pulse ox monitored during session  General Comment  Comments: per RN ok to tx  Vital Signs  Patient Currently in Pain: Denies   Orientation  Orientation  Overall Orientation Status: Within Normal Limits  Objective    ADL  Feeding: Independent(pt on FR, was NPO for surgical procedure)  Grooming: Independent  Additional Comments: Based on currect activity tolerance level/balance/ROM expect pt able to perform all ADL tasks supervision to mod ind, except donning/doffing edwardo hose which is new to pt. since admit. Compliant w/ wearing edwardo hose        Balance  Sitting Balance: Independent  Standing Balance: Modified independent   Standing Balance  Time: @ 1-2 minutes  Activity: during static stance, during fxl mob  Comment: no AD, posterior sway  Functional Mobility  Functional - Mobility Device: No device  Activity: Other  Assist Level: Supervision  Functional Mobility Comments: ambulated in room, tends to reach out for furniture, no AD used, posterior sway noted but no LOB.  Pulse ox dropped to 92% on RA after ambulation--OT reapplied 1L O2 since he just had surgical procedure  Wheelchair Bed Transfers  Wheelchair/Bed - Technique: Ambulating  Equipment Used: Bed  Level of Asssistance: Modified independent   Wheelchair Transfers Comments: used rail for t/f out of bed  Bed mobility  Supine to Sit: Modified independent  Comment: semi reclined bed used rail  Transfers  Sit to stand: Modified independent  Stand to sit: Modified independent  Transfer Comments: mild posterior sway but no LOB                       Cognition  Overall Cognitive Status: WNL  Cognition Comment: pt cooperative, discussed having someone drive him to HD the first few times until his body becomes adjusted to it                                         Plan   Plan  Times per week: 2-3  Times per day: Daily  Plan weeks: 1-2 visits  Current Treatment Recommendations: Strengthening, Patient/Caregiver Education & Training, Home Management Training, Equipment Evaluation, Education, & procurement, Balance Training, Neuromuscular Re-education, Functional Mobility Training, Safety Education & Training, Self-Care / ADL, Endurance Training       AM-PAC Score        AM-PAC Inpatient Daily Activity Raw Score: 23 (01/25/21 1437)  AM-PAC Inpatient ADL T-Scale Score : 51.12 (01/25/21 1437)  ADL Inpatient CMS 0-100% Score: 15.86 (01/25/21 1437)  ADL Inpatient CMS G-Code Modifier : CI (01/25/21 1437)    Goals  Short term goals  Time Frame for Short term goals: 1-2 visits  Short term goal 1: Pt will participate in ed for doffing/donning edwardo hose to perform mod ind. Short term goal 2: Pt will participate in BUE HEP to increase activity tolerance to perform standing ADL/IADL task for 5-7 minutes. Long term goals  Time Frame for Long term goals : same as LTG  Patient Goals   Patient goals : Pt stated his goal is to strengthen his legs, get back to doing own laundry in the basement.        Therapy Time   Individual Concurrent Group Co-treatment   Time In 5921         Time Out 1436         Minutes 31         Timed Code Treatment Minutes: [address], [place] #[id number]

## 2021-01-25 NOTE — PLAN OF CARE
Problem: OXYGENATION/RESPIRATORY FUNCTION  Goal: Patient will maintain patent airway  1/24/2021 1110 by Dion Avalos RN  Outcome: Ongoing  Note:       Problem: FLUID AND ELECTROLYTE IMBALANCE  Goal: Fluid and electrolyte balance are achieved/maintained  1/25/2021 0009 by Slade Luz RN  Outcome: Ongoing  Note: Educated patient/family on CHF signs/ symptoms, causes, treatments, limited fluid intake, daily weights, low sodium diet, and follow-up. Pt to call attending physician if weight gain of 3 pounds in one day or 5 pounds in one week. 1/24/2021 1110 by Dion Avalos RN  Outcome: Ongoing  Note:       Problem: SAFETY  Goal: Free from accidental physical injury  Outcome: Ongoing  Note: Patient assessed for fall risk; fall precautions initiated. Patient and family instructed about safety devices. Environment kept free of clutter and adequate lighting provided. Bed locked and in lowest position. Call light within reach. Will continue to monitor. Fall risk assessment completed every shift. All precautions in place. Pt has call light within reach at all times. Room clear of clutter. Pt aware to call for assistance when getting up. Problem: DAILY CARE  Goal: Daily care needs are met  1/25/2021 0009 by Slade Luz RN  Outcome: Ongoing  Note: Patient's ability assessed to perform self care and independent activity encouraged according to that ability. Assisted with ADL's as needed. Risk for skin breakdown assessed. Potential discharge needs assessed. Patient and family included in daily care decisions. 1/24/2021 1110 by Dion Avalos RN  Outcome: Ongoing  Note: Patient's ability assessed to perform self care and independent activity encouraged according to that ability. Assisted with ADL's as needed. Risk for skin breakdown assessed. Potential discharge needs assessed. Patient included in daily care decisions.       Problem: Pain:  Goal: Pain level will decrease  Description: Pain level will decrease  1/24/2021 1110 by Dari Multani RN  Outcome: Ongoing  Note: Pain/discomfort being managed with PRN analgesics per MD orders. Pt able to express presence and absence of pain and rate pain appropriately using numerical scale.

## 2021-01-25 NOTE — PROGRESS NOTES
Hospitalist Progress Note      PCP: Rg Paris MD    Date of Admission: 1/18/2021    Chief Complaint: SOB    Hospital Course:75m with EF 40%, CKD presents with severe orthopnea, worsening drew, melania. Patient was diuresed, renal function remained poor, HD started. [facility] placed 1/22, replaced with tunneled catheter today    Subjective: Patient denies chest pain, sob. No new complaint, on room air      Medications:  Reviewed    Infusion Medications   Scheduled Medications    ceFAZolin  2,000 mg Intravenous 60 Min Pre-Op    midodrine  5 mg Oral TID WC    aspirin  81 mg Oral Daily    atorvastatin  20 mg Oral Daily    [Held by provider] clopidogrel  75 mg Oral Daily    metoprolol succinate  25 mg Oral Daily    sodium chloride flush  10 mL Intravenous 2 times per day    heparin (porcine)  5,000 Units Subcutaneous 3 times per day     PRN Meds: albumin human, sodium chloride flush, promethazine **OR** ondansetron, polyethylene glycol, acetaminophen **OR** acetaminophen      Intake/Output Summary (Last 24 hours) at 1/25/2021 1214  Last data filed at 1/25/2021 0327  Gross per 24 hour   Intake 480 ml   Output 100 ml   Net 380 ml       Physical Exam Performed:    /67   Pulse 76   Temp 98.2 °F (36.8 °C) (Oral)   Resp 16   Ht 5' 9\" (1.753 m)   Wt 293 lb 14 oz (133.3 kg)   SpO2 92%   BMI 43.40 kg/m²     General appearance: No apparent distress, appears stated age and cooperative. HEENT: Pupils equal, round, and reactive to light. Conjunctivae/corneas clear. Neck: Supple, with full range of motion. No jugular venous distention. Trachea midline. Respiratory:  Normal respiratory effort. Clear to auscultation, bilaterally without Rales/Wheezes/Rhonchi. Cardiovascular: Regular rate and rhythm with normal S1/S2 without murmurs, rubs or gallops. Abdomen: Soft, non-tender, non-distended with normal bowel sounds. Musculoskeletal: No clubbing, cyanosis  Full range of motion without deformity. Labs:   Recent Labs     01/23/21  0453 01/24/21  0445 01/25/21  0518   WBC 9.3 9.6 9.3   HGB 8.9* 9.6* 9.0*   HCT 27.8* 30.0* 27.9*    295 291     Recent Labs     01/23/21  0453      K 3.0*   CL 94*   CO2 27   BUN 69*   CREATININE 5.6*   CALCIUM 8.7   PHOS 5.0*     No results for input(s): AST, ALT, BILIDIR, BILITOT, ALKPHOS in the last 72 hours. No results for input(s): INR in the last 72 hours. No results for input(s): Pedro Fling in the last 72 hours. Urinalysis:      Lab Results   Component Value Date    NITRU Negative 11/05/2019    WBCUA 4 11/05/2019    BACTERIA RARE 11/05/2019    RBCUA 2 11/05/2019    BLOODU SMALL 11/05/2019    SPECGRAV 1.010 11/05/2019    GLUCOSEU Negative 11/05/2019       Radiology:  IR FLUORO GUIDED CVA DEVICE PLMT/REPLACE/REMOVAL   Final Result   Successful ultrasound and fluoroscopy guided right IJ non-tunneled   hemodialysis catheter placement. XR CHEST PORTABLE   Final Result   1. Right pleural effusion with right basilar atelectasis or pneumonia. 2. Pulmonary edema.          IR TUNNELED CVC PLACE WO SQ PORT/PUMP > 5 YEARS    (Results Pending)           Assessment/Plan:    Active Hospital Problems    Diagnosis Date Noted    Acute on chronic systolic HF (heart failure) (HCC) [I50.23]     Hypokalemia [E87.6]     Severe aortic stenosis [I35.0]     Coronary artery disease involving native coronary artery of native heart without angina pectoris [I25.10]     Acute on chronic congestive heart failure (HCC) [I50.9]     CHF (congestive heart failure), NYHA class I, acute on chronic, combined ([facility] [I50.43] 01/18/2021     1) CHF  - diuresing    2) CKD  - s/p tunneled catheter    3) Severe aortic stenosis  - poss baloon valvuloplasty        Diet: Diet NPO Effective Now  Diet NPO, After Midnight Exceptions are: Sips with Meds  Code Status: Evgeny Malave MD

## 2021-01-26 ENCOUNTER — APPOINTMENT (OUTPATIENT)
Dept: CARDIAC CATH/INVASIVE PROCEDURES | Age: 76
DRG: 319 | End: 2021-01-26
Payer: MEDICARE

## 2021-01-26 LAB
ALBUMIN SERPL-MCNC: 3.7 G/DL (ref 3.4–5)
ANION GAP SERPL CALCULATED.3IONS-SCNC: 18 MMOL/L (ref 3–16)
BUN BLDV-MCNC: 74 MG/DL (ref 7–20)
CALCIUM SERPL-MCNC: 9 MG/DL (ref 8.3–10.6)
CHLORIDE BLD-SCNC: 97 MMOL/L (ref 99–110)
CO2: 25 MMOL/L (ref 21–32)
CREAT SERPL-MCNC: 6.4 MG/DL (ref 0.8–1.3)
GFR AFRICAN AMERICAN: 10
GFR NON-AFRICAN AMERICAN: 9
GLUCOSE BLD-MCNC: 115 MG/DL (ref 70–99)
HCT VFR BLD CALC: 29.3 % (ref 40.5–52.5)
HEMOGLOBIN: 9.4 G/DL (ref 13.5–17.5)
MAGNESIUM: 2.5 MG/DL (ref 1.8–2.4)
MCH RBC QN AUTO: 28 PG (ref 26–34)
MCHC RBC AUTO-ENTMCNC: 32.1 G/DL (ref 31–36)
MCV RBC AUTO: 87.2 FL (ref 80–100)
PDW BLD-RTO: 16.3 % (ref 12.4–15.4)
PHOSPHORUS: 5.7 MG/DL (ref 2.5–4.9)
PLATELET # BLD: 309 K/UL (ref 135–450)
PMV BLD AUTO: 8.5 FL (ref 5–10.5)
POC ACT LR: 168 SEC
POC ACT LR: 176 SEC
POC ACT LR: 179 SEC
POTASSIUM SERPL-SCNC: 3.1 MMOL/L (ref 3.5–5.1)
RBC # BLD: 3.36 M/UL (ref 4.2–5.9)
SODIUM BLD-SCNC: 140 MMOL/L (ref 136–145)
WBC # BLD: 9.7 K/UL (ref 4–11)

## 2021-01-26 PROCEDURE — 6360000002 HC RX W HCPCS: Performed by: INTERNAL MEDICINE

## 2021-01-26 PROCEDURE — C1725 CATH, TRANSLUMIN NON-LASER: HCPCS

## 2021-01-26 PROCEDURE — 6370000000 HC RX 637 (ALT 250 FOR IP): Performed by: INTERNAL MEDICINE

## 2021-01-26 PROCEDURE — 6360000004 HC RX CONTRAST MEDICATION: Performed by: HOSPITALIST

## 2021-01-26 PROCEDURE — 6370000000 HC RX 637 (ALT 250 FOR IP)

## 2021-01-26 PROCEDURE — 85027 COMPLETE CBC AUTOMATED: CPT

## 2021-01-26 PROCEDURE — 90935 HEMODIALYSIS ONE EVALUATION: CPT

## 2021-01-26 PROCEDURE — 92986 REVISION OF AORTIC VALVE: CPT

## 2021-01-26 PROCEDURE — 83735 ASSAY OF MAGNESIUM: CPT

## 2021-01-26 PROCEDURE — B2111ZZ FLUOROSCOPY OF MULTIPLE CORONARY ARTERIES USING LOW OSMOLAR CONTRAST: ICD-10-PCS | Performed by: INTERNAL MEDICINE

## 2021-01-26 PROCEDURE — 2709999900 HC NON-CHARGEABLE SUPPLY

## 2021-01-26 PROCEDURE — 85347 COAGULATION TIME ACTIVATED: CPT

## 2021-01-26 PROCEDURE — 2580000003 HC RX 258: Performed by: NURSE PRACTITIONER

## 2021-01-26 PROCEDURE — 99152 MOD SED SAME PHYS/QHP 5/>YRS: CPT

## 2021-01-26 PROCEDURE — 80069 RENAL FUNCTION PANEL: CPT

## 2021-01-26 PROCEDURE — 027F3ZZ DILATION OF AORTIC VALVE, PERCUTANEOUS APPROACH: ICD-10-PCS | Performed by: INTERNAL MEDICINE

## 2021-01-26 PROCEDURE — 93458 L HRT ARTERY/VENTRICLE ANGIO: CPT | Performed by: INTERNAL MEDICINE

## 2021-01-26 PROCEDURE — 2580000003 HC RX 258: Performed by: INTERNAL MEDICINE

## 2021-01-26 PROCEDURE — 2500000003 HC RX 250 WO HCPCS

## 2021-01-26 PROCEDURE — 99153 MOD SED SAME PHYS/QHP EA: CPT

## 2021-01-26 PROCEDURE — C1894 INTRO/SHEATH, NON-LASER: HCPCS

## 2021-01-26 PROCEDURE — 36415 COLL VENOUS BLD VENIPUNCTURE: CPT

## 2021-01-26 PROCEDURE — 93458 L HRT ARTERY/VENTRICLE ANGIO: CPT

## 2021-01-26 PROCEDURE — 92986 REVISION OF AORTIC VALVE: CPT | Performed by: INTERNAL MEDICINE

## 2021-01-26 PROCEDURE — 4A023N7 MEASUREMENT OF CARDIAC SAMPLING AND PRESSURE, LEFT HEART, PERCUTANEOUS APPROACH: ICD-10-PCS | Performed by: INTERNAL MEDICINE

## 2021-01-26 PROCEDURE — C1769 GUIDE WIRE: HCPCS

## 2021-01-26 PROCEDURE — 2000000000 HC ICU R&B

## 2021-01-26 PROCEDURE — 6360000002 HC RX W HCPCS

## 2021-01-26 PROCEDURE — 93005 ELECTROCARDIOGRAM TRACING: CPT | Performed by: INTERNAL MEDICINE

## 2021-01-26 RX ORDER — ONDANSETRON 2 MG/ML
4 INJECTION INTRAMUSCULAR; INTRAVENOUS EVERY 6 HOURS PRN
Status: DISCONTINUED | OUTPATIENT
Start: 2021-01-26 | End: 2021-01-26 | Stop reason: SDUPTHER

## 2021-01-26 RX ORDER — POTASSIUM CHLORIDE 20 MEQ/1
20 TABLET, EXTENDED RELEASE ORAL ONCE
Status: COMPLETED | OUTPATIENT
Start: 2021-01-26 | End: 2021-01-26

## 2021-01-26 RX ORDER — ACETAMINOPHEN 325 MG/1
650 TABLET ORAL EVERY 4 HOURS PRN
Status: DISCONTINUED | OUTPATIENT
Start: 2021-01-26 | End: 2021-01-26 | Stop reason: SDUPTHER

## 2021-01-26 RX ORDER — SODIUM CHLORIDE 0.9 % (FLUSH) 0.9 %
10 SYRINGE (ML) INJECTION PRN
Status: DISCONTINUED | OUTPATIENT
Start: 2021-01-26 | End: 2021-01-26 | Stop reason: SDUPTHER

## 2021-01-26 RX ORDER — SODIUM CHLORIDE 0.9 % (FLUSH) 0.9 %
10 SYRINGE (ML) INJECTION EVERY 12 HOURS SCHEDULED
Status: DISCONTINUED | OUTPATIENT
Start: 2021-01-26 | End: 2021-01-26 | Stop reason: SDUPTHER

## 2021-01-26 RX ORDER — SODIUM CHLORIDE 0.9 % (FLUSH) 0.9 %
10 SYRINGE (ML) INJECTION PRN
Status: DISCONTINUED | OUTPATIENT
Start: 2021-01-26 | End: 2021-01-27 | Stop reason: HOSPADM

## 2021-01-26 RX ORDER — HEPARIN SODIUM 1000 [USP'U]/ML
3800 INJECTION, SOLUTION INTRAVENOUS; SUBCUTANEOUS PRN
Status: DISCONTINUED | OUTPATIENT
Start: 2021-01-26 | End: 2021-01-27 | Stop reason: HOSPADM

## 2021-01-26 RX ORDER — AMIODARONE HYDROCHLORIDE 200 MG/1
200 TABLET ORAL 2 TIMES DAILY
Status: DISCONTINUED | OUTPATIENT
Start: 2021-01-26 | End: 2021-01-27 | Stop reason: HOSPADM

## 2021-01-26 RX ORDER — ACETAMINOPHEN 325 MG/1
650 TABLET ORAL EVERY 4 HOURS PRN
Status: DISCONTINUED | OUTPATIENT
Start: 2021-01-26 | End: 2021-01-27 | Stop reason: HOSPADM

## 2021-01-26 RX ORDER — SODIUM CHLORIDE 0.9 % (FLUSH) 0.9 %
10 SYRINGE (ML) INJECTION EVERY 12 HOURS SCHEDULED
Status: DISCONTINUED | OUTPATIENT
Start: 2021-01-26 | End: 2021-01-27 | Stop reason: HOSPADM

## 2021-01-26 RX ORDER — DIPHENHYDRAMINE HYDROCHLORIDE 50 MG/ML
12.5 INJECTION INTRAMUSCULAR; INTRAVENOUS ONCE
Status: COMPLETED | OUTPATIENT
Start: 2021-01-26 | End: 2021-01-26

## 2021-01-26 RX ADMIN — HEPARIN SODIUM 5000 UNITS: 5000 INJECTION INTRAVENOUS; SUBCUTANEOUS at 22:32

## 2021-01-26 RX ADMIN — Medication 10 ML: at 22:33

## 2021-01-26 RX ADMIN — ACETAMINOPHEN 650 MG: 325 TABLET ORAL at 20:46

## 2021-01-26 RX ADMIN — POTASSIUM CHLORIDE 20 MEQ: 20 TABLET, EXTENDED RELEASE ORAL at 16:10

## 2021-01-26 RX ADMIN — IOPAMIDOL 135 ML: 755 INJECTION, SOLUTION INTRAVENOUS at 12:02

## 2021-01-26 RX ADMIN — AMIODARONE HYDROCHLORIDE 1 MG/MIN: 50 INJECTION, SOLUTION INTRAVENOUS at 13:30

## 2021-01-26 RX ADMIN — DIPHENHYDRAMINE HYDROCHLORIDE 12.5 MG: 50 INJECTION, SOLUTION INTRAMUSCULAR; INTRAVENOUS at 18:56

## 2021-01-26 RX ADMIN — HEPARIN SODIUM 3800 UNITS: 1000 INJECTION INTRAVENOUS; SUBCUTANEOUS at 21:45

## 2021-01-26 RX ADMIN — SODIUM CHLORIDE: 9 INJECTION, SOLUTION INTRAVENOUS at 09:00

## 2021-01-26 RX ADMIN — AMIODARONE HYDROCHLORIDE 200 MG: 200 TABLET ORAL at 22:32

## 2021-01-26 ASSESSMENT — PAIN SCALES - GENERAL
PAINLEVEL_OUTOF10: 2
PAINLEVEL_OUTOF10: 0

## 2021-01-26 ASSESSMENT — PAIN DESCRIPTION - ONSET: ONSET: GRADUAL

## 2021-01-26 ASSESSMENT — PAIN DESCRIPTION - PAIN TYPE: TYPE: ACUTE PAIN

## 2021-01-26 ASSESSMENT — PAIN DESCRIPTION - DESCRIPTORS: DESCRIPTORS: HEADACHE

## 2021-01-26 ASSESSMENT — PAIN DESCRIPTION - FREQUENCY: FREQUENCY: CONTINUOUS

## 2021-01-26 ASSESSMENT — PAIN DESCRIPTION - LOCATION: LOCATION: HEAD

## 2021-01-26 ASSESSMENT — PAIN DESCRIPTION - ORIENTATION: ORIENTATION: ANTERIOR

## 2021-01-26 NOTE — PROGRESS NOTES
Physical Therapy  Jeremías Zeferino Govea  [id number]  [id number]    Attempted to  See for PT session however pt out of room to cath lab for R/L heart cath with possible balloon valvuloplasty.   Will attempt later as schedule permits  [address] [facility],Third Floor, [facility], [address]

## 2021-01-26 NOTE — PROGRESS NOTES
Department of Internal Medicine  Nephrology Progress Note    SUBJECTIVE:  We are following this patient for kaiden. Patient progress reviewed. UOP/Labs and wt noted . HPI:  Breathing stable. ROS:  no CP/SOB/GIRALDO; Eating better . ROS neg for the rest of the system . [address]:  medications reviewed. Physical Exam:    VITALS:  /71   Pulse 77   Temp 98.2 °F (36.8 °C) (Oral)   Resp 18   Ht 5' 9\" (1.753 m)   Wt 293 lb 6.9 oz (133.1 kg)   SpO2 91%   BMI 43.33 kg/m²   24HR INTAKE/OUTPUT:      Intake/Output Summary (Last 24 hours) at 1/26/2021 1109  Last data filed at 1/26/2021 0945  Gross per 24 hour   Intake 480 ml   Output 825 ml   Net -345 ml       Constitutional:  Looks comfortable   Respiratory:  Decrease BS at bases   Gastrointestinal:  No tenderness. Normal Bowel Sounds  Cardiovascular:  S1, S2 RRR   Edema:  ++ edema    DATA:    CBC:  Lab Results   Component Value Date    WBC 9.7 01/26/2021    RBC 3.36 01/26/2021    HGB 9.4 01/26/2021    HCT 29.3 01/26/2021    MCV 87.2 01/26/2021    MCH 28.0 01/26/2021    MCHC 32.1 01/26/2021    RDW 16.3 01/26/2021     01/26/2021    MPV 8.5 01/26/2021     CMP:  Lab Results   Component Value Date     01/26/2021    K 3.1 01/26/2021    K 2.9 01/18/2021    CL 97 01/26/2021    CO2 25 01/26/2021    BUN 74 01/26/2021    CREATININE 6.4 01/26/2021    GFRAA 10 01/26/2021    AGRATIO 1.0 01/18/2021    LABGLOM 9 01/26/2021    GLUCOSE 115 01/26/2021    PROT 7.0 01/25/2021    CALCIUM 9.0 01/26/2021    BILITOT <0.2 01/25/2021    ALKPHOS 78 01/25/2021    AST 25 01/25/2021    ALT 25 01/25/2021      Hepatic Function Panel:   Lab Results   Component Value Date    ALKPHOS 78 01/25/2021    ALT 25 01/25/2021    AST 25 01/25/2021    PROT 7.0 01/25/2021    BILITOT <0.2 01/25/2021    BILIDIR <0.2 01/25/2021    IBILI see below 01/25/2021      Phosphorus:   Lab Results   Component Value Date    PHOS 5.7 01/26/2021       ASSESSMENT/PLAN:  1.  KAIDEN on CKD4   - most likely cardio renal   - temporary dialysis catheter by IR 01/22/21   - TDC today. - HD started 01/22/21 and second 01/23/21   - outpt dialysis arranged at [facility] on TTS schedule (time?). Next hd today. 2. ACSHF   - challenge EDW    3. Hypotenion with HD   - proamatine available    4. Anemia stable . 5. Hypokalemia   - on supp. 6. Mod AS   - Cardiac cath today .    - per cardiology      Issac Andersen MD,FACP

## 2021-01-26 NOTE — FLOWSHEET NOTE
Treatment time: 3.5 hours  Net UF: 1000 ml     Pre weight: 133.4 kg  Post weight:132.2 kg  EDW: TBD kg  Crit Line Used: n/a Ending Profile: n/a    Access used: R TDC    Access function: Well with  ml/min     Medications or blood products given: 12.5mg Benadryl IV and 650mg Tylenol PO     Regular outpatient schedule: TTS [facility]     Summary of response to treatment: Patient tolerated treatment well with complaints of a mild headache. Tylenol given and headache relieved. Patient remained stable throughout entire treatment. Report given to Bhavani Carreno RN and copy of dialysis treatment record placed in chart, to be scanned into EMR.

## 2021-01-26 NOTE — PROGRESS NOTES
Occupational Therapy    Jeremías Romario Doran  [id number]      [id number]    Attempted to see for OT session however pt out of room to cath lab for R/L heart cath with possible balloon valvuloplasty. Plan to re-attemept pt tomorrow.      Electronically signed by Yusra Dean, OTR/L  License # [id number]

## 2021-01-26 NOTE — PROCEDURES
valvuloplasty balloon was advanced and inflated across the aortic valve. Balloon catheter was removed. Then, the right groin sheath was removed. Perclose was applied to achieve hemostasis. No complication. Left  groin sheath was removed. We applied manual pressure to achieve  hemostasis. No complication. Estimated blood loss less than 30 mL. ANGIOGRAPHY FINDINGS:  1. Left main:  Normal.  2.  Left anterior descending artery midportion 80% stenosis. 3.  Left circumflex is patent. No stenosis. 4.  Right coronary artery comes from the right coronary cusp. It is  patent without significant stenosis. HEMODYNAMIC FINDINGS:  Aortic valve gradient is 36 mmHg. With  dobutamine 20 mcg/kg/minute, it reaches up to 48 mmHg. SUMMARY:  Success balloon aortic valvuloplasty. RECOMMENDATION:  1. Continue postop post cath care. 2.  Site care. 3.  Risk factor modification. 4.  The patient will need TAVR. 5.  Will also need percutaneous coronary artery intervention of left  anterior descending artery midportion.         Mike Mohamud MD    D: 01/26/2021 12:15:53       T: 01/26/2021 14:15:02     [id number]  Job#: [id number]     Doc#: [id number]    CC:

## 2021-01-26 NOTE — CONSULTS
[facility]  HEART FAILURE PROGRAM      NAME:  Tracy Merrill RECORD NUMBER:  [id number]  AGE: 77 y.o.    GENDER: male  : 1945  TODAY'S DATE:  2021    Subjective:     VISIT TYPE: evaluation     ADMITTING PHYSICIAN:  Narendra Alcazar MD    PAST MEDICAL HISTORY        Diagnosis Date    Arthritis     Bell's palsy     Cancer ([facility] [address]     CHF (congestive heart failure) ([facility] [address]     Coronary artery disease involving native coronary artery of native heart without angina pectoris     Hyperlipidemia     Hypertension     Influenza B 4/2/15    Kidney failure     sees Dr Romeo Saravia HISTORY    Social History     Tobacco Use    Smoking status: Former Smoker     Quit date: 1986     Years since quittin.0    Smokeless tobacco: Never Used    Tobacco comment: will not start    Substance Use Topics    Alcohol use: No    Drug use: No       ALLERGIES    No Known Allergies    MEDICATIONS  Scheduled Meds:   potassium chloride  20 mEq Oral Once    sodium chloride flush  10 mL Intravenous 2 times per day    amiodarone  200 mg Oral BID    ceFAZolin  2,000 mg Intravenous 60 Min Pre-Op    aspirin  81 mg Oral Daily    atorvastatin  20 mg Oral Daily    clopidogrel  75 mg Oral Daily    metoprolol succinate  25 mg Oral Daily    heparin (porcine)  5,000 Units Subcutaneous 3 times per day       ADMIT DATE: 2021      Objective:     ADMISSION DIAGNOSIS:   CHF (congestive heart failure), NYHA class I, acute on chronic, combined ([facility]) [I50.43]     /73   Pulse 68   Temp 97.7 °F (36.5 °C) (Axillary)   Resp 18   Ht 5' 9\" (1.753 m)   Wt 240 lb 11.9 oz (109.2 kg)   SpO2 95%   BMI 35.55 kg/m²     ADMIT:  Weight: (!) 313 lb (142 kg)    TODAY: Weight: 240 lb 11.9 oz (109.2 kg)    Wt Readings from Last 25 Encounters:   21 240 lb 11.9 oz (109.2 kg)   20 (!) 313 lb 3.2 oz (142.1 kg)   20 (!) 326 lb 4.5 oz (148 kg)   19 (!) 338 lb (153.3 kg)   01/11/19 (!) 334 lb (151.5 kg)   11/09/17 (!) 344 lb (156 kg)   05/08/17 (!) 340 lb (154.2 kg)   04/18/17 (!) 350 lb 5 oz (158.9 kg)          Intake/Output Summary (Last 24 hours) at 1/26/2021 1531  Last data filed at 1/26/2021 1509  Gross per 24 hour   Intake 796.5 ml   Output 825 ml   Net -28.5 ml       LABS  BMP:   Lab Results   Component Value Date     01/26/2021    K 3.1 01/26/2021    K 2.9 01/18/2021    CL 97 01/26/2021    CO2 25 01/26/2021    BUN 74 01/26/2021    LABALBU 3.7 01/26/2021    CREATININE 6.4 01/26/2021    CALCIUM 9.0 01/26/2021    GFRAA 10 01/26/2021    LABGLOM 9 01/26/2021    GLUCOSE 115 01/26/2021     CBC:   Recent Labs     01/24/21  0445 01/25/21  0518 01/26/21  0432   WBC 9.6 9.3 9.7   HGB 9.6* 9.0* 9.4*   HCT 30.0* 27.9* 29.3*   MCV 87.1 88.1 87.2    291 309     BNP: No results found for: BNP    ECHOCARDIOGRAM: 01/19/2021  Summary   LVEF approximately 40%   inferior, apical anterior wall hypokinesis   Moderate aortic regurgitation. Severe aortic stenosis with a peak velocity of 4.4m/s and a mean pressure   gradient of 55mmHg. Assessment:     CONSULTS:   IP CONSULT TO HOSPITALIST  IP CONSULT TO NEPHROLOGY  IP CONSULT TO PALLIATIVE CARE  IP CONSULT TO CARDIOLOGY  IP CONSULT TO HEART FAILURE NURSE/COORDINATOR    Patient has a CARDIOLOGY CONSULT: Yes      Patient taking an ACEI/ARB:  No: melania       Patient taking a BETA BLOCKER:  Yes    SCALE AVAILABLE:  Yes     EDUCATION STATUS: Patient -- no one else present / pt declines offer to include someone else by phone   [x]  Provided both written and verbal education on Heart Failure signs/symptoms. [x]  Provided instructions on daily medications. [x]  Provided instructions to monitor and record weight daily. [x]  Provided instructions to call if weight increases 3 lbs in one day or 5 lbs in one week. [x]  Received verbal acknowledgment/understanding of Heart Failure related causes.   [x]  Provided instructions on how to maintain a low sodium diet. []  Provided recommendations for smoking cessation programs  [x]  Provided recommendations on activity and exercise    [x]  Other: HF RN consult received from Dr Gilberto Olivo. Pt is also being coded HF per CDS HF list as of 1/20/21. Chart reviewed. Pt presented to ED via EMS with c/o progressive dyspnea. Onset has been months but worsening over last few days. He describes severe orthopnea and worsening bilateral leg edema. Pt has known systolic HF and follows with [facility]. He was last seen in office on 12/3/2020 by his primary cardiologist, Dr Ledy Terry. Pt has CKD and has been debating starting hemodialysis. He was considering HD vs palliative care. Weight at that time was 313#. ProBNP on admission was 14,429 with CR 5.0 BUN 66  K 2.9. CXR showed pulm edema and right pleural effusion. Weight 313# (ED) and 310# (floor). Pt was admitted for HF but indicated desire for conservative treatment per Dr Antonietta Christopher H&P. Palliative Care was consulted to assist with goals of care. After discussion, pt was then agreeable to HD if needed with hopes to return home. Pt was treated for acute HF with bolus IV lasix. Cardiology was consulted. Dr Neisha Powers notes echo results Sep 2020 which showed mod to severe AS EF 40% gr 2 DD. Dr Neisha Powers recommended high dose Lasix for diuresis, LHC and TAVR once HF resolves. Pt diuresed but renal function worsened. On 1/21, Cr 6.2 BUN 77. Pt agreed to HD. Temp dialysis catheter was placed that date with initiation of HD. Pt was able to have significant diuresis with HD. A tunneled HD catheter was placed 1/25 with OP plan for TThS schedule. Pt underwent LHC and BAV 1/26/2021 per Dr Ledy Terry. He will require TAVR / SAVR and PCI of LAD at some point in the future. I met with patient in his room. He is on room air and appears comfortable at rest and with light conversation. He is in the midst of packing things up for discharge.   I introduced myself and my role in his care. He is agreeable to spend time with me for HF education. Pt confirms the above summary of events leading to admission. He appears somewhat depressed and says \"this is all my own doing -- I didn't eat right, I didn't take care of my DM\". Writer allowed patient to vent but also shared some health issues are genetically driven. He shares both his parents  relatively early with cancer but multiple siblings have heart disease. I urged him to do everything he can now to conserve / repair his health as much as possible. I urged him to allow himself some grief but if it is overwhelming to please speak with HCPs about therapy / medications. I agreed HD is a big change in daily life. He is noncommittal.     We discussed the general definition of systolic HF and also the tenuous nature of severe AS. I shared it is important to not be overloaded nor hypovolemic as the valve is restricting flow and both events can be detrimental.  We discussed what BAV purpose was - a bridge to permanent treatment such as TAVR / surgical repair / replace. He asks if once he has surgery will his heart and kidney issues be over. I shared it was likely not going to \"cure\" him of anything -- it would allow him to not be so sensitive to fluid imbalance. He accepts response but seems saddened. We discussed importance of weights. Pt is now a HD pt but I explained this does not allow free reign thinking dialysis would take care of things. We discussed need for daily, ongoing self assessment /management. We discussed how often times nephrology does take lead role in patients who are on HD but in his particular case, cardiology will have equal if not greater role until he gets valve / PCI. I provided weight log and corresponding AHA HF zones sheet. Pt has a scale at home and reports he weighs regularly now, he just does not log it. He reports a usual weight of 300#.   He is aware he has had significant fluid removal since admission. We discussed the \"saw tooth\" pattern his weights will have with intermittent HD but how he should be able to recognize a pattern to what is \"normal\" for him. I urged him to use the yellow zone as guide to report concerns to both cardiology and / or HD staff as they are also tell tall signs of overload. He voices understanding. We discussed similarities of renal and HF diets. Pt is aware of need for sodium and fluid restriction. We reviewed a \"renal\" diet is a low sodium diet. Pt reports he lives alone and doesn't cook. Occasionally, he will bake chicken breasts, etc but mostly relies on microwaveable meals. He does read food labels and shares \"I have some at home in the freezer that I am not going to use because they are over a 1000 mg of sodium. \". We discussed how 2000 mg is for the entire day. If he chose to eat a single meal that TOTALED 1000 mg sodium (high), how it would be necessary to balance other food choices. Pt states he typically only eats a couple times / day and might be able to incorporate those things if he was careful. Praise give for quick grasp of \"balance\" concept. He asks again if once he has surgery, will \"I be able to eat [place] or Isa or ever get [place] again? \". I shared these are all commonly high sodium foods and again, it is a matter of balance. I urged him to review restaurant nutrition guides as all commercial restaurants are required to have full menu content. I urged him to find the lowest sodium options but also cautioned against carbs, MSG, etc.  I offered there is often nutrition classes for new HD patients and urged him to consider attending. He is \"pretty savvy\" with computer and feels he could get a lot from internet.   I cautioned him against false information also being on line and reliance on a professional is best.  I urged him to ask for suggestions of what type of Isa food, etc he would be allowed to have.  Pt declines my suggestion of recipes / cooking so it is impractical to find alternate recipes for favorite foods. He agrees to try suggestions and be careful with what he is purchasing. I informed pt I had made a CR referral as he is s/p BAV and will have TAVR / PCI in future. We discussed purpose / eligibility / insurance coverage. He was agreeable to learn more about program and interested in possibly attending but would have to work around his HD schedule. We discussed the requirement of 7 day follow up. I attempted to arrange with cardiology but Dr Ayaka Fitch could not accomodate 7 day and wanted to see pt himself. Pt is also HD TRS. Follow up was arranged for Mon 2/1 with PCP but I shared with patient office was willing to work with him once HD time locked in to allow him to come before / after HD on Tuesday. He agrees to call and see what other options but also is not opposed to attending on Monday. He voiced appreciation of my effort to conserve number of trips for him. Overall, pt was very polite. He asked excellent questions. He was somewhat quiet but evident he was thinking about how he was going to adapt his lifestyle. He seems accepting of upcoming medical plans but also understandably depressed over entire situation. I again urged him to seek out help -- through family, friends, support programs, etc. He agrees to [place] it in mind\". He was appreciative of all information. CURRENT DIET: DIET CARDIAC; Low Sodium (2 GM); Daily Fluid Restriction: 1500 ml    EDUCATIONAL PACKETS PROVIDED- PRINTED FROM [employer]. Titles and material given:  Yes   [x]  What is Heart Failure?   [x]  Heart Failure: Warning Signs of a Flare-Up  [x]  Heart Failure: Making Changes to Your Diet  [x]  Heart Failure: Medications to Help Your Heart   [x]  Other: weight log, AHA HF zones sheet, [employer], Sodium Content in Foods, Fast Food Nutrition Guide    PATIENT/CAREGIVER TEACHING:    Level of patient/caregiver understanding able to:   [x] Verbalize understanding   [] Demonstrate understanding       [x] Teach back        [x] Needs reinforcement     []  Other:      TEACHING TIME:  40 minutes       Plan:       DISCHARGE PLAN:  Placement for patient upon discharge: home with support   Hospice Care:  no  Code Status: Full Code  Discharge appointment scheduled: Yes     RECOMMENDATIONS:   [x]  Encourage to call Heart Failure Resource Line with any questions or concerns. [x]  Educate further Mr. Yudi Bonilla on fluid restriction 48 oz- 64 oz during inpatient stay so he can understand how to measure intake at home. [x]  Continue to educate on S/S of Heart Failure. [x]  Emphasize daily weights, diet, and if changes, to call Heart Failure Resource Line  [x]  Other:  Cardiac Rehab referral: electronic order placed / brochure provided -- pt is s/p BAV -- needs TAVR and PCI LAD in future  [address] referral : pt declines as he will be seen three times per week by medical staff and feels that will be all he needs. He is aware he could enroll in the future if desired.            Electronically signed by Mohsen Osuna, RN, BSN CHFN  on 1/26/2021 at 3:31 PM

## 2021-01-27 VITALS
BODY MASS INDEX: 40.95 KG/M2 | HEIGHT: 71 IN | DIASTOLIC BLOOD PRESSURE: 69 MMHG | TEMPERATURE: 98.2 F | OXYGEN SATURATION: 91 % | WEIGHT: 292.5 LBS | RESPIRATION RATE: 21 BRPM | HEART RATE: 69 BPM | SYSTOLIC BLOOD PRESSURE: 140 MMHG

## 2021-01-27 LAB
ALBUMIN SERPL-MCNC: 3.4 G/DL (ref 3.4–5)
ANION GAP SERPL CALCULATED.3IONS-SCNC: 14 MMOL/L (ref 3–16)
BUN BLDV-MCNC: 44 MG/DL (ref 7–20)
CALCIUM SERPL-MCNC: 8.7 MG/DL (ref 8.3–10.6)
CHLORIDE BLD-SCNC: 98 MMOL/L (ref 99–110)
CO2: 25 MMOL/L (ref 21–32)
CREAT SERPL-MCNC: 5.3 MG/DL (ref 0.8–1.3)
GFR AFRICAN AMERICAN: 13
GFR NON-AFRICAN AMERICAN: 11
GLUCOSE BLD-MCNC: 112 MG/DL (ref 70–99)
HCT VFR BLD CALC: 27.7 % (ref 40.5–52.5)
HEMOGLOBIN: 9.1 G/DL (ref 13.5–17.5)
MAGNESIUM: 2.2 MG/DL (ref 1.8–2.4)
MCH RBC QN AUTO: 28.6 PG (ref 26–34)
MCHC RBC AUTO-ENTMCNC: 33 G/DL (ref 31–36)
MCV RBC AUTO: 86.8 FL (ref 80–100)
PDW BLD-RTO: 16.6 % (ref 12.4–15.4)
PHOSPHORUS: 4.2 MG/DL (ref 2.5–4.9)
PLATELET # BLD: 285 K/UL (ref 135–450)
PMV BLD AUTO: 8.1 FL (ref 5–10.5)
POTASSIUM SERPL-SCNC: 3.3 MMOL/L (ref 3.5–5.1)
RBC # BLD: 3.19 M/UL (ref 4.2–5.9)
SODIUM BLD-SCNC: 137 MMOL/L (ref 136–145)
WBC # BLD: 9.7 K/UL (ref 4–11)

## 2021-01-27 PROCEDURE — 80069 RENAL FUNCTION PANEL: CPT

## 2021-01-27 PROCEDURE — 6370000000 HC RX 637 (ALT 250 FOR IP): Performed by: INTERNAL MEDICINE

## 2021-01-27 PROCEDURE — 85027 COMPLETE CBC AUTOMATED: CPT

## 2021-01-27 PROCEDURE — 2580000003 HC RX 258: Performed by: INTERNAL MEDICINE

## 2021-01-27 PROCEDURE — 36415 COLL VENOUS BLD VENIPUNCTURE: CPT

## 2021-01-27 PROCEDURE — 94760 N-INVAS EAR/PLS OXIMETRY 1: CPT

## 2021-01-27 PROCEDURE — 6360000002 HC RX W HCPCS: Performed by: INTERNAL MEDICINE

## 2021-01-27 PROCEDURE — 83735 ASSAY OF MAGNESIUM: CPT

## 2021-01-27 PROCEDURE — 97530 THERAPEUTIC ACTIVITIES: CPT

## 2021-01-27 RX ORDER — AMIODARONE HYDROCHLORIDE 200 MG/1
200 TABLET ORAL 2 TIMES DAILY
Qty: 60 TABLET | Refills: 0 | Status: SHIPPED | OUTPATIENT
Start: 2021-01-27 | End: 2021-03-12 | Stop reason: ALTCHOICE

## 2021-01-27 RX ORDER — CHOLECALCIFEROL (VITAMIN D3) 10 MCG
1 TABLET ORAL DAILY
Qty: 30 CAPSULE | Refills: 3 | Status: SHIPPED | OUTPATIENT
Start: 2021-01-27

## 2021-01-27 RX ORDER — POTASSIUM CHLORIDE 20 MEQ/1
20 TABLET, EXTENDED RELEASE ORAL ONCE
Status: COMPLETED | OUTPATIENT
Start: 2021-01-27 | End: 2021-01-27

## 2021-01-27 RX ORDER — CHOLECALCIFEROL (VITAMIN D3) 10 MCG
1 TABLET ORAL DAILY
Status: DISCONTINUED | OUTPATIENT
Start: 2021-01-27 | End: 2021-01-27 | Stop reason: HOSPADM

## 2021-01-27 RX ORDER — METOPROLOL SUCCINATE 25 MG/1
25 TABLET, EXTENDED RELEASE ORAL DAILY
Qty: 30 TABLET | Refills: 3 | Status: SHIPPED | OUTPATIENT
Start: 2021-01-28

## 2021-01-27 RX ADMIN — Medication 10 ML: at 08:33

## 2021-01-27 RX ADMIN — POTASSIUM CHLORIDE 20 MEQ: 1500 TABLET, EXTENDED RELEASE ORAL at 14:04

## 2021-01-27 RX ADMIN — ATORVASTATIN CALCIUM 20 MG: 20 TABLET, FILM COATED ORAL at 08:32

## 2021-01-27 RX ADMIN — CLOPIDOGREL BISULFATE 75 MG: 75 TABLET ORAL at 08:32

## 2021-01-27 RX ADMIN — ASPIRIN 81 MG: 81 TABLET, CHEWABLE ORAL at 08:32

## 2021-01-27 RX ADMIN — NEPHROCAP 1 MG: 1 CAP ORAL at 14:04

## 2021-01-27 RX ADMIN — HEPARIN SODIUM 5000 UNITS: 5000 INJECTION INTRAVENOUS; SUBCUTANEOUS at 06:01

## 2021-01-27 RX ADMIN — METOPROLOL SUCCINATE 25 MG: 25 TABLET, EXTENDED RELEASE ORAL at 08:32

## 2021-01-27 RX ADMIN — AMIODARONE HYDROCHLORIDE 200 MG: 200 TABLET ORAL at 08:32

## 2021-01-27 ASSESSMENT — PAIN SCALES - GENERAL
PAINLEVEL_OUTOF10: 0
PAINLEVEL_OUTOF10: 0

## 2021-01-27 NOTE — PROGRESS NOTES
Physical Therapy    Facility/Department: [address] ICU  Treatment Note    NAME: Facundo Razo  : 1945  MRN: [id number]    Date of Service: 2021    Discharge Recommendations:  Home with assist PRN   PT Equipment Recommendations  Equipment Needed: No    Assessment   Body structures, Functions, Activity limitations: Decreased endurance  Assessment: 76 y/o male admit 2021 with Acute Respiratory, CHF, Acute Renal and Severe Aortic Stenosis. Cardio consult :  2021 : S/P Balloon Valvuloplasty (will need TAVR). Nephro consult : new HD pt. PMH as noted including CAD, Angio with Stent, CHF. Pt reports that he will have adequate assist/support upon d/c. Denies any further needs at this time. No further PT indicated at this time. Prognosis: Good  Decision Making: Low Complexity  History: 76 y/o male admit 2021 with Acute Respiratory, CHF, Acute Renal and Severe Aortic Stenosis. Cardio consult :  2021 : S/P Balloon Valvuloplasty (will need TAVR). Nephro consult : new HD pt. PMH as noted including CAD, Angio with Stent, CHF. Exam: See above. Clinical Presentation: See above. Barriers to Learning: None. REQUIRES PT FOLLOW UP: No  Activity Tolerance  Activity Tolerance: Patient limited by endurance       Patient Diagnosis(es): The primary encounter diagnosis was Acute on chronic congestive heart failure, unspecified heart failure type ([address] Diagnoses of Acute respiratory failure with hypoxia ([address] Acute pulmonary edema ([address] Pleural effusion, Acute on chronic renal insufficiency, and Hypokalemia were also pertinent to this visit. has a past medical history of Arthritis, Bell's palsy, Cancer ([address] CHF (congestive heart failure) ([address] Coronary artery disease involving native coronary artery of native heart without angina pectoris, Hyperlipidemia, Hypertension, Influenza B, Kidney failure, Nosebleed, and Radiation.    has a past surgical history that includes Tunneled venous catheter placement (Right, 01/25/2021); IR TUNNELED CVC PLACE WO SQ PORT/PUMP > 5 YEARS (01/25/2021); and Coronary angioplasty with stent (04/16/2017). Restrictions  Position Activity Restriction  Other position/activity restrictions: Fluid Restriction 1500 ml/day. Renal Diet. New HD pt. Vision/Hearing  Vision: Impaired  Vision Exceptions: Wears glasses at all times  Hearing: Within functional limits     Subjective  General  Chart Reviewed: Yes  Patient assessed for rehabilitation services?: Yes  Additional Pertinent Hx: 78 y/o male admit 1/18/2021 with Acute Respiratory, CHF, Acute Renal and Severe Aortic Stenosis. Cardio consult :  1/26/2021 : S/P Balloon Valvuloplasty (will need TAVR). Nephro consult : new HD pt. PMH as noted including CAD, Angio with Stent, CHF. Family / Caregiver Present: No  Referring Practitioner: Dr. Sumit Waddell  Diagnosis: Acute Respiratory, CHF, Acute Renal and Severe Aortic Stenosis. Follows Commands: Within Functional Limits  Subjective  Subjective: Pt agreeable to PT Rx. Going home today.   Pain Screening  Patient Currently in Pain: Denies          Orientation  Orientation  Overall Orientation Status: Within Functional Limits  Social/Functional History  Social/Functional History  Lives With: Alone  Type of Home: House  Home Layout: Two level, Able to Live on Main level with bedroom/bathroom, Laundry in basement  Home Access: Stairs to enter with rails  Entrance Stairs - Number of Steps: 3STE  Entrance Stairs - Rails: Right  Bathroom Shower/Tub: Tub/Shower unit  Bathroom Toilet: Standard(sink counter near)  [employer]: Grab bars in shower  Bathroom Accessibility: Accessible  Home Equipment: Rolling walker, Cane, Grab bars  ADL Assistance: Independent  Homemaking Assistance: Independent  Homemaking Responsibilities: (Pt did all)  Ambulation Assistance: Independent(With Cane prn.)  Transfer Assistance: Independent  Active : Yes  Occupation: Retired  Leisure & Hobbies: enjoys being home, watches movies  Additional Comments: Pt took care of his wife w/brain tumor whom passed away in 2008, children live out of town, has supportive friends in the area  Cognition   Cognition  Overall Cognitive Status: WFL    Objective          AROM RLE (degrees)  RLE AROM: WFL  AROM LLE (degrees)  LLE AROM : WFL  AROM RUE (degrees)  RUE AROM : WFL  AROM LUE (degrees)  LUE AROM : WFL  Strength RLE  Strength RLE: WFL  Strength LLE  Strength LLE: WFL  Strength RUE  Strength RUE: WFL  Strength LUE  Strength LUE: WFL        Bed mobility  Supine to Sit: Independent  Transfers  Sit to Stand: Independent  Stand to sit: Independent  Ambulation  Ambulation?: Yes  Ambulation 1  Distance: Pt amb within hospital room setting without assist device Independently. MIld lat sway, no specific LOB noted. LImited endurance. Comments: O2 sats at least 90% following amb activities. Plan   Plan  Times per week: D/C Acute Care PT Services. Current Treatment Recommendations: Functional Mobility Training  Safety Devices  Type of devices: Call light within reach, Left in chair, Nurse notified                       AM-PAC Score  -PAC Inpatient Mobility Raw Score : 23 (01/27/21 1335)  -PAC Inpatient T-Scale Score : 56.93 (01/27/21 1335)  Mobility Inpatient CMS 0-100% Score: 11.2 (01/27/21 1335)  Mobility Inpatient CMS G-Code Modifier : CI (01/27/21 1335)          Goals  Short term goals  Time Frame for Short term goals: No further Acute Care PT Goals Identified. Short term goal 1: amb ' with or without SPC sup while maintaining O2 sats in 90's  without O2  Short term goal 2: negotiate stairs when able  Patient Goals   Patient goals : Go home.        Therapy Time   Individual Concurrent Group Co-treatment   Time In 1310         Time Out 1330         Minutes Refugio Onofre

## 2021-01-27 NOTE — PROGRESS NOTES
1430: DC instructions reviewed with patient. Questions answered.  Stable @ DC  Electronically signed by Princess Benjamin RN on 1/27/2021 at 2:35 PM

## 2021-01-27 NOTE — PROGRESS NOTES
Pt is alert and oriented and doing well. This RN offered to update his primary contact (Son Caren March). Pt declined as he is keeping everyone up to date via cell phone.

## 2021-01-27 NOTE — DISCHARGE SUMMARY
[facility] ORTHOPEDIC AND SPINE [facility]   Discharge Summary    Patient:  Sujata Hudson  YOB: 1945    MRN: [id number]   Acct: [de-identified]    Primary Care Physician: Dianne Steward MD    Admit date:  1/18/2021    Discharge date:   1/27/2021      Discharge Diagnoses: Active Problems:    CHF (congestive heart failure), NYHA class I, acute on chronic, combined (HCC)    Acute on chronic congestive heart failure (HCC)    Acute on chronic systolic HF (heart failure) ([facility])    Hypokalemia    Severe aortic stenosis, s/p balloon valvuloplasty, eith need for AVR    Coronary artery disease, will need PCI LAD      Admitted for: ([address] with EF 40%, CKD presents with severe orthopnea, worsening drew, melania. Patient was diuresed, renal function remained poor, HD started. TDC placed 1/22, replaced with tunneled catheter today. Underwent LHC, balloon aortic valvuloplasty 1/26 with finding of LAD disease requiring PCI. Currently patient is stable on room air, in no distress. Consultants:  Card - Dr. Ignacia Escalera ; Nephro - Dr. April George    Discharge Medications:       Medication List      START taking these medications    amiodarone 200 MG tablet  Commonly known as: CORDARONE  Take 1 tablet by mouth 2 times daily     b complex-C-folic acid 1 MG capsule  Take 1 capsule by mouth daily        CHANGE how you take these medications    metoprolol succinate 25 MG extended release tablet  Commonly known as: TOPROL XL  Take 1 tablet by mouth daily  Start taking on: January 28, 2021  What changed:   · medication strength  · See the new instructions.         CONTINUE taking these medications    aspirin 81 MG chewable tablet     atorvastatin 20 MG tablet  Commonly known as: LIPITOR     clopidogrel 75 MG tablet  Commonly known as: PLAVIX  TAKE 1 TABLET BY MOUTH EVERY DAY        STOP taking these medications    furosemide 40 MG tablet  Commonly known as: LASIX     GLUCOSAMINE CHONDR 1500 COMPLX PO     NIFEdipine 60 MG extended release tablet  Commonly known as: ADALAT CC     therapeutic multivitamin-minerals tablet           Where to Get Your Medications      These medications [facility], [address] [address]    Phone: [phone]   · amiodarone 200 MG tablet  · b complex-C-folic acid 1 MG capsule  · metoprolol succinate 25 MG extended release tablet           Physical Exam:    Vitals:  Vitals:    01/27/21 0900 01/27/21 1000 01/27/21 1100 01/27/21 1200   BP: (!) 118/106 (!) 126/58 119/70 (!) 140/69   Pulse: 79 76 67 69   Resp: 19 23 21 21   Temp:       TempSrc:       SpO2:       Weight:       Height:         Weight: Weight: 292 lb 8 oz (132.7 kg)     24 hour intake/output:    Intake/Output Summary (Last 24 hours) at 1/27/2021 1304  Last data filed at 1/27/2021 1010  Gross per 24 hour   Intake 1196.5 ml   Output 1950 ml   Net -753.5 ml       General appearance - alert, well appearing, and in no distress  Chest - clear to auscultation, no wheezes, rales or rhonchi, symmetric air entry  Heart - normal rate, regular rhythm, normal S1, S2, no murmurs, rubs, clicks or gallops  Abdomen - soft, nontender, nondistended, no masses or organomegaly  Obese: No; Protuberant: No   Neurological - alert, oriented, normal speech, no focal findings or movement disorder noted  Extremities - peripheral pulses normal, no pedal edema, no clubbing or cyanosis  Skin - normal coloration and turgor     Radiology reports as per the Radiologist  Radiology: Echo Limited    Result Date: 1/19/2021  Transthoracic Echocardiography Report (TTE)  Demographics   Patient Name       Loreta Zamora   Date of Study      01/19/2021       Gender               Male   Patient Number     [id number]       Date of Birth        1945   Visit Number       [id number]        Age                  77 year(s)   Accession Number   [id number]       Room Number          [address] [place] [address] ID       [id number]          Sonographer          Laureano Lacey                                                           [facility]   Ordering Physician David Vargas MD  Interpreting         [address] [place] [address].                                      Physician            David Vargas MD  Procedure Type of Study   TTE procedure:ECHOCARDIOGRAM LIMITED, CARDIAC DOPPLER, CARDIAC COLOR FLOW  TC.  Procedure Date Date: 01/19/2021 Start: 01:43 PM Study Location: [facility] Echo Lab Technical Quality: Adequate visualization Indications: Aortic stenosis. Patient Status: Routine Rhythm: Within normal limits HR: 69 bpm BP: 130/70 mmHg  Conclusions   Summary  LVEF approximately 40%  inferior, apical anterior wall hypokinesis  Moderate aortic regurgitation. Severe aortic stenosis with a peak velocity of 4.4m/s and a mean pressure  gradient of 55mmHg. Signature   ------------------------------------------------------------------  Electronically signed by David Vargas MD (Interpreting  physician) on 01/19/2021 at 05:21 PM  ------------------------------------------------------------------   Findings   Left Ventricle  LVEF approximately 40%  inferior, apical anterior wall hypokinesis   Aortic Valve  Moderate aortic regurgitation. Severe aortic stenosis with a peak velocity of 4.4m/s and a mean pressure  gradient of 55mmHg. Pericardial Effusion  There is a trivial pericardial effusion noted. Pleural Effusion  No pleural effusion.   M-Mode/2D Measurements (cm)   LV Diastolic Dimension: 9.97 cm LV Systolic Dimension: 5.5 cm  LV Septum Diastolic: 8.55 cm  LV PW Diastolic: 6.01 cm        AO Root Dimension: 3.2 cm  RV Diastolic Dimension: 4.81 cm                                  LA Area: 32.6 cm2  LVOT: 1.9 cm                    LA volume/Index: 130 ml  Doppler Measurements   AV Peak Velocity: 443 cm/s     MV Peak E-Wave: 82.9 cm/s  AV Peak Gradient: 78.5 mmHg    MV Peak A-Wave: 98.2 cm/s  AV Mean Gradient: 45 mmHg      MV E/A Ratio: 0.84  LVOT Peak Velocity: 125 cm/s   MV P1/2t: 124 msec  AV Area (Continuity):0.84 cm2  MV Mean Gradient: 1 mmHg                                 MV Max PG: 3 mmHg                                 MV Vmax:84.3 cm/s                                 MV VTI:29.4 cm/s  E' Septal Velocity: 6.47 cm/s  E' Lateral Velocity: 12.6 cm/s MV Area (continuity): 3.05 cm2  PV Peak Velocity: 112 cm/s     MV Deceleration Time: 254 msec  PV Peak Gradient: 5.02 mmHg    MV Area (PHT): 1.77 cm2   Aortic Valve   Peak Velocity: 443 cm/s     Mean Velocity: 353 cm/s  Peak Gradient: 78.5 mmHg    Mean Gradient: 45 mmHg  Area (continuity): 0.84 cm2  AV VTI: 106 cm  AI P1/2t: 462 msec  Aorta   Aortic Root: 3.2 cm  Ascending Aorta: 3.7 cm  LVOT Diameter: 1.9 cm      Xr Chest Portable    Result Date: 1/18/2021  EXAMINATION: ONE XRAY VIEW OF THE CHEST 1/18/2021 6:26 am COMPARISON: 09/25/2020 HISTORY: ORDERING SYSTEM PROVIDED HISTORY: sob TECHNOLOGIST PROVIDED HISTORY: Reason for exam:->sob Reason for Exam: sob Acuity: Acute Relevant Medical/Surgical History: hx chf, bone ca FINDINGS: Right pleural effusion is identified with right basilar airspace opacity. Pulmonary edema is again seen. The heart size is within normal limits. There is no discernible pneumothorax. 1. Right pleural effusion with right basilar atelectasis or pneumonia. 2. Pulmonary edema.         Results for orders placed or performed during the hospital encounter of 01/18/21   CBC Auto Differential   Result Value Ref Range    WBC 10.4 4.0 - 11.0 K/uL    RBC 3.41 (L) 4.20 - 5.90 M/uL    Hemoglobin 9.6 (L) 13.5 - 17.5 g/dL    Hematocrit 30.3 (L) 40.5 - 52.5 %    MCV 88.9 80.0 - 100.0 fL    MCH 28.3 26.0 - 34.0 pg    MCHC 31.8 31.0 - 36.0 g/dL    RDW 16.6 (H) 12.4 - 15.4 %    Platelets 292 655 - 938 K/uL    MPV 8.4 5.0 - 10.5 fL    Neutrophils % 79.8 %    Lymphocytes % 6.4 %    Monocytes % 9.0 %    Eosinophils % 3.8 %    Basophils % 1.0 %    Neutrophils Absolute 8.3 (H) 1.7 - 7.7 K/uL    Lymphocytes Absolute 0.7 (L) 1.0 - 5.1 K/uL    Monocytes Absolute 0.9 0.0 - 1.3 K/uL    Eosinophils Absolute 0.4 0.0 - 0.6 K/uL    Basophils Absolute 0.1 0.0 - 0.2 K/uL   Comprehensive Metabolic Panel w/ Reflex to MG   Result Value Ref Range    Sodium 138 136 - 145 mmol/L    Potassium reflex Magnesium 2.9 (LL) 3.5 - 5.1 mmol/L    Chloride 96 (L) 99 - 110 mmol/L    CO2 25 21 - 32 mmol/L    Anion Gap 17 (H) 3 - 16    Glucose 119 (H) 70 - 99 mg/dL    BUN 66 (H) 7 - 20 mg/dL    CREATININE 5.0 (H) 0.8 - 1.3 mg/dL    GFR Non- 11 (A) >60    GFR  14 (A) >60    Calcium 9.0 8.3 - 10.6 mg/dL    Total Protein 7.2 6.4 - 8.2 g/dL    Albumin 3.6 3.4 - 5.0 g/dL    Albumin/Globulin Ratio 1.0 (L) 1.1 - 2.2    Total Bilirubin 0.4 0.0 - 1.0 mg/dL    Alkaline Phosphatase 98 40 - 129 U/L    ALT 10 10 - 40 U/L    AST 12 (L) 15 - 37 U/L    Globulin 3.6 g/dL   Blood Gas, Venous   Result Value Ref Range    pH, Alejandro 7.362 7.350 - 7.450    pCO2, Alejandro 42.0 40.0 - 50.0 mmHg    pO2, Alejandro 40 Not Established mmHg    HCO3, Venous 24 23 - 29 mmol/L    Base Excess, Alejandro -1.5 Not Established mmol/L    O2 Sat, Alejandro 71 Not Established %    Carboxyhemoglobin 1.7 %    MetHgb, Alejandro 0.6 <1.5 %    TC02 (Calc), Alejandro 25 Not Established mmol/L    O2 Content, Alejandro 8 Not Established mL/dL    O2 Therapy Unknown    Brain Natriuretic Peptide   Result Value Ref Range    Pro-BNP 14,429 (H) 0 - 449 pg/mL   Troponin   Result Value Ref Range    Troponin 0.04 (H) <0.01 ng/mL   COVID-19   Result Value Ref Range    SARS-CoV-2, NAAT Not Detected Not Detected   Magnesium   Result Value Ref Range    Magnesium 2.30 1.80 - 2.40 mg/dL   Renal Function Panel   Result Value Ref Range    Sodium 139 136 - 145 mmol/L    Potassium 3.5 3.5 - 5.1 mmol/L    Chloride 99 99 - 110 mmol/L    CO2 24 21 - 32 mmol/L    Anion Gap 16 3 - 16    Glucose 103 (H) 70 - 99 mg/dL    BUN 63 (H) 7 - 20 mg/dL    CREATININE 5.2 (HH) 0.8 - 1.3 mg/dL    GFR Non-African American 11 (A) >60    GFR  13 (A) >60    Calcium 9.1 8.3 - 10.6 mg/dL    Phosphorus 5.2 (H) 2.5 - 4.9 mg/dL    Albumin 3.6 3.4 - 5.0 g/dL   Renal Function Panel   Result Value Ref Range    Sodium 144 136 - 145 mmol/L    Potassium 3.4 (L) 3.5 - 5.1 mmol/L    Chloride 102 99 - 110 mmol/L    CO2 28 21 - 32 mmol/L    Anion Gap 14 3 - 16    Glucose 116 (H) 70 - 99 mg/dL    BUN 64 (H) 7 - 20 mg/dL    CREATININE 5.5 (HH) 0.8 - 1.3 mg/dL    GFR Non-African American 10 (A) >60    GFR  12 (A) >60    Calcium 8.9 8.3 - 10.6 mg/dL    Phosphorus 4.5 2.5 - 4.9 mg/dL    Albumin 3.4 3.4 - 5.0 g/dL   Renal Function Panel   Result Value Ref Range    Sodium 140 136 - 145 mmol/L    Potassium 3.8 3.5 - 5.1 mmol/L    Chloride 100 99 - 110 mmol/L    CO2 26 21 - 32 mmol/L    Anion Gap 14 3 - 16    Glucose 102 (H) 70 - 99 mg/dL    BUN 64 (H) 7 - 20 mg/dL    CREATININE 6.0 (HH) 0.8 - 1.3 mg/dL    GFR Non-African American 9 (A) >60    GFR  11 (A) >60    Calcium 8.8 8.3 - 10.6 mg/dL    Phosphorus 5.4 (H) 2.5 - 4.9 mg/dL    Albumin 3.3 (L) 3.4 - 5.0 g/dL   Renal Function Panel   Result Value Ref Range    Sodium 141 136 - 145 mmol/L    Potassium 3.3 (L) 3.5 - 5.1 mmol/L    Chloride 99 99 - 110 mmol/L    CO2 24 21 - 32 mmol/L    Anion Gap 18 (H) 3 - 16    Glucose 118 (H) 70 - 99 mg/dL    BUN 70 (H) 7 - 20 mg/dL    CREATININE 5.4 (HH) 0.8 - 1.3 mg/dL    GFR Non-African American 10 (A) >60    GFR  13 (A) >60    Calcium 9.0 8.3 - 10.6 mg/dL    Phosphorus 5.3 (H) 2.5 - 4.9 mg/dL    Albumin 3.5 3.4 - 5.0 g/dL   CBC   Result Value Ref Range    WBC 9.1 4.0 - 11.0 K/uL    RBC 3.31 (L) 4.20 - 5.90 M/uL    Hemoglobin 9.5 (L) 13.5 - 17.5 g/dL    Hematocrit 29.1 (L) 40.5 - 52.5 %    MCV 87.8 80.0 - 100.0 fL    MCH 28.7 26.0 - 34.0 pg    MCHC 32.7 31.0 - 36.0 g/dL    RDW 16.9 (H) 12.4 - 15.4 %    Platelets 883 380 - 140 K/uL    MPV 8.1 5.0 - 10.5 fL   Renal Function Panel   Result Value Ref Range Sodium 141 136 - 145 mmol/L    Potassium 3.0 (L) 3.5 - 5.1 mmol/L    Chloride 99 99 - 110 mmol/L    CO2 27 21 - 32 mmol/L    Anion Gap 15 3 - 16    Glucose 104 (H) 70 - 99 mg/dL    BUN 64 (H) 7 - 20 mg/dL    CREATININE 6.2 (HH) 0.8 - 1.3 mg/dL    GFR Non-African American 9 (A) >60    GFR  11 (A) >60    Calcium 8.9 8.3 - 10.6 mg/dL    Phosphorus 5.4 (H) 2.5 - 4.9 mg/dL    Albumin 3.5 3.4 - 5.0 g/dL   PTH, Intact   Result Value Ref Range    .7 (H) 14.0 - 72.0 pg/mL   Magnesium   Result Value Ref Range    Magnesium 2.40 1.80 - 2.40 mg/dL   Iron and TIBC   Result Value Ref Range    Iron 32 (L) 59 - 158 ug/dL    TIBC 310 260 - 445 ug/dL    Iron Saturation 10 (L) 20 - 50 %   Ferritin   Result Value Ref Range    Ferritin 53.2 30.0 - 400.0 ng/mL   Renal Function Panel   Result Value Ref Range    Sodium 138 136 - 145 mmol/L    Potassium 3.0 (L) 3.5 - 5.1 mmol/L    Chloride 95 (L) 99 - 110 mmol/L    CO2 27 21 - 32 mmol/L    Anion Gap 16 3 - 16    Glucose 121 (H) 70 - 99 mg/dL    BUN 77 (H) 7 - 20 mg/dL    CREATININE 6.2 (HH) 0.8 - 1.3 mg/dL    GFR Non-African American 9 (A) >60    GFR  11 (A) >60    Calcium 8.7 8.3 - 10.6 mg/dL    Phosphorus 5.7 (H) 2.5 - 4.9 mg/dL    Albumin 3.5 3.4 - 5.0 g/dL   Protime-INR   Result Value Ref Range    Protime 13.1 10.0 - 13.2 sec    INR 1.13 0.86 - 1.14   APTT   Result Value Ref Range    aPTT 29.1 24.2 - 36.2 sec   CBC   Result Value Ref Range    WBC 9.2 4.0 - 11.0 K/uL    RBC 3.19 (L) 4.20 - 5.90 M/uL    Hemoglobin 9.0 (L) 13.5 - 17.5 g/dL    Hematocrit 28.1 (L) 40.5 - 52.5 %    MCV 87.9 80.0 - 100.0 fL    MCH 28.3 26.0 - 34.0 pg    MCHC 32.2 31.0 - 36.0 g/dL    RDW 16.6 (H) 12.4 - 15.4 %    Platelets 752 415 - 201 K/uL    MPV 8.0 5.0 - 10.5 fL   Magnesium   Result Value Ref Range    Magnesium 2.30 1.80 - 2.40 mg/dL   Brain Natriuretic Peptide   Result Value Ref Range    Pro-BNP 13,153 (H) 0 - 449 pg/mL   Magnesium   Result Value Ref Range Magnesium 2.60 (H) 1.80 - 2.40 mg/dL   CBC Auto Differential   Result Value Ref Range    WBC 10.0 4.0 - 11.0 K/uL    RBC 3.43 (L) 4.20 - 5.90 M/uL    Hemoglobin 9.8 (L) 13.5 - 17.5 g/dL    Hematocrit 30.2 (L) 40.5 - 52.5 %    MCV 88.0 80.0 - 100.0 fL    MCH 28.6 26.0 - 34.0 pg    MCHC 32.5 31.0 - 36.0 g/dL    RDW 16.0 (H) 12.4 - 15.4 %    Platelets 842 080 - 901 K/uL    MPV 8.2 5.0 - 10.5 fL    Neutrophils % 75.5 %    Lymphocytes % 7.6 %    Monocytes % 10.6 %    Eosinophils % 5.3 %    Basophils % 1.0 %    Neutrophils Absolute 7.5 1.7 - 7.7 K/uL    Lymphocytes Absolute 0.8 (L) 1.0 - 5.1 K/uL    Monocytes Absolute 1.1 0.0 - 1.3 K/uL    Eosinophils Absolute 0.5 0.0 - 0.6 K/uL    Basophils Absolute 0.1 0.0 - 0.2 K/uL   Renal Function Panel   Result Value Ref Range    Sodium 139 136 - 145 mmol/L    Potassium 3.2 (L) 3.5 - 5.1 mmol/L    Chloride 96 (L) 99 - 110 mmol/L    CO2 25 21 - 32 mmol/L    Anion Gap 18 (H) 3 - 16    Glucose 112 (H) 70 - 99 mg/dL    BUN 82 (HH) 7 - 20 mg/dL    CREATININE 7.3 (HH) 0.8 - 1.3 mg/dL    GFR Non-African American 7 (A) >60    GFR  9 (A) >60    Calcium 8.7 8.3 - 10.6 mg/dL    Phosphorus 6.2 (H) 2.5 - 4.9 mg/dL    Albumin 3.4 3.4 - 5.0 g/dL   Hepatitis B Surface Antibody   Result Value Ref Range    Hep B S Ab <3.50 mIU/mL   Hepatitis B Surface Antigen   Result Value Ref Range    Hep B S Ag Interp Non-reactive Non-reactive   Hepatitis B Core Antibody, Total   Result Value Ref Range    Hep B Core Total Ab Negative Negative   CBC   Result Value Ref Range    WBC 9.3 4.0 - 11.0 K/uL    RBC 3.15 (L) 4.20 - 5.90 M/uL    Hemoglobin 8.9 (L) 13.5 - 17.5 g/dL    Hematocrit 27.8 (L) 40.5 - 52.5 %    MCV 88.3 80.0 - 100.0 fL    MCH 28.2 26.0 - 34.0 pg    MCHC 31.9 31.0 - 36.0 g/dL    RDW 16.6 (H) 12.4 - 15.4 %    Platelets 018 360 - 473 K/uL    MPV 8.4 5.0 - 10.5 fL   Magnesium   Result Value Ref Range    Magnesium 2.40 1.80 - 2.40 mg/dL   Renal Function Panel   Result Value Ref Range Sodium 138 136 - 145 mmol/L    Potassium 3.0 (L) 3.5 - 5.1 mmol/L    Chloride 94 (L) 99 - 110 mmol/L    CO2 27 21 - 32 mmol/L    Anion Gap 17 (H) 3 - 16    Glucose 125 (H) 70 - 99 mg/dL    BUN 69 (H) 7 - 20 mg/dL    CREATININE 5.6 (HH) 0.8 - 1.3 mg/dL    GFR Non-African American 10 (A) >60    GFR  12 (A) >60    Calcium 8.7 8.3 - 10.6 mg/dL    Phosphorus 5.0 (H) 2.5 - 4.9 mg/dL    Albumin 3.4 3.4 - 5.0 g/dL   CBC   Result Value Ref Range    WBC 9.6 4.0 - 11.0 K/uL    RBC 3.44 (L) 4.20 - 5.90 M/uL    Hemoglobin 9.6 (L) 13.5 - 17.5 g/dL    Hematocrit 30.0 (L) 40.5 - 52.5 %    MCV 87.1 80.0 - 100.0 fL    MCH 27.9 26.0 - 34.0 pg    MCHC 32.1 31.0 - 36.0 g/dL    RDW 16.7 (H) 12.4 - 15.4 %    Platelets 332 668 - 832 K/uL    MPV 8.3 5.0 - 10.5 fL   Magnesium   Result Value Ref Range    Magnesium 2.20 1.80 - 2.40 mg/dL   CBC   Result Value Ref Range    WBC 9.3 4.0 - 11.0 K/uL    RBC 3.17 (L) 4.20 - 5.90 M/uL    Hemoglobin 9.0 (L) 13.5 - 17.5 g/dL    Hematocrit 27.9 (L) 40.5 - 52.5 %    MCV 88.1 80.0 - 100.0 fL    MCH 28.4 26.0 - 34.0 pg    MCHC 32.3 31.0 - 36.0 g/dL    RDW 16.7 (H) 12.4 - 15.4 %    Platelets 821 663 - 246 K/uL    MPV 8.1 5.0 - 10.5 fL   Magnesium   Result Value Ref Range    Magnesium 2.50 (H) 1.80 - 2.40 mg/dL   Renal Function Panel   Result Value Ref Range    Sodium 137 136 - 145 mmol/L    Potassium 3.5 3.5 - 5.1 mmol/L    Chloride 97 (L) 99 - 110 mmol/L    CO2 23 21 - 32 mmol/L    Anion Gap 17 (H) 3 - 16    Glucose 117 (H) 70 - 99 mg/dL    BUN 61 (H) 7 - 20 mg/dL    CREATININE 6.3 (HH) 0.8 - 1.3 mg/dL    GFR Non-African American 9 (A) >60    GFR  11 (A) >60    Calcium 8.8 8.3 - 10.6 mg/dL    Phosphorus 5.2 (H) 2.5 - 4.9 mg/dL    Albumin 3.5 3.4 - 5.0 g/dL   Magnesium   Result Value Ref Range    Magnesium 2.40 1.80 - 2.40 mg/dL   Hepatic Function Panel   Result Value Ref Range    Total Protein 7.0 6.4 - 8.2 g/dL    Alkaline Phosphatase 78 40 - 129 U/L    ALT 25 10 - 40 U/L AST 25 15 - 37 U/L    Total Bilirubin <0.2 0.0 - 1.0 mg/dL    Bilirubin, Direct <0.2 0.0 - 0.3 mg/dL    Bilirubin, Indirect see below 0.0 - 1.0 mg/dL   CBC   Result Value Ref Range    WBC 9.7 4.0 - 11.0 K/uL    RBC 3.36 (L) 4.20 - 5.90 M/uL    Hemoglobin 9.4 (L) 13.5 - 17.5 g/dL    Hematocrit 29.3 (L) 40.5 - 52.5 %    MCV 87.2 80.0 - 100.0 fL    MCH 28.0 26.0 - 34.0 pg    MCHC 32.1 31.0 - 36.0 g/dL    RDW 16.3 (H) 12.4 - 15.4 %    Platelets 171 373 - 632 K/uL    MPV 8.5 5.0 - 10.5 fL   Magnesium   Result Value Ref Range    Magnesium 2.50 (H) 1.80 - 2.40 mg/dL   Renal Function Panel   Result Value Ref Range    Sodium 140 136 - 145 mmol/L    Potassium 3.1 (L) 3.5 - 5.1 mmol/L    Chloride 97 (L) 99 - 110 mmol/L    CO2 25 21 - 32 mmol/L    Anion Gap 18 (H) 3 - 16    Glucose 115 (H) 70 - 99 mg/dL    BUN 74 (H) 7 - 20 mg/dL    CREATININE 6.4 (HH) 0.8 - 1.3 mg/dL    GFR Non-African American 9 (A) >60    GFR  10 (A) >60    Calcium 9.0 8.3 - 10.6 mg/dL    Phosphorus 5.7 (H) 2.5 - 4.9 mg/dL    Albumin 3.7 3.4 - 5.0 g/dL   Magnesium   Result Value Ref Range    Magnesium 2.20 1.80 - 2.40 mg/dL   Renal Function Panel   Result Value Ref Range    Sodium 137 136 - 145 mmol/L    Potassium 3.3 (L) 3.5 - 5.1 mmol/L    Chloride 98 (L) 99 - 110 mmol/L    CO2 25 21 - 32 mmol/L    Anion Gap 14 3 - 16    Glucose 112 (H) 70 - 99 mg/dL    BUN 44 (H) 7 - 20 mg/dL    CREATININE 5.3 (HH) 0.8 - 1.3 mg/dL    GFR Non- 11 (A) >60    GFR  13 (A) >60    Calcium 8.7 8.3 - 10.6 mg/dL    Phosphorus 4.2 2.5 - 4.9 mg/dL    Albumin 3.4 3.4 - 5.0 g/dL   CBC   Result Value Ref Range    WBC 9.7 4.0 - 11.0 K/uL    RBC 3.19 (L) 4.20 - 5.90 M/uL    Hemoglobin 9.1 (L) 13.5 - 17.5 g/dL    Hematocrit 27.7 (L) 40.5 - 52.5 %    MCV 86.8 80.0 - 100.0 fL    MCH 28.6 26.0 - 34.0 pg    MCHC 33.0 31.0 - 36.0 g/dL    RDW 16.6 (H) 12.4 - 15.4 %    Platelets 196 946 - 613 K/uL    MPV 8.1 5.0 - 10.5 fL   POC ACT-Low Range Result Value Ref Range    POC ACT  Not Established sec   POC ACT-Low Range   Result Value Ref Range    POC ACT  Not Established sec   POC ACT-Low Range   Result Value Ref Range    POC ACT  Not Established sec   EKG 12 Lead   Result Value Ref Range    Ventricular Rate 81 BPM    Atrial Rate 81 BPM    P-R Interval 174 ms    QRS Duration 112 ms    Q-T Interval 436 ms    QTc Calculation (Bazett) 506 ms    P Axis 84 degrees    R Axis -25 degrees    T Axis 59 degrees    Diagnosis       Sinus rhythm with Premature atrial complexes with Aberrant conductionAnterior infarct (cited on or before 16-APR-2017)Prolonged QTConfirmed by SARAH WAGNER, Charli Carrasco ([id number]) on 1/18/2021 8:02:27 AM   EKG 12 Lead   Result Value Ref Range    Ventricular Rate 131 BPM    Atrial Rate 147 BPM    QRS Duration 122 ms    Q-T Interval 328 ms    QTc Calculation (Bazett) 484 ms    R Axis -61 degrees    T Axis 65 degrees    Diagnosis       Atrial fibrillation with rapid ventricular response with premature ventricular or aberrantly conducted complexesLeft axis deviationRSR' or QR pattern in V1 suggests right ventricular conduction delayLeft ventricular hypertrophy with QRS wideningInferior infarct , age undeterminedAnterolateral infarct (cited on or before 16-APR-2017)Abnormal ECGWhen compared with ECG of 18-JAN-2021 05:45,Significant changes have occurred       Diet:  DIET CARDIAC; Low Sodium (2 GM);  Daily Fluid Restriction: 1500 ml    Activity:  Activity as tolerated (Patient may move about with assist as indicated or with supervision.)    Follow-up:  in the next few days with Deepti Hodge MD      Disposition: home    Condition: Stable      Time Spent: 35 minutes    Electronically signed by Bernardino Seay MD on 1/27/2021 at 1:04 PM    Discharging Hospitalist

## 2021-01-27 NOTE — PROGRESS NOTES
Occupational Therapy  Pt with transfer to ICU. Will need new orders to resume OT services.      Konrad Scott, OTR/L

## 2021-01-27 NOTE — PROGRESS NOTES
Physical Therapy  Pt transfer to ICU, await new referral when approp.   Thank you, Ronn Becerra, PT [phone]

## 2021-01-27 NOTE — CARE COORDINATION
CASE MANAGEMENT DISCHARGE SUMMARY:    DISCHARGE DATE: 1/27/2021    DISCHARGED TO: Home with assistance as needed. Patient confirmed his friend provides support and his sone will be coming home this weekend. Patient denies need at this time. Reviewed IMM with patient. Patient stated, \"I'm pushing for this,\" and is agreeable to discharge. TRANSPORTATION: Patient's friend to transport. TIME: Patient coordinated with RN. PREFERRED PHARMACY: [facility] [address]              NUMBER: [phone]    Dialysis: chair time is Tuesday, Thursday, Saturday at 12:00PM (ARRIVE 20 MIN EARLY).    [facility] [address] [place] [address]  Phone: [phone]  Fax: [phone]     ELSA Miranda, THERESA, Social Work/Case Management   [phone]  Electronically signed by ELSA Miranda LSW on 1/27/2021 at 2:02 PM

## 2021-01-27 NOTE — DISCHARGE INSTR - COC
Continuity of Care Form    Patient Name: Mami    :  1945  MRN:  [id number]    [facility] date:  2021  Discharge date:  ***    Code Status Order: Limited   Advance Directives:   Advance Care Flowsheet Documentation     Date/Time Healthcare Directive Type of Healthcare Directive Copy in [address] Agent's Name Healthcare Agent's Phone Number    21 1600  Yes, patient has an advance directive for healthcare treatment  Durable power of  for health care  Yes, copy in chart  Adult Children  Lori Minium  [phone]          Admitting Physician:  Norma Rowe MD  PCP: Maxi Morales MD    Discharging Nurse: [facility] Unit/Room#: U4U-3393/2110-01  Discharging Unit Phone Number: ***    Emergency Contact:   Extended Emergency Contact Information  Primary Emergency Contact: [address] [place] [address] Phone: [phone]  Relation: Other  Secondary Emergency Contact: Aubrie Serrano  Address: [address] [place] [address] Phone: [phone]  Mobile Phone: [phone]  Relation: Other    Past Surgical History:  Past Surgical History:   Procedure Laterality Date    CORONARY ANGIOPLASTY WITH STENT PLACEMENT  2017    EVERETT to LAD    IR TUNNELED CATHETER PLACEMENT GREATER THAN 5 YEARS  2021    IR TUNNELED CATHETER PLACEMENT GREATER THAN 5 YEARS 2021 [facility]    TUNNELED VENOUS CATHETER PLACEMENT Right 2021    Permacath; RIJ access; 23cm; Dr. Yvan Drummond       Immunization History: There is no immunization history on file for this patient.     Active Problems:  Patient Active Problem List   Diagnosis Code    NSTEMI (non-ST elevated myocardial infarction) ([facility] [address] I21.4    Ischemic chest pain ([facility]) I20.9    SOB (shortness of breath) R06.02    Acute on chronic diastolic CHF (congestive heart failure) (HCC) I50.33    Chronic kidney disease, stage IV (severe) (HCC) N18.4    Hypertension, essential, benign I10    Morbid obesity with BMI of 45.0-49.9, adult ([facility]) E66.01, Z68.42    Chronic combined systolic and diastolic congestive heart failure (HCC) I50.42    Bell's palsy G51.0    Hyperlipemia E78.5    MGUS (monoclonal gammopathy of unknown significance) D47.2    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    Proteinuria R80.9    Secondary malignant neoplasm of bone ([facility]) C79.51    CHF (congestive heart failure), NYHA class I, acute on chronic, combined (HCC) I50.43    Acute on chronic congestive heart failure (HCC) I50.9    Acute on chronic systolic HF (heart failure) ([facility]) I50.23    Hypokalemia E87.6    Severe aortic stenosis I35.0    Coronary artery disease involving native coronary artery of native heart without angina pectoris I25.10       Isolation/Infection:   Isolation          No Isolation        Patient Infection Status     Infection Onset Added Last Indicated Last Indicated By Review Planned Expiration Resolved Resolved By    None active    Resolved    COVID-19 Rule Out 01/18/21 01/18/21 01/18/21 COVID-19 (Ordered)   01/18/21 Rule-Out Test Resulted          Nurse Assessment:  Last Vital Signs: BP (!) 140/69   Pulse 69   Temp 98.2 °F (36.8 °C) (Oral)   Resp 21   Ht 5' 11\" (1.803 m)   Wt 292 lb 8 oz (132.7 kg)   SpO2 91%   BMI 40.80 kg/m²     Last documented pain score (0-10 scale): Pain Level: 0  Last Weight:   Wt Readings from Last 1 Encounters:   01/27/21 292 lb 8 oz (132.7 kg)     Mental Status:  {IP PT MENTAL STATUS:20030}    IV Access:  { FLORENCIO IV ACCESS:947943416}    Nursing Mobility/ADLs:  Walking   {CHP DME BRVO:243966133}  Transfer  {CHP DME TEYN:329039707}  Bathing  {CHP DME HDSI:262564039}  Dressing  {CHP DME TGNU:756701971}  Toileting  {CHP DME TBUM:962540929}  Feeding  {CHP DME FGYX:252965167}  Med Admin  {CHP DME EBSQ:376405130}  Med Delivery   { FLORENCIO MED Delivery:191655302}    Wound Care Documentation and Therapy:        Elimination:  Continence:   · Bowel: {YES / ZW:36308}  · Bladder: {YES / VI:66860}  Urinary Catheter: {Urinary Catheter:267250895}   Colostomy/Ileostomy/Ileal Conduit: {YES / GS:78005}       Date of Last BM: ***    Intake/Output Summary (Last 24 hours) at 2021 1356  Last data filed at 2021 1010  Gross per 24 hour   Intake 1196.5 ml   Output 1950 ml   Net -753.5 ml     I/O last 3 completed shifts: In: 956.5 [P.O.:480; I.V.:76.5]  Out: 2350 [Urine:950]    Safety Concerns:     [address] [employer] Safety Concerns:122392366}    Impairments/Disabilities:      [address] [employer] Impairments/Disabilities:981925716}    Nutrition Therapy:  Current Nutrition Therapy:   [address] [employer] Diet List:036868438}    Routes of Feeding: {CHP DME Other Feedings:088661934}  Liquids: {Slp liquid thickness:30289}  Daily Fluid Restriction: {CHP DME Yes amt example:481797877}  Last Modified Barium Swallow with Video (Video Swallowing Test): {Done Not Done JPCF:941734273}    Treatments at the Time of Hospital Discharge:   Respiratory Treatments: ***  Oxygen Therapy:  {Therapy; copd oxygen:68105}  Ventilator:    { CC Vent KIRW:626384473}    Rehab Therapies: {THERAPEUTIC INTERVENTION:0752741245}  Weight Bearing Status/Restrictions: [address]as  Weight Bearin}  Other Medical Equipment (for information only, NOT a DME order):  {EQUIPMENT:840511823}  Other Treatments: ***    Patient's personal belongings (please select all that are sent with patient):  {P DME Belongings:323901934}    RN SIGNATURE:  {Esignature:377591273}    CASE MANAGEMENT/SOCIAL WORK SECTION    Inpatient Status Date: 2021    Readmission Risk Assessment Score:  Readmission Risk              Risk of Unplanned Readmission:        28           Discharging to Facility/ Agency   · Name:   · Address:  · Phone:  · Fax:    Dialysis Facility (if applicable)   · Name: [facility]   · Address: [address] [place] [address]  · Dialysis Schedule: chair time is Tuesday, Thursday, Saturday at 12:00PM (ARRIVE 20 MIN EARLY).    · Phone: [phone]  · Fax: [phone]    / signature: Electronically signed by ELSA Nunes LSW on 1/27/21 at 2:02 PM EST    PHYSICIAN SECTION    Prognosis: {Prognosis:6041908068}    Condition at Discharge: Jorgito Kaye Patient Condition:619872659}    Rehab Potential (if transferring to Rehab): {Prognosis:9874919867}    Recommended Labs or Other Treatments After Discharge: ***    Physician Certification: I certify the above information and transfer of Rosanne Harris  is necessary for the continuing treatment of the diagnosis listed and that he requires {Admit to Appropriate Level of Care:97015} for {GREATER/LESS:116291749} 30 days.      Update Admission H&P: {CHP DME Changes in RPPGO:518981988}    PHYSICIAN SIGNATURE:  {Esignature:713424873}

## 2021-01-27 NOTE — PROGRESS NOTES
Hospitalist Progress Note      PCP: Tico Jarrell MD    Date of Admission: 1/18/2021    Chief Complaint: SOB    Hospital Course:75m with EF 40%, CKD presents with severe orthopnea, worsening drew, melania. Patient was diuresed, renal function remained poor, HD started. [facility] placed 1/22, replaced with tunneled catheter today    Subjective: Patient denies chest pain, sob. No new complaint, on room air      Medications:  Reviewed    Infusion Medications   Scheduled Medications    sodium chloride flush  10 mL Intravenous 2 times per day    amiodarone  200 mg Oral BID    ceFAZolin  2,000 mg Intravenous 60 Min Pre-Op    aspirin  81 mg Oral Daily    atorvastatin  20 mg Oral Daily    clopidogrel  75 mg Oral Daily    metoprolol succinate  25 mg Oral Daily    heparin (porcine)  5,000 Units Subcutaneous 3 times per day     PRN Meds: sodium chloride flush, acetaminophen, heparin (porcine), albumin human, promethazine **OR** ondansetron, polyethylene glycol, [DISCONTINUED] acetaminophen **OR** acetaminophen      Intake/Output Summary (Last 24 hours) at 1/27/2021 0959  Last data filed at 1/26/2021 2200  Gross per 24 hour   Intake 956.5 ml   Output 1950 ml   Net -993.5 ml       Physical Exam Performed:    BP (!) 118/106   Pulse 79   Temp 98.2 °F (36.8 °C) (Oral)   Resp 19   Ht 5' 11\" (1.803 m)   Wt 292 lb 8 oz (132.7 kg)   SpO2 91%   BMI 40.80 kg/m²     General appearance: No apparent distress, appears stated age and cooperative. HEENT: Pupils equal, round, and reactive to light. Conjunctivae/corneas clear. Neck: Supple, with full range of motion. No jugular venous distention. Trachea midline. Respiratory:  Normal respiratory effort. Clear to auscultation, bilaterally without Rales/Wheezes/Rhonchi. Cardiovascular: Regular rate and rhythm with normal S1/S2 without murmurs, rubs or gallops. Abdomen: Soft, non-tender, non-distended with normal bowel sounds.   Musculoskeletal: No clubbing, cyanosis  Full range Severe aortic stenosis  - s/p baloon valvuloplasty, will need avr    4) CAD  - LAD requiring intervention       Diet: DIET CARDIAC; Low Sodium (2 GM);  Daily Fluid Restriction: 1500 ml  Code Status: Limited         Dispo - dep on timing of Cardiac intervention  Sadie Glover MD

## 2021-01-27 NOTE — PLAN OF CARE
Problem: OXYGENATION/RESPIRATORY FUNCTION  Goal: Patient will maintain patent airway  Outcome: Ongoing  Goal: Patient will achieve/maintain normal respiratory rate/effort  Description: Respiratory rate and effort will be within normal limits for the patient  Outcome: Ongoing     Problem: HEMODYNAMIC STATUS  Goal: Patient has stable vital signs and fluid balance  Outcome: Ongoing     Problem: FLUID AND ELECTROLYTE IMBALANCE  Goal: Fluid and electrolyte balance are achieved/maintained  Outcome: Ongoing     Problem: ACTIVITY INTOLERANCE/IMPAIRED MOBILITY  Goal: Mobility/activity is maintained at optimum level for patient  Outcome: Ongoing     Problem: SAFETY  Goal: Free from accidental physical injury  Outcome: Ongoing  Goal: Free from intentional harm  Outcome: Ongoing     Problem: DAILY CARE  Goal: Daily care needs are met  Outcome: Ongoing     Problem: PAIN  Goal: Patient's pain/discomfort is manageable  Outcome: Ongoing     Problem: SKIN INTEGRITY  Goal: Skin integrity is maintained or improved  Outcome: Ongoing     Problem: KNOWLEDGE DEFICIT  Goal: Patient/S.O. demonstrates understanding of disease process, treatment plan, medications, and discharge instructions.   Outcome: Ongoing     Problem: DISCHARGE BARRIERS  Goal: Patient's continuum of care needs are met  Outcome: Ongoing     Problem: Pain:  Goal: Pain level will decrease  Description: Pain level will decrease  Outcome: Ongoing  Goal: Control of acute pain  Description: Control of acute pain  Outcome: Ongoing

## 2021-01-27 NOTE — PROGRESS NOTES
Department of Internal Medicine  Nephrology Progress Note    SUBJECTIVE:  We are following this patient for melania. Patient progress reviewed. UOP/Labs and wt noted . HPI:  Breathing stable. ROS:  no CP/SOB/GIRALDO; Eating better . ROS neg for the rest of the system . [address]:  medications reviewed. Physical Exam:    VITALS:  BP (!) 140/69   Pulse 69   Temp 98.2 °F (36.8 °C) (Oral)   Resp 21   Ht 5' 11\" (1.803 m)   Wt 292 lb 8 oz (132.7 kg)   SpO2 91%   BMI 40.80 kg/m²   24HR INTAKE/OUTPUT:      Intake/Output Summary (Last 24 hours) at 1/27/2021 1245  Last data filed at 1/27/2021 1010  Gross per 24 hour   Intake 1196.5 ml   Output 1950 ml   Net -753.5 ml       Constitutional:  Looks comfortable   Respiratory:  Decrease BS at bases   Gastrointestinal:  No tenderness. Normal Bowel Sounds  Cardiovascular:  S1, S2 RRR   Edema:  ++ edema    DATA:    CBC:  Lab Results   Component Value Date    WBC 9.7 01/27/2021    RBC 3.19 01/27/2021    HGB 9.1 01/27/2021    HCT 27.7 01/27/2021    MCV 86.8 01/27/2021    MCH 28.6 01/27/2021    MCHC 33.0 01/27/2021    RDW 16.6 01/27/2021     01/27/2021    MPV 8.1 01/27/2021     CMP:  Lab Results   Component Value Date     01/27/2021    K 3.3 01/27/2021    K 2.9 01/18/2021    CL 98 01/27/2021    CO2 25 01/27/2021    BUN 44 01/27/2021    CREATININE 5.3 01/27/2021    GFRAA 13 01/27/2021    AGRATIO 1.0 01/18/2021    LABGLOM 11 01/27/2021    GLUCOSE 112 01/27/2021    PROT 7.0 01/25/2021    CALCIUM 8.7 01/27/2021    BILITOT <0.2 01/25/2021    ALKPHOS 78 01/25/2021    AST 25 01/25/2021    ALT 25 01/25/2021      Hepatic Function Panel:   Lab Results   Component Value Date    ALKPHOS 78 01/25/2021    ALT 25 01/25/2021    AST 25 01/25/2021    PROT 7.0 01/25/2021    BILITOT <0.2 01/25/2021    BILIDIR <0.2 01/25/2021    IBILI see below 01/25/2021      Phosphorus:   Lab Results   Component Value Date    PHOS 4.2 01/27/2021       ASSESSMENT/PLAN:  1.  ESRD    - temporary dialysis catheter by IR 01/22/21 followed by [facility] 1/24 .    - HD started 01/22/21 and second 01/23/21   - outpt dialysis arranged at [facility] on TTS schedule (time?). Next hd in am. Pt wants to go home . Will need perm access. Will need vein mapping and follow with surgery . 2. ACSHF   - challenge EDW    3. Hypotenion with HD   - proamatine available    4. Anemia stable . 5. Hypokalemia   - on supp. 6. Mod AS   - S/p Cardiac cath . Will need angioplasty of LAD in near future . - S/p valvuloplasty . Will need TAVR in future per cards . Advised to f/u with Dr Jasmyn Briones in 3-4 wks .        Scott Hand MD,FACP

## 2021-01-27 NOTE — PROGRESS NOTES
Occupational Therapy    Resume OT order received following transfer to ICU. Per physical therapy, pt does not warrant OT at this time. Pt also with discharge order. Will sign off at this time.     Electronically signed by Shira Cruz OT on 1/27/2021 at 1:49 PM

## 2021-01-28 LAB
EKG ATRIAL RATE: 147 BPM
EKG DIAGNOSIS: NORMAL
EKG Q-T INTERVAL: 328 MS
EKG QRS DURATION: 122 MS
EKG QTC CALCULATION (BAZETT): 484 MS
EKG R AXIS: -61 DEGREES
EKG T AXIS: 65 DEGREES
EKG VENTRICULAR RATE: 131 BPM

## 2021-01-28 PROCEDURE — 93010 ELECTROCARDIOGRAM REPORT: CPT | Performed by: INTERNAL MEDICINE

## 2021-01-29 NOTE — PROGRESS NOTES
known as: ADALAT CC     therapeutic multivitamin-minerals tablet           Where to Get Your Medications      These medications [facility] to [facility] [address], [address], [address] [place] [address]    Phone: [phone]   · amiodarone 200 MG tablet  · b complex-C-folic acid 1 MG capsule  · metoprolol succinate 25 MG extended release tablet         [address]  Heart Failure Discharge Medication Reconciliation Program  [phone]

## 2021-01-30 ENCOUNTER — FOLLOWUP TELEPHONE ENCOUNTER (OUTPATIENT)
Dept: INPATIENT UNIT | Age: 76
End: 2021-01-30

## 2021-01-30 NOTE — PROGRESS NOTES
LE 1400 Attempted to reach patient for 72 hour HF hospital follow up. Call rang and rang. No opportunity to leave VM. Pt does have HD on Saturday but unsure of final time slot. Will attempt again later.

## 2021-01-30 NOTE — PROGRESS NOTES
Third and final attempt to reach patient for 72 hour HF hospital follow up. Unable to reach him. He was aware of PCP appt on 2/1 and has HD T, Th, and S. He was scheduled for HD this date.

## 2021-02-04 NOTE — PROGRESS NOTES
[address]      Cardiology Progress Note    Jeremías Ledezma  1945    2021      CC: \"My breathing is much better. \"     HPI:  The patient is 80 y.o. male with a past medical history significant for HTN, CKD with Cr of 2.0 came into [facility] with chest pain started 4/15/17. Today, he is here for hospital follow up. He says that his breathing is much better since the procedure. He continues to walk with a cane. Patient denies exertional chest pain/pressure, dyspnea at rest, GIRALDO, PND, orthopnea, palpitations, lightheadedness, weight changes, changes in LE edema, and syncope. Patient reports compliance to his medications.      Past Medical History:   Diagnosis Date    Arthritis     Bell's palsy     Cancer ([facility] [address]     CHF (congestive heart failure) ([facility] [address]     Coronary artery disease involving native coronary artery of native heart without angina pectoris     Hyperlipidemia     Hypertension     Influenza B 4/2/15    Kidney failure     sees Dr Monica Lopez      Past Surgical History:   Procedure Laterality Date    CORONARY ANGIOPLASTY WITH STENT PLACEMENT  2017    EVERETT to LAD    IR TUNNELED CATHETER PLACEMENT GREATER THAN 5 YEARS  2021    IR TUNNELED CATHETER PLACEMENT GREATER THAN 5 YEARS 2021 [facility]    TUNNELED VENOUS CATHETER PLACEMENT Right 2021    Permacath; RIJ access; 23cm; Dr. Pedro Munson     Family History   Problem Relation Age of Onset    Cancer Mother     Cancer Father      Social History     Tobacco Use    Smoking status: Former Smoker     Quit date: 1986     Years since quittin.0    Smokeless tobacco: Never Used    Tobacco comment: will not start    Substance Use Topics    Alcohol use: No    Drug use: No       No Known Allergies  Current Outpatient Medications   Medication Sig Dispense Refill    metoprolol succinate (TOPROL XL) 25 MG extended release tablet Take 1 tablet by mouth daily 30 tablet 3    b complex-C-folic acid (NEPHROCAPS) 1 MG capsule Take 1 capsule by mouth daily 30 capsule 3    amiodarone (CORDARONE) 200 MG tablet Take 1 tablet by mouth 2 times daily 60 tablet 0    clopidogrel (PLAVIX) 75 MG tablet TAKE 1 TABLET BY MOUTH EVERY DAY 90 tablet 3    aspirin 81 MG chewable tablet Take 81 mg by mouth daily.  atorvastatin (LIPITOR) 20 MG tablet Take 20 mg by mouth daily. No current facility-administered medications for this visit. Review of Systems:    Constitutional: negative  Eyes: negative  Ears, nose, mouth, throat, and face: negative  Respiratory: negative  Cardiovascular: negative  Gastrointestinal: negative  Genitourinary:negative  Integument/breast: negative  Hematologic/lymphatic: negative  Musculoskeletal:negative  Neurological: negative  Behavioral/Psych: negative  Endocrine: negative  Allergic/Immunologic: negative     Physical Exam:   Vitals:    02/05/21 1150   BP: 128/74   Pulse: 67   Temp: 97.5 °F (36.4 °C)   SpO2: 96%   Weight: 293 lb (132.9 kg)   Height: 5' 11\" (1.803 m)     Wt Readings from Last 3 Encounters:   02/05/21 293 lb (132.9 kg)   01/27/21 292 lb 8 oz (132.7 kg)   12/03/20 (!) 313 lb 3.2 oz (142.1 kg)       Constitutional: He is oriented to person, place, and time. He appears well-developed and well-nourished. In no acute distress. Head: Normocephalic and atraumatic. Pupils equal and round. Neck: Neck supple. No JVP or carotid bruit appreciated. No mass and no thyromegaly present. No lymphadenopathy present. Cardiovascular: Normal rate. Normal heart sounds. Exam reveals no gallop and no friction rub. No murmur heard. Pulmonary/Chest: Effort normal and breath sounds normal. No respiratory distress. He has no wheezes, rhonchi or rales. Abdominal: Soft, non-tender. Bowel sounds are normal. He exhibits no organomegaly, mass or bruit. Extremities: mil BLE edema, no cyanosis, or clubbing.      Pulses are 2+ radial/dorsalis present. 3. The left circumflex comes from the left main coronary artery and gives  rise to obtuse marginal branches. The mid portion has approximately 60% stenosis. 4. The right coronary artery comes from the right coronary cusp, giving rise  to posterior descending artery and posterolateral branch. The proximal mid  portion has 60% stenosis present. INTERVENTION PERFORMED: We intervened on the proximal left anterior  descending artery, placed a stent 3.0 x 18 post-dilated to 3.9 mm. A DRUG COATED XIENCE ALPINE stent was placed in the LAD    ECHO: 8/7/18  Summary  Suboptimal parasternal image quality. Ejection fraction is visually estimated to be 50-55%. There is hypokinesis of the distal anteroseptal wall. Right ventricular size and function are normal.  Mildly dilated left atrium. There are no significant valvular abnormalities appreciated in this study.     ECHO  9/26/2020  Ejection fraction is visually estimated to be 40%. There is severe hypokinesis of the entire apical myocardium and and  hypokinesis of the distal septal wall. There is severe concentric left ventricular hypertrophy. Grade II diastolic dysfunction with elevated LV filling pressures. Mild mitral regurgitation is present. No evidence of mitral stenosis. Mild calcification of the mitral valve noted. Moderate to severe aortic stenosis with a peak velocity of 437m/s and a mean pressure gradient of 50mmHg. Mild aortic regurgitation. Moderately dilated right ventricle. Right ventricular systolic function is normal .     Cardiac Cath 1/18/2021  ANGIOGRAPHY FINDINGS:  1. Left main:  Normal.  2.  Left anterior descending artery midportion 80% stenosis. 3.  Left circumflex is patent. No stenosis. 4.  Right coronary artery comes from the right coronary cusp. It is  patent without significant stenosis.     HEMODYNAMIC FINDINGS:  Aortic valve gradient is 36 mmHg.   With  dobutamine 20 mcg/kg/minute, it reaches up to 48 mmHg.     SUMMARY:  Success balloon aortic valvuloplasty. Assessment/Plan: Aortic valve stenosis  S/p  Success balloon aortic valvuloplasty. I have discussed Dr Rosemarie Matta for TAVR. Coronary artery disease Ischemic CP with NSTEMI  MI involving anterior wall   --4/16/17 We intervened on the proximal left anterior descending artery, placed a stent 3.0 x 18 post-dilated to 3.9 mm  Continue Asa, Plavix, B-blocker and statin therapy. In view of his recent [facility] recommend coronary angiogram. Risks, benefits, expectations and alternative treatments discussed. The patient verbalizes understanding and agrees to proceed. Ischemic Cardiomyopathy/systolic heart failure:   Appears compensated. Continue Asa and B-blocker. Hypertension  Controlled. Continue current medical management.      Chronic kidney disease  Following with nephrology. Diabetes. Managed by PCP     Morbid obesity  Encouraged increase aerobic exercise and heart healthy diet      Follow up in 6 months. Thank you very much for allowing me to participate in the care of your patient. Please do not hesitate to contact me if you have any questions.     Sincerely,    May Adorno MD, MPH      [facility], [address] [place], [place] [address]J[facility] Edwards [facility]  Ph: [phone]  Fax: [phone]

## 2021-02-05 ENCOUNTER — OFFICE VISIT (OUTPATIENT)
Dept: CARDIOLOGY CLINIC | Age: 76
End: 2021-02-05
Payer: MEDICARE

## 2021-02-05 VITALS
SYSTOLIC BLOOD PRESSURE: 128 MMHG | HEIGHT: 71 IN | WEIGHT: 293 LBS | BODY MASS INDEX: 41.02 KG/M2 | OXYGEN SATURATION: 96 % | TEMPERATURE: 97.5 F | HEART RATE: 67 BPM | DIASTOLIC BLOOD PRESSURE: 74 MMHG

## 2021-02-05 DIAGNOSIS — I50.42 CHRONIC COMBINED SYSTOLIC AND DIASTOLIC CONGESTIVE HEART FAILURE (HCC): ICD-10-CM

## 2021-02-05 DIAGNOSIS — I25.10 CORONARY ARTERY DISEASE INVOLVING NATIVE CORONARY ARTERY OF NATIVE HEART WITHOUT ANGINA PECTORIS: ICD-10-CM

## 2021-02-05 DIAGNOSIS — I35.0 SEVERE AORTIC STENOSIS: ICD-10-CM

## 2021-02-05 DIAGNOSIS — I10 HYPERTENSION, ESSENTIAL, BENIGN: Primary | ICD-10-CM

## 2021-02-05 PROCEDURE — 99024 POSTOP FOLLOW-UP VISIT: CPT | Performed by: INTERNAL MEDICINE

## 2021-02-05 PROCEDURE — G8417 CALC BMI ABV UP PARAM F/U: HCPCS | Performed by: INTERNAL MEDICINE

## 2021-02-05 PROCEDURE — G8484 FLU IMMUNIZE NO ADMIN: HCPCS | Performed by: INTERNAL MEDICINE

## 2021-02-05 PROCEDURE — G8427 DOCREV CUR MEDS BY ELIG CLIN: HCPCS | Performed by: INTERNAL MEDICINE

## 2021-02-05 PROCEDURE — 1111F DSCHRG MED/CURRENT MED MERGE: CPT | Performed by: INTERNAL MEDICINE

## 2021-02-05 PROCEDURE — 93000 ELECTROCARDIOGRAM COMPLETE: CPT | Performed by: INTERNAL MEDICINE

## 2021-02-05 PROCEDURE — 4040F PNEUMOC VAC/ADMIN/RCVD: CPT | Performed by: INTERNAL MEDICINE

## 2021-02-05 PROCEDURE — 1123F ACP DISCUSS/DSCN MKR DOCD: CPT | Performed by: INTERNAL MEDICINE

## 2021-02-05 PROCEDURE — 1036F TOBACCO NON-USER: CPT | Performed by: INTERNAL MEDICINE

## 2021-02-05 PROCEDURE — 3017F COLORECTAL CA SCREEN DOC REV: CPT | Performed by: INTERNAL MEDICINE

## 2021-02-05 NOTE — PATIENT INSTRUCTIONS
911 anytime you think you may need emergency care. For example, call if:    · You passed out (lost consciousness).     · You have symptoms of a heart attack. These may include:  ? Chest pain or pressure, or a strange feeling in the chest.  ? Sweating. ? Shortness of breath. ? Nausea or vomiting. ? Pain, pressure, or a strange feeling in the back, neck, jaw, or upper belly or in one or both shoulders or arms. ? Lightheadedness or sudden weakness. ? A fast or irregular heartbeat. After you call 911, the  may tell you to chew 1 adult-strength or 2 to 4 low-dose aspirin. Wait for an ambulance. Do not try to drive yourself. Call your doctor now or seek immediate medical care if:    · You develop new symptoms of aortic valve stenosis, such as chest pain, dizziness, or shortness of breath.     · You have new or increased shortness of breath.     · You are dizzy or lightheaded, or you feel like you may faint.     · You have sudden weight gain, such as more than 2 to 3 pounds in a day or 5 pounds in a week. (Your doctor may suggest a different range of weight gain.)     · You have increased swelling in your legs, ankles, or feet. Watch closely for changes in your health, and be sure to contact your doctor if:    · You have trouble making healthy lifestyle changes.     · You want more information about healthy lifestyle changes. Where can you learn more? Go to https://Sleep.FM.PhoneTell. org and sign in to your AWID account. Enter U586 in the [facility] box to learn more about \"Aortic Valve Stenosis: Care Instructions. \"     If you do not have an account, please click on the \"Sign Up Now\" link. Current as of: December 16, 2019               Content Version: 12.6  © 9776-5803 [employer]. Care instructions adapted under license by [facility] ([facility]).  If you have questions about a medical condition or this instruction, always ask your healthcare professional. Americo Calvin,

## 2021-02-08 ENCOUNTER — TELEPHONE (OUTPATIENT)
Dept: CARDIOLOGY CLINIC | Age: 76
End: 2021-02-08

## 2021-02-08 NOTE — TELEPHONE ENCOUNTER
[facility] scheduled for 3/10/2021 at 8:30  Arrive at 7:00      The morning of your procedure you will park in the hospital parking lot and report directly to the cath lab to check in.     Pre-Procedure Instructions   1. You will need to fast for at least 8 hours prior to procedure. No caffeine the morning of.   2. All other medications can be taken in the morning with sips of water. 3. You will need to take 325 mg aspirin the morning of. If you are currently taking 81 mg please take 4 tablets that morning. 4. Do not use any lotions, creams or perfume the morning of procedure. 5. Pre-procedure lab work will need to be completed 5-7 days prior to procedure. 6. Please have a responsible adult to drive you home after procedure. We advise you have someone to stay with you for 24 hours following procedure for precautionary measures. Depending on procedure you may require an overnight stay. 7. Cath lab will provide you with all post procedure instructions. If you have any questions regarding the procedure itself or medications, please call [phone] and ask to speak with a nurse. Published on [employer] and e-mail to [place]. Called and spoke to patient. Went over pre-procedure instructions. He verbalized understanding. He says that he does not have anyone to stay with him for 24 hours after the procedure as he lives alone and does not have any family. He verbalized understanding of all instructions. Encouraged him to call with any further questions or concerns.

## 2021-02-08 NOTE — TELEPHONE ENCOUNTER
Can you get Mr. Floresita Cantu set up with Dr. González Vega as a new patient (per Dr. Ray Clancy) for severe aortic stenosis? Please make it a Thursday office day and 1st avail is fine.  Juan Diego CARPENTER

## 2021-02-10 NOTE — TELEPHONE ENCOUNTER
Spoke with Dr. Sanaz Charles about this. He states that he will speak with the patient.  Juan Diego CARPENTER

## 2021-02-22 NOTE — PROGRESS NOTES
[facility]    H+P // CONSULT // OUTPATIENT VISIT // Chavez Porter     Referring Doctor Gisel Moore MD   Encounter Type Followup     CHIEF COMPLAINT     Visit Type Chronic   Symptoms None   Problems AS, CAD, HTN     HISTORY OF PRESENT ILLNESS      GEN - new patient for AS. Seen by Dr. Sanaz Charles and BAV performed. Awaiting staged PCI 3/10. Denies cp. SOB with exertion noted.  CAD - Denies cp, dizziness, syncope, palpitations. SOB with exertion. Planned PCI soon   HTN - Ambulatory BP readings in good range. No HA or dizziness.  CHOL - Last cholesterol reviewed and in good range. Tolerating statin without side effects.  ESRD - on dialysis for 1 month. \"Hates\" dialysis but plans to continue.  AFIB - brief episode, on amiodarone   MED - Compliant with CV meds listed below without notable side effects. HISTORY/ALLERGIES/ROS     MedHx:  has a past medical history of Arthritis, Bell's palsy, Cancer ([facility] [address] CHF (congestive heart failure) ([facility] [address] Coronary artery disease involving native coronary artery of native heart without angina pectoris, Hyperlipidemia, Hypertension, Influenza B, Kidney failure, Nosebleed, and Radiation. SurgHx:  has a past surgical history that includes Tunneled venous catheter placement (Right, 01/25/2021); IR TUNNELED CVC PLACE WO SQ PORT/PUMP > 5 YEARS (01/25/2021); and Coronary angioplasty with stent (04/16/2017). SocHx:  reports that he quit smoking about 35 years ago. He has never used smokeless tobacco. He reports that he does not drink alcohol or use drugs. FamHx: family history includes Cancer in his father and mother. Allerg: Patient has no known allergies.    ROS: [x]Full ROS obtained and negative except as mentioned in HPI     MEDICATIONS      Current Outpatient Medications   Medication Sig Dispense Refill    metoprolol succinate (TOPROL XL) 25 MG extended release tablet Take 1 tablet by mouth daily 30 tablet 3    b complex-C-folic acid (NEPHROCAPS) 1 MG capsule Take 1 capsule by mouth daily 30 capsule 3    amiodarone (CORDARONE) 200 MG tablet Take 1 tablet by mouth 2 times daily 60 tablet 0    clopidogrel (PLAVIX) 75 MG tablet TAKE 1 TABLET BY MOUTH EVERY DAY 90 tablet 3    aspirin 81 MG chewable tablet Take 81 mg by mouth daily.  atorvastatin (LIPITOR) 20 MG tablet Take 20 mg by mouth daily. No current facility-administered medications for this visit.       Reviewed with patient and will remain unchanged except as mentioned in A/P  PHYSICAL EXAM     Vitals:    02/25/21 0727   BP: 136/62   Pulse: 67   SpO2: 97%      Gen Alert, coop, no distress Heart  Rrr, 4/6   Head NC, AT, no abnorm Abd  Soft, NT, +BS, no mass, no OM   Eyes PER, conj/corn clear Ext  Ext nl, AT, no C/C/E   Nose Nares nl, no drain, NT Pulse 2+ and symmetric   Throat Lips, mucosa, tongue nl Skin Col/text/turg nl, no vis rash/les   Neck S/S, TM, NT, no bruit/JVD Psych Nl mood and affect   Lung CTA-B, unlabored, no DTP Lymph   No cervical or axillary LA   Ch wall NT, no deform Neuro  Nl gross M/S exam     ASSESSMENT AND PLAN     *AS    Date EF Detail   Sx     sob   DATA   Most recent TTE, [facility] reviewed personally   NYHA   II   Hx 1/21  BAV, MG post 36 Yumiko Muss)   TTE 8/18 9/20 1/21 55%  40%  40%   Mod-severe MG 50, mild AI, Severe HK of apical myocardium, severe LVH  Severe AS MG 55   Plan     TAVR workup   *CAD   Date EF Results   Sx   sob   Data   Most recent EKG, ECHO and C reviewed personally   [facility] 4/17 1/21  PCI of proxLAD Yumiko Muss)  Needs PCI of LAD Yumiko Muss)   Plan   Continue aggressive medical treatment at doses above  PCI 3/10 scheduled   *HTN  Status Controlled, vitals and available ambulatory monitoring logs reviewed personally  Plan Counseled on diet/salt/exercise/weight, continue meds at doses above  *CHOL  Status  controlled with last LDL of 59 (goal <70) and HDL of 34 (9/20), labs reviewed personally  Plan Counseled on diet/exercise/weight, continue

## 2021-02-25 ENCOUNTER — OFFICE VISIT (OUTPATIENT)
Dept: CARDIOLOGY CLINIC | Age: 76
End: 2021-02-25
Payer: MEDICARE

## 2021-02-25 ENCOUNTER — TELEPHONE (OUTPATIENT)
Dept: CARDIOLOGY CLINIC | Age: 76
End: 2021-02-25

## 2021-02-25 VITALS
SYSTOLIC BLOOD PRESSURE: 136 MMHG | HEIGHT: 71 IN | BODY MASS INDEX: 41.16 KG/M2 | DIASTOLIC BLOOD PRESSURE: 62 MMHG | OXYGEN SATURATION: 97 % | HEART RATE: 67 BPM | WEIGHT: 294 LBS

## 2021-02-25 DIAGNOSIS — I10 ESSENTIAL HYPERTENSION: ICD-10-CM

## 2021-02-25 DIAGNOSIS — I25.10 CORONARY ARTERY DISEASE DUE TO LIPID RICH PLAQUE: ICD-10-CM

## 2021-02-25 DIAGNOSIS — I25.83 CORONARY ARTERY DISEASE DUE TO LIPID RICH PLAQUE: ICD-10-CM

## 2021-02-25 DIAGNOSIS — R42 DIZZINESS: ICD-10-CM

## 2021-02-25 DIAGNOSIS — I35.0 NONRHEUMATIC AORTIC VALVE STENOSIS: Primary | ICD-10-CM

## 2021-02-25 PROCEDURE — 1036F TOBACCO NON-USER: CPT | Performed by: INTERNAL MEDICINE

## 2021-02-25 PROCEDURE — 1123F ACP DISCUSS/DSCN MKR DOCD: CPT | Performed by: INTERNAL MEDICINE

## 2021-02-25 PROCEDURE — G8427 DOCREV CUR MEDS BY ELIG CLIN: HCPCS | Performed by: INTERNAL MEDICINE

## 2021-02-25 PROCEDURE — 4040F PNEUMOC VAC/ADMIN/RCVD: CPT | Performed by: INTERNAL MEDICINE

## 2021-02-25 PROCEDURE — 99214 OFFICE O/P EST MOD 30 MIN: CPT | Performed by: INTERNAL MEDICINE

## 2021-02-25 PROCEDURE — 3017F COLORECTAL CA SCREEN DOC REV: CPT | Performed by: INTERNAL MEDICINE

## 2021-02-25 PROCEDURE — G8417 CALC BMI ABV UP PARAM F/U: HCPCS | Performed by: INTERNAL MEDICINE

## 2021-02-25 PROCEDURE — 1111F DSCHRG MED/CURRENT MED MERGE: CPT | Performed by: INTERNAL MEDICINE

## 2021-02-25 PROCEDURE — G8484 FLU IMMUNIZE NO ADMIN: HCPCS | Performed by: INTERNAL MEDICINE

## 2021-02-25 NOTE — LETTER
[address] [place] [address] Cardiology - [address]  [address] [address]  Phone: [phone]  Fax: [phone]    Yaritza Lopez MD        February 25, 2021     Rg Paris MD  [id number]  [address] [facility] [address]    Patient: Morley Siemens  MR Number: [id number]  YOB: 1945  Date of Visit: 2/25/2021    Dear Dr. Rg Paris:    [facility]    H+P // CONSULT // OUTPATIENT VISIT // Namon Gregg     Referring Doctor Rg Paris MD   Encounter Type Followup     CHIEF COMPLAINT     Visit Type Chronic   Symptoms None   Problems AS, CAD, HTN     HISTORY OF PRESENT ILLNESS     ? GEN - new patient for AS. Seen by Dr. Pratima Ortiz and BAV performed. Awaiting staged PCI 3/10. Denies cp. SOB with exertion noted. ? CAD - Denies cp, dizziness, syncope, palpitations. SOB with exertion. Planned PCI soon  ? HTN - Ambulatory BP readings in good range. No HA or dizziness. ? CHOL - Last cholesterol reviewed and in good range. Tolerating statin without side effects. ? ESRD - on dialysis for 1 month. \"Hates\" dialysis but plans to continue. ? AFIB - brief episode, on amiodarone  ? MED - Compliant with CV meds listed below without notable side effects. HISTORY/ALLERGIES/ROS     MedHx:  has a past medical history of Arthritis, Bell's palsy, Cancer ([facility] [address] CHF (congestive heart failure) ([facility] [address] Coronary artery disease involving native coronary artery of native heart without angina pectoris, Hyperlipidemia, Hypertension, Influenza B, Kidney failure, Nosebleed, and Radiation. SurgHx:  has a past surgical history that includes Tunneled venous catheter placement (Right, 01/25/2021); IR TUNNELED CVC PLACE WO SQ PORT/PUMP > 5 YEARS (01/25/2021); and Coronary angioplasty with stent (04/16/2017). SocHx:  reports that he quit smoking about 35 years ago. He has never used smokeless tobacco. He reports that he does not drink alcohol or use drugs. FamHx: family history includes Cancer in his father and mother. Allerg: Patient has no known allergies. ROS: [x]Full ROS obtained and negative except as mentioned in HPI     MEDICATIONS      Current Outpatient Medications   Medication Sig Dispense Refill    metoprolol succinate (TOPROL XL) 25 MG extended release tablet Take 1 tablet by mouth daily 30 tablet 3    b complex-C-folic acid (NEPHROCAPS) 1 MG capsule Take 1 capsule by mouth daily 30 capsule 3    amiodarone (CORDARONE) 200 MG tablet Take 1 tablet by mouth 2 times daily 60 tablet 0    clopidogrel (PLAVIX) 75 MG tablet TAKE 1 TABLET BY MOUTH EVERY DAY 90 tablet 3    aspirin 81 MG chewable tablet Take 81 mg by mouth daily.  atorvastatin (LIPITOR) 20 MG tablet Take 20 mg by mouth daily. No current facility-administered medications for this visit.       Reviewed with patient and will remain unchanged except as mentioned in A/P  PHYSICAL EXAM     Vitals:    02/25/21 0727   BP: 136/62   Pulse: 67   SpO2: 97%      Gen Alert, coop, no distress Heart  Rrr, 4/6   Head NC, AT, no abnorm Abd  Soft, NT, +BS, no mass, no OM   Eyes PER, conj/corn clear Ext  Ext nl, AT, no C/C/E   Nose Nares nl, no drain, NT Pulse 2+ and symmetric   Throat Lips, mucosa, tongue nl Skin Col/text/turg nl, no vis rash/les   Neck S/S, TM, NT, no bruit/JVD Psych Nl mood and affect   Lung CTA-B, unlabored, no DTP Lymph   No cervical or axillary LA   Ch wall NT, no deform Neuro  Nl gross M/S exam     ASSESSMENT AND PLAN     *AS    Date EF Detail   Sx     sob   DATA   Most recent TTE, C reviewed personally   NYHA   II   Hx 1/21  BAV, MG post 36 Aleksandra Rodrigues)   TTE 8/18 9/20 1/21 55%  40%  40%   Mod-severe MG 50, mild AI, Severe HK of apical myocardium, severe LVH  Severe AS MG 55   Plan     TAVR workup   *CAD   Date EF Results   Sx   sob   Data   Most recent EKG, ECHO and LHC reviewed personally   [facility] 4/17 1/21  PCI of proxLAD Aleksandra Rodrigues)  Needs PCI of LAD Aleksandra Rodrigues) Plan   Continue aggressive medical treatment at doses above  PCI 3/10 scheduled   *HTN  Status Controlled, vitals and available ambulatory monitoring logs reviewed personally  Plan Counseled on diet/salt/exercise/weight, continue meds at doses above  *CHOL  Status  controlled with last LDL of 59 (goal <70) and HDL of 34 (9/20), labs reviewed personally  Plan Counseled on diet/exercise/weight, continue high-intensity statin, lipid/liver surveillance per PCP  *CKD  Follows with nephrology, on dialysis TIW  Plan Per nephrology  *AFIB  Status Transient, no known recurrence, regular today  Plan On amio, not on AC, per Dr. Joyce Howe Discussed importance of compliance with meds/diet/salt/exercise; avoid tob/alc/drugs; patient verbalized understanding  *FOLLOWUP  After testing    [address] Navin Santamaria, am scribing for and in the presence of Wild De Santiago MD.   SignedNavin 02/22/21 1:05 PM   Provider Cate Viramontes is working as a scribe for and in the presence of me (Wild De Santiago MD). Working as a scribe, Navin Fish may have prepopulated components of this note with my historical  intellectual property under my direct supervision. Any additions to this intellectual property were performed in my presence and at my direction.   Furthermore, the content and accuracy of this note have been reviewed by me Wild De Santiago MD).  2/25/2021 7:46 AM    CODING     Category Diagnosis   Stable chronic illness  (22196/71844 - 2 or more) CAD, HTN, CKD, CHOL   Chronic illness w/: Exac, progr or SA of Tx  (43504/76332 - 1 or more) AS   Undiagnosed new problem w/: uncertain prognosis  (13761/32825 - 1 or more)    Acute illness with systemic Sx  (63617/41205 - 1 or more)    Acute, complicated injury  (73810/17721 - 1 or more)    95810 1 or more chronic illness with exacerbation, progression or SA of treatment    Time 30-39 minutes spent preparing to see patient including reviewing patient history/prior tests/prior consults, performing a medical exam, counseling and educating patient/family/caregiver, ordering medications/tests/procedures, referring and communicating with PCPs and other pertinent consultants, documenting information in the EMR, independently interpreting results and communicating to family and coordination of patient care. If you have questions, please do not hesitate to call me. I look forward to following Jeremías along with you.     Sincerely,        Clau Jules MD

## 2021-02-25 NOTE — TELEPHONE ENCOUNTER
TAVR testing is scheduled for 3-9-21 per below:    8:00 am - [facility] CTA CHEST ABD PELVIC W/CONTRAST  PREPS:  * NPO 4 hours prior to procedure  * Arrive 30 minutes prior to exam (7:30 am)  * No necklaces, underwire bras, body piercing, no pants with zippers, belts,or suspenders  * Bring a complete list of medications or the test cannot be completed.      9:00 am - CAROTID STUDY  * Please wear easy to remove clothing  * Please take all medication, unless otherwise instructed  * You will be here for approximately 1 hour

## 2021-03-01 ENCOUNTER — TELEPHONE (OUTPATIENT)
Dept: CARDIOLOGY CLINIC | Age: 76
End: 2021-03-01

## 2021-03-05 DIAGNOSIS — I25.10 CORONARY ARTERY DISEASE INVOLVING NATIVE CORONARY ARTERY OF NATIVE HEART WITHOUT ANGINA PECTORIS: ICD-10-CM

## 2021-03-05 DIAGNOSIS — I35.0 SEVERE AORTIC STENOSIS: ICD-10-CM

## 2021-03-05 LAB
ANION GAP SERPL CALCULATED.3IONS-SCNC: 14 MMOL/L (ref 3–16)
BUN BLDV-MCNC: 30 MG/DL (ref 7–20)
CALCIUM SERPL-MCNC: 8.8 MG/DL (ref 8.3–10.6)
CHLORIDE BLD-SCNC: 95 MMOL/L (ref 99–110)
CO2: 30 MMOL/L (ref 21–32)
CREAT SERPL-MCNC: 4.6 MG/DL (ref 0.8–1.3)
GFR AFRICAN AMERICAN: 15
GFR NON-AFRICAN AMERICAN: 13
GLUCOSE BLD-MCNC: 128 MG/DL (ref 70–99)
HCT VFR BLD CALC: 31.3 % (ref 40.5–52.5)
HEMOGLOBIN: 10 G/DL (ref 13.5–17.5)
MCH RBC QN AUTO: 27.5 PG (ref 26–34)
MCHC RBC AUTO-ENTMCNC: 32 G/DL (ref 31–36)
MCV RBC AUTO: 86.1 FL (ref 80–100)
PDW BLD-RTO: 18.4 % (ref 12.4–15.4)
PLATELET # BLD: 348 K/UL (ref 135–450)
PMV BLD AUTO: 8.2 FL (ref 5–10.5)
POTASSIUM SERPL-SCNC: 4.2 MMOL/L (ref 3.5–5.1)
RBC # BLD: 3.64 M/UL (ref 4.2–5.9)
SODIUM BLD-SCNC: 139 MMOL/L (ref 136–145)
WBC # BLD: 8.2 K/UL (ref 4–11)

## 2021-03-10 ENCOUNTER — HOSPITAL ENCOUNTER (OUTPATIENT)
Dept: CARDIAC CATH/INVASIVE PROCEDURES | Age: 76
Discharge: HOME | End: 2021-03-10
Payer: MEDICARE

## 2021-03-10 VITALS
DIASTOLIC BLOOD PRESSURE: 68 MMHG | WEIGHT: 291 LBS | HEIGHT: 69 IN | BODY MASS INDEX: 43.1 KG/M2 | OXYGEN SATURATION: 95 % | HEART RATE: 68 BPM | RESPIRATION RATE: 18 BRPM | SYSTOLIC BLOOD PRESSURE: 125 MMHG | TEMPERATURE: 97.4 F

## 2021-03-10 LAB
EKG ATRIAL RATE: 61 BPM
EKG DIAGNOSIS: NORMAL
EKG P AXIS: 21 DEGREES
EKG P-R INTERVAL: 176 MS
EKG Q-T INTERVAL: 504 MS
EKG QRS DURATION: 98 MS
EKG QTC CALCULATION (BAZETT): 507 MS
EKG R AXIS: -27 DEGREES
EKG T AXIS: 65 DEGREES
EKG VENTRICULAR RATE: 61 BPM
POC ACT LR: 277 SEC

## 2021-03-10 PROCEDURE — 93010 ELECTROCARDIOGRAM REPORT: CPT | Performed by: INTERNAL MEDICINE

## 2021-03-10 PROCEDURE — C1894 INTRO/SHEATH, NON-LASER: HCPCS

## 2021-03-10 PROCEDURE — C1769 GUIDE WIRE: HCPCS

## 2021-03-10 PROCEDURE — 99152 MOD SED SAME PHYS/QHP 5/>YRS: CPT

## 2021-03-10 PROCEDURE — 93005 ELECTROCARDIOGRAM TRACING: CPT | Performed by: INTERNAL MEDICINE

## 2021-03-10 PROCEDURE — C1887 CATHETER, GUIDING: HCPCS

## 2021-03-10 PROCEDURE — 2709999900 HC NON-CHARGEABLE SUPPLY

## 2021-03-10 PROCEDURE — C9600 PERC DRUG-EL COR STENT SING: HCPCS

## 2021-03-10 PROCEDURE — 6360000002 HC RX W HCPCS

## 2021-03-10 PROCEDURE — 6360000004 HC RX CONTRAST MEDICATION: Performed by: INTERNAL MEDICINE

## 2021-03-10 PROCEDURE — 85347 COAGULATION TIME ACTIVATED: CPT

## 2021-03-10 PROCEDURE — 2500000003 HC RX 250 WO HCPCS

## 2021-03-10 PROCEDURE — 99153 MOD SED SAME PHYS/QHP EA: CPT

## 2021-03-10 PROCEDURE — C1725 CATH, TRANSLUMIN NON-LASER: HCPCS

## 2021-03-10 PROCEDURE — C1874 STENT, COATED/COV W/DEL SYS: HCPCS

## 2021-03-10 PROCEDURE — 92928 PRQ TCAT PLMT NTRAC ST 1 LES: CPT | Performed by: INTERNAL MEDICINE

## 2021-03-10 RX ORDER — ASPIRIN 325 MG
325 TABLET ORAL ONCE
Status: DISCONTINUED | OUTPATIENT
Start: 2021-03-10 | End: 2021-03-11 | Stop reason: HOSPADM

## 2021-03-10 RX ORDER — SODIUM CHLORIDE 0.9 % (FLUSH) 0.9 %
10 SYRINGE (ML) INJECTION EVERY 12 HOURS SCHEDULED
Status: DISCONTINUED | OUTPATIENT
Start: 2021-03-10 | End: 2021-03-11 | Stop reason: HOSPADM

## 2021-03-10 RX ORDER — SODIUM CHLORIDE 0.9 % (FLUSH) 0.9 %
10 SYRINGE (ML) INJECTION PRN
Status: DISCONTINUED | OUTPATIENT
Start: 2021-03-10 | End: 2021-03-11 | Stop reason: HOSPADM

## 2021-03-10 RX ORDER — SODIUM CHLORIDE 0.9 % (FLUSH) 0.9 %
10 SYRINGE (ML) INJECTION PRN
Status: DISCONTINUED | OUTPATIENT
Start: 2021-03-10 | End: 2021-03-10 | Stop reason: SDUPTHER

## 2021-03-10 RX ORDER — ONDANSETRON 2 MG/ML
4 INJECTION INTRAMUSCULAR; INTRAVENOUS EVERY 6 HOURS PRN
Status: DISCONTINUED | OUTPATIENT
Start: 2021-03-10 | End: 2021-03-11 | Stop reason: HOSPADM

## 2021-03-10 RX ORDER — SODIUM CHLORIDE 0.9 % (FLUSH) 0.9 %
10 SYRINGE (ML) INJECTION EVERY 12 HOURS SCHEDULED
Status: DISCONTINUED | OUTPATIENT
Start: 2021-03-10 | End: 2021-03-10 | Stop reason: SDUPTHER

## 2021-03-10 RX ORDER — ACETAMINOPHEN 325 MG/1
650 TABLET ORAL EVERY 4 HOURS PRN
Status: DISCONTINUED | OUTPATIENT
Start: 2021-03-10 | End: 2021-03-11 | Stop reason: HOSPADM

## 2021-03-10 RX ORDER — SODIUM CHLORIDE 9 MG/ML
INJECTION, SOLUTION INTRAVENOUS CONTINUOUS
Status: DISCONTINUED | OUTPATIENT
Start: 2021-03-10 | End: 2021-03-11 | Stop reason: HOSPADM

## 2021-03-10 RX ADMIN — IOPAMIDOL 160 ML: 755 INJECTION, SOLUTION INTRAVENOUS at 09:11

## 2021-03-10 NOTE — PROCEDURES
[address] [address]                            CARDIAC CATHETERIZATION    PATIENT NAME: Tamara Mckay                          :        1945  MED REC NO:   [id number]                          ROOM:  ACCOUNT NO:   [de-identified]                           ADMIT DATE: 03/10/2021  PROVIDER:     Jake Eddy MD    DATE OF PROCEDURE:  03/10/2021    PROCEDURE PERFORMED:  Percutaneous coronary intervention of left  anterior descending artery, insertion of one stent, 3.5 x 20-mm Xience  expanding up to 3.8 mm. ASA is II. Mallampati score II. PROCEDURE IN DETAIL:  The patient was brought to cardiac cath  laboratory. Right groin was prepped and draped in sterile fashion. We  accessed the right common femoral artery after prepping the groin. We  gave lidocaine. We inserted a 6-Macedonian sheath. We inserted an XB3.5  guide catheter and used it to select and engage the left main coronary  artery ostium. We performed angiography. We advanced the coronary  guidewire in the LAD. We performed balloon angioplasty of the mid LAD  using a 3-mm balloon and then deployed a stent, 3.5 x 20 drug-coated  Xience and achieved a stent diameter of 3.8. After that, angiogram was  performed. All guide catheters and sheaths were removed. Manual  pressure was applied to obtain hemostasis. No complication. Estimated  blood loss less than 20 mL. ANGIOGRAPHY FINDINGS:  1. Left main:  Normal.  2.  Left anterior descending artery midportion has 80% stenosis,  proximally has a stent which is patent. 3.  Left circumflex is patent. SUMMARY:  Successful revascularization of LAD, placement of one stent,  3.5 x 20 expanding up to 3.9 mm. RECOMMENDATION:  1. Continue postop post cath care. 2.  Continue dual antiplatelet therapy. 3.  Follow up with me in the office.         Brie Perez MD    D: 03/10/2021 9:34:45       T: 03/10/2021 11:41:42     [email or web address]  Job#: [id number]     Doc#: [id number]    CC:

## 2021-03-10 NOTE — H&P
CC: \"My breathing is much better. \"      HPI:  The patient is 80 y.o. male with a past medical history significant for HTN, CKD with Cr of 2.0 came into [facility] with chest pain started 4/15/17.      Today, he is here for hospital follow up. He says that his breathing is much better since the procedure. He continues to walk with a cane. Patient denies exertional chest pain/pressure, dyspnea at rest, GIRALDO, PND, orthopnea, palpitations, lightheadedness, weight changes, changes in LE edema, and syncope. Patient reports compliance to his medications.      Past Medical History        Past Medical History:   Diagnosis Date    Arthritis      Bell's palsy      Cancer ([facility] [address]      CHF (congestive heart failure) (HCC)      Coronary artery disease involving native coronary artery of native heart without angina pectoris      Hyperlipidemia      Hypertension      Influenza B 4/2/15    Kidney failure       sees Dr Veronique Doyle Radiation           Past Surgical History         Past Surgical History:   Procedure Laterality Date    CORONARY ANGIOPLASTY WITH STENT PLACEMENT   2017     [place] to LAD    IR TUNNELED CATHETER PLACEMENT GREATER THAN 5 YEARS   2021     IR TUNNELED CATHETER PLACEMENT GREATER THAN 5 YEARS 2021 [facility]    TUNNELED VENOUS CATHETER PLACEMENT Right 2021     Permacath; RIJ access; 23cm; Dr. Garcia January         Family History         Family History   Problem Relation Age of Onset    Cancer Mother      Cancer Father           Social History            Tobacco Use    Smoking status: Former Smoker       Quit date: 1986       Years since quittin.0    Smokeless tobacco: Never Used    Tobacco comment: will not start    Substance Use Topics    Alcohol use: No    Drug use:  No         No Known Allergies  Current Facility-Administered Medications          Current Outpatient Medications   Medication Sig Dispense Refill    metoprolol succinate (TOPROL XL) 25 MG extended release tablet Take 1 tablet by mouth daily 30 tablet 3    b complex-C-folic acid (NEPHROCAPS) 1 MG capsule Take 1 capsule by mouth daily 30 capsule 3    amiodarone (CORDARONE) 200 MG tablet Take 1 tablet by mouth 2 times daily 60 tablet 0    clopidogrel (PLAVIX) 75 MG tablet TAKE 1 TABLET BY MOUTH EVERY DAY 90 tablet 3    aspirin 81 MG chewable tablet Take 81 mg by mouth daily.        atorvastatin (LIPITOR) 20 MG tablet Take 20 mg by mouth daily.          No current facility-administered medications for this visit.             Review of Systems:     Constitutional: negative  Eyes: negative  Ears, nose, mouth, throat, and face: negative  Respiratory: negative  Cardiovascular: negative  Gastrointestinal: negative  Genitourinary:negative  Integument/breast: negative  Hematologic/lymphatic: negative  Musculoskeletal:negative  Neurological: negative  Behavioral/Psych: negative  Endocrine: negative  Allergic/Immunologic: negative      Physical Exam:   Vitals       Vitals:     02/05/21 1150   BP: 128/74   Pulse: 67   Temp: 97.5 °F (36.4 °C)   SpO2: 96%   Weight: 293 lb (132.9 kg)   Height: 5' 11\" (1.803 m)             Wt Readings from Last 3 Encounters:   02/05/21 293 lb (132.9 kg)   01/27/21 292 lb 8 oz (132.7 kg)   12/03/20 (!) 313 lb 3.2 oz (142.1 kg)         Constitutional: He is oriented to person, place, and time. He appears well-developed and well-nourished. In no acute distress.      Head: Normocephalic and atraumatic. Pupils equal and round.     Neck: Neck supple. No JVP or carotid bruit appreciated. No mass and no thyromegaly present. No lymphadenopathy present.     Cardiovascular: Normal rate. Normal heart sounds. Exam reveals no gallop and no friction rub. No murmur heard.     Pulmonary/Chest: Effort normal and breath sounds normal. No respiratory distress. He has no wheezes, rhonchi or rales.      Abdominal: Soft, non-tender.  Bowel sounds are normal. He exhibits no organomegaly, mass or bruit.      Extremities: mil BLE edema, no cyanosis, or clubbing.      Pulses are 2+ radial/dorsalis pedis/posterior tibial/carotid bilaterally.     Neurological: Awake  alert and orientedNo gross cranial nerve deficit. Coordination normal.      Skin: Skin is warm and dry. There is no rash or diaphoresis.      Psychiatric: He has a normal mood and affect. His speech is normal and behavior is normal.      Lab Review: all labs reviewed         Lab Results   Component Value Date     TRIG 71 09/26/2020     HDL 34 09/26/2020     LDLCALC 59 09/26/2020     LABVLDL 14 09/26/2020            Lab Results   Component Value Date     BUN 44 01/27/2021     CREATININE 5.3 01/27/2021            Lab Results   Component Value Date     WBC 9.7 01/27/2021     RBC 3.19 01/27/2021     HGB 9.1 01/27/2021     HCT 27.7 01/27/2021     MCV 86.8 01/27/2021     MCH 28.6 01/27/2021     MCHC 33.0 01/27/2021     RDW 16.6 01/27/2021      01/27/2021     MPV 8.1 01/27/2021            Lab Results   Component Value Date      01/27/2021     K 3.3 01/27/2021     K 2.9 01/18/2021     CL 98 01/27/2021     CO2 25 01/27/2021     BUN 44 01/27/2021     LABALBU 3.4 01/27/2021     CREATININE 5.3 01/27/2021     CALCIUM 8.7 01/27/2021     GFRAA 13 01/27/2021     LABGLOM 11 01/27/2021     GLUCOSE 112 01/27/2021             Lab Results   Component Value Date     TROPONINI 0.04 01/18/2021     TROPONINI 0.03 09/25/2020     TROPONINI 0.34 04/16/2017         Image Review: all imaging reviewed      ECHO:4/17/17   Summary   Appears to have normal left ventricular size and wall thickness. Mildly   decreased left ventricular systolic function with an estimated ejection fraction of 40%.   Mid distal anterior wall hypokinesis. The apex is akinetic.   Normal right ventricular size and function.     Cath:4/16/17  ANGIOGRAPHIC FINDINGS:  1. Left main coronary comes from the left coronary cusp, has FERMÍN-3 flow, no significant stenosis.   2. The left anterior descending artery comes from the left main coronary  artery, is occluded in the proximal portion and mid portion has around 70%  stenosis present and distal also 70% stenosis present. 3. The left circumflex comes from the left main coronary artery and gives  rise to obtuse marginal branches. The mid portion has approximately 60% stenosis. 4. The right coronary artery comes from the right coronary cusp, giving rise  to posterior descending artery and posterolateral branch. The proximal mid  portion has 60% stenosis present. INTERVENTION PERFORMED: We intervened on the proximal left anterior  descending artery, placed a stent 3.0 x 18 post-dilated to 3.9 mm. A DRUG COATED XIENCE ALPINE stent was placed in the LAD     ECHO: 8/7/18  Summary  Suboptimal parasternal image quality. Ejection fraction is visually estimated to be 50-55%. There is hypokinesis of the distal anteroseptal wall. Right ventricular size and function are normal.  Mildly dilated left atrium. There are no significant valvular abnormalities appreciated in this study.     ECHO  9/26/2020  Ejection fraction is visually estimated to be 40%. There is severe hypokinesis of the entire apical myocardium and and  hypokinesis of the distal septal wall. There is severe concentric left ventricular hypertrophy. Grade II diastolic dysfunction with elevated LV filling pressures. Mild mitral regurgitation is present. No evidence of mitral stenosis. Mild calcification of the mitral valve noted. Moderate to severe aortic stenosis with a peak velocity of 437m/s and a mean pressure gradient of 50mmHg. Mild aortic regurgitation. Moderately dilated right ventricle. Right ventricular systolic function is normal .     Cardiac Cath 1/18/2021  ANGIOGRAPHY FINDINGS:  1.  Left main:  Normal.  2.  Left anterior descending artery midportion 80% stenosis. 3.  Left circumflex is patent.  No stenosis. 4.  Right coronary artery comes from the right coronary cusp.  It is  patent without significant stenosis.     HEMODYNAMIC FINDINGS:  Aortic valve gradient is 36 mmHg.  With  dobutamine 20 mcg/kg/minute, it reaches up to 48 mmHg.     SUMMARY:  Success balloon aortic valvuloplasty. Assessment/Plan:      Aortic valve stenosis  S/p  Success balloon aortic valvuloplasty. I have discussed Dr Daniel Laurent for TAVR.     Coronary artery disease Ischemic CP with NSTEMI  MI involving anterior wall   --4/16/17 We intervened on the proximal left anterior descending artery, placed a stent 3.0 x 18 post-dilated to 3.9 mm  Continue Asa, Plavix, B-blocker and statin therapy. In view of his recent [facility] recommend coronary angiogram. Risks, benefits, expectations and alternative treatments discussed. The patient verbalizes understanding and agrees to proceed.      Ischemic Cardiomyopathy/systolic heart failure:   Appears compensated. Continue Asa and B-blocker.      Hypertension  Controlled. Continue current medical management.      Chronic kidney disease  Following with nephrology.     Diabetes. Managed by PCP      Morbid obesity  Encouraged increase aerobic exercise and heart healthy diet        Thank you very much for allowing me to participate in the care of your patient.  Please do not hesitate to contact me if you have any questions.     Sincerely,     Floyd Valero MD, MPH        [facility] [address]

## 2021-03-11 ENCOUNTER — APPOINTMENT (OUTPATIENT)
Dept: CT IMAGING | Age: 76
End: 2021-03-11
Payer: MEDICARE

## 2021-03-11 ENCOUNTER — TELEPHONE (OUTPATIENT)
Dept: CARDIOLOGY CLINIC | Age: 76
End: 2021-03-11

## 2021-03-11 ENCOUNTER — HOSPITAL ENCOUNTER (EMERGENCY)
Age: 76
Discharge: HOME | End: 2021-03-11
Attending: EMERGENCY MEDICINE
Payer: MEDICARE

## 2021-03-11 VITALS
DIASTOLIC BLOOD PRESSURE: 74 MMHG | RESPIRATION RATE: 17 BRPM | HEART RATE: 62 BPM | SYSTOLIC BLOOD PRESSURE: 148 MMHG | OXYGEN SATURATION: 94 % | TEMPERATURE: 98.2 F

## 2021-03-11 DIAGNOSIS — Z98.890 POST-OPERATIVE STATE: Primary | ICD-10-CM

## 2021-03-11 LAB
ANION GAP SERPL CALCULATED.3IONS-SCNC: 15 MMOL/L (ref 3–16)
BUN BLDV-MCNC: 49 MG/DL (ref 7–20)
CALCIUM SERPL-MCNC: 8 MG/DL (ref 8.3–10.6)
CHLORIDE BLD-SCNC: 96 MMOL/L (ref 99–110)
CO2: 24 MMOL/L (ref 21–32)
CREAT SERPL-MCNC: 6.2 MG/DL (ref 0.8–1.3)
GFR AFRICAN AMERICAN: 11
GFR NON-AFRICAN AMERICAN: 9
GLUCOSE BLD-MCNC: 185 MG/DL (ref 70–99)
POC ACT LR: 190 SEC
POTASSIUM REFLEX MAGNESIUM: 3.9 MMOL/L (ref 3.5–5.1)
SODIUM BLD-SCNC: 135 MMOL/L (ref 136–145)

## 2021-03-11 PROCEDURE — 75635 CT ANGIO ABDOMINAL ARTERIES: CPT

## 2021-03-11 PROCEDURE — 36415 COLL VENOUS BLD VENIPUNCTURE: CPT

## 2021-03-11 PROCEDURE — 6360000004 HC RX CONTRAST MEDICATION: Performed by: EMERGENCY MEDICINE

## 2021-03-11 PROCEDURE — 6370000000 HC RX 637 (ALT 250 FOR IP): Performed by: EMERGENCY MEDICINE

## 2021-03-11 PROCEDURE — 99283 EMERGENCY DEPT VISIT LOW MDM: CPT

## 2021-03-11 PROCEDURE — 80048 BASIC METABOLIC PNL TOTAL CA: CPT

## 2021-03-11 RX ORDER — CLINDAMYCIN HYDROCHLORIDE 300 MG/1
300 CAPSULE ORAL 3 TIMES DAILY
Qty: 21 CAPSULE | Refills: 0 | Status: SHIPPED | OUTPATIENT
Start: 2021-03-11 | End: 2021-03-18

## 2021-03-11 RX ORDER — CLINDAMYCIN HYDROCHLORIDE 150 MG/1
300 CAPSULE ORAL ONCE
Status: COMPLETED | OUTPATIENT
Start: 2021-03-11 | End: 2021-03-11

## 2021-03-11 RX ADMIN — CLINDAMYCIN HYDROCHLORIDE 300 MG: 150 CAPSULE ORAL at 05:35

## 2021-03-11 RX ADMIN — IOPAMIDOL 75 ML: 755 INJECTION, SOLUTION INTRAVENOUS at 03:34

## 2021-03-11 ASSESSMENT — ENCOUNTER SYMPTOMS
COLOR CHANGE: 0
STRIDOR: 0
RECTAL PAIN: 0
ANAL BLEEDING: 0
NAUSEA: 0
APNEA: 0
VOMITING: 0
COUGH: 0
EYE DISCHARGE: 0
ABDOMINAL PAIN: 0
ABDOMINAL DISTENTION: 0
BLOOD IN STOOL: 0
EYE PAIN: 0
EYE REDNESS: 0
PHOTOPHOBIA: 0
CONSTIPATION: 0
WHEEZING: 0
CHOKING: 0
CHEST TIGHTNESS: 0
DIARRHEA: 0
BACK PAIN: 0
SHORTNESS OF BREATH: 0
EYE ITCHING: 0

## 2021-03-11 ASSESSMENT — PAIN DESCRIPTION - ORIENTATION: ORIENTATION: RIGHT

## 2021-03-11 ASSESSMENT — PAIN DESCRIPTION - ONSET: ONSET: ON-GOING

## 2021-03-11 ASSESSMENT — PAIN SCALES - GENERAL: PAINLEVEL_OUTOF10: 7

## 2021-03-11 ASSESSMENT — PAIN DESCRIPTION - PAIN TYPE: TYPE: ACUTE PAIN

## 2021-03-11 NOTE — ED PROVIDER NOTES
allergies. FAMILY HISTORY        Family History   Problem Relation Age of Onset    Cancer Mother     Cancer Father     No Known Problems Sister     No Known Problems Brother     Cancer Sister        SOCIAL HISTORY       Social History     Socioeconomic History    Marital status:      Spouse name: None    Number of children: None    Years of education: None    Highest education level: None   Occupational History    None   Social Needs    Financial resource strain: None    Food insecurity     Worry: None     Inability: None    Transportation needs     Medical: None     Non-medical: None   Tobacco Use    Smoking status: Former Smoker     Packs/day: 0.50     Years: 20.00     Pack years: 10.00     Types: Cigarettes     Quit date: 1986     Years since quittin.1    Smokeless tobacco: Never Used    Tobacco comment: will not start    Substance and Sexual Activity    Alcohol use: No    Drug use: No    Sexual activity: None   Lifestyle    Physical activity     Days per week: None     Minutes per session: None    Stress: None   Relationships    Social connections     Talks on phone: None     Gets together: None     Attends Zoroastrian service: None     Active member of club or organization: None     Attends meetings of clubs or organizations: None     Relationship status: None    Intimate partner violence     Fear of current or ex partner: None     Emotionally abused: None     Physically abused: None     Forced sexual activity: None   Other Topics Concern    None   Social History Narrative    None       PHYSICAL EXAM       ED Triage Vitals   BP Temp Temp Source Pulse Resp SpO2 Height Weight   21 0215 21 0215 21 0215 21 0215 21 0215 21 0512 -- --   (!) 142/65 98.2 °F (36.8 °C) Oral 67 20 94 %         Physical Exam  Vitals signs and nursing note reviewed. Constitutional:       General: He is not in acute distress.      Appearance: He is well-developed. He is not diaphoretic. HENT:      Head: Normocephalic and atraumatic. Eyes:      General:         Right eye: No discharge. Left eye: No discharge. Pupils: Pupils are equal, round, and reactive to light. Neck:      Musculoskeletal: Normal range of motion. Thyroid: No thyromegaly. Trachea: No tracheal deviation. Cardiovascular:      Rate and Rhythm: Normal rate and regular rhythm. Heart sounds: No murmur. Pulmonary:      Breath sounds: No wheezing or rales. Chest:      Chest wall: No tenderness. Abdominal:      General: There is no distension. Palpations: Abdomen is soft. There is no mass. Tenderness: There is no abdominal tenderness. There is no guarding or rebound. Musculoskeletal: Normal range of motion. General: No tenderness or deformity. Skin:     General: Skin is warm. Coloration: Skin is not pale. Findings: No erythema or rash. Comments: Femoral site without bleeding. There is some swelling and bruising. Mild induration. No fluctuance noted. No redness noted. Neurological:      Mental Status: He is alert. Cranial Nerves: No cranial nerve deficit. Motor: No abnormal muscle tone. Coordination: Coordination normal.         DIAGNOSTIC RESULTS     EKG: All EKG's are interpreted by the Emergency Department Physician who either signs or Co-signs this chart in the absence of acardiologist.    None    RADIOLOGY:   Non-plain film images such as CT, Ultrasoundand MRI are read by the radiologist. Plain radiographic images are visualized and preliminarily interpreted by the emergency physician with the below findings:    Impression   1. No CT angiogram evidence of abdominopelvic or lower extremity arterial   occlusion, extravasation. 2. Mild abdominopelvic atherosclerotic scattered calcification without   significant arterial stenosis.    3. Right posterior tibial artery atherosclerotic calcification at the level   of the ankle joint resulting in approximately 75% arterial stenosis. 4. Right inguinal and medial right thigh marked cellulitis with prominent   right inguinal lymphadenopathy, as discussed above.  Finding most suggestive   of reactive adenopathy associated with local infectious process.  No evidence   of organized soft tissue abscess.  Cannot exclude association with   inflammatory disease or neoplastic disease process.  Recommend clinical   correlation. 5. Right inguinal inflammation extends superiorly along the right inguinal   vessels within the retroperitoneal space. 6. Descending and sigmoid colon mild diverticulosis without evidence of   diverticulitis. 7. Bilateral renal atrophy.         ED BEDSIDE ULTRASOUND:   Performed by ED Physician - none    LABS:  Labs Reviewed   BASIC METABOLIC PANEL W/ REFLEX TO MG FOR LOW K - Abnormal; Notable for the following components:       Result Value    Sodium 135 (*)     Chloride 96 (*)     Glucose 185 (*)     BUN 49 (*)     CREATININE 6.2 (*)     GFR Non- 9 (*)     GFR  11 (*)     Calcium 8.0 (*)     All other components within normal limits    Narrative:     Mariah Reed tel. [phone],  Chemistry results called to and read back by ariadna schafer rn in er,  03/11/2021 03:19, by Vanita Hawthorne  Performed at:  [place] [address]   Phone [phone]       All other labs were withinnormal range or not returned as of this dictation. EMERGENCY DEPARTMENT COURSE and DIFFERENTIAL DIAGNOSIS/MDM:     PMH, Surgical Hx, FH, Social Hx reviewed by myself (ETOH usage, Tobacco usage, Drug usage reviewed by myself, no pertinent Hx)- No Pertinent Hx     Old records were reviewed by me    CT with possible cellulitis. I did long discussion with patient about CT results. We will start the patient on clindamycin in case there is cellulitis.   My guess is this is probably all inflammation from cardiac catheterization though. No active bleeding. Right leg warm well perfused. Strong distal pulses present. Soft compartments. Discussed close follow-up. I estimate there is LOW risk for Sepsis, MI, Stroke, Tamponade, PTX, Toxicity or other life threatening etiology thus I consider the discharge disposition reasonable. The patient is at low risk for mortality based on demographic, history and clinical factors. Given the best available information and clinical assessment, I estimate the risk of hospitalization to be greater than risk of treatment at home. I have explained to the patient that the risk could rapidly change, given precautions for return and instructions. Explained to patient that the risk for mortality is low based on demographic, history and clinical factors. I discussed with patient the results of evaluation in the ED, diagnosis, care, and prognosis. The plan is to discharge to home. Patient is in agreement with plan and questions have been answered. I also discussed with patient the reasons which may require a return visit and the importance of follow-up care. The patient is well-appearing, nontoxic, and improved at the time of discharge. Patient agrees to call to arrange follow-up care as directed. Patient understands to return immediately for worsening/change in symptoms. CRITICAL CARE TIME   Total Critical Caretime was 21 minutes, excluding separately reportable procedures. There was a high probability of clinically significant/life threatening deterioration in the patient's condition which required my urgent intervention. PROCEDURES:  Unlessotherwise noted below, none    FINAL IMPRESSION      1.  Post-operative state          DISPOSITION/PLAN   DISPOSITION Decision To Discharge 03/11/2021 04:49:14 AM    PATIENT REFERRED TO:  Giovanni Reynolds MD  [address] [facility]) [facility] [address] [place] [address]  [phone]    Call today        DISCHARGE MEDICATIONS:  Discharge Medication List as of 3/11/2021  5:21 AM      START taking these medications    Details   clindamycin (CLEOCIN) 300 MG capsule Take 1 capsule by mouth 3 times daily for 7 days, Disp-21 capsule, R-0Print                (Please note that portions ofthis note were completed with a voice recognition program.  Efforts were made to edit the dictations but occasionally words are mis-transcribed.)    Gabrielle Pedraza MD(electronically signed)  Attending Emergency Physician          Gabrielle ePdraza MD  03/11/21 1982

## 2021-03-11 NOTE — TELEPHONE ENCOUNTER
Dr. Ignacia Escalera, looks like pt was treated for cellulitis and discharged home on antibiotic. He has appointment with you 4/12/21. Any further recommendations? 1. No CT angiogram evidence of abdominopelvic or lower extremity arterial   occlusion, extravasation. 2. Mild abdominopelvic atherosclerotic scattered calcification without   significant arterial stenosis. 3. Right posterior tibial artery atherosclerotic calcification at the level   of the ankle joint resulting in approximately 75% arterial stenosis. 4. Right inguinal and medial right thigh marked cellulitis with prominent   right inguinal lymphadenopathy, as discussed above.  Finding most suggestive   of reactive adenopathy associated with local infectious process.  No evidence   of organized soft tissue abscess.  Cannot exclude association with   inflammatory disease or neoplastic disease process.  Recommend clinical   correlation. 5. Right inguinal inflammation extends superiorly along the right inguinal   vessels within the retroperitoneal space. 6. Descending and sigmoid colon mild diverticulosis without evidence of   diverticulitis.    7. Bilateral renal atrophy.

## 2021-03-11 NOTE — TELEPHONE ENCOUNTER
Lon called in this morning wanting to let Dr. Harris Seen know he went to the ER last due:   hardy yesterday; concerned with swelling in right groin from cath    He would like Dr. Harris Seen to look over his ER visit and give him a call back.     You can reach Jeremías at #[phone]

## 2021-03-11 NOTE — PROGRESS NOTES
[facility]  Office Visit    Pradeep Luciano  1945    March 12, 2021    CC:   Chief Complaint   Patient presents with    Post-Op Check     groin check     HPI:  The patient is 77 y.o. male with a past medical history significant for HTN, chronic kidney disease with Cr 2.0, CAD, Aortic stenosis and S/P BAV in January 2021 (referred to Dr. Elaine Reyes for TAVR), CHF and HLP who is here for follow up from ED visit on 3/11/2021. He underwent angiogram on 3/10/2021 and was discharged home (noted significant bruising and associated with dull pain rated 7 out of 10). He had been taking OTC meds with some relief but given continued to pain, presented to ED for evaluation. He was placed on Cleocin, underwent CT scan and discharged home with instructions for quick follow up with Dr. Alberta Goldman. Dx with cellulitis R groin/inguinal area. He awakened early in AM after being discharged that day with redness, pain and soreness in his R groin. He took picture of it and it appeared to be red and raw looking. He also felt a \"large sausage along his leg\"and decided he needed to be seen. Went to ER and had CT but without evidence of bleeding and dx with cellulitis and placed on Cleocin on 3/11/2021 and has not made any difference. No drainage from site. Denies chest pain/discomfort, SOB, orthopnea/PND, cough, palpitations, dizziness, syncope, edema , weight change. He has cancelled TAVR evaluation for now. Review of Systems:  Constitutional: Denies  fatigue, weakness, night sweats or fever/chills. HEENT: Denies new visual changes, ringing in ears, nosebleeds,nasal congestion  Respiratory: Denies new or change in SOB, PND, orthopnea or cough. Cardiovascular: see HPI  GI: Denies N/V, diarrhea, constipation, abdominal pain, change in bowel habits, melena or hematochezia  : Denies urinary frequency, urgency, incontinence, hematuria or dysuria.   Skin: Denies rash, hives, or cyanosis  Musculoskeletal: Denies joint or muscle aches/pain. + uses cane for ambulation  Neurological: Denies syncope or TIA-like symptoms.   Psychiatric: Denies anxiety, insomnia or depression     Past Medical History:   Diagnosis Date    Arthritis     Bell's palsy     [facility] [address]     [facility]estive [facility] failure) [facility] [address]     Coronary artery disease involving native coronary artery of native heart without angina pectoris     Hyperlipidemia     Hypertension     Influenza B 4/2/15    Kidney failure     sees Dr Brittany Sweeney      Past Surgical History:   Procedure Laterality Date    CORONARY ANGIOPLASTY WITH STENT PLACEMENT  2017    EVERETT to LAD    IR TUNNELED CATHETER PLACEMENT GREATER THAN 5 YEARS  2021    IR TUNNELED CATHETER PLACEMENT GREATER THAN 5 YEARS 2021 [facility]    PTCA      TUNNELED VENOUS CATHETER PLACEMENT Right 2021    Permacath; RIJ access; 23cm; Dr. Feliciano Hernandez     Family History   Problem Relation Age of Onset    Cancer Mother     Cancer Father     No Known Problems Sister     No Known Problems Brother     Cancer Sister      Social History     Tobacco Use    Smoking status: Former Smoker     Packs/day: 0.50     Years: 20.00     Pack years: 10.00     Types: Cigarettes     Quit date: 1986     Years since quittin.1    Smokeless tobacco: Never Used    Tobacco comment: will not start    Substance Use Topics    Alcohol use: No    Drug use: No       No Known Allergies  Current Outpatient Medications   Medication Sig Dispense Refill    clindamycin (CLEOCIN) 300 MG capsule Take 1 capsule by mouth 3 times daily for 7 days 21 capsule 0    metoprolol succinate (TOPROL XL) 25 MG extended release tablet Take 1 tablet by mouth daily 30 tablet 3    b complex-C-folic acid (NEPHROCAPS) 1 MG capsule Take 1 capsule by mouth daily 30 capsule 3    amiodarone (CORDARONE) 200 MG tablet Take 1 tablet by mouth 2 times daily 60 tablet 0    clopidogrel (PLAVIX) 75 MG tablet TAKE 1 TABLET BY MOUTH EVERY DAY 90 tablet 3    aspirin 81 MG chewable tablet Take 81 mg by mouth daily.  atorvastatin (LIPITOR) 20 MG tablet Take 20 mg by mouth daily. No current facility-administered medications for this visit. Physical Exam:   /68   Pulse 68   Temp 98.2 °F (36.8 °C)   Ht 5' 9\" (1.753 m)   Wt 297 lb (134.7 kg)   SpO2 98%   BMI 43.86 kg/m²   Wt Readings from Last 2 Encounters:   03/12/21 297 lb (134.7 kg)   03/10/21 291 lb (132 kg)     Constitutional: He is oriented to person, place, and time. He appears well-developed and well-nourished. In no acute distress. HEENT: Normocephalic and atraumatic. Sclerae anicteric. No xanthelasmas. EOM's intact. Neck: Supple. No JVD present. Carotids without bruits. No thyromegaly present. Cardiovascular: RRR, normal S1 and M8;NHWE systolic murmur  Pulmonary/Chest: Effort normal.  Lungs clear to auscultation. Chest wall nontender  Abdominal: soft, nontender, nondistended. + bowel sounds; no hepatomegaly  Extremities: R groin site with large area of ecchymosis extending from R lateral hip to pubis and down thigh approximately 1/3. Area of hematoma noted above puncture site approximately 2 cms by 5 cms (no pulsation or bruit). No drainage. No cyanosis, or clubbing. 1+ L ankle edema. Pulses are 2+ radial and 1+ bilateral DP/PT pulses bilaterally. Cap refill brisk. Neurological: No focal deficit. Skin: Skin is warm and dry. Psychiatric: He has a normal mood and affect.  His speech is normal and behavior is normal.     Lab Review:   Lab Results   Component Value Date    TRIG 71 09/26/2020    HDL 34 09/26/2020    LDLCALC 59 09/26/2020    LABVLDL 14 09/26/2020     Lab Results   Component Value Date     03/11/2021    K 3.9 03/11/2021    CL 96 03/11/2021    CO2 24 03/11/2021    BUN 49 03/11/2021    CREATININE 6.2 03/11/2021    GLUCOSE 185 03/11/2021    CALCIUM 8.0 03/11/2021      Lab Results   Component Value Date    WBC 8.2 03/05/2021    HGB 10.0 (L) 03/05/2021    HCT 31.3 (L) 03/05/2021    MCV 86.1 03/05/2021     03/05/2021     3/11/2021 CTA abdominal aorta:  1. No CT angiogram evidence of abdominopelvic or lower extremity arterial   occlusion, extravasation. 2. Mild abdominopelvic atherosclerotic scattered calcification without   significant arterial stenosis. 3. Right posterior tibial artery atherosclerotic calcification at the level   of the ankle joint resulting in approximately 75% arterial stenosis. 4. Right inguinal and medial right thigh marked cellulitis with prominent   right inguinal lymphadenopathy, as discussed above.  Finding most suggestive   of reactive adenopathy associated with local infectious process.  No evidence   of organized soft tissue abscess.  Cannot exclude association with   inflammatory disease or neoplastic disease process.  Recommend clinical   correlation. 5. Right inguinal inflammation extends superiorly along the right inguinal   vessels within the retroperitoneal space. 6. Descending and sigmoid colon mild diverticulosis without evidence of   diverticulitis. 7. Bilateral renal atrophy. 3/10/2021 Selective angiography with PCI:  1. Left main:  Normal.  2.  Left anterior descending artery midportion has 80% stenosis,  proximally has a stent which is patent. 3.  Left circumflex is patent.     SUMMARY:  Successful revascularization of LAD, placement of one stent,  3.5 x 20 expanding up to 3.9 mm.    1/26/2021 Coronary angiogram/BAV  ANGIOGRAPHY FINDINGS:  1. Left main:  Normal.  2.  Left anterior descending artery midportion 80% stenosis. 3.  Left circumflex is patent. No stenosis. 4.  Right coronary artery comes from the right coronary cusp. It is  patent without significant stenosis.     HEMODYNAMIC FINDINGS:  Aortic valve gradient is 36 mmHg.   With  dobutamine 20 mcg/kg/minute, it reaches up to 48 mmHg.     SUMMARY:  Success balloon aortic valvuloplasty.     1/19/2021 Echo:  LVEF approximately 40%   inferior, apical anterior wall hypokinesis   Moderate aortic regurgitation. Severe aortic stenosis with a peak velocity of 4.4m/s and a mean pressure   gradient of 55mmHg. 9/26/2020 Echo:   Ejection fraction is visually estimated to be 40%. There is severe hypokinesis of the entire apical myocardium and and   hypokinesis of the distal septal wall. There is severe concentric left ventricular hypertrophy. Grade II diastolic dysfunction with elevated LV filling pressures. Mild mitral regurgitation is present. No evidence of mitral stenosis. Mild calcification of the mitral valve noted. Moderate to severe aortic stenosis with a peak velocity of 437m/s and a mean   pressure gradient of 50mmHg. Mild aortic regurgitation. Moderately dilated right ventricle. Right ventricular systolic function is normal .    4/16/2017 Coronary angiogram/PCI LAD:  1. Left main coronary comes from the left coronary cusp, has FERMÍN-3 flow, no  significant stenosis. 2.  The left anterior descending artery comes from the left main coronary  artery, is occluded in the proximal portion and mid portion has around 70%  stenosis present and distal also 70% stenosis present. 3.  The left circumflex comes from the left main coronary artery and gives  rise to obtuse marginal branches. The mid portion has approximately 60%  stenosis. 4.  The right coronary artery comes from the right coronary cusp, giving rise  to posterior descending artery and posterolateral branch. The proximal mid  portion has 60% stenosis present.     INTERVENTION PERFORMED:  We intervened on the proximal left anterior  descending artery, placed a stent 3.0 x 18 post-dilated to 3.9 mm. A DRUG COATED XIENCE ALPINE stent was placed in the LAD    Assessment:    1.  CAD in native artery  -S/P EVERETT to LAD on 3/10/2021  -no recurrent angina  -post procedure with R groin hematoma/and large area ecchymosis (not

## 2021-03-12 ENCOUNTER — OFFICE VISIT (OUTPATIENT)
Dept: CARDIOLOGY CLINIC | Age: 76
End: 2021-03-12
Payer: MEDICARE

## 2021-03-12 VITALS
TEMPERATURE: 98.2 F | WEIGHT: 297 LBS | BODY MASS INDEX: 43.99 KG/M2 | SYSTOLIC BLOOD PRESSURE: 112 MMHG | HEART RATE: 68 BPM | OXYGEN SATURATION: 98 % | DIASTOLIC BLOOD PRESSURE: 68 MMHG | HEIGHT: 69 IN

## 2021-03-12 DIAGNOSIS — I25.10 CAD IN NATIVE ARTERY: ICD-10-CM

## 2021-03-12 DIAGNOSIS — I50.22 CHRONIC SYSTOLIC HEART FAILURE (HCC): ICD-10-CM

## 2021-03-12 DIAGNOSIS — N18.6 CKD (CHRONIC KIDNEY DISEASE) STAGE V REQUIRING CHRONIC DIALYSIS (HCC): ICD-10-CM

## 2021-03-12 DIAGNOSIS — I25.5 ISCHEMIC CARDIOMYOPATHY: ICD-10-CM

## 2021-03-12 DIAGNOSIS — Z99.2 CKD (CHRONIC KIDNEY DISEASE) STAGE V REQUIRING CHRONIC DIALYSIS (HCC): ICD-10-CM

## 2021-03-12 DIAGNOSIS — I25.10 CAD IN NATIVE ARTERY: Primary | ICD-10-CM

## 2021-03-12 DIAGNOSIS — I35.0 SEVERE AORTIC STENOSIS: ICD-10-CM

## 2021-03-12 DIAGNOSIS — I10 ESSENTIAL HYPERTENSION: ICD-10-CM

## 2021-03-12 LAB
ANION GAP SERPL CALCULATED.3IONS-SCNC: 13 MMOL/L (ref 3–16)
BASOPHILS ABSOLUTE: 0.1 K/UL (ref 0–0.2)
BASOPHILS RELATIVE PERCENT: 1.1 %
BUN BLDV-MCNC: 33 MG/DL (ref 7–20)
CALCIUM SERPL-MCNC: 8.2 MG/DL (ref 8.3–10.6)
CHLORIDE BLD-SCNC: 95 MMOL/L (ref 99–110)
CO2: 28 MMOL/L (ref 21–32)
CREAT SERPL-MCNC: 5 MG/DL (ref 0.8–1.3)
EOSINOPHILS ABSOLUTE: 0.3 K/UL (ref 0–0.6)
EOSINOPHILS RELATIVE PERCENT: 2.8 %
GFR AFRICAN AMERICAN: 14
GFR NON-AFRICAN AMERICAN: 11
GLUCOSE BLD-MCNC: 93 MG/DL (ref 70–99)
HCT VFR BLD CALC: 29.7 % (ref 40.5–52.5)
HEMOGLOBIN: 9.7 G/DL (ref 13.5–17.5)
LYMPHOCYTES ABSOLUTE: 0.9 K/UL (ref 1–5.1)
LYMPHOCYTES RELATIVE PERCENT: 9.2 %
MCH RBC QN AUTO: 28.5 PG (ref 26–34)
MCHC RBC AUTO-ENTMCNC: 32.7 G/DL (ref 31–36)
MCV RBC AUTO: 87.1 FL (ref 80–100)
MONOCYTES ABSOLUTE: 1.1 K/UL (ref 0–1.3)
MONOCYTES RELATIVE PERCENT: 11.2 %
NEUTROPHILS ABSOLUTE: 7.8 K/UL (ref 1.7–7.7)
NEUTROPHILS RELATIVE PERCENT: 75.7 %
PDW BLD-RTO: 20.3 % (ref 12.4–15.4)
PLATELET # BLD: 285 K/UL (ref 135–450)
PMV BLD AUTO: 8.4 FL (ref 5–10.5)
POTASSIUM SERPL-SCNC: 4 MMOL/L (ref 3.5–5.1)
PRO-BNP: ABNORMAL PG/ML (ref 0–449)
RBC # BLD: 3.41 M/UL (ref 4.2–5.9)
SODIUM BLD-SCNC: 136 MMOL/L (ref 136–145)
WBC # BLD: 10.3 K/UL (ref 4–11)

## 2021-03-12 PROCEDURE — G8427 DOCREV CUR MEDS BY ELIG CLIN: HCPCS | Performed by: NURSE PRACTITIONER

## 2021-03-12 PROCEDURE — G8484 FLU IMMUNIZE NO ADMIN: HCPCS | Performed by: NURSE PRACTITIONER

## 2021-03-12 PROCEDURE — G8417 CALC BMI ABV UP PARAM F/U: HCPCS | Performed by: NURSE PRACTITIONER

## 2021-03-12 PROCEDURE — 99214 OFFICE O/P EST MOD 30 MIN: CPT | Performed by: NURSE PRACTITIONER

## 2021-03-12 PROCEDURE — 4040F PNEUMOC VAC/ADMIN/RCVD: CPT | Performed by: NURSE PRACTITIONER

## 2021-03-12 PROCEDURE — 3017F COLORECTAL CA SCREEN DOC REV: CPT | Performed by: NURSE PRACTITIONER

## 2021-03-12 PROCEDURE — 1123F ACP DISCUSS/DSCN MKR DOCD: CPT | Performed by: NURSE PRACTITIONER

## 2021-03-12 PROCEDURE — 1036F TOBACCO NON-USER: CPT | Performed by: NURSE PRACTITIONER

## 2021-04-09 NOTE — PROGRESS NOTES
[facility]      Cardiology Progress Note    Lon Russ Buerger  1945    2021      CC: \"I am doing okay. \"     HPI:  The patient is 80 y.o. male with a past medical history significant for HTN, CKD with Cr of 2.0 came into [facility] with chest pain started 4/15/17. Today, he is here for follow. He says that he is ready to proceed with the valve replacement procedure. He has had both of his vaccines. He continues to use a cane for ambulation. Patient denies exertional chest pain/pressure, dyspnea at rest, GIRALDO, PND, orthopnea, palpitations, lightheadedness, weight changes, changes in LE edema, and syncope.      Past Medical History:   Diagnosis Date    Arthritis     Bell's palsy     [facility] [address]     [facility]estive heart failure) [facility] [address]     Coronary artery disease involving native coronary artery of native heart without angina pectoris     Hyperlipidemia     Hypertension     Influenza B 4/2/15    Kidney failure     sees Dr Radha Rene      Past Surgical History:   Procedure Laterality Date    CORONARY ANGIOPLASTY WITH STENT PLACEMENT  2017    EVERETT to LAD    IR TUNNELED CATHETER PLACEMENT GREATER THAN 5 YEARS  2021    IR TUNNELED CATHETER PLACEMENT GREATER THAN 5 YEARS 2021 [facility]    PTCA      TUNNELED VENOUS CATHETER PLACEMENT Right 2021    Permacath; RIJ access; 23cm; Dr. Hussein Flores     Family History   Problem Relation Age of Onset    Cancer Mother     Cancer Father     No Known Problems Sister     No Known Problems Brother     Cancer Sister      Social History     Tobacco Use    Smoking status: Former Smoker     Packs/day: 0.50     Years: 20.00     Pack years: 10.00     Types: Cigarettes     Quit date: 1986     Years since quittin.2    Smokeless tobacco: Never Used    Tobacco comment: will not start    Substance Use Topics    Alcohol use: No    Drug use: No       No Known Allergies  Current Pulses are 2+ radial/dorsalis pedis/posterior tibial/carotid bilaterally. Neurological: Awake  alert and orientedNo gross cranial nerve deficit. Coordination normal.     Skin: Skin is warm and dry. There is no rash or diaphoresis. Psychiatric: He has a normal mood and affect. His speech is normal and behavior is normal.     Lab Review: all labs reviewed   Lab Results   Component Value Date    TRIG 71 09/26/2020    HDL 34 09/26/2020    LDLCALC 59 09/26/2020    LABVLDL 14 09/26/2020      Lab Results   Component Value Date    BUN 33 03/12/2021    CREATININE 5.0 03/12/2021     Lab Results   Component Value Date    WBC 10.3 03/12/2021    RBC 3.41 03/12/2021    HGB 9.7 03/12/2021    HCT 29.7 03/12/2021    MCV 87.1 03/12/2021    MCH 28.5 03/12/2021    MCHC 32.7 03/12/2021    RDW 20.3 03/12/2021     03/12/2021    MPV 8.4 03/12/2021     Lab Results   Component Value Date     03/12/2021    K 4.0 03/12/2021    K 3.9 03/11/2021    CL 95 03/12/2021    CO2 28 03/12/2021    BUN 33 03/12/2021    LABALBU 3.4 01/27/2021    CREATININE 5.0 03/12/2021    CALCIUM 8.2 03/12/2021    GFRAA 14 03/12/2021    LABGLOM 11 03/12/2021    GLUCOSE 93 03/12/2021       Lab Results   Component Value Date    TROPONINI 0.04 01/18/2021    TROPONINI 0.03 09/25/2020    TROPONINI 0.34 04/16/2017       Image Review: all imaging reviewed     ECHO:4/17/17   Summary   Appears to have normal left ventricular size and wall thickness. Mildly   decreased left ventricular systolic function with an estimated ejection fraction of 40%.   Mid distal anterior wall hypokinesis. The apex is akinetic.   Normal right ventricular size and function. Cath:4/16/17  ANGIOGRAPHIC FINDINGS:  1. Left main coronary comes from the left coronary cusp, has FERMÍN-3 flow, no significant stenosis.   2. The left anterior descending artery comes from the left main coronary  artery, is occluded in the proximal portion and mid portion has around 70%  stenosis present and distal also 70% stenosis present. 3. The left circumflex comes from the left main coronary artery and gives  rise to obtuse marginal branches. The mid portion has approximately 60% stenosis. 4. The right coronary artery comes from the right coronary cusp, giving rise  to posterior descending artery and posterolateral branch. The proximal mid  portion has 60% stenosis present. INTERVENTION PERFORMED: We intervened on the proximal left anterior  descending artery, placed a stent 3.0 x 18 post-dilated to 3.9 mm. A DRUG COATED XIENCE ALPINE stent was placed in the LAD    ECHO: 8/7/18  Summary  Suboptimal parasternal image quality. Ejection fraction is visually estimated to be 50-55%. There is hypokinesis of the distal anteroseptal wall. Right ventricular size and function are normal.  Mildly dilated left atrium. There are no significant valvular abnormalities appreciated in this study.     ECHO  9/26/2020  Ejection fraction is visually estimated to be 40%. There is severe hypokinesis of the entire apical myocardium and and  hypokinesis of the distal septal wall. There is severe concentric left ventricular hypertrophy. Grade II diastolic dysfunction with elevated LV filling pressures. Mild mitral regurgitation is present. No evidence of mitral stenosis. Mild calcification of the mitral valve noted. Moderate to severe aortic stenosis with a peak velocity of 437m/s and a mean pressure gradient of 50mmHg. Mild aortic regurgitation. Moderately dilated right ventricle. Right ventricular systolic function is normal .     Cardiac Cath 1/18/2021  ANGIOGRAPHY FINDINGS:  1. Left main:  Normal.  2.  Left anterior descending artery midportion 80% stenosis. 3.  Left circumflex is patent. No stenosis. 4.  Right coronary artery comes from the right coronary cusp. It is  patent without significant stenosis.     HEMODYNAMIC FINDINGS:  Aortic valve gradient is 36 mmHg.   With  dobutamine 20 mcg/kg/minute, it reaches up to 48 mmHg.     SUMMARY:  Success balloon aortic valvuloplasty. Cardiac cath 3/10/2021  ANGIOGRAPHY FINDINGS:  1. Left main:  Normal.  2.  Left anterior descending artery midportion has 80% stenosis,  proximally has a stent which is patent. 3.  Left circumflex is patent.     SUMMARY:  Successful revascularization of LAD, placement of one stent,  3.5 x 20 expanding up to 3.9 mm. Assessment/Plan: Aortic valve stenosis  S/p  Success balloon aortic valvuloplasty. I have discussed Dr Ivone Zarate for TAVR. He is agreeable to proceed with TAVR procedure. Will make Selam Pruitt RN aware. Coronary artery disease Ischemic CP with NSTEMI  MI involving anterior wall   --4/16/17 We intervened on the proximal left anterior descending artery, placed a stent 3.0 x 18 post-dilated to 3.9 mm  3/10/2021: Successful revascularization of LAD, placement of one stent,  3.5 x 20 expanding up to 3.9 mm. Asymptomatic. Continue Asa, Plavix, B-blocker and statin therapy. Ischemic Cardiomyopathy/systolic heart failure:   Appears compensated. Continue Asa and B-blocker. Hypertension  Controlled. Continue current medical management.      Chronic kidney disease  Following with nephrology. Diabetes. Managed by PCP     Morbid obesity  Encouraged increase aerobic exercise and heart healthy diet      Follow up in 6 months. Thank you very much for allowing me to participate in the care of your patient. Please do not hesitate to contact me if you have any questions. Sincerely,    Summer Glaser MD, MPH      [facility], [address] [place] [address] Juan Alegre [facility]  Ph: [phone]  Fax: [phone]    This note was scribed in the presence of Dr Arlet Wetzel, by Edita Etienne RN  Physician Attestation:  The scribes documentation has been prepared under my direction and personally reviewed by me in its entirety.      I confirm that the note above accurately reflects all work, treatment, procedures, and medical decision making performed by me.

## 2021-04-12 ENCOUNTER — OFFICE VISIT (OUTPATIENT)
Dept: CARDIOLOGY CLINIC | Age: 76
End: 2021-04-12

## 2021-04-12 VITALS
DIASTOLIC BLOOD PRESSURE: 82 MMHG | SYSTOLIC BLOOD PRESSURE: 124 MMHG | OXYGEN SATURATION: 97 % | HEART RATE: 67 BPM | BODY MASS INDEX: 43.52 KG/M2 | HEIGHT: 69 IN | WEIGHT: 293.8 LBS

## 2021-04-12 DIAGNOSIS — I25.10 CAD IN NATIVE ARTERY: ICD-10-CM

## 2021-04-12 DIAGNOSIS — I35.0 NONRHEUMATIC AORTIC VALVE STENOSIS: Primary | ICD-10-CM

## 2021-04-12 DIAGNOSIS — I25.5 ISCHEMIC CARDIOMYOPATHY: ICD-10-CM

## 2021-04-12 DIAGNOSIS — I10 HYPERTENSION, ESSENTIAL, BENIGN: ICD-10-CM

## 2021-04-12 PROCEDURE — G8427 DOCREV CUR MEDS BY ELIG CLIN: HCPCS | Performed by: INTERNAL MEDICINE

## 2021-04-12 PROCEDURE — 4040F PNEUMOC VAC/ADMIN/RCVD: CPT | Performed by: INTERNAL MEDICINE

## 2021-04-12 PROCEDURE — 3017F COLORECTAL CA SCREEN DOC REV: CPT | Performed by: INTERNAL MEDICINE

## 2021-04-12 PROCEDURE — 1036F TOBACCO NON-USER: CPT | Performed by: INTERNAL MEDICINE

## 2021-04-12 PROCEDURE — 99024 POSTOP FOLLOW-UP VISIT: CPT | Performed by: INTERNAL MEDICINE

## 2021-04-12 PROCEDURE — G8417 CALC BMI ABV UP PARAM F/U: HCPCS | Performed by: INTERNAL MEDICINE

## 2021-04-12 PROCEDURE — 1123F ACP DISCUSS/DSCN MKR DOCD: CPT | Performed by: INTERNAL MEDICINE

## 2021-04-12 NOTE — PATIENT INSTRUCTIONS

## 2021-04-14 ENCOUNTER — TELEPHONE (OUTPATIENT)
Dept: CARDIOLOGY CLINIC | Age: 76
End: 2021-04-14

## 2021-04-14 NOTE — TELEPHONE ENCOUNTER
TAVR testing is scheduled for 4-28-21 per below:        11:00 am - CAROTID STUDY  * Please wear easy to remove clothing  * Please take all medication, unless otherwise instructed  * You will be here for approximately 1 hour  * Arrive 10 minutes prior    12:00 pm - F CTA CHEST ABD PELVIC W/CONTRAST  PREPS:  * NPO 4 hours prior to procedure  * Arrive 30 minutes prior to exam - immediately following Carotid. * No necklaces, underwire bras, body piercing, no pants with zippers, belts,or suspenders  * Bring a complete list of medications or the test cannot be completed. Labs not needed, drawn at dialysis.

## 2021-04-27 DIAGNOSIS — I25.83 CORONARY ARTERY DISEASE DUE TO LIPID RICH PLAQUE: ICD-10-CM

## 2021-04-27 DIAGNOSIS — I25.10 CORONARY ARTERY DISEASE DUE TO LIPID RICH PLAQUE: ICD-10-CM

## 2021-04-27 DIAGNOSIS — I10 ESSENTIAL HYPERTENSION: ICD-10-CM

## 2021-04-27 DIAGNOSIS — I35.0 NONRHEUMATIC AORTIC VALVE STENOSIS: Primary | ICD-10-CM

## 2021-04-28 ENCOUNTER — HOSPITAL ENCOUNTER (OUTPATIENT)
Dept: VASCULAR LAB | Age: 76
Discharge: HOME | End: 2021-04-28
Payer: MEDICARE

## 2021-04-28 ENCOUNTER — HOSPITAL ENCOUNTER (OUTPATIENT)
Dept: CT IMAGING | Age: 76
Discharge: HOME | End: 2021-04-28
Payer: MEDICARE

## 2021-04-28 DIAGNOSIS — R42 DIZZINESS: ICD-10-CM

## 2021-04-28 DIAGNOSIS — I25.83 CORONARY ARTERY DISEASE DUE TO LIPID RICH PLAQUE: ICD-10-CM

## 2021-04-28 DIAGNOSIS — I35.0 NONRHEUMATIC AORTIC VALVE STENOSIS: ICD-10-CM

## 2021-04-28 DIAGNOSIS — I10 ESSENTIAL HYPERTENSION: Primary | ICD-10-CM

## 2021-04-28 DIAGNOSIS — I25.10 CORONARY ARTERY DISEASE DUE TO LIPID RICH PLAQUE: ICD-10-CM

## 2021-04-28 DIAGNOSIS — I10 ESSENTIAL HYPERTENSION: ICD-10-CM

## 2021-04-28 PROCEDURE — 6360000004 HC RX CONTRAST MEDICATION: Performed by: INTERNAL MEDICINE

## 2021-04-28 PROCEDURE — 93880 EXTRACRANIAL BILAT STUDY: CPT

## 2021-04-28 PROCEDURE — 74174 CTA ABD&PLVS W/CONTRAST: CPT

## 2021-04-28 RX ADMIN — IOPAMIDOL 150 ML: 755 INJECTION, SOLUTION INTRAVENOUS at 11:46

## 2021-05-10 ENCOUNTER — TELEPHONE (OUTPATIENT)
Dept: CARDIOLOGY CLINIC | Age: 76
End: 2021-05-10

## 2021-05-12 ENCOUNTER — ANESTHESIA EVENT (OUTPATIENT)
Dept: OPERATING ROOM | Age: 76
DRG: 266 | End: 2021-05-12
Payer: MEDICARE

## 2021-05-12 ENCOUNTER — OFFICE VISIT (OUTPATIENT)
Dept: CARDIOTHORACIC SURGERY | Age: 76
End: 2021-05-12
Payer: MEDICARE

## 2021-05-12 VITALS
DIASTOLIC BLOOD PRESSURE: 72 MMHG | WEIGHT: 298.2 LBS | HEART RATE: 67 BPM | BODY MASS INDEX: 44.17 KG/M2 | HEIGHT: 69 IN | SYSTOLIC BLOOD PRESSURE: 138 MMHG | TEMPERATURE: 97.5 F | OXYGEN SATURATION: 95 %

## 2021-05-12 DIAGNOSIS — I35.0 AORTIC STENOSIS, SEVERE: Primary | ICD-10-CM

## 2021-05-12 PROCEDURE — 99204 OFFICE O/P NEW MOD 45 MIN: CPT | Performed by: THORACIC SURGERY (CARDIOTHORACIC VASCULAR SURGERY)

## 2021-05-12 PROCEDURE — G8427 DOCREV CUR MEDS BY ELIG CLIN: HCPCS | Performed by: THORACIC SURGERY (CARDIOTHORACIC VASCULAR SURGERY)

## 2021-05-12 PROCEDURE — 1123F ACP DISCUSS/DSCN MKR DOCD: CPT | Performed by: THORACIC SURGERY (CARDIOTHORACIC VASCULAR SURGERY)

## 2021-05-12 PROCEDURE — 4040F PNEUMOC VAC/ADMIN/RCVD: CPT | Performed by: THORACIC SURGERY (CARDIOTHORACIC VASCULAR SURGERY)

## 2021-05-12 PROCEDURE — G8417 CALC BMI ABV UP PARAM F/U: HCPCS | Performed by: THORACIC SURGERY (CARDIOTHORACIC VASCULAR SURGERY)

## 2021-05-12 PROCEDURE — 1036F TOBACCO NON-USER: CPT | Performed by: THORACIC SURGERY (CARDIOTHORACIC VASCULAR SURGERY)

## 2021-05-12 PROCEDURE — 3017F COLORECTAL CA SCREEN DOC REV: CPT | Performed by: THORACIC SURGERY (CARDIOTHORACIC VASCULAR SURGERY)

## 2021-05-12 RX ORDER — AMIODARONE HYDROCHLORIDE 200 MG/1
200 TABLET ORAL 2 TIMES DAILY
Status: ON HOLD | COMMUNITY
End: 2021-05-19 | Stop reason: HOSPADM

## 2021-05-12 ASSESSMENT — ENCOUNTER SYMPTOMS
EYE PAIN: 0
SHORTNESS OF BREATH: 1
CHEST TIGHTNESS: 0
ALLERGIC/IMMUNOLOGIC NEGATIVE: 1
SHORTNESS OF BREATH: 0
APNEA: 0
GASTROINTESTINAL NEGATIVE: 1
COUGH: 0
BACK PAIN: 0
EYE REDNESS: 0

## 2021-05-12 NOTE — PROGRESS NOTES
Subjective:      Patient ID: Terri Leyva is a 77 y.o. male. Chief Complaint   Patient presents with   [facility] Patient     Mr. Lex Sandhu is being seen today at the request of Dr. Caitlin Stringer for a TAVR consult. HPI Mr. Lex Sandhu has had problems related to aortic stenosis for the last several years. This is culminated in him having a balloon aortic valvuloplasty patiently. This did much to help his activity level and decrease his shortness of breath and the aggressiveness of his ankle edema. He understood that this was a temporary fix and that he would ultimately need to have an aortic valve replacement. He presents today for discussion related to transcatheter aortic valve replacement. Review of Systems   Constitutional: Negative. HENT: Negative. Eyes: Negative for pain, redness and visual disturbance. Wears reading glasses   Respiratory: Negative for apnea, cough, chest tightness and shortness of breath. Cardiovascular: Positive for leg swelling. Negative for chest pain and palpitations. Gastrointestinal: Negative. Endocrine: Negative. Genitourinary: Negative. Musculoskeletal: Negative for arthralgias, back pain and neck pain. Ambulates with cane for assurance   Skin: Negative. Allergic/Immunologic: Negative. Neurological: Negative for dizziness, seizures, syncope and light-headedness. Hematological: Bruises/bleeds easily. Psychiatric/Behavioral: Negative.       Past Medical History:   Diagnosis Date    Arthritis     Bell's palsy     [facility]     CHF (congestive heart failure) ([facility]     Coronary artery disease involving native coronary artery of native heart without angina pectoris     Hyperlipidemia     Hypertension     Influenza B 4/2/15    Kidney failure     sees Dr Stephanie Jones      Past Surgical History:   Procedure Laterality Date    CARDIAC CATHETERIZATION  03/10/2021    x 1 Stent placement    CORONARY ANGIOPLASTY WITH STENT PLACEMENT  2017    EVERETT to LAD    IR TUNNELED CATHETER PLACEMENT GREATER THAN 5 YEARS  2021    IR TUNNELED CATHETER PLACEMENT GREATER THAN 5 YEARS 2021 [facility]    PTCA      TUNNELED VENOUS CATHETER PLACEMENT Right 2021    Permacath; RIJ access; 23cm; Dr. Harris      No Known Allergies  Current Outpatient Medications   Medication Sig Dispense Refill    amiodarone (CORDARONE) 200 MG tablet Take 200 mg by mouth 2 times daily      GLUCOSAMINE HCL PO Take 3,000 mg by mouth 2 times daily      metoprolol succinate (TOPROL XL) 25 MG extended release tablet Take 1 tablet by mouth daily 30 tablet 3    b complex-C-folic acid (NEPHROCAPS) 1 MG capsule Take 1 capsule by mouth daily 30 capsule 3    clopidogrel (PLAVIX) 75 MG tablet TAKE 1 TABLET BY MOUTH EVERY DAY 90 tablet 3    aspirin 81 MG chewable tablet Take 81 mg by mouth daily.  atorvastatin (LIPITOR) 20 MG tablet Take 20 mg by mouth daily. No current facility-administered medications for this visit. Social History     Socioeconomic History    Marital status:       Spouse name: Not on file    Number of children: Not on file    Years of education: Not on file    Highest education level: Not on file   Occupational History    Not on file   Social Needs    Financial resource strain: Not on file    Food insecurity     Worry: Not on file     Inability: Not on file    Transportation needs     Medical: Not on file     Non-medical: Not on file   Tobacco Use    Smoking status: Former Smoker     Packs/day: 0.50     Years: 20.00     Pack years: 10.00     Types: Cigarettes     Quit date: 1984     Years since quittin.3    Smokeless tobacco: Never Used    Tobacco comment: will not start    Substance and Sexual Activity    Alcohol use: No    Drug use: No    Sexual activity: Not on file   Lifestyle    Physical activity     Days per week: Not on file     Minutes per session: Not on file    Stress: Not on file   Relationships    Social connections     Talks on phone: Not on file     Gets together: Not on file     Attends Mandaen service: Not on file     Active member of club or organization: Not on file     Attends meetings of clubs or organizations: Not on file     Relationship status: Not on file    Intimate partner violence     Fear of current or ex partner: Not on file     Emotionally abused: Not on file     Physically abused: Not on file     Forced sexual activity: Not on file   Other Topics Concern    Not on file   Social History Narrative    Not on file     Family History   Problem Relation Age of Onset    Cancer Mother     Cancer Father     No Known Problems Sister     No Known Problems Brother     Cancer Sister      Objective:   Physical Exam  Constitutional:       General: He is not in acute distress. Appearance: He is well-developed. He is obese. He is not diaphoretic. HENT:      Head: Normocephalic. Nose: Nose normal.   Eyes:      General: No scleral icterus. Conjunctiva/sclera: Conjunctivae normal.      Pupils: Pupils are equal, round, and reactive to light. Neck:      Musculoskeletal: Normal range of motion and neck supple. Thyroid: No thyromegaly. Vascular: Carotid bruit present. No JVD. Trachea: No tracheal deviation. Comments: Aortic murmur radiates into both carotid arteries  Cardiovascular:      Rate and Rhythm: Normal rate and regular rhythm. Pulses: Normal pulses. Heart sounds: Normal heart sounds. No murmur. No friction rub. No gallop. Pulmonary:      Effort: Pulmonary effort is normal. No respiratory distress. Breath sounds: Normal breath sounds. No stridor. No wheezing or rales. Abdominal:      General: Bowel sounds are normal. There is no distension. Palpations: Abdomen is soft. Tenderness: There is no abdominal tenderness. Musculoskeletal: Normal range of motion. General: No deformity.       Right lower leg: Edema present. Left lower leg: Edema present. Comments: Walks with a cane   Skin:     General: Skin is warm and dry. Capillary Refill: Capillary refill takes less than 2 seconds. Coloration: Skin is not jaundiced. Neurological:      General: No focal deficit present. Mental Status: He is alert and oriented to person, place, and time. Cranial Nerves: No cranial nerve deficit. Coordination: Coordination normal.   Psychiatric:         Mood and Affect: Mood normal.         Behavior: Behavior normal.         Thought Content: Thought content normal.         Judgment: Judgment normal.       Vitals:    05/12/21 1010 05/12/21 1014   BP: (!) 140/86 138/72   Site: Left Upper Arm Right Upper Arm   Position: Sitting Sitting   Pulse: 67    Temp: 97.5 °F (36.4 °C)    TempSrc: Infrared    SpO2: 95%    Weight: 298 lb 3.2 oz (135.3 kg)    Height: 5' 9\" (1.753 m)      Assessment:      59-year-old man with several year history of progressive congestive heart failure in part related to severe aortic valve stenosis. -Comorbidities includes his weight and lower extremity skeletal problems related to his weight. Plan:      I agree that TAVR is Mr. Dale yMers best option. I asked him if he had any questions regarding the procedure. He felt as though he was already very well-informed and had no questions for me. He is already scheduled for this procedure on May 18.

## 2021-05-12 NOTE — ANESTHESIA PRE PROCEDURE
Department of Anesthesiology  Preprocedure Note       Name:  Malik Carter   Age:  77 y.o.  :  1945                                          MRN:  [id number]         Date:  2021      Surgeon: Raquel Lopez):  MD Rhett Castillo MD    Procedure: Procedure(s):  TRANSCATHETER AORTIC VALVE REPLACEMENT FEMORAL APPROACH  TRANSCATHETER AORTIC VALVE REPLACEMENT FEMORAL APPROACH    Medications prior to admission:   Prior to Admission medications    Medication Sig Start Date End Date Taking? Authorizing Provider   amiodarone (CORDARONE) 200 MG tablet Take 200 mg by mouth 2 times daily    Historical Provider, MD   GLUCOSAMINE HCL PO Take 3,000 mg by mouth 2 times daily    Historical Provider, MD   metoprolol succinate (TOPROL XL) 25 MG extended release tablet Take 1 tablet by mouth daily 21   Juan Marquez MD   b complex-C-folic acid (NEPHROCAPS) 1 MG capsule Take 1 capsule by mouth daily 21   Juan Marquez MD   clopidogrel (PLAVIX) 75 MG tablet TAKE 1 TABLET BY MOUTH EVERY DAY 20   Antwan Sevilla MD   aspirin 81 MG chewable tablet Take 81 mg by mouth daily. Historical Provider, MD   atorvastatin (LIPITOR) 20 MG tablet Take 20 mg by mouth daily. Historical Provider, MD       Current medications:    No current facility-administered medications for this encounter. Current Outpatient Medications   Medication Sig Dispense Refill    amiodarone (CORDARONE) 200 MG tablet Take 200 mg by mouth 2 times daily      GLUCOSAMINE HCL PO Take 3,000 mg by mouth 2 times daily      metoprolol succinate (TOPROL XL) 25 MG extended release tablet Take 1 tablet by mouth daily 30 tablet 3    b complex-C-folic acid (NEPHROCAPS) 1 MG capsule Take 1 capsule by mouth daily 30 capsule 3    clopidogrel (PLAVIX) 75 MG tablet TAKE 1 TABLET BY MOUTH EVERY DAY 90 tablet 3    aspirin 81 MG chewable tablet Take 81 mg by mouth daily.  atorvastatin (LIPITOR) 20 MG tablet Take 20 mg by mouth daily. PROCEDURES    PTCA      TUNNELED VENOUS CATHETER PLACEMENT Right 2021    Permacath; RIJ access; 23cm; Dr. Vignesh Douglass       Social History:    Social History     Tobacco Use    Smoking status: Former Smoker     Packs/day: 0.50     Years: 20.00     Pack years: 10.00     Types: Cigarettes     Quit date: 1984     Years since quittin.3    Smokeless tobacco: Never Used    Tobacco comment: will not start    Substance Use Topics    Alcohol use: No                                Counseling given: Not Answered  Comment: will not start       Vital Signs (Current): There were no vitals filed for this visit. BP Readings from Last 3 Encounters:   21 138/72   21 124/82   21 112/68       NPO Status:                                                                                 BMI:   Wt Readings from Last 3 Encounters:   21 298 lb 3.2 oz (135.3 kg)   21 293 lb 12.8 oz (133.3 kg)   21 297 lb (134.7 kg)     There is no height or weight on file to calculate BMI.    CBC:   Lab Results   Component Value Date    WBC 10.3 2021    RBC 3.41 2021    HGB 9.7 2021    HCT 29.7 2021    MCV 87.1 2021    RDW 20.3 2021     2021       CMP:   Lab Results   Component Value Date     2021    K 4.0 2021    K 3.9 2021    CL 95 2021    CO2 28 2021    BUN 33 2021    CREATININE 5.0 2021    GFRAA 14 2021    AGRATIO 1.0 2021    LABGLOM 11 2021    GLUCOSE 93 2021    PROT 7.0 2021    CALCIUM 8.2 2021    BILITOT <0.2 2021    ALKPHOS 78 2021    AST 25 2021    ALT 25 2021       POC Tests: No results for input(s): POCGLU, POCNA, POCK, POCCL, POCBUN, POCHEMO, POCHCT in the last 72 hours.     Coags:   Lab Results   Component Value Date    PROTIME 13.1 2021    INR 1.13 2021    APTT 29.1 2021       HCG line  MIPS: Prophylactic antiemetics administered. Anesthetic plan and risks discussed with patient. Plan discussed with CRNA.                   Benjamin Lopez MD   5/12/2021

## 2021-05-14 ENCOUNTER — HOSPITAL ENCOUNTER (OUTPATIENT)
Dept: PREADMISSION TESTING | Age: 76
Discharge: HOME | End: 2021-05-18
Payer: MEDICARE

## 2021-05-14 VITALS
DIASTOLIC BLOOD PRESSURE: 71 MMHG | HEART RATE: 60 BPM | TEMPERATURE: 97.9 F | RESPIRATION RATE: 16 BRPM | SYSTOLIC BLOOD PRESSURE: 140 MMHG | OXYGEN SATURATION: 96 %

## 2021-05-14 LAB
ABO/RH: NORMAL
ALBUMIN SERPL-MCNC: 4.1 G/DL (ref 3.4–5)
ALP BLD-CCNC: 67 U/L (ref 40–129)
ALT SERPL-CCNC: 13 U/L (ref 10–40)
ANION GAP SERPL CALCULATED.3IONS-SCNC: 16 MMOL/L (ref 3–16)
ANISOCYTOSIS: ABNORMAL
ANTIBODY SCREEN: NORMAL
AST SERPL-CCNC: 15 U/L (ref 15–37)
BASOPHILS ABSOLUTE: 0.1 K/UL (ref 0–0.2)
BASOPHILS RELATIVE PERCENT: 1.2 %
BILIRUB SERPL-MCNC: <0.2 MG/DL (ref 0–1)
BILIRUBIN DIRECT: <0.2 MG/DL (ref 0–0.3)
BILIRUBIN, INDIRECT: NORMAL MG/DL (ref 0–1)
BUN BLDV-MCNC: 43 MG/DL (ref 7–20)
CALCIUM SERPL-MCNC: 9.3 MG/DL (ref 8.3–10.6)
CHLORIDE BLD-SCNC: 96 MMOL/L (ref 99–110)
CO2: 27 MMOL/L (ref 21–32)
CREAT SERPL-MCNC: 6.1 MG/DL (ref 0.8–1.3)
EKG ATRIAL RATE: 59 BPM
EKG DIAGNOSIS: NORMAL
EKG P AXIS: 41 DEGREES
EKG P-R INTERVAL: 186 MS
EKG Q-T INTERVAL: 514 MS
EKG QRS DURATION: 88 MS
EKG QTC CALCULATION (BAZETT): 508 MS
EKG R AXIS: -37 DEGREES
EKG T AXIS: 42 DEGREES
EKG VENTRICULAR RATE: 59 BPM
EOSINOPHILS ABSOLUTE: 0.5 K/UL (ref 0–0.6)
EOSINOPHILS RELATIVE PERCENT: 6.5 %
GFR AFRICAN AMERICAN: 11
GFR NON-AFRICAN AMERICAN: 9
GLUCOSE BLD-MCNC: 108 MG/DL (ref 70–99)
HCT VFR BLD CALC: 43.5 % (ref 40.5–52.5)
HEMOGLOBIN: 14.3 G/DL (ref 13.5–17.5)
INR BLD: 1.03 (ref 0.86–1.14)
LYMPHOCYTES ABSOLUTE: 0.7 K/UL (ref 1–5.1)
LYMPHOCYTES RELATIVE PERCENT: 8.9 %
MAGNESIUM: 2.4 MG/DL (ref 1.8–2.4)
MCH RBC QN AUTO: 32.1 PG (ref 26–34)
MCHC RBC AUTO-ENTMCNC: 32.9 G/DL (ref 31–36)
MCV RBC AUTO: 97.5 FL (ref 80–100)
MONOCYTES ABSOLUTE: 0.8 K/UL (ref 0–1.3)
MONOCYTES RELATIVE PERCENT: 10.2 %
NEUTROPHILS ABSOLUTE: 5.8 K/UL (ref 1.7–7.7)
NEUTROPHILS RELATIVE PERCENT: 73.2 %
OVALOCYTES: ABNORMAL
PDW BLD-RTO: 21.8 % (ref 12.4–15.4)
PLATELET # BLD: 244 K/UL (ref 135–450)
PMV BLD AUTO: 8.2 FL (ref 5–10.5)
POLYCHROMASIA: ABNORMAL
POTASSIUM SERPL-SCNC: 3.4 MMOL/L (ref 3.5–5.1)
PROTHROMBIN TIME: 12 SEC (ref 10–13.2)
RBC # BLD: 4.47 M/UL (ref 4.2–5.9)
SLIDE REVIEW: ABNORMAL
SODIUM BLD-SCNC: 139 MMOL/L (ref 136–145)
TEAR DROP CELLS: ABNORMAL
TOTAL PROTEIN: 7.6 G/DL (ref 6.4–8.2)
WBC # BLD: 7.9 K/UL (ref 4–11)

## 2021-05-14 PROCEDURE — 86900 BLOOD TYPING SEROLOGIC ABO: CPT

## 2021-05-14 PROCEDURE — 36415 COLL VENOUS BLD VENIPUNCTURE: CPT

## 2021-05-14 PROCEDURE — 86901 BLOOD TYPING SEROLOGIC RH(D): CPT

## 2021-05-14 PROCEDURE — 93005 ELECTROCARDIOGRAM TRACING: CPT | Performed by: INTERNAL MEDICINE

## 2021-05-14 PROCEDURE — 94375 RESPIRATORY FLOW VOLUME LOOP: CPT

## 2021-05-14 PROCEDURE — 83735 ASSAY OF MAGNESIUM: CPT

## 2021-05-14 PROCEDURE — 85025 COMPLETE CBC W/AUTO DIFF WBC: CPT

## 2021-05-14 PROCEDURE — 80076 HEPATIC FUNCTION PANEL: CPT

## 2021-05-14 PROCEDURE — 80048 BASIC METABOLIC PNL TOTAL CA: CPT

## 2021-05-14 PROCEDURE — 93010 ELECTROCARDIOGRAM REPORT: CPT | Performed by: INTERNAL MEDICINE

## 2021-05-14 PROCEDURE — 85610 PROTHROMBIN TIME: CPT

## 2021-05-14 PROCEDURE — 86850 RBC ANTIBODY SCREEN: CPT

## 2021-05-14 NOTE — PROGRESS NOTES
Pt here for PAT visit. Consents for procedure and blood signed by pt and in chart. Labs drawn and sent to lab for testing as ordered. Pt seen by Dr. Samantha Platt from Anesthesia and questions answered. Vitals stable and documented in flowsheet. Pt instructed to be NPO after midnight prior to procedure and states understanding. Pt provided with hibiclens and instructed to shower with entire bottle the night prior to procedure. Pt instructed to take the following medications the morning of procedure with a small sip of water: amiodarone, metoprolol. Abnormal labs reported to [facility]. EKG completed and results in chart. Pt instructed to arrive to the hospital the morning of procedure at 0700 on 5/18/21. Pt in good condition and okay for discharge after completion of PAT. Pt denies further questions at time of discharge. Pt instructed to call Dr. Baron Cazares or [facility] office with any medical concerns or the heart office at [phone] with any further questions between now and day of procedure.

## 2021-05-18 ENCOUNTER — HOSPITAL ENCOUNTER (INPATIENT)
Age: 76
LOS: 1 days | Discharge: HOME | DRG: 266 | End: 2021-05-19
Attending: INTERNAL MEDICINE | Admitting: INTERNAL MEDICINE
Payer: MEDICARE

## 2021-05-18 ENCOUNTER — ANESTHESIA (OUTPATIENT)
Dept: OPERATING ROOM | Age: 76
DRG: 266 | End: 2021-05-18
Payer: MEDICARE

## 2021-05-18 VITALS — OXYGEN SATURATION: 100 %

## 2021-05-18 LAB
ABO/RH: NORMAL
ANTIBODY SCREEN: NORMAL
BASE EXCESS ARTERIAL: 0 (ref -3–3)
BLOOD BANK DISPENSE STATUS: NORMAL
BLOOD BANK PRODUCT CODE: NORMAL
BPU ID: NORMAL
CALCIUM IONIZED: 1.16 MMOL/L (ref 1.12–1.32)
CREAT SERPL-MCNC: 8.3 MG/DL (ref 0.8–1.3)
DESCRIPTION BLOOD BANK: NORMAL
EKG ATRIAL RATE: 63 BPM
EKG DIAGNOSIS: NORMAL
EKG P AXIS: 53 DEGREES
EKG P-R INTERVAL: 214 MS
EKG Q-T INTERVAL: 546 MS
EKG QRS DURATION: 164 MS
EKG QTC CALCULATION (BAZETT): 558 MS
EKG R AXIS: -51 DEGREES
EKG T AXIS: 96 DEGREES
EKG VENTRICULAR RATE: 63 BPM
GFR AFRICAN AMERICAN: 8
GFR NON-AFRICAN AMERICAN: 6
GLUCOSE BLD-MCNC: 125 MG/DL (ref 70–99)
HCO3 ARTERIAL: 26 MMOL/L (ref 21–29)
HEMOGLOBIN: 13.9 GM/DL (ref 13.5–17.5)
LACTATE: 1.35 MMOL/L (ref 0.4–2)
O2 SAT, ARTERIAL: 95 % (ref 93–100)
PCO2 ARTERIAL: 49.9 MM HG (ref 35–45)
PERFORMED ON: ABNORMAL
PH ARTERIAL: 7.33 (ref 7.35–7.45)
PO2 ARTERIAL: 84.6 MM HG (ref 75–108)
POC HEMATOCRIT: 41 % (ref 40.5–52.5)
POC POTASSIUM: 3.9 MMOL/L (ref 3.5–5.1)
POC SAMPLE TYPE: ABNORMAL
POC SODIUM: 139 MMOL/L (ref 136–145)
POTASSIUM SERPL-SCNC: 4.1 MMOL/L (ref 3.5–5.1)
PRO-BNP: 9035 PG/ML (ref 0–449)
TCO2 ARTERIAL: 28 MMOL/L
TROPONIN: 0.04 NG/ML

## 2021-05-18 PROCEDURE — 2720000010 HC SURG SUPPLY STERILE

## 2021-05-18 PROCEDURE — 84484 ASSAY OF TROPONIN QUANT: CPT

## 2021-05-18 PROCEDURE — 2709999900 HC NON-CHARGEABLE SUPPLY

## 2021-05-18 PROCEDURE — 82330 ASSAY OF CALCIUM: CPT

## 2021-05-18 PROCEDURE — 83605 ASSAY OF LACTIC ACID: CPT

## 2021-05-18 PROCEDURE — 82947 ASSAY GLUCOSE BLOOD QUANT: CPT

## 2021-05-18 PROCEDURE — 2580000003 HC RX 258: Performed by: NURSE ANESTHETIST, CERTIFIED REGISTERED

## 2021-05-18 PROCEDURE — 2580000003 HC RX 258: Performed by: INTERNAL MEDICINE

## 2021-05-18 PROCEDURE — C1894 INTRO/SHEATH, NON-LASER: HCPCS

## 2021-05-18 PROCEDURE — 6360000002 HC RX W HCPCS: Performed by: THORACIC SURGERY (CARDIOTHORACIC VASCULAR SURGERY)

## 2021-05-18 PROCEDURE — 2500000003 HC RX 250 WO HCPCS: Performed by: INTERNAL MEDICINE

## 2021-05-18 PROCEDURE — 6370000000 HC RX 637 (ALT 250 FOR IP): Performed by: INTERNAL MEDICINE

## 2021-05-18 PROCEDURE — 5A1223Z PERFORMANCE OF CARDIAC PACING, CONTINUOUS: ICD-10-PCS | Performed by: THORACIC SURGERY (CARDIOTHORACIC VASCULAR SURGERY)

## 2021-05-18 PROCEDURE — 33361 REPLACE AORTIC VALVE PERQ: CPT | Performed by: INTERNAL MEDICINE

## 2021-05-18 PROCEDURE — 85014 HEMATOCRIT: CPT

## 2021-05-18 PROCEDURE — 6360000002 HC RX W HCPCS: Performed by: NURSE ANESTHETIST, CERTIFIED REGISTERED

## 2021-05-18 PROCEDURE — 3700000000 HC ANESTHESIA ATTENDED CARE: Performed by: INTERNAL MEDICINE

## 2021-05-18 PROCEDURE — 2500000003 HC RX 250 WO HCPCS

## 2021-05-18 PROCEDURE — 82803 BLOOD GASES ANY COMBINATION: CPT

## 2021-05-18 PROCEDURE — 2140000000 HC CCU INTERMEDIATE R&B

## 2021-05-18 PROCEDURE — 86900 BLOOD TYPING SEROLOGIC ABO: CPT

## 2021-05-18 PROCEDURE — 7100000010 HC PHASE II RECOVERY - FIRST 15 MIN

## 2021-05-18 PROCEDURE — 2580000003 HC RX 258: Performed by: THORACIC SURGERY (CARDIOTHORACIC VASCULAR SURGERY)

## 2021-05-18 PROCEDURE — B41F1ZZ FLUOROSCOPY OF RIGHT LOWER EXTREMITY ARTERIES USING LOW OSMOLAR CONTRAST: ICD-10-PCS | Performed by: THORACIC SURGERY (CARDIOTHORACIC VASCULAR SURGERY)

## 2021-05-18 PROCEDURE — 83880 ASSAY OF NATRIURETIC PEPTIDE: CPT

## 2021-05-18 PROCEDURE — 6360000004 HC RX CONTRAST MEDICATION

## 2021-05-18 PROCEDURE — 93010 ELECTROCARDIOGRAM REPORT: CPT | Performed by: INTERNAL MEDICINE

## 2021-05-18 PROCEDURE — C1760 CLOSURE DEV, VASC: HCPCS

## 2021-05-18 PROCEDURE — 2709999900 HC NON-CHARGEABLE SUPPLY: Performed by: INTERNAL MEDICINE

## 2021-05-18 PROCEDURE — 3700000001 HC ADD 15 MINUTES (ANESTHESIA): Performed by: INTERNAL MEDICINE

## 2021-05-18 PROCEDURE — 36415 COLL VENOUS BLD VENIPUNCTURE: CPT

## 2021-05-18 PROCEDURE — 6360000002 HC RX W HCPCS: Performed by: INTERNAL MEDICINE

## 2021-05-18 PROCEDURE — C1769 GUIDE WIRE: HCPCS

## 2021-05-18 PROCEDURE — 6360000002 HC RX W HCPCS

## 2021-05-18 PROCEDURE — 2500000003 HC RX 250 WO HCPCS: Performed by: THORACIC SURGERY (CARDIOTHORACIC VASCULAR SURGERY)

## 2021-05-18 PROCEDURE — 2780000006 HC MISC HEART VALVE

## 2021-05-18 PROCEDURE — 6360000004 HC RX CONTRAST MEDICATION: Performed by: INTERNAL MEDICINE

## 2021-05-18 PROCEDURE — 90935 HEMODIALYSIS ONE EVALUATION: CPT

## 2021-05-18 PROCEDURE — 86901 BLOOD TYPING SEROLOGIC RH(D): CPT

## 2021-05-18 PROCEDURE — 86923 COMPATIBILITY TEST ELECTRIC: CPT

## 2021-05-18 PROCEDURE — 84295 ASSAY OF SERUM SODIUM: CPT

## 2021-05-18 PROCEDURE — 02RF38Z REPLACEMENT OF AORTIC VALVE WITH ZOOPLASTIC TISSUE, PERCUTANEOUS APPROACH: ICD-10-PCS | Performed by: THORACIC SURGERY (CARDIOTHORACIC VASCULAR SURGERY)

## 2021-05-18 PROCEDURE — 3600000017 HC SURGERY HYBRID ADDL 15MIN

## 2021-05-18 PROCEDURE — 86850 RBC ANTIBODY SCREEN: CPT

## 2021-05-18 PROCEDURE — 7100000011 HC PHASE II RECOVERY - ADDTL 15 MIN

## 2021-05-18 PROCEDURE — 93005 ELECTROCARDIOGRAM TRACING: CPT | Performed by: INTERNAL MEDICINE

## 2021-05-18 PROCEDURE — C1725 CATH, TRANSLUMIN NON-LASER: HCPCS

## 2021-05-18 PROCEDURE — 82565 ASSAY OF CREATININE: CPT

## 2021-05-18 PROCEDURE — 33361 REPLACE AORTIC VALVE PERQ: CPT | Performed by: THORACIC SURGERY (CARDIOTHORACIC VASCULAR SURGERY)

## 2021-05-18 PROCEDURE — 2500000003 HC RX 250 WO HCPCS: Performed by: NURSE ANESTHETIST, CERTIFIED REGISTERED

## 2021-05-18 PROCEDURE — 3600000007 HC SURGERY HYBRID BASE

## 2021-05-18 PROCEDURE — 94150 VITAL CAPACITY TEST: CPT

## 2021-05-18 PROCEDURE — 84132 ASSAY OF SERUM POTASSIUM: CPT

## 2021-05-18 PROCEDURE — 5A1D70Z PERFORMANCE OF URINARY FILTRATION, INTERMITTENT, LESS THAN 6 HOURS PER DAY: ICD-10-PCS | Performed by: INTERNAL MEDICINE

## 2021-05-18 RX ORDER — HYDRALAZINE HYDROCHLORIDE 20 MG/ML
INJECTION INTRAMUSCULAR; INTRAVENOUS PRN
Status: DISCONTINUED | OUTPATIENT
Start: 2021-05-18 | End: 2021-05-18 | Stop reason: SDUPTHER

## 2021-05-18 RX ORDER — ACETAMINOPHEN 325 MG/1
650 TABLET ORAL EVERY 4 HOURS PRN
Status: DISCONTINUED | OUTPATIENT
Start: 2021-05-18 | End: 2021-05-19 | Stop reason: HOSPADM

## 2021-05-18 RX ORDER — ASPIRIN 81 MG/1
81 TABLET ORAL DAILY
Status: DISCONTINUED | OUTPATIENT
Start: 2021-05-18 | End: 2021-05-19 | Stop reason: HOSPADM

## 2021-05-18 RX ORDER — SODIUM CHLORIDE 0.9 % (FLUSH) 0.9 %
5-40 SYRINGE (ML) INJECTION PRN
Status: DISCONTINUED | OUTPATIENT
Start: 2021-05-18 | End: 2021-05-19 | Stop reason: HOSPADM

## 2021-05-18 RX ORDER — CHOLECALCIFEROL (VITAMIN D3) 10 MCG
1 TABLET ORAL DAILY
Status: DISCONTINUED | OUTPATIENT
Start: 2021-05-18 | End: 2021-05-19 | Stop reason: HOSPADM

## 2021-05-18 RX ORDER — SODIUM CHLORIDE 9 MG/ML
25 INJECTION, SOLUTION INTRAVENOUS PRN
Status: DISCONTINUED | OUTPATIENT
Start: 2021-05-18 | End: 2021-05-19 | Stop reason: HOSPADM

## 2021-05-18 RX ORDER — PROTAMINE SULFATE 10 MG/ML
INJECTION, SOLUTION INTRAVENOUS PRN
Status: DISCONTINUED | OUTPATIENT
Start: 2021-05-18 | End: 2021-05-18 | Stop reason: SDUPTHER

## 2021-05-18 RX ORDER — SODIUM CHLORIDE 450 MG/100ML
INJECTION, SOLUTION INTRAVENOUS CONTINUOUS
Status: DISCONTINUED | OUTPATIENT
Start: 2021-05-18 | End: 2021-05-19 | Stop reason: HOSPADM

## 2021-05-18 RX ORDER — LIDOCAINE 4 G/G
1 PATCH TOPICAL ONCE
Status: COMPLETED | OUTPATIENT
Start: 2021-05-18 | End: 2021-05-18

## 2021-05-18 RX ORDER — NITROGLYCERIN 20 MG/100ML
5 INJECTION INTRAVENOUS CONTINUOUS
Status: DISCONTINUED | OUTPATIENT
Start: 2021-05-18 | End: 2021-05-19 | Stop reason: HOSPADM

## 2021-05-18 RX ORDER — HYDRALAZINE HYDROCHLORIDE 20 MG/ML
10 INJECTION INTRAMUSCULAR; INTRAVENOUS EVERY 6 HOURS PRN
Status: DISCONTINUED | OUTPATIENT
Start: 2021-05-18 | End: 2021-05-19 | Stop reason: HOSPADM

## 2021-05-18 RX ORDER — ONDANSETRON 2 MG/ML
4 INJECTION INTRAMUSCULAR; INTRAVENOUS EVERY 6 HOURS PRN
Status: DISCONTINUED | OUTPATIENT
Start: 2021-05-18 | End: 2021-05-19 | Stop reason: HOSPADM

## 2021-05-18 RX ORDER — PROPOFOL 10 MG/ML
INJECTION, EMULSION INTRAVENOUS CONTINUOUS PRN
Status: DISCONTINUED | OUTPATIENT
Start: 2021-05-18 | End: 2021-05-18 | Stop reason: SDUPTHER

## 2021-05-18 RX ORDER — SODIUM CHLORIDE 0.9 % (FLUSH) 0.9 %
5-40 SYRINGE (ML) INJECTION EVERY 12 HOURS SCHEDULED
Status: DISCONTINUED | OUTPATIENT
Start: 2021-05-18 | End: 2021-05-19 | Stop reason: HOSPADM

## 2021-05-18 RX ORDER — KETAMINE HYDROCHLORIDE 10 MG/ML
INJECTION, SOLUTION INTRAMUSCULAR; INTRAVENOUS PRN
Status: DISCONTINUED | OUTPATIENT
Start: 2021-05-18 | End: 2021-05-18 | Stop reason: SDUPTHER

## 2021-05-18 RX ORDER — CLOPIDOGREL BISULFATE 75 MG/1
75 TABLET ORAL DAILY
Status: DISCONTINUED | OUTPATIENT
Start: 2021-05-18 | End: 2021-05-19 | Stop reason: HOSPADM

## 2021-05-18 RX ORDER — PROPOFOL 10 MG/ML
INJECTION, EMULSION INTRAVENOUS PRN
Status: DISCONTINUED | OUTPATIENT
Start: 2021-05-18 | End: 2021-05-18 | Stop reason: SDUPTHER

## 2021-05-18 RX ORDER — HEPARIN SODIUM 1000 [USP'U]/ML
INJECTION, SOLUTION INTRAVENOUS; SUBCUTANEOUS PRN
Status: DISCONTINUED | OUTPATIENT
Start: 2021-05-18 | End: 2021-05-18 | Stop reason: SDUPTHER

## 2021-05-18 RX ORDER — ONDANSETRON 2 MG/ML
INJECTION INTRAMUSCULAR; INTRAVENOUS PRN
Status: DISCONTINUED | OUTPATIENT
Start: 2021-05-18 | End: 2021-05-18 | Stop reason: SDUPTHER

## 2021-05-18 RX ORDER — ATROPINE SULFATE 0.4 MG/ML
0.5 AMPUL (ML) INJECTION
Status: DISPENSED | OUTPATIENT
Start: 2021-05-18 | End: 2021-05-18

## 2021-05-18 RX ORDER — ATORVASTATIN CALCIUM 20 MG/1
20 TABLET, FILM COATED ORAL DAILY
Status: DISCONTINUED | OUTPATIENT
Start: 2021-05-18 | End: 2021-05-19 | Stop reason: HOSPADM

## 2021-05-18 RX ORDER — METOPROLOL SUCCINATE 25 MG/1
25 TABLET, EXTENDED RELEASE ORAL DAILY
Status: DISCONTINUED | OUTPATIENT
Start: 2021-05-19 | End: 2021-05-19 | Stop reason: HOSPADM

## 2021-05-18 RX ORDER — LIDOCAINE HYDROCHLORIDE 20 MG/ML
INJECTION, SOLUTION INFILTRATION; PERINEURAL PRN
Status: DISCONTINUED | OUTPATIENT
Start: 2021-05-18 | End: 2021-05-18 | Stop reason: SDUPTHER

## 2021-05-18 RX ORDER — SODIUM CHLORIDE 9 MG/ML
INJECTION, SOLUTION INTRAVENOUS CONTINUOUS PRN
Status: DISCONTINUED | OUTPATIENT
Start: 2021-05-18 | End: 2021-05-18 | Stop reason: SDUPTHER

## 2021-05-18 RX ORDER — GLYCOPYRROLATE 0.2 MG/ML
INJECTION INTRAMUSCULAR; INTRAVENOUS PRN
Status: DISCONTINUED | OUTPATIENT
Start: 2021-05-18 | End: 2021-05-18 | Stop reason: SDUPTHER

## 2021-05-18 RX ORDER — HEPARIN SODIUM 1000 [USP'U]/ML
3600 INJECTION, SOLUTION INTRAVENOUS; SUBCUTANEOUS PRN
Status: DISCONTINUED | OUTPATIENT
Start: 2021-05-18 | End: 2021-05-19 | Stop reason: HOSPADM

## 2021-05-18 RX ORDER — CALCIUM CHLORIDE 100 MG/ML
INJECTION INTRAVENOUS; INTRAVENTRICULAR PRN
Status: DISCONTINUED | OUTPATIENT
Start: 2021-05-18 | End: 2021-05-18 | Stop reason: SDUPTHER

## 2021-05-18 RX ORDER — 0.9 % SODIUM CHLORIDE 0.9 %
500 INTRAVENOUS SOLUTION INTRAVENOUS PRN
Status: DISCONTINUED | OUTPATIENT
Start: 2021-05-18 | End: 2021-05-19 | Stop reason: HOSPADM

## 2021-05-18 RX ADMIN — ONDANSETRON 4 MG: 2 INJECTION INTRAMUSCULAR; INTRAVENOUS at 08:31

## 2021-05-18 RX ADMIN — NEPHROCAP 1 MG: 1 CAP ORAL at 15:25

## 2021-05-18 RX ADMIN — GLYCOPYRROLATE 0.4 MG: 0.2 INJECTION, SOLUTION INTRAMUSCULAR; INTRAVENOUS at 08:25

## 2021-05-18 RX ADMIN — SODIUM CHLORIDE: 9 INJECTION, SOLUTION INTRAVENOUS at 07:30

## 2021-05-18 RX ADMIN — ASPIRIN 81 MG: 81 TABLET, COATED ORAL at 15:25

## 2021-05-18 RX ADMIN — HEPARIN SODIUM 5000 UNITS: 1000 INJECTION INTRAVENOUS; SUBCUTANEOUS at 09:41

## 2021-05-18 RX ADMIN — KETAMINE HYDROCHLORIDE 30 MG: 10 INJECTION, SOLUTION INTRAMUSCULAR; INTRAVENOUS at 08:26

## 2021-05-18 RX ADMIN — HEPARIN SODIUM 10000 UNITS: 1000 INJECTION INTRAVENOUS; SUBCUTANEOUS at 09:35

## 2021-05-18 RX ADMIN — IOPAMIDOL 251 ML: 755 INJECTION, SOLUTION INTRAVENOUS at 10:12

## 2021-05-18 RX ADMIN — HEPARIN SODIUM 3600 UNITS: 1000 INJECTION INTRAVENOUS; SUBCUTANEOUS at 20:38

## 2021-05-18 RX ADMIN — CALCIUM CHLORIDE 1 G: 100 INJECTION INTRAVENOUS; INTRAVENTRICULAR at 10:06

## 2021-05-18 RX ADMIN — ATORVASTATIN CALCIUM 20 MG: 20 TABLET, FILM COATED ORAL at 15:25

## 2021-05-18 RX ADMIN — SODIUM CHLORIDE: 4.5 INJECTION, SOLUTION INTRAVENOUS at 07:48

## 2021-05-18 RX ADMIN — CLOPIDOGREL BISULFATE 75 MG: 75 TABLET ORAL at 15:25

## 2021-05-18 RX ADMIN — Medication 0.5 MCG/MIN: at 08:26

## 2021-05-18 RX ADMIN — LIDOCAINE HYDROCHLORIDE 100 MG: 20 INJECTION, SOLUTION INFILTRATION; PERINEURAL at 08:25

## 2021-05-18 RX ADMIN — PROTAMINE SULFATE 60 MG: 10 INJECTION, SOLUTION INTRAVENOUS at 10:03

## 2021-05-18 RX ADMIN — FAMOTIDINE 20 MG: 10 INJECTION INTRAVENOUS at 08:29

## 2021-05-18 RX ADMIN — HYDRALAZINE HYDROCHLORIDE 10 MG: 20 INJECTION INTRAMUSCULAR; INTRAVENOUS at 09:54

## 2021-05-18 RX ADMIN — PROPOFOL 50 MG: 10 INJECTION, EMULSION INTRAVENOUS at 08:36

## 2021-05-18 RX ADMIN — NITROGLYCERIN 5 MCG/MIN: 20 INJECTION INTRAVENOUS at 15:23

## 2021-05-18 RX ADMIN — HYDROCORTISONE SODIUM SUCCINATE 100 MG: 100 INJECTION, POWDER, FOR SOLUTION INTRAMUSCULAR; INTRAVENOUS at 08:31

## 2021-05-18 RX ADMIN — GLYCOPYRROLATE 0.2 MG: 0.2 INJECTION, SOLUTION INTRAMUSCULAR; INTRAVENOUS at 08:55

## 2021-05-18 RX ADMIN — Medication 3000 MG: at 08:36

## 2021-05-18 RX ADMIN — Medication 10 ML: at 15:25

## 2021-05-18 RX ADMIN — PROPOFOL 100 MCG/KG/MIN: 10 INJECTION, EMULSION INTRAVENOUS at 08:26

## 2021-05-18 ASSESSMENT — PULMONARY FUNCTION TESTS
PIF_VALUE: 1

## 2021-05-18 ASSESSMENT — PAIN SCALES - GENERAL
PAINLEVEL_OUTOF10: 0
PAINLEVEL_OUTOF10: 1
PAINLEVEL_OUTOF10: 0

## 2021-05-18 ASSESSMENT — PAIN DESCRIPTION - LOCATION: LOCATION: HEAD

## 2021-05-18 ASSESSMENT — PAIN DESCRIPTION - ORIENTATION: ORIENTATION: ANTERIOR

## 2021-05-18 ASSESSMENT — PAIN DESCRIPTION - FREQUENCY: FREQUENCY: INTERMITTENT

## 2021-05-18 ASSESSMENT — ENCOUNTER SYMPTOMS: SHORTNESS OF BREATH: 1

## 2021-05-18 ASSESSMENT — PAIN DESCRIPTION - ONSET: ONSET: ON-GOING

## 2021-05-18 ASSESSMENT — PAIN DESCRIPTION - PAIN TYPE: TYPE: ACUTE PAIN

## 2021-05-18 ASSESSMENT — PAIN - FUNCTIONAL ASSESSMENT: PAIN_FUNCTIONAL_ASSESSMENT: ACTIVITIES ARE NOT PREVENTED

## 2021-05-18 NOTE — FLOWSHEET NOTE
Patient admitted to CVU from hybrid cath lab and attached to monitors. Report received from cath lab RN. Right and left groin sites without hematomas or oozing. Left radial arterial line site has gauze and tegaderm over site. Hemodymanics stable and will continue to monitor. Assessment complete. Family let back to see patient. Visiting hours reviewed and all questions answered. Primary RN Shelby.             Patient transitioned to stepdown level of care at  [phone]

## 2021-05-18 NOTE — H&P
Component Value Date    WBC 7.9 05/14/2021    HGB 13.9 05/18/2021    HCT 43.5 05/14/2021    MCV 97.5 05/14/2021     05/14/2021     Lab Results   Component Value Date     05/14/2021    K 4.1 05/18/2021    CL 96 (L) 05/14/2021    CO2 27 05/14/2021    BUN 43 (H) 05/14/2021    CREATININE 8.3 (HH) 05/18/2021    GLUCOSE 108 (H) 05/14/2021    CALCIUM 9.3 05/14/2021    PROT 7.6 05/14/2021    LABALBU 4.1 05/14/2021    BILITOT <0.2 05/14/2021    ALKPHOS 67 05/14/2021    AST 15 05/14/2021    ALT 13 05/14/2021    LABGLOM 6 (A) 05/18/2021    GFRAA 8 (A) 05/18/2021    AGRATIO 1.0 (L) 01/18/2021    GLOB 3.6 01/18/2021       CARDIAC ENZYMES:   Recent Labs     05/18/21  0725   TROPONINI 0.04*       A/P:  77 y. o. with severe AS. Severe as    Plan:  AUREAR    Giovanna Cook MD, [facility], [facility]

## 2021-05-18 NOTE — ANESTHESIA PRE PROCEDURE
Department of Anesthesiology  Preprocedure Note       Name:  Brian Montgomery   Age:  77 y.o.  :  1945                                          MRN:  [id number]         Date:  2021      Surgeon: Alex Weathers):  MD Arnaldo Yarbrough Asp, MD    Procedure: Procedure(s):  TRANSCATHETER AORTIC VALVE REPLACEMENT FEMORAL APPROACH  TRANSCATHETER AORTIC VALVE REPLACEMENT FEMORAL APPROACH    Medications prior to admission:   Prior to Admission medications    Medication Sig Start Date End Date Taking? Authorizing Provider   amiodarone (CORDARONE) 200 MG tablet Take 200 mg by mouth 2 times daily    Historical Provider, MD   GLUCOSAMINE HCL PO Take 3,000 mg by mouth 2 times daily    Historical Provider, MD   metoprolol succinate (TOPROL XL) 25 MG extended release tablet Take 1 tablet by mouth daily 21   Brinton Krabbe, MD   b complex-C-folic acid (NEPHROCAPS) 1 MG capsule Take 1 capsule by mouth daily 21   Brinton Krabbe, MD   clopidogrel (PLAVIX) 75 MG tablet TAKE 1 TABLET BY MOUTH EVERY DAY 20   Lupillo Díaz MD   aspirin 81 MG chewable tablet Take 81 mg by mouth daily. Historical Provider, MD   atorvastatin (LIPITOR) 20 MG tablet Take 20 mg by mouth daily. Historical Provider, MD       Current medications:    Current Outpatient Medications   Medication Sig Dispense Refill    amiodarone (CORDARONE) 200 MG tablet Take 200 mg by mouth 2 times daily      GLUCOSAMINE HCL PO Take 3,000 mg by mouth 2 times daily      metoprolol succinate (TOPROL XL) 25 MG extended release tablet Take 1 tablet by mouth daily 30 tablet 3    b complex-C-folic acid (NEPHROCAPS) 1 MG capsule Take 1 capsule by mouth daily 30 capsule 3    clopidogrel (PLAVIX) 75 MG tablet TAKE 1 TABLET BY MOUTH EVERY DAY 90 tablet 3    aspirin 81 MG chewable tablet Take 81 mg by mouth daily.  atorvastatin (LIPITOR) 20 MG tablet Take 20 mg by mouth daily. No current facility-administered medications for this visit. Facility-Administered Medications Ordered in Other Visits   Medication Dose Route Frequency Provider Last Rate Last Admin    norepinephrine (LEVOPHED) 16 mg in dextrose 5 % 250 mL infusion  2-100 mcg/min Intravenous Continuous Arnaldo Marley MD        ceFAZolin (ANCEF) 3000 mg in dextrose 5 % 100 mL IVPB  3,000 mg Intravenous Once Arnaldo Marley MD           Allergies:  No Known Allergies    Problem List:    Patient Active Problem List   Diagnosis Code    NSTEMI (non-ST elevated myocardial infarction) ([facility] I21.4    Ischemic chest pain ([facility]) I20.9    SOB (shortness of breath) R06.02    Acute on chronic diastolic CHF (congestive heart failure) ([facility]) I50.33    Chronic kidney disease, stage IV (severe) ([facility]) N18.4    Essential hypertension I10    Morbid obesity with BMI of 45.0-49.9, adult ([facility]) E66.01, Z68.42    Chronic combined systolic and diastolic congestive heart failure ([facility]) I50.42    Bell's palsy G51.0    Hyperlipemia E78.5    MGUS (monoclonal gammopathy of unknown significance) D47.2    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    Proteinuria R80.9    Secondary malignant neoplasm of bone ([facility]) C79.51    CHF (congestive heart failure), NYHA class I, acute on chronic, combined ([facility]) I50.43    Acute on chronic congestive heart failure ([facility]) I50.9    Acute on chronic systolic HF (heart failure) ([facility]) I50.23    Hypokalemia E87.6    Nonrheumatic aortic valve stenosis I35.0    Coronary artery disease due to lipid rich plaque I25.10, I25.83    Coronary artery disease involving native coronary artery of native heart with angina pectoris ([facility] [address] I25.119       Past Medical History:        Diagnosis Date    Arthritis     Bell's palsy     Cancer ([facility]     [facility]     Coronary artery disease involving native coronary artery of native heart without angina pectoris     Hyperlipidemia     Hypertension     Influenza B 4/2/15    Kidney failure     sees Dr Gladis Torres Nosebleed     Radiation        Past Surgical History:        Procedure Laterality Date    CARDIAC CATHETERIZATION  03/10/2021    x 1 Stent placement    CORONARY ANGIOPLASTY WITH STENT PLACEMENT  2017    EVERETT to LAD    IR TUNNELED CATHETER PLACEMENT GREATER THAN 5 YEARS  2021    IR TUNNELED CATHETER PLACEMENT GREATER THAN 5 YEARS 2021 [facility]    PTCA      TUNNELED VENOUS CATHETER PLACEMENT Right 2021    Permacath; RIJ access; 23cm; Dr. Cristhian Calvo       Social History:    Social History     Tobacco Use    Smoking status: Former Smoker     Packs/day: 0.50     Years: 20.00     Pack years: 10.00     Types: Cigarettes     Quit date: 1984     Years since quittin.3    Smokeless tobacco: Never Used    Tobacco comment: will not start    Substance Use Topics    Alcohol use: No                                Counseling given: Not Answered  Comment: will not start       Vital Signs (Current): There were no vitals filed for this visit.                                            BP Readings from Last 3 Encounters:   21 (!) 140/71   21 138/72   21 124/82       NPO Status:                                                                                 BMI:   Wt Readings from Last 3 Encounters:   21 298 lb 3.2 oz (135.3 kg)   21 293 lb 12.8 oz (133.3 kg)   21 297 lb (134.7 kg)     There is no height or weight on file to calculate BMI.    CBC:   Lab Results   Component Value Date    WBC 7.9 2021    RBC 4.47 2021    HGB 14.3 2021    HCT 43.5 2021    MCV 97.5 2021    RDW 21.8 2021     2021       CMP:   Lab Results   Component Value Date     2021    K 3.4 2021    K 3.9 2021    CL 96 2021    CO2 27 2021    BUN 43 2021    CREATININE 6.1 2021    GFRAA 11 2021    AGRATIO 1.0 2021    LABGLOM 9 2021    GLUCOSE 108 2021    PROT 7.6 05/14/2021    CALCIUM 9.3 05/14/2021    BILITOT <0.2 05/14/2021    ALKPHOS 67 05/14/2021    AST 15 05/14/2021    ALT 13 05/14/2021       POC Tests: No results for input(s): POCGLU, POCNA, POCK, POCCL, POCBUN, POCHEMO, POCHCT in the last 72 hours. Coags:   Lab Results   Component Value Date    PROTIME 12.0 05/14/2021    INR 1.03 05/14/2021    APTT 29.1 01/21/2021       HCG (If Applicable): No results found for: PREGTESTUR, PREGSERUM, HCG, HCGQUANT     ABGs:   Lab Results   Component Value Date    PHART 7.443 01/30/2015    PO2ART 76.8 01/30/2015    SHS8FEX 47.8 01/30/2015    FEA3BTR 32.2 01/30/2015    BEART 7.2 01/30/2015    D1ELFNPL 95.6 01/30/2015        Type & Screen (If Applicable):  No results found for: LABABO, LABRH    Drug/Infectious Status (If Applicable):  No results found for: HIV, HEPCAB    COVID-19 Screening (If Applicable):   Lab Results   Component Value Date    COVID19 Not Detected 01/18/2021           Anesthesia Evaluation  Patient summary reviewed and Nursing notes reviewed  Airway: Mallampati: II  TM distance: >3 FB   Neck ROM: full  Mouth opening: > = 3 FB Dental:          Pulmonary:   (+) shortness of breath:                             Cardiovascular:  Exercise tolerance: poor (<4 METS),   (+) hypertension: moderate, valvular problems/murmurs: AS, angina:, past MI: > 6 months, CAD: non-obstructive, CHF:,                ROS comment: Echo   Ejection fraction is visually estimated to be 40%. There is severe hypokinesis of the entire apical myocardium and and   hypokinesis of the distal septal wall. There is severe concentric left ventricular hypertrophy. Grade II diastolic dysfunction with elevated LV filling pressures. Mild mitral regurgitation is present. No evidence of mitral stenosis. Mild calcification of the mitral valve noted. Moderate to severe aortic stenosis with a peak velocity of 437m/s and a mean   pressure gradient of 50mmHg. Mild aortic regurgitation.    Moderately dilated right ventricle. Right ventricular systolic function is normal .     Neuro/Psych:               GI/Hepatic/Renal:   (+) renal disease: dialysis and ESRD, morbid obesity          Endo/Other:                     Abdominal:   (+) obese,         Vascular:                                          Anesthesia Plan      MAC     ASA 4       Induction: intravenous. arterial line  MIPS: Prophylactic antiemetics administered. Anesthetic plan and risks discussed with patient. Plan discussed with CRNA.                   Harvinder Vizcaino MD   5/18/2021

## 2021-05-18 NOTE — ANESTHESIA PROCEDURE NOTES
Arterial Line:    An arterial line was placed using surface landmarks, in the pre-op for the following indication(s): continuous blood pressure monitoring and blood sampling needed. A 20 gauge (size), 1 and 3/8 inch (length), Arrow (type) catheter was placed, Seldinger technique used, into the left radial artery, secured by tape and Tegaderm. Anesthesia type: Local  Local infiltration: Topical    Events:  patient tolerated procedure well with no complications and EBL < 5mL.   5/18/2021 7:42 AM5/18/2021 7:45 AM  Anesthesiologist: Ritika Mcelroy MD  Performed: Anesthesiologist   Preanesthetic Checklist  Completed: patient identified, IV checked, site marked, risks and benefits discussed, surgical consent, monitors and equipment checked, pre-op evaluation, timeout performed, anesthesia consent given, oxygen available and patient being monitored

## 2021-05-18 NOTE — PROGRESS NOTES
Incentive Spirometry education and demonstration completed by Respiratory Therapy Yes     Response to education: Very Good     Teaching Time: 5 minutes    Minimum Predicted Vital Capacity - 707 mL. Patient's Actual Vital Capacity - 3000 mL. Turning over to Nursing for routine follow-up Yes.     Electronically signed by Neil Rosen RCP on 5/18/2021 at 4:04 PM

## 2021-05-18 NOTE — ANESTHESIA POSTPROCEDURE EVALUATION
Department of Anesthesiology  Postprocedure Note    Patient: Cora Turner  MRN: [id number]  YOB: 1945  Date of evaluation: 5/18/2021  Time:  12:56 PM     Procedure Summary     Date: 05/18/21 Room / Location: [address] [place] [address]    Anesthesia Start: 2959 Anesthesia Stop: 6483    Procedures:       TRANSCATHETER AORTIC VALVE REPLACEMENT FEMORAL APPROACH (N/A )      TRANSCATHETER AORTIC VALVE REPLACEMENT FEMORAL APPROACH (N/A ) Diagnosis: (AORTIC STENOSIS)    Surgeons: Mike Ibarra MD; Percival Kawasaki, MD Responsible Provider: Juan Antonio Mcfarland MD    Anesthesia Type: MAC ASA Status: 4          Anesthesia Type: MAC    Shelbie Phase I:      Shelbie Phase II:      Last vitals: Reviewed and per EMR flowsheets.        Anesthesia Post Evaluation    Patient location during evaluation: PACU  Patient participation: complete - patient participated  Level of consciousness: awake and awake and alert  Pain score: 2  Airway patency: patent  Nausea & Vomiting: no vomiting  Complications: no  Cardiovascular status: blood pressure returned to baseline  Respiratory status: acceptable  Hydration status: euvolemic

## 2021-05-18 NOTE — PROCEDURES
Interventional Cardiology TF-TAVR Operative Report     Physicians Present:  Interventional Cardiology: Dr. Slime Hector, Dr. Kimi Simons  Cardiothoracic Surgery: Dr. Meme Hoffmann, Dr. Karlos Prather    NYHA: 2    STS Mortality: 9.5%    Procedures:  Temporary transvenous pacemaker placement  Transcutaneous aortic valve replacement (26 mm Rusty S3 +1 cc Valve)  Peripheral angiography  ____________________    Anesthesia had placed both a peripheral arterial line and peripheral IVs prior to the procedure. Procedure Detail:    The patient was taken to hybrid room and was prepped and draped with sterile surgical technique from neck to knees. Anesthesia induction and maintenance was performed per anesthesia notes. Under fluoroscopic guidance, the right common femoral artery and vein were accessed. A 6 Kinyarwanda sheath was placed in the vein, and a 5 Wolof sheath was placed in the artery. Next, a 5 Fr sheath was placed in the left common femoral artery. All sheaths were flushed after insertion. A temporary pacemaker was placed through the right femoral venous sheath and into the right ventricle. Thresholds were tested and were acceptable. Through the left 5 Fr arterial sheath, we could not easily pass a pigtail catheter to the aorta, so we advanced a Glide Advantage wire into the aorta over a JR4 catheter. The catheter and sheath were removed, and a long 5 Fr sheath was inserted (23 cm). Because our wire was seen to have advanced into the DIANNA, we performed an angiogram with the JR4 to assess for vessel injury. Next, a pigtail was advanced over the [facility] Advantage wire into the ascending aorta. The J wire was removed, and the catheter was hooked up to a power injector. The catheter was placed in the right coronary cusp. Power injections of the aortic root were performed to align the aortic valve cusps for proper aortic valve deployment imaging.     Next, two perclose proglides were deployed in the right common femoral artery using removed. The dilator was reinserted into the Terrell femoral e-sheath over an Amplatz wire, and the sheath/dilator were slowly withdrawn over the wire. The pigtail was removed from the femoral arterial sheath and replaced with a 5 Fr DIANNA catheter. The DIANNA catheter was used to engage the right common iliac artery. After the e-sheath was withdrawn into the iliac vessels, an iliofemoral angiogram was performed via the DIANNA catheter. No significant vessel injury was seen. The sheath was withdrawn, and the two percloses were \"tamped\" down. The suture deployment was completed. The sutures were cut. Another angiogram was performed via the DIANNA catheter, and no vascular injury was seen. The DIANNA catheter was then removed over a J wire. The remaining sheaths were pulled after protamine was given. The pacemaker was left in place due to development of a bundle branch block. Patient was transferred to CVU. Estimated blood loss: 100 mL or less. Attempt to fracture previously placed valve (Yes/No):  No    If so, timing (pre/post) and how fracture verified (fluoroscopy, snap, drop in balloon pressure without balloon rupture): N/A    Complications   None     Blood Loss   <100cc     Facility Inpatient Coding      ü ICD-10 Diagnosis Code Code   X Nonrheumatic aortic valve stenosis I35.0     ü ICD-10 Diagnosis Code Code   X Replacement of aortic valve with zooplastic tissue, percutaneous approach [id number]    Replacement of aortic valve with zooplastic tissue, transapical, percutaneous approach [id number]       Major Comorbidities and Complications   Cardiac   Acute systolic CHF K45.54    X  Acute on chronic systolic CHF L82.74   Elevated BNP, elevated Tn, SOB     Acute diastolic CHF N19.52      Acute on chronic diastolic CHF I07.31       Acute combined systolic and diastolic CHF E35.07       Acute on chronic combined systolic and diastolic CHF J50.24       Ventricular fibrillation I49.01       Cardiogenic shock R57.0 Neurologic    CVA due to embolism of carotid artery, unsp I63.139       CVA due to unsp occl or sten of carotid arteries, unsp I63.239       CVA due to thrombosis of vertebral artery, unsp I63.019       CVA due to embolism of vertebral artery, unsp I63.119       CVA due to unspec occl or sten of vertebral arteries, unsp I63.219       CVA due to unspec occl or sten of other cerebral arteries I63.59       CVA due to unspec occl or sten of unspec cerebral artery I63.50       Encephalopathy, unsp G38.72       Metabolic encephalopathy Q92.24       Other encephalopathy G93.49       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       Toxic encephalopathy G92     Respiratory    Pneumonia due to staphylococcus, unspecified J15.20       Acute respiratory failure, hypoxia or hypercapnea, unsp J96.00      Respiratory failure, hypoxia or hypercapnia, unsp       Acute on chronic resp failure, hypoxia or hypercapnea, unsp J96.20     Liver/Renal Failure    Severe protein malnutrition E43       Chronic gastric ulcer with hemorrhage, unsp K25.4       Acute and subacute hepatic failure without coma K72.00       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       Hepatic failure, unspecified with coma K72.91      X End stage renal disease N18.6        Comorbidities and Complications   Cardiac    Rheumatic CHF I09.81     HTN heart and CKD I13.0     Atherosclerosis of bypass graft I25.810     Cardiomyopathy I43     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     AVB, complete I44.2     Bifascicular block I45.2     Trifascicular block I45.3     Other conduction disorders I45.89     SVT I47.1     Atrial flutter I48.92     Unspecified combined S/D CHF I50.40     LV failure I50.1     Unspecified systolic CHF D30.50     Chronic systolic CHF E64.79     Unspecified diastolic CHF M82.88     Chronic diastolic CHF Y92.25     Chronic comb S/D CHF I50.42     ASD Q21.1   Respiratory    Acute COPD exacerbation J44.1     COPD with infection 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

## 2021-05-18 NOTE — CONSULTS
Nephrology Consult Note  [phone]  [phone]   [email or web address]        Reason for Consult:  ESRD on HD     HISTORY OF PRESENT ILLNESS:      The patient is a 77 y.o. male with significant past medical history of ESRD on maintenance hemodialysis , Congestive heart failure, hyperlipidemia, hypertension, aggressive kidney disease leading to maintenance hemodialysis this year. He was admitted for planned TAVR , that he underwent this morning. He still makes fair amount of urine today is his scheduled dialysis day he normally dialyzes at [facility] via right IJ tunneled  Catheter. There is no history of fever cough chest pain or palpitations    Past Medical History:        Diagnosis Date    Arthritis     Bell's palsy     [facility] [address]     CHF [facility]congestive heart failure) [facility] [address] [facility]     Coronary artery disease involving native coronary artery of native heart without angina pectoris     Hyperlipidemia     Hypertension     Influenza B 4/2/15    Kidney failure     sees Dr Pamella Jimenez        Past Surgical History:        Procedure Laterality Date    CARDIAC CATHETERIZATION  03/10/2021    x 1 Stent placement    CORONARY ANGIOPLASTY WITH STENT PLACEMENT  04/16/2017    EVERETT to LAD    IR TUNNELED CATHETER PLACEMENT GREATER THAN 5 YEARS  01/25/2021    IR TUNNELED CATHETER PLACEMENT GREATER THAN 5 YEARS 1/25/2021 [facility] PROCEDURES    PTCA      TUNNELED VENOUS CATHETER PLACEMENT Right 01/25/2021    Permacath; RIJ access; 23cm; Dr. Toño Baltazar       Current Medications:    No current facility-administered medications on file prior to encounter.      Current Outpatient Medications on File Prior to Encounter   Medication Sig Dispense Refill    amiodarone (CORDARONE) 200 MG tablet Take 200 mg by mouth 2 times daily      GLUCOSAMINE HCL PO Take 3,000 mg by mouth 2 times daily      metoprolol succinate (TOPROL XL) 25 MG extended release tablet Take 1 tablet by mouth daily 30 tablet 3    b Physically Abused:     Sexually Abused:        Family History:       Problem Relation Age of Onset    Cancer Mother     Cancer Father     No Known Problems Sister     No Known Problems Brother     Cancer Sister          REVIEW OF SYSTEMS:      10 pt ROS done, relevant features as in [place], rest negative       PHYSICAL EXAM:    Vitals:    BP (!) 142/68   Pulse 59   Temp 98.1 °F (36.7 °C) (Temporal)   Resp 16   Ht 5' 9\" (1.753 m)   Wt 298 lb 11.6 oz (135.5 kg)   BMI 44.11 kg/m²   No intake/output data recorded. I/O this shift: In: 500 [I.V.:500]  Out: -     Physical Exam:  Gen:  alert, oriented x 3  PERRL , EOM +  Pallor +, No icterus  JVP not raised   CV: RRR no murmur or rub . Lungs:B/ L air entry, Normal breath sounds   Abd: soft, bowel sounds + , No organomegaly   Ext: No edema, no cyanosis, dressing Rt groin   Skin: Warm.   No rash  Neuro: nonfocal.  Rt IJ TDC         DATA:    CBC with Differential:    Lab Results   Component Value Date    WBC 7.9 05/14/2021    RBC 4.47 05/14/2021    HGB 13.9 05/18/2021    HCT 43.5 05/14/2021     05/14/2021    MCV 97.5 05/14/2021    MCH 32.1 05/14/2021    MCHC 32.9 05/14/2021    RDW 21.8 05/14/2021    LYMPHOPCT 8.9 05/14/2021    MONOPCT 10.2 05/14/2021    BASOPCT 1.2 05/14/2021    MONOSABS 0.8 05/14/2021    LYMPHSABS 0.7 05/14/2021    EOSABS 0.5 05/14/2021    BASOSABS 0.1 05/14/2021     CMP:    Lab Results   Component Value Date     05/14/2021    K 4.1 05/18/2021    K 3.9 03/11/2021    CL 96 05/14/2021    CO2 27 05/14/2021    BUN 43 05/14/2021    CREATININE 8.3 05/18/2021    GFRAA 8 05/18/2021    AGRATIO 1.0 01/18/2021    LABGLOM 6 05/18/2021    GLUCOSE 108 05/14/2021    PROT 7.6 05/14/2021    LABALBU 4.1 05/14/2021    CALCIUM 9.3 05/14/2021    BILITOT <0.2 05/14/2021    ALKPHOS 67 05/14/2021    AST 15 05/14/2021    ALT 13 05/14/2021     Magnesium:    Lab Results   Component Value Date    MG 2.40 05/14/2021     Phosphorus:    Lab Results   Component

## 2021-05-18 NOTE — OP NOTE
Operative Note      Pre-Operative Diagnosis: Severe aortic stenosis    Post-Operative Diagnosis: Same    Procedure: Transcatheter aortic valve replacement with a 26 Terrell S3 Ultra tissue valve; transthoracic echocardiography    Surgeons: Ayaka Morrison    Cardiologists: Alba Koo    Anesthesia: Local / MAC by Dr. Cheri Navas: Tian Bevel    EBL: 75cc    Findings: New valve well-seated with no inappropriate leaks. Mean gradient 2 mmHg. There were no signs vascular compromise in delivery side by post-procedure ileofemoral angiogram.    Complications: none    Disposition: Stable, improved to cath lab recovery    Report of Procedure:    After placement of appropriate monitors, starting IV sedation and infusion of appropriate antibiotics, the patient was sterilely prepped and draped. A local infusion of 1% lidocaine was injected along anticipated femoral cannulation tracts. Bilateral common femoral arterial and right common femoral venous access was achieved using modified Seldinger technique and fluoroscopic guidance. 5 Belgian arterial and 6 Belgian venous sheaths were placed and flushed. A pigtail catheter was placed over a J wire into the aortic root and deployment angle confirmed after a glide wire advantage was used to exchange the standard left 5 Fr sheath for a 23 to cross a tortuous section of the iliac. A transvenous pacing wire was floated into the right ventricle and was tested to confirm appropriate thresholds and capture. The right femoral artery was pre-closed with 2 perclose devices. Derrick Babin was used to exchange a J wire for a Lunderquist wire with its tip in the distal ascending aorta. The Sharin [place] was placed over serial dilators and the patient was given a systemic dose of heparin. An AL1 catheter was used to guide a wire across the aortic valve and then it was advanced into the left ventricle. The wire was exchanged for a pre-curved Amplatz extra-stiff.   The valve was negotiated through the aortic arch into an intra-annular position. A run of rapid ventricular pacing was started, aortic root injection given, the pigtail pulled back and the valve deployed. Transthoracic echocardiography confirmed good position of the valve. It appeared well-seated and no leaks were seen. Mean gradient was 2 mmHg. The pigtail catheter was exchanged for an DIANNA over a J wire so that serial ileo-femoral angiography could be performed as the E-sheath was pulled back over a dilator and for completion angiography after the perclose devices were tied after complete removal of the E-sheath and wire. The patient tolerated the procedure well without apparent complications. The patient was taken in stable condition to the cath lab recovery for recovery. Sponge an needle count were reported as correct at the end of the procedure.     Electronically signed by Mariam Boggs MD on 5/18/2021 at 10:30 AM

## 2021-05-19 VITALS
BODY MASS INDEX: 44.24 KG/M2 | SYSTOLIC BLOOD PRESSURE: 126 MMHG | HEIGHT: 69 IN | HEART RATE: 66 BPM | RESPIRATION RATE: 16 BRPM | WEIGHT: 298.72 LBS | OXYGEN SATURATION: 98 % | DIASTOLIC BLOOD PRESSURE: 59 MMHG | TEMPERATURE: 97.6 F

## 2021-05-19 LAB
ANION GAP SERPL CALCULATED.3IONS-SCNC: 16 MMOL/L (ref 3–16)
BUN BLDV-MCNC: 56 MG/DL (ref 7–20)
CALCIUM SERPL-MCNC: 8.6 MG/DL (ref 8.3–10.6)
CHLORIDE BLD-SCNC: 96 MMOL/L (ref 99–110)
CO2: 22 MMOL/L (ref 21–32)
CREAT SERPL-MCNC: 7.3 MG/DL (ref 0.8–1.3)
GFR AFRICAN AMERICAN: 9
GFR NON-AFRICAN AMERICAN: 7
GLUCOSE BLD-MCNC: 98 MG/DL (ref 70–99)
HCT VFR BLD CALC: 36.9 % (ref 40.5–52.5)
HEMOGLOBIN: 12.4 G/DL (ref 13.5–17.5)
LV EF: 55 %
LVEF MODALITY: NORMAL
MCH RBC QN AUTO: 32.4 PG (ref 26–34)
MCHC RBC AUTO-ENTMCNC: 33.5 G/DL (ref 31–36)
MCV RBC AUTO: 97 FL (ref 80–100)
PDW BLD-RTO: 20.2 % (ref 12.4–15.4)
PLATELET # BLD: 191 K/UL (ref 135–450)
PMV BLD AUTO: 8.3 FL (ref 5–10.5)
POTASSIUM REFLEX MAGNESIUM: 4.2 MMOL/L (ref 3.5–5.1)
RBC # BLD: 3.81 M/UL (ref 4.2–5.9)
SODIUM BLD-SCNC: 134 MMOL/L (ref 136–145)
WBC # BLD: 9.6 K/UL (ref 4–11)

## 2021-05-19 PROCEDURE — 93306 TTE W/DOPPLER COMPLETE: CPT

## 2021-05-19 PROCEDURE — 85027 COMPLETE CBC AUTOMATED: CPT

## 2021-05-19 PROCEDURE — 99239 HOSP IP/OBS DSCHRG MGMT >30: CPT | Performed by: INTERNAL MEDICINE

## 2021-05-19 PROCEDURE — 6370000000 HC RX 637 (ALT 250 FOR IP): Performed by: INTERNAL MEDICINE

## 2021-05-19 PROCEDURE — 80048 BASIC METABOLIC PNL TOTAL CA: CPT

## 2021-05-19 PROCEDURE — 2580000003 HC RX 258: Performed by: INTERNAL MEDICINE

## 2021-05-19 RX ORDER — AMIODARONE HYDROCHLORIDE 200 MG/1
200 TABLET ORAL DAILY
Qty: 90 TABLET | Refills: 3
Start: 2021-05-19

## 2021-05-19 RX ORDER — AMIODARONE HYDROCHLORIDE 200 MG/1
200 TABLET ORAL DAILY
Status: DISCONTINUED | OUTPATIENT
Start: 2021-05-19 | End: 2021-05-19 | Stop reason: HOSPADM

## 2021-05-19 RX ADMIN — ASPIRIN 81 MG: 81 TABLET, COATED ORAL at 08:06

## 2021-05-19 RX ADMIN — NEPHROCAP 1 MG: 1 CAP ORAL at 08:06

## 2021-05-19 RX ADMIN — CLOPIDOGREL BISULFATE 75 MG: 75 TABLET ORAL at 08:06

## 2021-05-19 RX ADMIN — AMIODARONE HYDROCHLORIDE 200 MG: 200 TABLET ORAL at 12:10

## 2021-05-19 RX ADMIN — Medication 10 ML: at 10:00

## 2021-05-19 ASSESSMENT — PAIN SCALES - GENERAL
PAINLEVEL_OUTOF10: 0
PAINLEVEL_OUTOF10: 0

## 2021-05-19 NOTE — DISCHARGE SUMMARY
[address]   TAVR DISCHARGE SUMMARY     Patient Jeferson Guerrero [id number]  77 y.o.  1945    Admit Date: 5/18/2021  D/C Date:  5/19/2021  Admit MD:  Keily Watson MD   Admit Dx:  Nonrheumatic aortic valve stenosis [I35.0]  D/C Dx: Severe aortic stenosis/SP TAVR  Patient Active Problem List   Diagnosis    [facility]STEMI (non-ST elevated [facility] infarction) [facility] [address]    Ischemic chest pain (HCC)    SOB (shortness of breath)    Acute on chronic diastolic CHF (congestive heart failure) ([facility] [address] [facility]    Chronic kidney disease, stage IV (severe) ([facility] [address]    Essential hypertension    Morbid obesity with BMI of 45.0-49.9, adult ([facility] [address]    Chronic combined systolic and diastolic congestive heart failure (HCC)    Bell's palsy    Hyperlipemia    MGUS (monoclonal gammopathy of unknown significance)    Plasmacytoma (HCC)    Proteinuria    Secondary malignant neoplasm of bone ([facility] [address]    CHF (congestive heart failure), NYHA class I, acute on chronic, combined ([facility] [address]    Acute on chronic congestive heart failure (HCC)    Acute on chronic systolic HF (heart failure) (HCC)    Hypokalemia    Nonrheumatic aortic valve stenosis    Coronary artery disease due to lipid rich plaque    Coronary artery disease involving native coronary artery of native heart with angina pectoris ([address]      D/C Cond:  good  Course:  Admitted for TAVR for severe AS. No apparent complications. Access site(s) stable. Patient denies cp, sob. Dialysis performed post TAVR  Consults:  IP CONSULT TO NEPHROLOGY  IP CONSULT TO CARDIAC REHAB  Subjective: Patient was seen and examined. Notes, labs, and recent testing reviewed. No acute issues overnight and patient without concern prior to discharge.    Exam:   Gen Alert, coop, no distress Heart  RRR, no MRG, nl apical impulse   Head NC, AT, no abnorm Abd  Soft, NT, +BS, no mass, no OM   Eyes PERRLA, conj/corn clear Ext  Ext nl, AT, no C/C/E   Nose Nares nl, no drain, NT Pulse 2+ and symmetric   Throat Lips, mucosa, tongue nl Skin Color/text/turg nl, no rash/lesions   Neck S/S, TM, NT, no bruit/JVD Psych Nl mood and affect   Lung CTA-B, unlabored, no DTP Lymph   No cervical or axillary LA   Ch wall NT, no deform Neuro  Nl gross M/S exam     Studies/Labs:  Reviewed, please see Epic for specific details  Disposition: home  Discharge Medications:    Alejo Allison   Home Medication Instructions [id number]    Printed on:05/19/21 1020   Medication Information                      amiodarone (CORDARONE) 200 MG tablet  Take 1 tablet by mouth daily             aspirin 81 MG chewable tablet  Take 81 mg by mouth daily. atorvastatin (LIPITOR) 20 MG tablet  Take 20 mg by mouth daily. b complex-C-folic acid (NEPHROCAPS) 1 MG capsule  Take 1 capsule by mouth daily             clopidogrel (PLAVIX) 75 MG tablet  TAKE 1 TABLET BY MOUTH EVERY DAY             GLUCOSAMINE HCL PO  Take 3,000 mg by mouth 2 times daily             metoprolol succinate (TOPROL XL) 25 MG extended release tablet  Take 1 tablet by mouth daily                 Summary:  ~Patient is stable from CV standpoint  --Amio decreased to 200mg daily  ~Tobacco, diet, salt, activity restrictions discussed in detail  [facility] not indicated for AS or for TAVR procedure. Resume only with other indications.      Followup:  ~[facility] T[facility]R [facility]: 6/3/21 at 10:15am at [facility] Valve clinic    Signed:  Isabel Ayala MD, 5/19/2021, 10:20 AM  Time spent on discharge of patient: >31 minutes

## 2021-05-19 NOTE — FLOWSHEET NOTE
Discharge instructions reviewed with patient. Patien verbalized understanding. All home medications have been reviewed, questions answered and patient voiced understanding. All medication side effects reviewed and patient and family verbalized understanding. Follow up appointment(s) reviewed with patient and all attempts made to schedule within 7-10 days of discharge. Patient given discharge instructions and medications and appointment times reviewed. Patient discharged to home with a friend via private car. Taken to lobby via wheelchair.

## 2021-05-19 NOTE — PROGRESS NOTES
Nephrology Progress Note  [phone]  [phone]   [email or web address].        Reason for Consult:  ESRD on HD   The patient is a 77 y.o. male with significant past medical history of ESRD on maintenance hemodialysis , Congestive heart failure, hyperlipidemia, hypertension, aggressive kidney disease leading to maintenance hemodialysis this year. He was admitted for planned TAVR , that he underwent this morning. He still makes fair amount of urine today is his scheduled dialysis day he normally dialyzes at [facility] via right IJ tunneled  Catheter. There is no history of fever cough chest pain or palpitations    Interval History : Tolerated HD , feels well  May get discharged later today     PHYSICAL EXAM:    Vitals:    BP (!) 103/48   Pulse 63   Temp 97.4 °F (36.3 °C) (Temporal)   Resp 16   Ht 5' 9\" (1.753 m)   Wt 298 lb 11.6 oz (135.5 kg)   SpO2 96%   BMI 44.11 kg/m²   I/O last 3 completed shifts: In: 3709 [P.O.:1080; I.V.:510]  Out: 4400 [Urine:350]  No intake/output data recorded. Physical Exam:  Gen:  alert, oriented x 3  PERRL , EOM +  Pallor +, No icterus  JVP not raised   CV: RRR no murmur or rub . Lungs:B/ L air entry, Normal breath sounds   Abd: soft, bowel sounds + , No organomegaly   Ext: No edema, no cyanosis, dressing Rt groin   Skin: Warm.   No rash  Neuro: nonfocal.  Rt IJ TDC         DATA:    CBC with Differential:    Lab Results   Component Value Date    WBC 9.6 05/19/2021    RBC 3.81 05/19/2021    HGB 12.4 05/19/2021    HCT 36.9 05/19/2021     05/19/2021    MCV 97.0 05/19/2021    MCH 32.4 05/19/2021    MCHC 33.5 05/19/2021    RDW 20.2 05/19/2021    LYMPHOPCT 8.9 05/14/2021    MONOPCT 10.2 05/14/2021    BASOPCT 1.2 05/14/2021    MONOSABS 0.8 05/14/2021    LYMPHSABS 0.7 05/14/2021    EOSABS 0.5 05/14/2021    BASOSABS 0.1 05/14/2021     CMP:    Lab Results   Component Value Date     05/19/2021    K 4.2 05/19/2021    CL 96 05/19/2021    CO2 22 05/19/2021    BUN 56 05/19/2021    CREATININE 7.3 05/19/2021    GFRAA 9 05/19/2021    AGRATIO 1.0 01/18/2021    LABGLOM 7 05/19/2021    GLUCOSE 98 05/19/2021    PROT 7.6 05/14/2021    LABALBU 4.1 05/14/2021    CALCIUM 8.6 05/19/2021    BILITOT <0.2 05/14/2021    ALKPHOS 67 05/14/2021    AST 15 05/14/2021    ALT 13 05/14/2021     Magnesium:    Lab Results   Component Value Date    MG 2.40 05/14/2021     Phosphorus:    Lab Results   Component Value Date    PHOS 4.2 01/27/2021     Troponin:    Lab Results   Component Value Date    TROPONINI 0.04 05/18/2021     U/A:    Lab Results   Component Value Date    COLORU YELLOW 11/05/2019    PROTEINU 100 11/05/2019    PHUR 6.0 11/05/2019    LABCAST 5-10 Hyaline 01/30/2015    WBCUA 4 11/05/2019    RBCUA 2 11/05/2019    MUCUS 1+ 01/27/2015    YEAST Present 01/27/2015    BACTERIA RARE 11/05/2019    CLARITYU Clear 11/05/2019    SPECGRAV 1.010 11/05/2019    LEUKOCYTESUR Negative 11/05/2019    UROBILINOGEN 0.2 11/05/2019    BILIRUBINUR Negative 11/05/2019    BLOODU SMALL 11/05/2019    GLUCOSEU Negative 11/05/2019           IMPRESSION/RECOMMENDATIONS:      1. S/ P TAVR for Aortic Stenosis     2. ESRD on HD   catherine MCKINNEY do HD this pm     3. Anemia of CKD   Target Hb 9-11     4. Renal osteodystrophy    Monitor Ca and Phos     Thank you for allowing me to participate in the care of this patient. I will continue to follow along. Please call with questions.     Silvia Kulkarni MD, MD  5/19/2021  [facility]

## 2021-05-19 NOTE — CARE COORDINATION
Discharge Planning Assessment  Readmission risk score 25%  RN discharge planner met with patient/ (and family member) to discuss reason for admission, current living situation, and potential needs at the time of discharge    Demographics/Insurance verified Yes    Current type of dwelling: single level home with 3 steps to enter    Patient from ECF/SW confirmed with:n/a    Living arrangements:lives alone, has supportive friends/ family    Level of function/Support:independent    PCP:Gall    Last Visit to PCP:January 2021    DME: none    Active with any community resources/agencies/skilled home care:  [id number] [facility]-Sat at 1130 AM    Medication compliance issues: no concerns uses [facility] [place] issues that could impact healthcare:not identified      Tentative discharge plan:hhome    *Discussed and provided facilities of choice if transition to a skilled nursing facility is required at the time of discharge      *Discussed with patient and/or family that on the day of discharge home tentative time of discharge will be between 10 AM and noon.     Transportation at the time of discharge:has a ride available  MARK Millan, CCM, [place] [id number]  [phone]

## 2021-05-19 NOTE — PROGRESS NOTES
[facility]  Daily Progress Note    Cardio: christine  Admit: 5/18/2021      CC: AS, ESRD  Subj: Today, doing well. No issues overnight. QRS narrowed. No pauses or tachy overnight.      Obj:  BP (!) 126/50   Pulse 66   Temp 98.2 °F (36.8 °C) (Temporal)   Resp 20   Ht 5' 9\" (1.753 m)   Wt 298 lb 11.6 oz (135.5 kg)   SpO2 96%   BMI 44.11 kg/m²       Intake/Output Summary (Last 24 hours) at 5/19/2021 0536  Last data filed at 5/19/2021 0300  Gross per 24 hour   Intake 2520 ml   Output 4300 ml   Net -1780 ml     Gen Alert, coop, no distress Heart Rrr, 1/6   Head NC, AT, no abnorm Abd Soft, NT, ND, +BS, no mass, no OM   Eyes PERRLA, conj/corn clear Ext Ext nl, AT, no c/c/e   Nose Nares nl, no drain, NT Pulse 2+ symm ra, dp, pt   Throat Lips, mucosa, tongue nl Skin Color/tect/turg nl, no rash/lesion   Neck S/S, TM, NT, no bruit/JVD Psych Nl mood and affect   Lung cta bilat Lymph No cervical or ax LA   Ch Wall NT, no deform Neuro Nl gross M/S exam     Medications:    sodium chloride flush  5-40 mL Intravenous 2 times per day    aspirin  81 mg Oral Daily    clopidogrel  75 mg Oral Daily    atorvastatin  20 mg Oral Daily    b complex-C-folic acid  1 capsule Oral Daily    metoprolol succinate  25 mg Oral Daily      sodium chloride 0  (05/18/21 0818)    norepinephrine      sodium chloride      nitroGLYCERIN 5 mcg/min (05/18/21 1523)       Lab Data: Relevant and available CV data reviewed    Plan:  *AS  EKG  NSR, LBBB (postop)  Tele Stable, narrow qrs  Labs Stable, reviewed  Plan Echo, likely home later today  *ESRD  Status HD last evening  Plan Per renal

## 2021-05-19 NOTE — PLAN OF CARE
Free of falls. Fall prevention protocol in place. Wearing non-skid footwear, bed in lowest position and locked. call light within reach. Bed alarm on.

## 2021-05-19 NOTE — DISCHARGE INSTR - COC
Continuity of Care Form    Patient Name: Arlet He   :  1945  MRN:  [id number]    Admit date:  2021  Discharge date:  ***    Code Status Order: Full Code   Advance Directives:   Advance Care Flowsheet Documentation     Date/Time Healthcare Directive Type of Healthcare Directive Copy in [address] Agent's Name Healthcare Agent's Phone Number    [phone]  Yes, patient has an advance directive for healthcare treatment  Durable power of  for health care;Living will  Yes, copy in chart  Adult Children -- --          Admitting Physician:  Benjamin Lance MD  PCP: Mariama Otero MD    Discharging Nurse: [facility] Unit/Room#: CVU-2904/2904-01  Discharging Unit Phone Number: ***    Emergency Contact:   Extended Emergency Contact Information  Primary Emergency Contact: [address] [place] [address] Phone: [phone]  Relation: Other  Secondary Emergency Contact: Aubrie Serrano  Address: [address] [place] [address] Phone: [phone]  Mobile Phone: [phone]  Relation: Other    Past Surgical History:  Past Surgical History:   Procedure Laterality Date    AORTIC VALVE REPLACEMENT N/A 2021    TRANSCATHETER AORTIC VALVE REPLACEMENT FEMORAL APPROACH performed by Benjamin Lance MD at [address] 2021    [address] [place] [address] performed by Lu Ashford MD at [address]  03/10/2021    x 1 Stent placement    CORONARY ANGIOPLASTY WITH STENT PLACEMENT  2017    EVERETT to LAD    IR TUNNELED CATHETER PLACEMENT GREATER THAN 5 YEARS  2021    IR TUNNELED CATHETER PLACEMENT GREATER THAN 5 YEARS 2021 [facility]    PTCA      TUNNELED VENOUS CATHETER PLACEMENT Right 2021    Permacath; RIJ access; 23cm; Dr. Shelton Mejia       Immunization History: There is no immunization history on file for this patient.     Active Date: 5/18/2021    Readmission Risk Assessment Score:  Readmission Risk              Risk of Unplanned Readmission:  25           Discharging to Facility/ Agency   · Name:   · Address:  · Phone:  · Fax:    Dialysis Facility (if applicable)   [address] [place] [address] [facility]    Phone [phone]    · Fax [phone]  · Dialysis Schedule:  ·     / signature:MARK Johnson, CCM, RN  [address]    Prognosis: {Prognosis:9581364904}    Condition at Discharge: [facility] Patient Condition:210155087}    Rehab Potential (if transferring to Rehab): {Prognosis:4185493713}    Recommended Labs or Other Treatments After Discharge: ***    Physician Certification: I certify the above information and transfer of Sherry Gutiérrez  is necessary for the continuing treatment of the diagnosis listed and that he requires {Admit to Appropriate Level of Care:90300} for {GREATER/LESS:523263086} 30 days.      Update Admission H&P: {CHP DME Changes in ODLMD:551790605}    PHYSICIAN SIGNATURE:  {Esignature:283464533}

## 2021-05-19 NOTE — PROCEDURES
Pulmonary Function Testing      Patient name:  Anita Pretty [facility] Unit #:   [id number]   Date of test: 5/14/2021  Date of interpretation:   5/19/2021    Mr. Anita Del Cid is a 77y.o. year-old former smoker. The spirometry data were acceptable and reproducible. Spirometry:  Flow volume loops were obstructed. The FEV-1/FVC ratio was decreased. The   FEV-1 was 2.34 liters (79% of predicted), which was moderately decreased. The FVC was 3.43 liters (84% of predicted), which was decreased. Response to inhaled bronchodilators (albuterol) was not performed. Lung volumes:  Lung volumes were not tested by plethysmography. Diffusion capacity was found to be not tested. Interpretation:  Moderate obstructive airway disease.

## 2021-05-19 NOTE — FLOWSHEET NOTE
Treatment time: 3.5 hours  Net UF: 3000 ml    Pre weight: 137.7 kg   Post weight: 134.7 kg  EDW: 132 kg    Access used: right chest wall TDC  Access function: Poor with -250 ml/min, positional, lines reversed    Medications or blood products given: no    Regular outpatient schedule: T.T.S    Summary of response to treatment: Pt tolerated ok with HD, Vital signs stable. His TDC functioned badly very positional, unable to use heparin today. Saline flush during HD. HD system clotted once, able to return partial blood, lost about 200 ml of blood. Pt's asymptomatic hypotension in the last hour of HD, reduced UF goal.  HD completed in full, heparin dwell in [facility]. Capped and clamped     Cirt Line: Initial Hct: 38.7;   End Profile : B; Refill ( Hct.1 -41.4  subtract Hct 2- 41.0): 0.4 (no Refill); BV: -5.9 %      Copy of dialysis treatment record placed in chart, to be scanned into EMR.     05/18/21 1633 05/18/21 2030   Vital Signs   BP (!) 171/68 111/61   Temp 98.8 °F (37.1 °C) 97.8 °F (36.6 °C)   Pulse 62 61   Resp 22 16   SpO2 93 %  --    Weight (!) 303 lb 9.2 oz (137.7 kg) 296 lb 15.4 oz (134.7 kg)   Weight Method Actual;Bed scale Actual;Bed scale   Percent Weight Change 1.62 -2.18   Dry Weight 291 lb 0.1 oz (132 kg)  --    Post-Hemodialysis Assessment   Post-Treatment Procedures  --  Blood returned;Catheter capped, clamped and heparinized x 2 ports   Machine Disinfection Process  --  Acid/Vinegar Clean;Heat Disinfect; Exterior Machine Disinfection   Rinseback Volume (ml)  --  400 ml   Total Liters Processed (l/min)  --  55.1 l/min   Dialyzer Clearance  --  Moderately streaked   Duration of Treatment (minutes)  --  210 minutes   Heparin amount administered during treatment (units)  --  0 units   Hemodialysis Intake (ml)  --  1050 ml   Hemodialysis Output (ml)  --  4050 ml   NET Removed (ml)  --  3000 ml   Tolerated Treatment  --  Fair

## 2021-05-19 NOTE — DISCHARGE INSTR - DIET

## 2021-06-02 PROBLEM — E78.00 HYPERCHOLESTEREMIA: Status: ACTIVE | Noted: 2019-11-07

## 2021-06-02 PROBLEM — Z95.2 S/P TAVR (TRANSCATHETER AORTIC VALVE REPLACEMENT): Status: ACTIVE | Noted: 2021-06-02

## 2021-06-02 NOTE — PROGRESS NOTES
[address]    H+P // CONSULT // OUTPATIENT VISIT // [facility] VISIT     Referring Doctor Katty Weiss MD   Encounter Type Followup     CHIEF COMPLAINT     Visit Type Chronic   Symptoms None   Problems AS s/p TAVR, CAD, HTN     HISTORY OF PRESENT ILLNESS      GEN - s/p TAVR and doing well. No new concerns.  CAD - Denies cp, dizziness, syncope, palpitations.  HTN - Ambulatory BP readings in good range. No HA or dizziness.  CHOL - Last cholesterol reviewed and in good range. Tolerating statin without side effects.  ESRD - on HD   AFIB - brief episode, on amiodarone   MED - Compliant with CV meds listed below without notable side effects. HISTORY/ALLERGIES/ROS     MedHx:  has a past medical history of Arthritis, Bell's palsy, Cancer ([facility] [address] CHF (congestive heart failure) ([facility] [address] Coronary artery disease involving native coronary artery of native heart without angina pectoris, Hyperlipidemia, Hypertension, Influenza B, Kidney failure, Nosebleed, and Radiation. SurgHx:  has a past surgical history that includes Tunneled venous catheter placement (Right, 01/25/2021); IR TUNNELED CVC PLACE WO SQ PORT/PUMP > 5 YEARS (01/25/2021); Coronary angioplasty with stent (04/16/2017); Percutaneous Transluminal Coronary Angio; Cardiac catheterization (03/10/2021); Aortic valve replacement (N/A, 5/18/2021); and Aortic valve replacement (N/A, 5/18/2021). SocHx:  reports that he quit smoking about 37 years ago. His smoking use included cigarettes. He has a 10.00 pack-year smoking history. He has never used smokeless tobacco. He reports that he does not drink alcohol and does not use drugs. FamHx: family history includes Cancer in his father, mother, and sister; No Known Problems in his brother and sister. Allerg: Patient has no known allergies.    ROS: [x]Full ROS obtained and negative except as mentioned in HPI     MEDICATIONS      Current Outpatient Medications   Medication Sig Dispense *HTN  Status Controlled, vitals and available ambulatory monitoring logs reviewed personally  Plan Counseled on diet/salt/exercise/weight, continue meds at doses above  *CHOL  Status  controlled with last LDL of 59 (goal <70) and HDL of 34 (9/20), labs reviewed personally  Plan Counseled on diet/exercise/weight, continue high-intensity statin, lipid/liver surveillance per PCP  *CKD  Follows with nephrology, on dialysis TIW  Plan Per nephrology  *AFIB  Status Transient, no known recurrence, regular today  Plan On amio, not on AC, per Dr. Kiet Allred Discussed importance of compliance with meds/diet/salt/exercise; avoid tob/alc/drugs; patient verbalized understanding  *FOLLOWUP  1 month with Sophia Mendieta, am scribing for and in the presence of Michi Gasca MD.   SignedSophia 06/02/21 8:36 AM   Provider Fabienne Juarez is working as a scribe for and in the presence of me (Michi Gasca MD). Working as a scribe, Sophia Hinson may have prepopulated components of this note with my historical  intellectual property under my direct supervision. Any additions to this intellectual property were performed in my presence and at my direction.   Furthermore, the content and accuracy of this note have been reviewed by me Michi Gasca MD).  6/3/2021 10:38 AM  CODING     Category Diagnosis   Stable chronic illness  (92472/50118 - 2 or more) AS, CAD, HTN, CKD, CHOL   Chronic illness w/: Exac, progr or SA of Tx  (49871/54517 - 1 or more)    Undiagnosed new problem w/: uncertain prognosis  (78584/03846 - 1 or more)    Acute illness with systemic Sx  (46683/28553 - 1 or more)    Acute, complicated injury  (72210/82162 - 1 or more)    22603 1 or more chronic illness with exacerbation, progression or SA of treatment    Time  30-39 minutes spent preparing to see patient including reviewing patient history/prior tests/prior consults, performing a medical exam, counseling and educating patient/family/caregiver, ordering medications/tests/procedures, referring and communicating with PCPs and other pertinent consultants, documenting information in the EMR, independently interpreting results and communicating to family and coordination of patient care.

## 2021-06-03 ENCOUNTER — OFFICE VISIT (OUTPATIENT)
Dept: CARDIOLOGY CLINIC | Age: 76
End: 2021-06-03
Payer: MEDICARE

## 2021-06-03 VITALS
BODY MASS INDEX: 43.09 KG/M2 | HEIGHT: 70 IN | OXYGEN SATURATION: 96 % | DIASTOLIC BLOOD PRESSURE: 72 MMHG | HEART RATE: 56 BPM | WEIGHT: 301 LBS | SYSTOLIC BLOOD PRESSURE: 138 MMHG

## 2021-06-03 DIAGNOSIS — I25.83 CORONARY ARTERY DISEASE DUE TO LIPID RICH PLAQUE: ICD-10-CM

## 2021-06-03 DIAGNOSIS — I10 ESSENTIAL HYPERTENSION: ICD-10-CM

## 2021-06-03 DIAGNOSIS — I35.0 NONRHEUMATIC AORTIC VALVE STENOSIS: Primary | ICD-10-CM

## 2021-06-03 DIAGNOSIS — E78.00 HYPERCHOLESTEREMIA: ICD-10-CM

## 2021-06-03 DIAGNOSIS — Z95.2 S/P TAVR (TRANSCATHETER AORTIC VALVE REPLACEMENT): ICD-10-CM

## 2021-06-03 DIAGNOSIS — I25.10 CORONARY ARTERY DISEASE DUE TO LIPID RICH PLAQUE: ICD-10-CM

## 2021-06-03 PROCEDURE — G8427 DOCREV CUR MEDS BY ELIG CLIN: HCPCS | Performed by: INTERNAL MEDICINE

## 2021-06-03 PROCEDURE — 1123F ACP DISCUSS/DSCN MKR DOCD: CPT | Performed by: INTERNAL MEDICINE

## 2021-06-03 PROCEDURE — 1036F TOBACCO NON-USER: CPT | Performed by: INTERNAL MEDICINE

## 2021-06-03 PROCEDURE — 4040F PNEUMOC VAC/ADMIN/RCVD: CPT | Performed by: INTERNAL MEDICINE

## 2021-06-03 PROCEDURE — 1111F DSCHRG MED/CURRENT MED MERGE: CPT | Performed by: INTERNAL MEDICINE

## 2021-06-03 PROCEDURE — 3017F COLORECTAL CA SCREEN DOC REV: CPT | Performed by: INTERNAL MEDICINE

## 2021-06-03 PROCEDURE — 99214 OFFICE O/P EST MOD 30 MIN: CPT | Performed by: INTERNAL MEDICINE

## 2021-06-03 PROCEDURE — G8417 CALC BMI ABV UP PARAM F/U: HCPCS | Performed by: INTERNAL MEDICINE

## 2021-06-03 RX ORDER — CALCIUM ACETATE 667 MG/1
TABLET ORAL
COMMUNITY
Start: 2021-04-07

## 2021-06-30 RX ORDER — CLOPIDOGREL BISULFATE 75 MG/1
TABLET ORAL
Qty: 90 TABLET | Refills: 2 | Status: SHIPPED | OUTPATIENT
Start: 2021-06-30 | End: 2022-03-28

## 2021-07-02 PROBLEM — I50.22 CHRONIC SYSTOLIC HEART FAILURE (HCC): Status: ACTIVE | Noted: 2020-09-27

## 2021-07-02 NOTE — PROGRESS NOTES
[address]      Cardiology Progress Note    Jeremías Shipman  1945    July 9, 2021      CC: \"I feel good \"     HPI:  The patient is 80 y.o. male with a past medical history significant for AS, s/p TAVR 5/18/21, CAD S/P PCI, PAF, CKD, HTN, CKD with Cr of 2.0 came into [facility] with chest pain started 4/15/17. Today, he is here for follow. He states that he is feeling well. Pt is upset that he arrived early but had to wait. Patient denies exertional chest pain/pressure, dyspnea at rest, GIRALDO, PND, orthopnea, palpitations, lightheadedness, weight changes, changes in LE edema, and syncope.      Past Medical History:   Diagnosis Date    Arthritis     Bell's palsy     [facility] ([facility] [address]     CHF (congestive heart failure) ([facility] [address] [facility]     Coronary artery disease involving native coronary artery of native heart without angina pectoris     Hyperlipidemia     Hypertension     Influenza B 4/2/15    Kidney failure     sees Dr Bridger Perdomo      Past Surgical History:   Procedure Laterality Date    AORTIC VALVE REPLACEMENT N/A 5/18/2021    TRANSCATHETER AORTIC VALVE REPLACEMENT FEMORAL APPROACH performed by Florian Fuentes MD at [address] [facility] N/A 5/18/2021    TRANSCATHETER AORTIC VALVE REPLACEMENT FEMORAL APPROACH performed by Paula Marley MD at [address]  03/10/2021    x 1 Stent placement    CORONARY ANGIOPLASTY WITH STENT PLACEMENT  04/16/2017    EVERETT to LAD    IR TUNNELED CATHETER PLACEMENT GREATER THAN 5 YEARS  01/25/2021    IR TUNNELED CATHETER PLACEMENT GREATER THAN 5 YEARS 1/25/2021 [facility]    PTCA      TUNNELED VENOUS CATHETER PLACEMENT Right 01/25/2021    Permacath; RIJ access; 23cm; Dr. Amie Heard     Family History   Problem Relation Age of Onset    Cancer Mother     Cancer Father     No Known Problems Sister     No Known Problems Brother     Cancer Sister      Social History     Tobacco Use  Smoking status: Former Smoker     Packs/day: 0.50     Years: 20.00     Pack years: 10.00     Types: Cigarettes     Quit date: 1984     Years since quittin.4    Smokeless tobacco: Never Used    Tobacco comment: will not start    Vaping Use    Vaping Use: Never used   Substance Use Topics    Alcohol use: No    Drug use: No       No Known Allergies  Current Outpatient Medications   Medication Sig Dispense Refill    clopidogrel (PLAVIX) 75 MG tablet TAKE 1 TABLET BY MOUTH EVERY DAY 90 tablet 2    calcium acetate 667 MG TABS TAKE ONE TABLET BY MOUTH THREE TIMES DAILY WITH MEALS      amiodarone (CORDARONE) 200 MG tablet Take 1 tablet by mouth daily 90 tablet 3    GLUCOSAMINE HCL PO Take 3,000 mg by mouth 2 times daily      metoprolol succinate (TOPROL XL) 25 MG extended release tablet Take 1 tablet by mouth daily 30 tablet 3    b complex-C-folic acid (NEPHROCAPS) 1 MG capsule Take 1 capsule by mouth daily 30 capsule 3    aspirin 81 MG chewable tablet Take 81 mg by mouth daily.  atorvastatin (LIPITOR) 20 MG tablet Take 20 mg by mouth daily. No current facility-administered medications for this visit. Review of Systems:    Constitutional: negative  Eyes: negative  Ears, nose, mouth, throat, and face: negative  Respiratory: negative  Cardiovascular: negative  Gastrointestinal: negative  Genitourinary:negative  Integument/breast: negative  Hematologic/lymphatic: negative  Musculoskeletal:negative  Neurological: negative  Behavioral/Psych: negative  Endocrine: negative  Allergic/Immunologic: negative     Physical Exam:   Vitals:    21 1255   BP: 124/70   Pulse: 95   SpO2: 95%   Weight: (!) 301 lb 6.4 oz (136.7 kg)   Height: 5' 10\" (1.778 m)     Wt Readings from Last 3 Encounters:   21 (!) 301 lb 6.4 oz (136.7 kg)   21 (!) 301 lb (136.5 kg)   21 298 lb 11.6 oz (135.5 kg)       Constitutional: He is oriented to person, place, and time.  He appears well-developed and well-nourished. In no acute distress. Head: Normocephalic and atraumatic. Pupils equal and round. Neck: Neck supple. No JVP or carotid bruit appreciated. No mass and no thyromegaly present. No lymphadenopathy present. Cardiovascular: Normal rate. Normal heart sounds. Exam reveals no gallop and no friction rub. No murmur heard. Pulmonary/Chest: Effort normal and breath sounds normal. No respiratory distress. He has no wheezes, rhonchi or rales. Abdominal: Soft, non-tender. Bowel sounds are normal. He exhibits no organomegaly, mass or bruit. Extremities: mil BLE edema, no cyanosis, or clubbing. Pulses are 2+ radial/dorsalis pedis/posterior tibial/carotid bilaterally. Neurological: Awake  alert and orientedNo gross cranial nerve deficit. Coordination normal.     Skin: Skin is warm and dry. There is no rash or diaphoresis. Psychiatric: He has a normal mood and affect.  His speech is normal and behavior is normal.     Lab Review: all labs reviewed   Lab Results   Component Value Date    TRIG 71 09/26/2020    HDL 34 09/26/2020    LDLCALC 59 09/26/2020    LABVLDL 14 09/26/2020      Lab Results   Component Value Date    BUN 56 05/19/2021    CREATININE 7.3 05/19/2021     Lab Results   Component Value Date    WBC 9.6 05/19/2021    RBC 3.81 05/19/2021    HGB 12.4 05/19/2021    HCT 36.9 05/19/2021    MCV 97.0 05/19/2021    MCH 32.4 05/19/2021    MCHC 33.5 05/19/2021    RDW 20.2 05/19/2021     05/19/2021    MPV 8.3 05/19/2021     Lab Results   Component Value Date     05/19/2021    K 4.2 05/19/2021    CL 96 05/19/2021    CO2 22 05/19/2021    BUN 56 05/19/2021    LABALBU 4.1 05/14/2021    CREATININE 7.3 05/19/2021    CALCIUM 8.6 05/19/2021    GFRAA 9 05/19/2021    LABGLOM 7 05/19/2021    GLUCOSE 98 05/19/2021       Lab Results   Component Value Date    TROPONINI 0.04 05/18/2021    TROPONINI 0.04 01/18/2021    TROPONINI 0.03 09/25/2020 5/2021 proBNP 9087    Image Review: all imaging reviewed     ECHO:4/17/17   Summary   Appears to have normal left ventricular size and wall thickness. Mildly   decreased left ventricular systolic function with an estimated ejection fraction of 40%.   Mid distal anterior wall hypokinesis. The apex is akinetic.   Normal right ventricular size and function. Cath:4/16/17  ANGIOGRAPHIC FINDINGS:  1. Left main coronary comes from the left coronary cusp, has FERMÍN-3 flow, no significant stenosis. 2. The left anterior descending artery comes from the left main coronary  artery, is occluded in the proximal portion and mid portion has around 70%  stenosis present and distal also 70% stenosis present. 3. The left circumflex comes from the left main coronary artery and gives  rise to obtuse marginal branches. The mid portion has approximately 60% stenosis. 4. The right coronary artery comes from the right coronary cusp, giving rise  to posterior descending artery and posterolateral branch. The proximal mid  portion has 60% stenosis present. INTERVENTION PERFORMED: We intervened on the proximal left anterior  descending artery, placed a stent 3.0 x 18 post-dilated to 3.9 mm. A DRUG COATED XIENCE ALPINE stent was placed in the LAD      ECHO: 8/7/18  Summary  Suboptimal parasternal image quality. Ejection fraction is visually estimated to be 50-55%. There is hypokinesis of the distal anteroseptal wall. Right ventricular size and function are normal.  Mildly dilated left atrium. There are no significant valvular abnormalities appreciated in this study.     ECHO  9/26/2020  Ejection fraction is visually estimated to be 40%. There is severe hypokinesis of the entire apical myocardium and and  hypokinesis of the distal septal wall. There is severe concentric left ventricular hypertrophy. Grade II diastolic dysfunction with elevated LV filling pressures. Mild mitral regurgitation is present. No evidence of mitral stenosis.   Mild calcification of the mitral valve noted. Moderate to severe aortic stenosis with a peak velocity of 437m/s and a mean pressure gradient of 50mmHg. Mild aortic regurgitation. Moderately dilated right ventricle. Right ventricular systolic function is normal .     Cardiac Cath 1/18/2021  ANGIOGRAPHY FINDINGS:  1. Left main:  Normal.  2.  Left anterior descending artery midportion 80% stenosis. 3.  Left circumflex is patent. No stenosis. 4.  Right coronary artery comes from the right coronary cusp. It is  patent without significant stenosis.     HEMODYNAMIC FINDINGS:  Aortic valve gradient is 36 mmHg. With  dobutamine 20 mcg/kg/minute, it reaches up to 48 mmHg.     SUMMARY:  Success balloon aortic valvuloplasty. Cardiac cath 3/10/2021  ANGIOGRAPHY FINDINGS:  1. Left main:  Normal.  2.  Left anterior descending artery midportion has 80% stenosis,  proximally has a stent which is patent. 3.  Left circumflex is patent.     SUMMARY:  Successful revascularization of LAD, placement of one stent,  3.5 x 20 expanding up to 3.9 mm. Limited Echo 5/2021    *PreTAVR - borderline LV function with EF of 50-55%, Severe AS with PV   4.57m/s, MG 59mmHg, JAVIER 0.96cm2, moderate regurgitation   *PostTAVR - well seated TAVR valve,   -Post TAVR: Successful TAVR procedure. The new bioprosthetic aortic valve is   well seated with a peak velocity of 93cm/s, MG 2mmHg, no regurgitation. ECHO 5/2021    *Left ventricle - eft ventricular cavity size is severely dilated with mild   concentric left ventricular hypertrophy. *Aortic valve - well seated TAVR valve with PV of 2.9m/s, MG 19mmHg, EOA   3.5cm2, trivial intravalvular regurgitation   *Left atrium - dilated   *Mitral valve - thickened   *Tricuspid valve - mild regurgitation with PASP of 26mmHg  Summary   Suboptimal image quality. Ejection fraction is visually estimated to be 50-55%. No regional wall motion abnormalities are noted. Indeterminate diastolic function.    A 26mm Terrell Rusty 3 Ultra bioprosthetic aortic valve appears well seated   with a maximum velocity of 2.13 m/s and a mean gradient of 11 mmHg. No evidence of aortic valve regurgitation or stenosis. Right ventricular systolic function is normal.     Echo 7/2021 Summary   Suboptimal image quality. Ejection fraction is visually estimated to be 50-55%. No regional wall motion abnormalities are noted. Indeterminate diastolic function. A 26mm Terrell Rusty 3 Ultra bioprosthetic aortic valve appears well seated   with a maximum velocity of 2.13 m/s and a mean gradient of 11 mmHg. No evidence of aortic valve regurgitation or stenosis. Right ventricular systolic function is normal.       Assessment/Plan: Aortic valve stenosis  S/p  Success balloon aortic valvuloplasty. S/p TAVR 5/18/21   Symptoms improved post TAvr. Recommend repeating echocardiogram in 6 months to evaluate AS. Coronary artery disease Ischemic CP with NSTEMI  MI involving anterior wall   --4/16/17 We intervened on the proximal left anterior descending artery, placed a stent 3.0 x 18 post-dilated to 3.9 mm  3/10/2021: Successful revascularization of LAD, placement of one stent,  3.5 x 20 expanding up to 3.9 mm. Asymptomatic. Continue Asa, Plavix, B-blocker and statin therapy. Ischemic Cardiomyopathy/systolic heart failure:   Appears compensated. Continue Asa and B-blocker. Hypertension  Controlled. Continue current medical management. HLD. Continue high intensity statin.      Chronic kidney disease  Following with nephrology. Diabetes. Managed by PCP     Morbid obesity  Encouraged increase aerobic exercise and heart healthy diet    Transient atrial fib  No known recurrence. Not anticoagulated. Taking amiodarone. Follow up in 6 months. Thank you very much for allowing me to participate in the care of your patient. Please do not hesitate to contact me if you have any questions.     Sincerely,    Pramod Willard MD, MPH      [facility], [address]  Ph: [phone]  Fax: [phone]    This note was scribed in the presence of the physician by Roise Goldberg RN. Physician Attestation:  The scribes documentation has been prepared under my direction and personally reviewed by me in its entirety. I confirm that the note above accurately reflects all work, treatment, procedures, and medical decision making performed by me.

## 2021-07-07 ENCOUNTER — HOSPITAL ENCOUNTER (OUTPATIENT)
Dept: NON INVASIVE DIAGNOSTICS | Age: 76
Discharge: HOME | End: 2021-07-07
Payer: MEDICARE

## 2021-07-07 DIAGNOSIS — Z95.2 S/P TAVR (TRANSCATHETER AORTIC VALVE REPLACEMENT): ICD-10-CM

## 2021-07-07 LAB
LV EF: 53 %
LVEF MODALITY: NORMAL

## 2021-07-07 PROCEDURE — 93306 TTE W/DOPPLER COMPLETE: CPT

## 2021-07-09 ENCOUNTER — OFFICE VISIT (OUTPATIENT)
Dept: CARDIOLOGY CLINIC | Age: 76
End: 2021-07-09
Payer: MEDICARE

## 2021-07-09 VITALS
SYSTOLIC BLOOD PRESSURE: 124 MMHG | HEIGHT: 70 IN | DIASTOLIC BLOOD PRESSURE: 70 MMHG | HEART RATE: 68 BPM | BODY MASS INDEX: 43.15 KG/M2 | WEIGHT: 301.4 LBS | OXYGEN SATURATION: 95 %

## 2021-07-09 DIAGNOSIS — I25.10 CAD IN NATIVE ARTERY: ICD-10-CM

## 2021-07-09 DIAGNOSIS — E78.00 HYPERCHOLESTEREMIA: ICD-10-CM

## 2021-07-09 DIAGNOSIS — I50.22 CHRONIC SYSTOLIC HEART FAILURE (HCC): ICD-10-CM

## 2021-07-09 DIAGNOSIS — Z95.2 S/P TAVR (TRANSCATHETER AORTIC VALVE REPLACEMENT): Primary | ICD-10-CM

## 2021-07-09 DIAGNOSIS — I10 ESSENTIAL HYPERTENSION: ICD-10-CM

## 2021-07-09 DIAGNOSIS — I35.0 NONRHEUMATIC AORTIC VALVE STENOSIS: ICD-10-CM

## 2021-07-09 PROCEDURE — 3017F COLORECTAL CA SCREEN DOC REV: CPT | Performed by: INTERNAL MEDICINE

## 2021-07-09 PROCEDURE — 4040F PNEUMOC VAC/ADMIN/RCVD: CPT | Performed by: INTERNAL MEDICINE

## 2021-07-09 PROCEDURE — 1036F TOBACCO NON-USER: CPT | Performed by: INTERNAL MEDICINE

## 2021-07-09 PROCEDURE — 99213 OFFICE O/P EST LOW 20 MIN: CPT | Performed by: INTERNAL MEDICINE

## 2021-07-09 PROCEDURE — G8417 CALC BMI ABV UP PARAM F/U: HCPCS | Performed by: INTERNAL MEDICINE

## 2021-07-09 PROCEDURE — 1123F ACP DISCUSS/DSCN MKR DOCD: CPT | Performed by: INTERNAL MEDICINE

## 2021-07-09 PROCEDURE — G8427 DOCREV CUR MEDS BY ELIG CLIN: HCPCS | Performed by: INTERNAL MEDICINE

## 2021-10-15 NOTE — PROGRESS NOTES
Pravin   Daily Progress Note      Admit Date:  1/18/2021    Reason for follow up visit: Heart failure    CC: \"I'm still SOB when I get up but it is getting better. \"    78 y/o male with PMH significant for chronic kidney disease, CHF, aortic stenosis,HTN, HLP admitted to [facility] on 1/18/2021 with increasing SOB and lower extremity edema. Has received diuretic and nephrology following. To undergo placement of dialysis catheter today. Awaiting for further evaluation of his aortic stenosis (plan for cardiac cath and TAVR in future)    Interval History:  Pt. seen and examined; records reviewed  Dialysis planned later today  BP stable. Remains on O2  Net diuresis -1L last 24 hours and -5.9L since admit  Wt today 300# (313# on admit)  Cr today 7.3    Subjective:  Pt with no acute overnight cardiac events. Denies chest pain, palpitations, dizziness  + edema    + SOB    Review of Systems:   · Constitutional: no unanticipated weight loss. There's been no change in energy level, sleep pattern, or activity level. No fevers, chills. · Eyes: No visual changes or diplopia. No scleral icterus. · ENT: No Headaches, hearing loss or vertigo. No mouth sores or sore throat. · Cardiovascular: as reviewed in HPI  · Respiratory: No cough or wheezing, no sputum production. No hematemesis. · Gastrointestinal: No abdominal pain, appetite loss, blood in stools. No change in bowel or bladder habits. · Genitourinary: No dysuria, trouble voiding, or hematuria. · Musculoskeletal:  No gait disturbance, no joint complaints. + uses cane for ambulation  · Integumentary: No rash or pruritis. · Neurological: No headache, diplopia, change in muscle strength, numbness or tingling. · Psychiatric: No anxiety or depression. · Endocrine: No temperature intolerance. No excessive thirst, fluid intake, or urination. No tremor.   · Hematologic/Lymphatic: No abnormal bruising or bleeding, blood clots or swollen lymph nodes.  · Allergic/Immunologic: No nasal congestion or hives. Objective:   BP (!) 121/53   Pulse 70   Temp 98.1 °F (36.7 °C) (Oral)   Resp 18   Ht 5' 9\" (1.753 m)   Wt (!) 300 lb 4.3 oz (136.2 kg)   SpO2 95%   BMI 44.34 kg/m²       Intake/Output Summary (Last 24 hours) at 1/22/2021 0912  Last data filed at 1/22/2021 8592  Gross per 24 hour   Intake 1220 ml   Output 2825 ml   Net -1605 ml     Wt Readings from Last 3 Encounters:   01/22/21 (!) 300 lb 4.3 oz (136.2 kg)   12/03/20 (!) 313 lb 3.2 oz (142.1 kg)   09/27/20 (!) 326 lb 4.5 oz (148 kg)       Physical Exam:  General: In no acute distress. Awake, alert, and oriented X4. Up in chair. Asking several questions about his current health status this AM  Skin:  Warm and dry. No new appearing rashes or lesions. Neck:  Supple and thick. No JVD appreciated. Chest: Lungs clear to auscultation. No wheezes/rhonchi/rales  Cardiovascular:  RRR; III/VI AS murmur, radiates to carotids  Abdomen: obese, soft, nontender, +bowel sounds.    Extremities: 2-3+ bilateral lower leg and pedal edema; 1+ bilateral DP/PT and 2+ radial pulses; cap refill brisk    Medications:    aspirin  81 mg Oral Daily    atorvastatin  20 mg Oral Daily    [Held by provider] clopidogrel  75 mg Oral Daily    metoprolol succinate  25 mg Oral Daily    furosemide  60 mg Intravenous BID    metOLazone  5 mg Oral Daily    sodium chloride flush  10 mL Intravenous 2 times per day    heparin (porcine)  5,000 Units Subcutaneous 3 times per day       sodium chloride flush, promethazine **OR** ondansetron, polyethylene glycol, acetaminophen **OR** acetaminophen    Lab Data:  CBC:   Recent Labs     01/20/21  0504 01/21/21  0506 01/22/21  0457   WBC 9.1 9.2 10.0   HGB 9.5* 9.0* 9.8*    310 289     BMP:    Recent Labs     01/20/21  0504 01/21/21  0506 01/22/21  0457    138 139   K 3.0* 3.0* 3.2*   CO2 27 27 25   BUN 64* 77* 82*   CREATININE 6.2* 6.2* 7.3*     LIVR: No results for input(s): AST, ALT in the last 72 hours. INR:    Recent Labs     01/21/21  0506   INR 1.13     APTT:   Recent Labs     01/21/21  0506   APTT 29.1     Results for Orlando Van (MRN [id number]) as of 1/22/2021 09:16   Ref. Range 1/18/2021 05:40 1/21/2021 05:06   Pro-BNP Latest Ref Range: 0 - 449 pg/mL 14,429 (H) 13,153 (H)   Troponin Latest Ref Range: <0.01 ng/mL 0.04 (H)      Limited echo 1/19/2021:  LVEF approximately 40%   inferior, apical anterior wall hypokinesis   Moderate aortic regurgitation. Severe aortic stenosis with a peak velocity of 4.4m/s and a mean pressure   gradient of 55mmHg. Echo 9/25/2020:  Ejection fraction is visually estimated to be 40%. There is severe hypokinesis of the entire apical myocardium and and   hypokinesis of the distal septal wall. There is severe concentric left ventricular hypertrophy. Grade II diastolic dysfunction with elevated LV filling pressures. Mild mitral regurgitation is present. No evidence of mitral stenosis. Mild calcification of the mitral valve noted. Moderate to severe aortic stenosis with a peak velocity of 437m/s and a mean   pressure gradient of 50mmHg. Mild aortic regurgitation. Moderately dilated right ventricle. Right ventricular systolic function is normal .     4/2017 Coronary angiogram/PCI:  1. Left main coronary comes from the left coronary cusp, has FERMÍN-3 flow, no  significant stenosis. 2.  The left anterior descending artery comes from the left main coronary  artery, is occluded in the proximal portion and mid portion has around 70%  stenosis present and distal also 70% stenosis present. 3.  The left circumflex comes from the left main coronary artery and gives  rise to obtuse marginal branches. The mid portion has approximately 60%  stenosis. 4.  The right coronary artery comes from the right coronary cusp, giving rise  to posterior descending artery and posterolateral branch.   The proximal mid  portion has 60% stenosis [place] [facility])  Orthopaedic Surgery  [address]  [place] [address]  Phone: [phone]  Fax: [phone]  Follow Up Time:    present.     INTERVENTION PERFORMED:  We intervened on the proximal left anterior  descending artery, placed a stent 3.0 x 18 post-dilated to 3.9 mm. A DRUG COATED XIENCE ALPINE stent was placed in the LAD    Telemetry: Sinus rhythm with PAC's    Assessment/Plan:    1. Acute on chronic systolic heart failure/Cardiomyopathy  -remains on diuretic therapy (IV lasix and metolazone)  -continue Toprol  -no ACE/ARB/ARNI/aldactone d/t elevated Cr  -CHF nurse education  -LVEF 40%; NYHA class III    2. Severe aortic valve stenosis  -will plan for cardiac cath once volume status better  -plan for referral for TAVR  -may need to consider BAV as bridge to TAVR    3. KAIDEN on chronic kidney disease  -nephrology following  -for placement of dialysis catheter this AM and dialysis planned later today    4. Hypokalemia  -secondary to diuresis    5. Morbid obesity  -BMI 44    6.  Coronary artery disease without angina pectoris  -prior stent to LAD in 2017  -continue ASA, statin and BB  -eventual cath    Electronically signed by Roy Alpers, APRN - CNP on 1/22/2021 at 9:12 AM

## 2021-12-01 ENCOUNTER — HOSPITAL ENCOUNTER (OUTPATIENT)
Dept: NON INVASIVE DIAGNOSTICS | Age: 76
Discharge: HOME | End: 2021-12-01
Payer: MEDICARE

## 2021-12-01 LAB
LV EF: 53 %
LVEF MODALITY: NORMAL

## 2021-12-01 PROCEDURE — 93306 TTE W/DOPPLER COMPLETE: CPT

## 2021-12-02 NOTE — PROGRESS NOTES
[address]      Cardiology Progress Note    Jeremías Vásquez  1945    December 3, 2021      CC: \"I am doing okay. \"     HPI:  The patient is 80 y.o. male with a past medical history significant for AS, s/p TAVR 5/18/21, CAD S/P PCI, PAF, CKD, HTN, CKD with Cr of 2.0 came into [facility] with chest pain started 4/15/17. Today, he is here for follow up. He continues to use a cane for ambulation. He states that he is having trouble with low blood pressure at dialysis and they will not let him leave until his blood pressure is greater than 100. He does not have any other issues with his blood pressure. Patient denies exertional chest pain/pressure, dyspnea at rest, GIRALDO, PND, orthopnea, palpitations, lightheadedness, weight changes, changes in LE edema, and syncope. Patient reports compliance to his medications.       Past Medical History:   Diagnosis Date    Arthritis     Bell's palsy     [facility]     [facility]ive [facility] failure[facility]     Coronary artery disease involving native coronary artery of native heart without angina pectoris     Hyperlipidemia     Hypertension     Influenza B 4/2/15    Kidney failure     sees Dr Dasha Ramírez      Past Surgical History:   Procedure Laterality Date    AORTIC VALVE REPLACEMENT N/A 5/18/2021    TRANSCATHETER AORTIC VALVE REPLACEMENT FEMORAL APPROACH performed by Sony Pérez MD at [address] [facility] [facility] N/A 5/18/2021    TRANSCATHETER AORTIC VALVE REPLACEMENT FEMORAL APPROACH performed by Gifty Foy MD at [address]  03/10/2021    x 1 Stent placement    CORONARY ANGIOPLASTY WITH STENT PLACEMENT  04/16/2017    EVERETT to LAD    IR TUNNELED CATHETER PLACEMENT GREATER THAN 5 YEARS  01/25/2021    IR TUNNELED CATHETER PLACEMENT GREATER THAN 5 YEARS 1/25/2021 [facility]    PTCA      TUNNELED VENOUS CATHETER PLACEMENT Right 01/25/2021    Permacath; RIJ access; 23cm; Dr. Leslie Sequeira     Family History   Problem Relation Age of Onset    Cancer Mother     Cancer Father     No Known Problems Sister     No Known Problems Brother     Cancer Sister      Social History     Tobacco Use    Smoking status: Former Smoker     Packs/day: 0.50     Years: 20.00     Pack years: 10.00     Types: Cigarettes     Quit date: 1984     Years since quittin.8    Smokeless tobacco: Never Used    Tobacco comment: will not start    Vaping Use    Vaping Use: Never used   Substance Use Topics    Alcohol use: No    Drug use: No       No Known Allergies  Current Outpatient Medications   Medication Sig Dispense Refill    clopidogrel (PLAVIX) 75 MG tablet TAKE 1 TABLET BY MOUTH EVERY DAY 90 tablet 2    calcium acetate 667 MG TABS TAKE ONE TABLET BY MOUTH THREE TIMES DAILY WITH MEALS      amiodarone (CORDARONE) 200 MG tablet Take 1 tablet by mouth daily 90 tablet 3    GLUCOSAMINE HCL PO Take 3,000 mg by mouth 2 times daily      metoprolol succinate (TOPROL XL) 25 MG extended release tablet Take 1 tablet by mouth daily 30 tablet 3    b complex-C-folic acid (NEPHROCAPS) 1 MG capsule Take 1 capsule by mouth daily 30 capsule 3    aspirin 81 MG chewable tablet Take 81 mg by mouth daily.  atorvastatin (LIPITOR) 20 MG tablet Take 20 mg by mouth daily. No current facility-administered medications for this visit.        Review of Systems:    Constitutional: negative  Eyes: negative  Ears, nose, mouth, throat, and face: negative  Respiratory: negative  Cardiovascular: negative  Gastrointestinal: negative  Genitourinary:negative  Integument/breast: negative  Hematologic/lymphatic: negative  Musculoskeletal:negative  Neurological: negative  Behavioral/Psych: negative  Endocrine: negative  Allergic/Immunologic: negative     Physical Exam:   Vitals:    21 1122   BP: 136/74   Pulse: 67   SpO2: 96%   Weight: (!) 309 lb (140.2 kg)   Height: 5' 10\" (1.778 m)     Wt Readings from Last 3 05/19/2021    GLUCOSE 98 05/19/2021       Lab Results   Component Value Date    TROPONINI 0.04 05/18/2021    TROPONINI 0.04 01/18/2021    TROPONINI 0.03 09/25/2020 5/2021 proBNP 9035    Image Review: all imaging reviewed     ECHO:4/17/17  Summary  Appears to have normal left ventricular size and wall thickness. Mildly  decreased left ventricular systolic function with an estimated ejection fraction of 40%. Mid distal anterior wall hypokinesis. The apex is akinetic. Normal right ventricular size and function. Cath:4/16/17  ANGIOGRAPHIC FINDINGS:  1. Left main coronary comes from the left coronary cusp, has FERMÍN-3 flow, no significant stenosis. 2. The left anterior descending artery comes from the left main coronary  artery, is occluded in the proximal portion and mid portion has around 70%  stenosis present and distal also 70% stenosis present. 3. The left circumflex comes from the left main coronary artery and gives  rise to obtuse marginal branches. The mid portion has approximately 60% stenosis. 4. The right coronary artery comes from the right coronary cusp, giving rise  to posterior descending artery and posterolateral branch. The proximal mid  portion has 60% stenosis present. INTERVENTION PERFORMED: We intervened on the proximal left anterior  descending artery, placed a stent 3.0 x 18 post-dilated to 3.9 mm. A DRUG COATED XIENCE ALPINE stent was placed in the LAD      ECHO: 8/7/18  Summary  Suboptimal parasternal image quality. Ejection fraction is visually estimated to be 50-55%. There is hypokinesis of the distal anteroseptal wall. Right ventricular size and function are normal.  Mildly dilated left atrium. There are no significant valvular abnormalities appreciated in this study.     ECHO  9/26/2020  Ejection fraction is visually estimated to be 40%. There is severe hypokinesis of the entire apical myocardium and and  hypokinesis of the distal septal wall.   There is severe concentric left ventricular hypertrophy. Grade II diastolic dysfunction with elevated LV filling pressures. Mild mitral regurgitation is present. No evidence of mitral stenosis. Mild calcification of the mitral valve noted. Moderate to severe aortic stenosis with a peak velocity of 437m/s and a mean pressure gradient of 50mmHg. Mild aortic regurgitation. Moderately dilated right ventricle. Right ventricular systolic function is normal .     Cardiac Cath 1/18/2021  ANGIOGRAPHY FINDINGS:  1. Left main:  Normal.  2.  Left anterior descending artery midportion 80% stenosis. 3.  Left circumflex is patent. No stenosis. 4.  Right coronary artery comes from the right coronary cusp. It is  patent without significant stenosis.     HEMODYNAMIC FINDINGS:  Aortic valve gradient is 36 mmHg. With  dobutamine 20 mcg/kg/minute, it reaches up to 48 mmHg.     SUMMARY:  Success balloon aortic valvuloplasty. Cardiac cath 3/10/2021  ANGIOGRAPHY FINDINGS:  1. Left main:  Normal.  2.  Left anterior descending artery midportion has 80% stenosis,  proximally has a stent which is patent. 3.  Left circumflex is patent.     SUMMARY:  Successful revascularization of LAD, placement of one stent,  3.5 x 20 expanding up to 3.9 mm. Limited Echo 5/2021   PreTAVR - borderline LV function with EF of 50-55%, Severe AS with PV  4.57m/s, MG 59mmHg, JAVIER 0.96cm2, moderate regurgitation  *PostTAVR - well seated TAVR valve,  -Post TAVR: Successful TAVR procedure. The new bioprosthetic aortic valve is  well seated with a peak velocity of 93cm/s, MG 2mmHg, no regurgitation. ECHO 5/2021    *Left ventricle - eft ventricular cavity size is severely dilated with mild   concentric left ventricular hypertrophy.    *Aortic valve - well seated TAVR valve with PV of 2.9m/s, MG 19mmHg, EOA   3.5cm2, trivial intravalvular regurgitation   *Left atrium - dilated   *Mitral valve - thickened   *Tricuspid valve - mild regurgitation with PASP of 26mmHg  Summary Suboptimal image quality. Ejection fraction is visually estimated to be 50-55%. No regional wall motion abnormalities are noted. Indeterminate diastolic function. A 26mm Terrell Rusty 3 Ultra bioprosthetic aortic valve appears well seated   with a maximum velocity of 2.13 m/s and a mean gradient of 11 mmHg. No evidence of aortic valve regurgitation or stenosis. Right ventricular systolic function is normal.     Echo 7/2021 Summary   Suboptimal image quality. Ejection fraction is visually estimated to be 50-55%. No regional wall motion abnormalities are noted. Indeterminate diastolic function. A 26mm Terrell Rusty 3 Ultra bioprosthetic aortic valve appears well seated   with a maximum velocity of 2.13 m/s and a mean gradient of 11 mmHg. No evidence of aortic valve regurgitation or stenosis. Right ventricular systolic function is normal.     ECHO 12/1/2021  Left ventricular cavity size is normal.  There is mildly increased left ventricular wall thickness. Overall left ventricular systolic function appears normal.  Ejection fraction is visually estimated to be 50-55%. No regional wall motion abnormalities are noted. Indeterminate diastolic function. Stroke volume index calculated at 58.06 mL/m2/beat. The right ventricle is normal in size and function. The left atrium is mildly dilated. The ascending aorta is mildly dilated at 3.7cm. A 26mm Terrell Rusty bioprosthetic artificial aortic valve appears well  seated with a maximum velocity of 2.21m/s and a peak/mean gradient of  20mmHg/13mmHg. No evidence of aortic valve regurgitation. Assessment/Plan: Aortic valve stenosis  S/p  Success balloon aortic valvuloplasty. S/p TAVR 5/18/21   Symptoms improved post TAvr. Repeat echocardiogram in 6 months.      Coronary artery disease Ischemic CP with NSTEMI  MI involving anterior wall   --4/16/17 We intervened on the proximal left anterior descending artery, placed a stent 3.0 x 18 post-dilated to 3.9 mm  3/10/2021: Successful revascularization of LAD, placement of one stent,  3.5 x 20 expanding up to 3.9 mm. Asymptomatic. Continue Asa, Plavix, B-blocker and statin therapy. Ischemic Cardiomyopathy/systolic heart failure:   Appears compensated on exam. Continue Asa and B-blocker. Hypertension  Controlled. Continue current medical management. Hyperlipidemia  Continue high intensity statin.      Chronic kidney disease  Following with nephrology. Diabetes. Managed by PCP     Morbid obesity  Encouraged increase aerobic exercise and heart healthy diet    Transient atrial fib  No known recurrence. Not anticoagulated. Continue Amiodarone      Follow up in 6 months. Thank you very much for allowing me to participate in the care of your patient. Please do not hesitate to contact me if you have any questions.     Sincerely,    Merrill Newell MD, MPH      [place] [address]  Ph: [phone]  Fax: [phone]    This note was scribed in the presence of Dr Cam Keita, by Willam Fu.

## 2021-12-03 ENCOUNTER — OFFICE VISIT (OUTPATIENT)
Dept: CARDIOLOGY CLINIC | Age: 76
End: 2021-12-03
Payer: MEDICARE

## 2021-12-03 VITALS
OXYGEN SATURATION: 96 % | SYSTOLIC BLOOD PRESSURE: 136 MMHG | BODY MASS INDEX: 44.24 KG/M2 | WEIGHT: 309 LBS | HEART RATE: 67 BPM | DIASTOLIC BLOOD PRESSURE: 74 MMHG | HEIGHT: 70 IN

## 2021-12-03 DIAGNOSIS — I25.10 CORONARY ARTERY DISEASE INVOLVING NATIVE CORONARY ARTERY OF NATIVE HEART WITHOUT ANGINA PECTORIS: ICD-10-CM

## 2021-12-03 DIAGNOSIS — I35.0 SEVERE AORTIC STENOSIS: ICD-10-CM

## 2021-12-03 DIAGNOSIS — Z95.2 S/P TAVR (TRANSCATHETER AORTIC VALVE REPLACEMENT): Primary | ICD-10-CM

## 2021-12-03 DIAGNOSIS — I10 ESSENTIAL HYPERTENSION: ICD-10-CM

## 2021-12-03 DIAGNOSIS — E78.2 MIXED HYPERLIPIDEMIA: ICD-10-CM

## 2021-12-03 DIAGNOSIS — I25.5 ISCHEMIC CARDIOMYOPATHY: ICD-10-CM

## 2021-12-03 PROCEDURE — 4040F PNEUMOC VAC/ADMIN/RCVD: CPT | Performed by: INTERNAL MEDICINE

## 2021-12-03 PROCEDURE — G8484 FLU IMMUNIZE NO ADMIN: HCPCS | Performed by: INTERNAL MEDICINE

## 2021-12-03 PROCEDURE — G8417 CALC BMI ABV UP PARAM F/U: HCPCS | Performed by: INTERNAL MEDICINE

## 2021-12-03 PROCEDURE — G8427 DOCREV CUR MEDS BY ELIG CLIN: HCPCS | Performed by: INTERNAL MEDICINE

## 2021-12-03 PROCEDURE — 1036F TOBACCO NON-USER: CPT | Performed by: INTERNAL MEDICINE

## 2021-12-03 PROCEDURE — 1123F ACP DISCUSS/DSCN MKR DOCD: CPT | Performed by: INTERNAL MEDICINE

## 2021-12-03 PROCEDURE — 99213 OFFICE O/P EST LOW 20 MIN: CPT | Performed by: INTERNAL MEDICINE

## 2021-12-03 PROCEDURE — 3017F COLORECTAL CA SCREEN DOC REV: CPT | Performed by: INTERNAL MEDICINE

## 2021-12-03 NOTE — PATIENT INSTRUCTIONS
Patient Education        Aortic Valve Stenosis: Care Instructions  Overview     Having aortic valve stenosis means that the valve between your heart and the large blood vessel that carries blood to the body (aorta) has narrowed. That forces the heart to pump harder to get enough blood through the valve. As stenosis gets worse, the valve gets narrower. This can cause symptoms. Symptoms include chest pain, dizziness, fainting, or shortness of breath. Your doctor will check your heart regularly. You may take medicine to lower blood pressure or cholesterol. If the stenosis gets worse, you may choose to have the valve replaced. Follow-up care is a key part of your treatment and safety. Be sure to make and go to all appointments, and call your doctor if you are having problems. It's also a good idea to know your test results and keep a list of the medicines you take. How can you care for yourself at home? · Be safe with medicines. Take your medicines exactly as prescribed. Call your doctor if you think you are having a problem with your medicine. · Call your doctor if you have new symptoms or your symptoms get worse. · Eat heart-healthy foods. These include vegetables, fruits, nuts, beans, lean meat, fish, and whole grains. Limit sodium, alcohol, and sugar. · Be active. Ask your doctor what type and level of exercise is safe for you. · Do not smoke. · Stay at a healthy weight. Lose weight if you need to. · Manage other health problems. If you think you may have a problem with alcohol or drug use, talk to your doctor. · Avoid colds and flu. Get a pneumococcal vaccine shot if you need to. Get a flu vaccine every year. · Take care of your teeth and gums. Get regular dental checkups. Good dental health is important because bacteria can spread from infected teeth and gums to the heart valves. When should you call for help? Call 911 anytime you think you may need emergency care.  For example, call if:    · You passed out (lost consciousness).     · You have symptoms of a heart attack. These may include:  ? Chest pain or pressure, or a strange feeling in the chest.  ? Sweating. ? Shortness of breath. ? Nausea or vomiting. ? Pain, pressure, or a strange feeling in the back, neck, jaw, or upper belly or in one or both shoulders or arms. ? Lightheadedness or sudden weakness. ? A fast or irregular heartbeat. After you call 911, the  may tell you to chew 1 adult-strength or 2 to 4 low-dose aspirin. Wait for an ambulance. Do not try to drive yourself. Call your doctor now or seek immediate medical care if:    · You have new symptoms or your symptoms get worse.     · You have new or increased shortness of breath.     · You are dizzy or lightheaded, or you feel like you may faint.     · You have sudden weight gain, such as more than 2 to 3 pounds in a day or 5 pounds in a week. (Your doctor may suggest a different range of weight gain.)     · You have new or increased swelling in your legs, ankles, or feet.     · You are suddenly so tired or weak that you cannot do your usual activities. Watch closely for changes in your health, and be sure to contact your doctor if you have any problems. Where can you learn more? Go to [email or web address].[email or web address]. org and sign in to your [employer] account. Enter M993 in the [facility] box to learn more about \"Aortic Valve Stenosis: Care Instructions. \"     If you do not have an account, please click on the \"Sign Up Now\" link. Current as of: April 29, 2021               Content Version: 13.0  © 8420-5752 [employer]. Care instructions adapted under license by [facility] ([facility]). If you have questions about a medical condition or this instruction, always ask your healthcare professional. [address] any warranty or liability for your use of this information.

## 2021-12-29 ENCOUNTER — TELEPHONE (OUTPATIENT)
Dept: CARDIOLOGY CLINIC | Age: 76
End: 2021-12-29

## 2022-03-01 NOTE — DISCHARGE INSTR - ACTIVITY
INCREASE AS TOLERATED; SEE DISCHARGE INSTRUCTIONS    CONTINUE TO WALK AS MUCH AS YOU CAN
Abdominal Pain, N/V/D

## 2022-03-28 RX ORDER — CLOPIDOGREL BISULFATE 75 MG/1
TABLET ORAL
Qty: 90 TABLET | Refills: 3 | Status: SHIPPED | OUTPATIENT
Start: 2022-03-28

## 2022-06-01 ENCOUNTER — HOSPITAL ENCOUNTER (OUTPATIENT)
Dept: NON INVASIVE DIAGNOSTICS | Age: 77
Discharge: HOME | End: 2022-06-01
Payer: MEDICARE

## 2022-06-01 DIAGNOSIS — Z95.2 S/P TAVR (TRANSCATHETER AORTIC VALVE REPLACEMENT): ICD-10-CM

## 2022-06-01 DIAGNOSIS — I35.0 SEVERE AORTIC STENOSIS: ICD-10-CM

## 2022-06-01 LAB
LV EF: 53 %
LVEF MODALITY: NORMAL

## 2022-06-01 PROCEDURE — 93306 TTE W/DOPPLER COMPLETE: CPT

## 2022-06-03 ENCOUNTER — OFFICE VISIT (OUTPATIENT)
Dept: VASCULAR SURGERY | Age: 77
End: 2022-06-03
Payer: MEDICARE

## 2022-06-03 VITALS — BODY MASS INDEX: 38.31 KG/M2 | SYSTOLIC BLOOD PRESSURE: 120 MMHG | DIASTOLIC BLOOD PRESSURE: 60 MMHG | WEIGHT: 267 LBS

## 2022-06-03 DIAGNOSIS — N18.4 CHRONIC KIDNEY DISEASE, STAGE IV (SEVERE) (HCC): Primary | ICD-10-CM

## 2022-06-03 DIAGNOSIS — Z95.2 S/P TAVR (TRANSCATHETER AORTIC VALVE REPLACEMENT): ICD-10-CM

## 2022-06-03 PROCEDURE — G8427 DOCREV CUR MEDS BY ELIG CLIN: HCPCS | Performed by: SURGERY

## 2022-06-03 PROCEDURE — G8417 CALC BMI ABV UP PARAM F/U: HCPCS | Performed by: SURGERY

## 2022-06-03 PROCEDURE — 99204 OFFICE O/P NEW MOD 45 MIN: CPT | Performed by: SURGERY

## 2022-06-03 PROCEDURE — 1123F ACP DISCUSS/DSCN MKR DOCD: CPT | Performed by: SURGERY

## 2022-06-03 PROCEDURE — 1036F TOBACCO NON-USER: CPT | Performed by: SURGERY

## 2022-06-03 ASSESSMENT — ENCOUNTER SYMPTOMS
GASTROINTESTINAL NEGATIVE: 1
ALLERGIC/IMMUNOLOGIC NEGATIVE: 1

## 2022-06-03 NOTE — PROGRESS NOTES
Daily Progress Note   Kvng Llanes MD      6/3/2022    Chief Complaint   Patient presents with    New Patient     Ref by access center for dialysis access, mapping done 5/20. Pt mentions this is his 4th tunnel catheter within the 5 weeks, this one is actually working and they are using it the others in the past were ok. HISTORY OF PRESENT ILLNESS:                The patient is a 69 y.o. male who presents with a referral from the [address] [place] [address] for discussion of permanent access. Mr. Ivelisse Cosme currently gets dialysis through a tunnel catheter located on the right. .  He has had five or six tunnel lines that keep clotting up and needing to be changed. He says he lost his kidneys to hypertension and NSAID use. He says his left leg is worse than the right.   As far swelling and neuropathy    Past Medical History:   Diagnosis Date    Arthritis     Bell's palsy     Cancer [facility]     CHF (congestive heart failure) ([facility]     Coronary artery disease involving native coronary artery of native heart without angina pectoris     Hyperlipidemia     Hypertension     Influenza B 4/2/15    Kidney failure     sees Dr Mavis Torres        Past Surgical History:   Procedure Laterality Date    AORTIC VALVE REPLACEMENT N/A 5/18/2021    TRANSCATHETER AORTIC VALVE REPLACEMENT FEMORAL APPROACH performed by Rigoberto Ott MD at [address] [place] [address],[address] N/A 5/18/2021    TRANSCATHETER AORTIC VALVE REPLACEMENT FEMORAL APPROACH performed by Keyur Mora MD at [facility] [address]  03/10/2021    x 1 Stent placement    CORONARY ANGIOPLASTY WITH STENT PLACEMENT  04/16/2017    EVERETT to LAD    IR TUNNELED CATHETER PLACEMENT GREATER THAN 5 YEARS  01/25/2021    IR TUNNELED CATHETER PLACEMENT GREATER THAN 5 YEARS 1/25/2021 [facility]    PTCA      TUNNELED VENOUS CATHETER PLACEMENT Right 01/25/2021    Permacath; RIJ access; 23cm; Dr. Herring Seen Social History     Socioeconomic History    Marital status:      Spouse name: Not on file    Number of children: Not on file    Years of education: Not on file    Highest education level: Not on file   Occupational History    Not on file   Tobacco Use    Smoking status: Former Smoker     Packs/day: 0.50     Years: 20.00     Pack years: 10.00     Types: Cigarettes     Quit date: 1984     Years since quittin.3    Smokeless tobacco: Never Used    Tobacco comment: will not start    Vaping Use    Vaping Use: Never used   Substance and Sexual Activity    Alcohol use: No    Drug use: No    Sexual activity: Not on file   Other Topics Concern    Not on file   Social History Narrative    Not on file     Social Determinants of Health     Financial Resource Strain:     Difficulty of Paying Living Expenses: Not on file   Food Insecurity:     Worried About Running Out of Food in the Last Year: Not on file    Melissa of Food in the Last Year: Not on file   Transportation Needs:     Lack of Transportation (Medical): Not on file    Lack of Transportation (Non-Medical):  Not on file   Physical Activity:     Days of Exercise per Week: Not on file    Minutes of Exercise per Session: Not on file   Stress:     Feeling of Stress : Not on file   Social Connections:     Frequency of Communication with Friends and Family: Not on file    Frequency of Social Gatherings with Friends and Family: Not on file    Attends Catholic Services: Not on file    Active Member of Clubs or Organizations: Not on file    Attends Club or Organization Meetings: Not on file    Marital Status: Not on file   Intimate Partner Violence:     Fear of Current or Ex-Partner: Not on file    Emotionally Abused: Not on file    Physically Abused: Not on file    Sexually Abused: Not on file   Housing Stability:     Unable to Pay for Housing in the Last Year: Not on file    Number of [place] in the Last Year: Not on file    Unstable Housing in the Last Year: Not on file       Family History   Problem Relation Age of Onset    Cancer Mother     Cancer Father     No Known Problems Sister     No Known Problems Brother     Cancer Sister          Current Outpatient Medications:     clopidogrel (PLAVIX) 75 MG tablet, TAKE 1 TABLET BY MOUTH EVERY DAY, Disp: 90 tablet, Rfl: 3    calcium acetate 667 MG TABS, TAKE ONE TABLET BY MOUTH THREE TIMES DAILY WITH MEALS, Disp: , Rfl:     amiodarone (CORDARONE) 200 MG tablet, Take 1 tablet by mouth daily, Disp: 90 tablet, Rfl: 3    GLUCOSAMINE HCL PO, Take 3,000 mg by mouth 2 times daily, Disp: , Rfl:     metoprolol succinate (TOPROL XL) 25 MG extended release tablet, Take 1 tablet by mouth daily, Disp: 30 tablet, Rfl: 3    b complex-C-folic acid (NEPHROCAPS) 1 MG capsule, Take 1 capsule by mouth daily, Disp: 30 capsule, Rfl: 3    aspirin 81 MG chewable tablet, Take 81 mg by mouth daily. , Disp: , Rfl:     atorvastatin (LIPITOR) 20 MG tablet, Take 20 mg by mouth daily. , Disp: , Rfl:     No known allergies    Vitals:    06/03/22 1420   BP: 120/60   Weight: 267 lb (121.1 kg)       Hospital Outpatient Visit on 12/01/2021   Component Date Value Ref Range Status    Left Ventricular Ejection Fraction 12/01/2021 53   Final-Edited    LVEF MODALITY 12/01/2021 ECHO   Final-Edited       Review of Systems   Eyes: Positive for visual disturbance (wears glasses). Cardiovascular: Positive for leg swelling. Gastrointestinal: Negative. Endocrine: Negative. Genitourinary: Negative. Musculoskeletal: Negative. Skin: Negative. Allergic/Immunologic: Negative. Neurological: Negative. Hematological: Negative. Psychiatric/Behavioral: Negative. Physical Exam  Vitals and nursing note reviewed. Constitutional:       Appearance: Normal appearance. He is well-developed.    Eyes:      Conjunctiva/sclera: Conjunctivae normal.      Pupils: Pupils are equal, round, and reactive to light. Cardiovascular:      Rate and Rhythm: Normal rate and regular rhythm. Pulses:           Dorsalis pedis pulses are 0 on the right side and 0 on the left side. Posterior tibial pulses are 1+ on the right side and 0 on the left side. Heart sounds: Normal heart sounds. Comments:   Doppler 6/3/22:  Rt DP: monophasic (strong)  Rt PT: biphasic  Rt AT:     Lt DP: monophasic  Lt PT:biphasic  Lt AT:        MEASUREMENTS 6/3/22:     RIGHT ANKLE: 24.1 cm   RIGHT CALF: 43.8 cm     LEFT ANKLE: 25.1 cm   LEFT CALF: 47.0 cm      Lt radial: +2  Lt ulnar:+2    Lt Candido test: co-dominant, radial stronger    Rt radial: +2  Rt ulnar: +2    Right Candido test, co-dominant  Pulmonary:      Effort: Pulmonary effort is normal.      Breath sounds: Normal breath sounds. Abdominal:      General: Bowel sounds are normal.   Musculoskeletal:         General: Normal range of motion. Cervical back: Normal range of motion. Neurological:      Mental Status: He is alert and oriented to person, place, and time. Psychiatric:         Speech: Speech normal.         Behavior: Behavior normal.         Thought Content: Thought content normal.         Judgment: Judgment normal.     He had an aortic valve replacement May 18, 2021. Percutaneously not on long-term anticoagulation except for Plavix    ASSESSMENT:    Problem List Items Addressed This Visit        Unprioritized    Chronic kidney disease, stage IV (severe) ([facility]) - Primary    S/P TAVR (transcatheter aortic valve replacement)        Patient seems hostile to the idea of surgery but now says he will think about it. Patient has been on dialysis for a year and a half recently has had 3-4 replacements of his right jugular tunneled line. I explained that he has been living on temporary catheters that have danger and he should have a permanent access I am not sure he is convinced.   Told him to think about it and call us when he is ready and we would schedule a left radiocephalic fistula we reviewed the films and it measures 2.5 mm and looks even better correction up it seems to branch into a slightly different vein or she can tell on a venogram if this is more superficial or not. He does have good veins to do a cephalic brachial fistula but I told him we do not want to burn bridges would rather start distally and work-up if we have to  PLAN:    You would get a left arm radial artery to cephalic vein fistula. Please call us to let us know if you want to have surgery and we will discuss a surgery date. Claudette Lebron MA am scribing for and in the presence of Adrienne Ernst MD on this date of 06/03/22    I Jayden Hernandez MD personally performed the services described in this documentation as scribed by the Medical Assistant Juancarlos Velasco in my presence and it is both accurate and complete.         Electronically signed by Adrienne Ernst MD on 6/3/2022 at 4:09 PM

## 2022-06-03 NOTE — PATIENT INSTRUCTIONS
You would get a left arm radial artery to cephalic vein fistula. Please call us to let us know if you want to have surgery and we will discuss a surgery date.

## 2022-08-19 ENCOUNTER — OFFICE VISIT (OUTPATIENT)
Dept: CARDIOLOGY CLINIC | Age: 77
End: 2022-08-19
Payer: MEDICARE

## 2022-08-19 VITALS
BODY MASS INDEX: 45.1 KG/M2 | WEIGHT: 315 LBS | OXYGEN SATURATION: 94 % | DIASTOLIC BLOOD PRESSURE: 78 MMHG | HEIGHT: 70 IN | SYSTOLIC BLOOD PRESSURE: 148 MMHG | HEART RATE: 93 BPM

## 2022-08-19 DIAGNOSIS — E78.2 MIXED HYPERLIPIDEMIA: ICD-10-CM

## 2022-08-19 DIAGNOSIS — Z95.2 S/P TAVR (TRANSCATHETER AORTIC VALVE REPLACEMENT): ICD-10-CM

## 2022-08-19 DIAGNOSIS — I35.0 NONRHEUMATIC AORTIC VALVE STENOSIS: ICD-10-CM

## 2022-08-19 DIAGNOSIS — I25.5 ISCHEMIC CARDIOMYOPATHY: ICD-10-CM

## 2022-08-19 DIAGNOSIS — I25.10 CORONARY ARTERY DISEASE INVOLVING NATIVE CORONARY ARTERY OF NATIVE HEART WITHOUT ANGINA PECTORIS: Primary | ICD-10-CM

## 2022-08-19 DIAGNOSIS — I10 ESSENTIAL HYPERTENSION: ICD-10-CM

## 2022-08-19 PROCEDURE — 1036F TOBACCO NON-USER: CPT | Performed by: INTERNAL MEDICINE

## 2022-08-19 PROCEDURE — G8427 DOCREV CUR MEDS BY ELIG CLIN: HCPCS | Performed by: INTERNAL MEDICINE

## 2022-08-19 PROCEDURE — G8417 CALC BMI ABV UP PARAM F/U: HCPCS | Performed by: INTERNAL MEDICINE

## 2022-08-19 PROCEDURE — 99214 OFFICE O/P EST MOD 30 MIN: CPT | Performed by: INTERNAL MEDICINE

## 2022-08-19 PROCEDURE — 93000 ELECTROCARDIOGRAM COMPLETE: CPT | Performed by: INTERNAL MEDICINE

## 2022-08-19 PROCEDURE — 1123F ACP DISCUSS/DSCN MKR DOCD: CPT | Performed by: INTERNAL MEDICINE

## 2022-08-19 NOTE — PROGRESS NOTES
[address]      Cardiology Progress Note    Jeremías Norm   1945    August 19, 2022      CC: \"I am having leg pain. \"     HPI:  The patient is 80 y.o. male with a past medical history significant for AS, s/p TAVR 5/18/21, CAD S/P PCI, PAF, CKD, HTN, CKD with Cr of 2.0 came into [facility] with chest pain started 4/15/17. Today, he is here for follow up. He states that he has been dealing with some leg pain. He has burning in his leg. He does use a cane for ambulation. Patient denies exertional chest pain/pressure, dyspnea at rest, GIRALDO, PND, orthopnea, palpitations, lightheadedness, weight changes, changes in LE edema, and syncope. Patient reports compliance to hi medications.      Past Medical History:   Diagnosis Date    Arthritis     Bell's palsy     Cancer (HCC)     CHF (congestive heart failure) (HCC)     Coronary artery disease involving native coronary artery of native heart without angina pectoris     Hyperlipidemia     Hypertension     Influenza B 4/2/15    Kidney failure     sees Dr Mancilla Doing      Past Surgical History:   Procedure Laterality Date    AORTIC VALVE REPLACEMENT N/A 5/18/2021    TRANSCATHETER AORTIC VALVE REPLACEMENT FEMORAL APPROACH performed by Jennifer Roberson MD at [facility] 5/18/2021    TRANSCATHETER AORTIC VALVE REPLACEMENT FEMORAL APPROACH performed by Arnaldo Marley MD at [facility]  03/10/2021    x 1 Stent placement    CORONARY ANGIOPLASTY WITH STENT PLACEMENT  04/16/2017    EVERETT to LAD    IR TUNNELED CATHETER PLACEMENT GREATER THAN 5 YEARS  01/25/2021    IR TUNNELED CATHETER PLACEMENT GREATER THAN 5 YEARS 1/25/2021 [facility] PROCED[facility]    PTCA      TUNNELED VENOUS CATHETER PLACEMENT Right 01/25/2021    Permacath; RIJ access; 23cm; Dr. Harpreet Guevara     Family History   Problem Relation Age of Onset    Cancer Mother     Cancer Father     No Known Problems Sister     No Known Problems Brother     Cancer Sister      Social History     Tobacco Use    Smoking status: Former     Packs/day: 0.50     Years: 20.00     Pack years: 10.00     Types: Cigarettes     Quit date: 1984     Years since quittin.5    Smokeless tobacco: Never    Tobacco comments:     will not start    Vaping Use    Vaping Use: Never used   Substance Use Topics    Alcohol use: No    Drug use: No       Allergies   Allergen Reactions    No Known Allergies      Current Outpatient Medications   Medication Sig Dispense Refill    clopidogrel (PLAVIX) 75 MG tablet TAKE 1 TABLET BY MOUTH EVERY DAY 90 tablet 3    calcium acetate 667 MG TABS TAKE ONE TABLET BY MOUTH THREE TIMES DAILY WITH MEALS      amiodarone (CORDARONE) 200 MG tablet Take 1 tablet by mouth daily 90 tablet 3    GLUCOSAMINE HCL PO Take 3,000 mg by mouth 2 times daily      metoprolol succinate (TOPROL XL) 25 MG extended release tablet Take 1 tablet by mouth daily 30 tablet 3    b complex-C-folic acid (NEPHROCAPS) 1 MG capsule Take 1 capsule by mouth daily 30 capsule 3    aspirin 81 MG chewable tablet Take 81 mg by mouth daily. atorvastatin (LIPITOR) 20 MG tablet Take 20 mg by mouth daily. No current facility-administered medications for this visit.        Review of Systems:    Constitutional: negative  Eyes: negative  Ears, nose, mouth, throat, and face: negative  Respiratory: negative  Cardiovascular: negative  Gastrointestinal: negative  Genitourinary:negative  Integument/breast: negative  Hematologic/lymphatic: negative  Musculoskeletal:negative  Neurological: negative  Behavioral/Psych: negative  Endocrine: negative  Allergic/Immunologic: negative     Physical Exam:   Vitals:    22 1316 22 1326   BP: (!) 154/78 (!) 148/78   Pulse: 93    SpO2: 94%    Weight: (!) 319 lb 3.2 oz (144.8 kg)    Height: 5' 10\" (1.778 m)        Wt Readings from Last 3 Encounters:   22 (!) 319 lb 3.2 oz (144.8 kg)   22 267 lb (121.1 kg)   21 (!) 309 lb (140.2 kg)       Constitutional: He is oriented to person, place, and time. He appears well-developed and well-nourished. In no acute distress. Head: Normocephalic and atraumatic. Pupils equal and round. Neck: Neck supple. No JVP or carotid bruit appreciated. No mass and no thyromegaly present. No lymphadenopathy present. Cardiovascular: Normal rate. Normal heart sounds. Exam reveals no gallop and no friction rub. No murmur heard. Pulmonary/Chest: Effort normal and breath sounds normal. No respiratory distress. He has no wheezes, rhonchi or rales. Abdominal: Soft, non-tender. Bowel sounds are normal. He exhibits no organomegaly, mass or bruit. Extremities: mil BLE edema, no cyanosis, or clubbing. Pulses are 2+ radial/dorsalis pedis/posterior tibial/carotid bilaterally. Neurological: Awake  alert and orientedNo gross cranial nerve deficit. Coordination normal.     Skin: Skin is warm and dry. There is no rash or diaphoresis. Psychiatric: He has a normal mood and affect.  His speech is normal and behavior is normal.     Lab Review: all labs reviewed   Lab Results   Component Value Date/Time    TRIG 71 09/26/2020 06:03 AM    HDL 34 09/26/2020 06:03 AM    LDLCALC 59 09/26/2020 06:03 AM    LABVLDL 14 09/26/2020 06:03 AM      Lab Results   Component Value Date/Time    BUN 56 05/19/2021 03:12 AM    CREATININE 7.3 05/19/2021 03:12 AM     Lab Results   Component Value Date/Time    WBC 9.6 05/19/2021 03:12 AM    RBC 3.81 05/19/2021 03:12 AM    HGB 12.4 05/19/2021 03:12 AM    HCT 36.9 05/19/2021 03:12 AM    MCV 97.0 05/19/2021 03:12 AM    MCH 32.4 05/19/2021 03:12 AM    MCHC 33.5 05/19/2021 03:12 AM    RDW 20.2 05/19/2021 03:12 AM     05/19/2021 03:12 AM    MPV 8.3 05/19/2021 03:12 AM     Lab Results   Component Value Date/Time     05/19/2021 03:12 AM    K 4.2 05/19/2021 03:12 AM    CL 96 05/19/2021 03:12 AM    CO2 22 05/19/2021 03:12 AM    BUN 56 05/19/2021 03:12 AM LABALBU 4.1 05/14/2021 07:30 AM    CREATININE 7.3 05/19/2021 03:12 AM    CALCIUM 8.6 05/19/2021 03:12 AM    GFRAA 9 05/19/2021 03:12 AM    LABGLOM 7 05/19/2021 03:12 AM    GLUCOSE 98 05/19/2021 03:12 AM       Lab Results   Component Value Date/Time    TROPONINI 0.04 05/18/2021 07:25 AM    TROPONINI 0.04 01/18/2021 05:40 AM    TROPONINI 0.03 09/25/2020 10:00 AM   5/2021 proBNP 9035    Image Review: all imaging reviewed     ECHO:4/17/17  Summary  Appears to have normal left ventricular size and wall thickness. Mildly  decreased left ventricular systolic function with an estimated ejection fraction of 40%. Mid distal anterior wall hypokinesis. The apex is akinetic. Normal right ventricular size and function. Cath:4/16/17  ANGIOGRAPHIC FINDINGS:  1. Left main coronary comes from the left coronary cusp, has FERMÍN-3 flow, no significant stenosis. 2. The left anterior descending artery comes from the left main coronary  artery, is occluded in the proximal portion and mid portion has around 70%  stenosis present and distal also 70% stenosis present. 3. The left circumflex comes from the left main coronary artery and gives  rise to obtuse marginal branches. The mid portion has approximately 60% stenosis. 4. The right coronary artery comes from the right coronary cusp, giving rise  to posterior descending artery and posterolateral branch. The proximal mid  portion has 60% stenosis present. INTERVENTION PERFORMED: We intervened on the proximal left anterior  descending artery, placed a stent 3.0 x 18 post-dilated to 3.9 mm. A DRUG COATED XIENCE ALPINE stent was placed in the LAD      ECHO: 8/7/18  Summary  Suboptimal parasternal image quality. Ejection fraction is visually estimated to be 50-55%. There is hypokinesis of the distal anteroseptal wall. Right ventricular size and function are normal.  Mildly dilated left atrium. There are no significant valvular abnormalities appreciated in this study.      ECHO 9/26/2020  Ejection fraction is visually estimated to be 40%. There is severe hypokinesis of the entire apical myocardium and and  hypokinesis of the distal septal wall. There is severe concentric left ventricular hypertrophy. Grade II diastolic dysfunction with elevated LV filling pressures. Mild mitral regurgitation is present. No evidence of mitral stenosis. Mild calcification of the mitral valve noted. Moderate to severe aortic stenosis with a peak velocity of 437m/s and a mean pressure gradient of 50mmHg. Mild aortic regurgitation. Moderately dilated right ventricle. Right ventricular systolic function is normal .     Cardiac Cath 1/18/2021  ANGIOGRAPHY FINDINGS:  1. Left main:  Normal.  2.  Left anterior descending artery midportion 80% stenosis. 3.  Left circumflex is patent. No stenosis. 4.  Right coronary artery comes from the right coronary cusp. It is  patent without significant stenosis. HEMODYNAMIC FINDINGS:  Aortic valve gradient is 36 mmHg. With  dobutamine 20 mcg/kg/minute, it reaches up to 48 mmHg. SUMMARY:  Success balloon aortic valvuloplasty. Cardiac cath 3/10/2021  ANGIOGRAPHY FINDINGS:  1. Left main:  Normal.  2.  Left anterior descending artery midportion has 80% stenosis,  proximally has a stent which is patent. 3.  Left circumflex is patent. SUMMARY:  Successful revascularization of LAD, placement of one stent,  3.5 x 20 expanding up to 3.9 mm. Limited Echo 5/2021   PreTAVR - borderline LV function with EF of 50-55%, Severe AS with PV  4.57m/s, MG 59mmHg, JAVIER 0.96cm2, moderate regurgitation  *PostTAVR - well seated TAVR valve,  -Post TAVR: Successful TAVR procedure. The new bioprosthetic aortic valve is  well seated with a peak velocity of 93cm/s, MG 2mmHg, no regurgitation. ECHO 5/2021   Left ventricle - eft ventricular cavity size is severely dilated with mild  concentric left ventricular hypertrophy.   Aortic valve - well seated TAVR valve with PV of 2.9m/s, MG 19mmHg, EOA  3.5cm2, trivial intravalvular regurgitation  Left atrium - dilated  Mitral valve - thickened  Tricuspid valve - mild regurgitation with PASP of 26mmHg  Summary  Suboptimal image quality. Ejection fraction is visually estimated to be 50-55%. No regional wall motion abnormalities are noted. Indeterminate diastolic function. A 26mm Terrell Rusty 3 Ultra bioprosthetic aortic valve appears well seated  with a maximum velocity of 2.13 m/s and a mean gradient of 11 mmHg. No evidence of aortic valve regurgitation or stenosis. Right ventricular systolic function is normal.     Echo 7/2021 Summary  Suboptimal image quality. Ejection fraction is visually estimated to be 50-55%. No regional wall motion abnormalities are noted. Indeterminate diastolic function. A 26mm Terrell Rusty 3 Ultra bioprosthetic aortic valve appears well seated  with a maximum velocity of 2.13 m/s and a mean gradient of 11 mmHg. No evidence of aortic valve regurgitation or stenosis. Right ventricular systolic function is normal.     ECHO 12/1/2021  Left ventricular cavity size is normal.  There is mildly increased left ventricular wall thickness. Overall left ventricular systolic function appears normal.  Ejection fraction is visually estimated to be 50-55%. No regional wall motion abnormalities are noted. Indeterminate diastolic function. Stroke volume index calculated at 58.06 mL/m2/beat. The right ventricle is normal in size and function. The left atrium is mildly dilated. The ascending aorta is mildly dilated at 3.7cm. A 26mm Terrell Rusty bioprosthetic artificial aortic valve appears well  seated with a maximum velocity of 2.21m/s and a peak/mean gradient of  20mmHg/13mmHg. No evidence of aortic valve regurgitation. Assessment/Plan: Aortic valve stenosis  S/p  Success balloon aortic valvuloplasty. S/p TAVR 5/18/21   Asymptomatic. Echocardiogram reviewed. Repeat echocardiogram in 8 months. Coronary artery disease Ischemic CP with NSTEMI  MI involving anterior wall   --4/16/17 We intervened on the proximal left anterior descending artery, placed a stent 3.0 x 18 post-dilated to 3.9 mm  3/10/2021: Successful revascularization of LAD, placement of one stent,  3.5 x 20 expanding up to 3.9 mm. Asymptomatic. Continue Asa, Plavix, B-blocker and statin therapy. Ischemic Cardiomyopathy/systolic heart failure:   Appears compensated on exam. Continue Asa and B-blocker. Hypertension  Elevated today. Continue current medical management. Usually controlled, I have encouraged him to keep blood pressure diary and call if remains elevated. Hyperlipidemia  Continue high intensity statin. Chronic kidney disease  Following with nephrology. Diabetes. Managed by PCP     Morbid obesity  Encouraged increase aerobic exercise and heart healthy diet    Transient atrial fib  No known recurrence. Not anticoagulated. Continue Amiodarone      Follow up in 6 months. Thank you very much for allowing me to participate in the care of your patient. Please do not hesitate to contact me if you have any questions. Sincerely,    Sd Sawyer MD, MPH      [facility], [address] [place], [place] [address]  Ph: [phone]  Fax: [phone]  This note was scribed in the presence of Dr Chloe Crowley, by Chikis Sheets RN    Physician Attestation:  The scribes documentation has been prepared under my direction and personally reviewed by me in its entirety. I confirm that the note above accurately reflects all work, treatment, procedures, and medical decision making performed by me.

## 2022-08-25 ENCOUNTER — HOSPITAL ENCOUNTER (EMERGENCY)
Age: 77
Discharge: HOME | End: 2022-08-25
Attending: EMERGENCY MEDICINE
Payer: MEDICARE

## 2022-08-25 VITALS
DIASTOLIC BLOOD PRESSURE: 90 MMHG | SYSTOLIC BLOOD PRESSURE: 155 MMHG | WEIGHT: 315 LBS | TEMPERATURE: 97.2 F | BODY MASS INDEX: 45.8 KG/M2 | OXYGEN SATURATION: 96 % | RESPIRATION RATE: 17 BRPM | HEART RATE: 61 BPM

## 2022-08-25 DIAGNOSIS — M54.16 LUMBAR RADICULAR PAIN: Primary | ICD-10-CM

## 2022-08-25 DIAGNOSIS — Z99.2 ESRD ON HEMODIALYSIS (HCC): ICD-10-CM

## 2022-08-25 DIAGNOSIS — N18.6 ESRD ON HEMODIALYSIS (HCC): ICD-10-CM

## 2022-08-25 PROCEDURE — 99284 EMERGENCY DEPT VISIT MOD MDM: CPT | Performed by: EMERGENCY MEDICINE

## 2022-08-25 PROCEDURE — 6370000000 HC RX 637 (ALT 250 FOR IP): Performed by: EMERGENCY MEDICINE

## 2022-08-25 PROCEDURE — 6360000002 HC RX W HCPCS: Performed by: EMERGENCY MEDICINE

## 2022-08-25 PROCEDURE — 96372 THER/PROPH/DIAG INJ SC/IM: CPT | Performed by: EMERGENCY MEDICINE

## 2022-08-25 RX ORDER — ACETAMINOPHEN 325 MG/1
325 TABLET ORAL ONCE
Status: COMPLETED | OUTPATIENT
Start: 2022-08-25 | End: 2022-08-25

## 2022-08-25 RX ORDER — LIDOCAINE 50 MG/G
1 PATCH TOPICAL DAILY
Qty: 10 PATCH | Refills: 0 | Status: SHIPPED | OUTPATIENT
Start: 2022-08-25 | End: 2022-09-04

## 2022-08-25 RX ORDER — HYDROCODONE BITARTRATE AND ACETAMINOPHEN 5; 325 MG/1; MG/1
1 TABLET ORAL EVERY 8 HOURS PRN
Qty: 10 TABLET | Refills: 0 | Status: SHIPPED | OUTPATIENT
Start: 2022-08-25 | End: 2022-08-28

## 2022-08-25 RX ORDER — METHYLPREDNISOLONE 4 MG/1
TABLET ORAL
Qty: 1 KIT | Refills: 0 | Status: SHIPPED | OUTPATIENT
Start: 2022-08-25 | End: 2022-08-31

## 2022-08-25 RX ORDER — DEXAMETHASONE SODIUM PHOSPHATE 10 MG/ML
8 INJECTION, SOLUTION INTRAMUSCULAR; INTRAVENOUS ONCE
Status: COMPLETED | OUTPATIENT
Start: 2022-08-25 | End: 2022-08-25

## 2022-08-25 RX ORDER — LIDOCAINE 4 G/G
1 PATCH TOPICAL ONCE
Status: DISCONTINUED | OUTPATIENT
Start: 2022-08-25 | End: 2022-08-25 | Stop reason: HOSPADM

## 2022-08-25 RX ORDER — HYDROCODONE BITARTRATE AND ACETAMINOPHEN 5; 325 MG/1; MG/1
1 TABLET ORAL ONCE
Status: COMPLETED | OUTPATIENT
Start: 2022-08-25 | End: 2022-08-25

## 2022-08-25 RX ADMIN — ACETAMINOPHEN 325 MG: 325 TABLET ORAL at 07:51

## 2022-08-25 RX ADMIN — HYDROCODONE BITARTRATE AND ACETAMINOPHEN 1 TABLET: 5; 325 TABLET ORAL at 07:51

## 2022-08-25 RX ADMIN — DEXAMETHASONE SODIUM PHOSPHATE 8 MG: 10 INJECTION INTRAMUSCULAR; INTRAVENOUS at 07:51

## 2022-08-25 ASSESSMENT — PAIN SCALES - GENERAL
PAINLEVEL_OUTOF10: 5
PAINLEVEL_OUTOF10: 5

## 2022-08-25 ASSESSMENT — PAIN DESCRIPTION - LOCATION
LOCATION: HIP;LEG
LOCATION: HIP;LEG

## 2022-08-25 ASSESSMENT — PAIN DESCRIPTION - ORIENTATION
ORIENTATION: LEFT
ORIENTATION: LEFT

## 2022-08-25 ASSESSMENT — PAIN - FUNCTIONAL ASSESSMENT: PAIN_FUNCTIONAL_ASSESSMENT: 0-10

## 2022-08-25 ASSESSMENT — PAIN DESCRIPTION - DESCRIPTORS: DESCRIPTORS: ACHING

## 2022-08-27 NOTE — ED PROVIDER NOTES
EMERGENCY DEPARTMENT PROVIDER NOTE    Patient Identification  Pt Name: Leandra Diaz  MRN: [id number]  [place] 1945  Date of evaluation: 8/25/2022  Provider: Mely Robertson DO  PCP: Christopher Cohn MD    Chief Complaint  Leg Pain (Pt states he felt a \"twinge\" getting out of the car a few weeks ago. Since then, pt having increased pain in left hip/leg. States it hurts too bad to stand on it fully lately. Dialysis pt, T, Th. States he couldn't get to the car to get dialysis today. Tuesday into dialysis pain was manageable and then after that day, pain became increasingly worse.)      HPI  (History provided by patient)  This is a 69 y.o. male with pertinent past medical history of ESRD on HD who was brought in by self for left low back and thigh pain ongoing for the past 2-3 weeks. Pain began after patient was getting into his truck and twisted on his left leg, felt a \"twinge\" in his low back. Pain since then has been gradually worsening, acutely worsened with movement and walking. Nothing really seems to make the pain any better. Pain radiates down his left buttock and into his left thigh. Associated with tingling sensations along the front of the left thigh as well. Patient reports the pain today was severe enough that it prevented him from walking. He denies any direct traumas or injury, no muscle weakness or  symptoms. Reports total adherence to his dialysis, however missed his session this morning to come to the emergency department.     ROS    Const:  No fevers, no chills, no generalized weakness  Skin:  No rash, no lesions  Card:  No chest pain, no palpitations, no edema  Resp:  No shortness of breath, no cough, no wheezing  Abd:  No abdominal pain, no nausea, no vomiting, no diarrhea  MSK:  +joint pain, +myalgia  Neuro:  No focal weakness, +paresthesia    All other systems reviewed and negative unless otherwise noted in HPI      I have reviewed the following nursing documentation:  Allergies: No known allergies    Past medical history:   Past Medical History:   Diagnosis Date    Arthritis     Bell's palsy     Cancer (HCC)     CHF (congestive heart failure) (HCC)     Coronary artery disease involving native coronary artery of native heart without angina pectoris     Hyperlipidemia     Hypertension     Influenza B 4/2/15    Kidney failure     sees Dr Katelynn Rand      Past surgical history:   Past Surgical History:   Procedure Laterality Date    AORTIC VALVE REPLACEMENT N/A 5/18/2021    TRANSCATHETER AORTIC VALVE REPLACEMENT FEMORAL APPROACH performed by Malcolm Rojas MD at [facility] N/A 5/18/2021    TRANSCATHETER AORTIC VALVE REPLACEMENT FEMORAL APPROACH performed by Arnaldo Marley MD at [facility]  03/10/2021    x 1 Stent placement    CORONARY ANGIOPLASTY WITH STENT PLACEMENT  04/16/2017    EVERETT to LAD    IR TUNNELED CATHETER PLACEMENT GREATER THAN 5 YEARS  01/25/2021    IR TUNNELED CATHETER PLACEMENT GREATER THAN 5 YEARS 1/25/2021 [facility] SPECIAL PROCEDURES    PTCA      TUNNELED VENOUS CATHETER PLACEMENT Right 01/25/2021    Permacath; RIJ access; 23cm; Dr. Barbara Blanton medications:   Discharge Medication List as of 8/25/2022 10:00 AM        CONTINUE these medications which have NOT CHANGED    Details   clopidogrel (PLAVIX) 75 MG tablet TAKE 1 TABLET BY MOUTH EVERY DAY, Disp-90 tablet, R-3Normal      calcium acetate 667 MG TABS TAKE ONE TABLET BY MOUTH THREE TIMES DAILY WITH MEALSHistorical Med      amiodarone (CORDARONE) 200 MG tablet Take 1 tablet by mouth daily, Disp-90 tablet, R-3NO PRINT      GLUCOSAMINE HCL PO Take 3,000 mg by mouth 2 times dailyHistorical Med      metoprolol succinate (TOPROL XL) 25 MG extended release tablet Take 1 tablet by mouth daily, Disp-30 tablet, R-3Normal      b complex-C-folic acid (NEPHROCAPS) 1 MG capsule Take 1 capsule by mouth daily, Disp-30 capsule, R-3Normal      aspirin 81 MG chewable tablet Take 81 mg by mouth daily. atorvastatin (LIPITOR) 20 MG tablet Take 20 mg by mouth daily. Social history:  reports that he quit smoking about 38 years ago. His smoking use included cigarettes. He has a 10.00 pack-year smoking history. He has never used smokeless tobacco. He reports that he does not drink alcohol and does not use drugs. Family history:    Family History   Problem Relation Age of Onset    Cancer Mother     Cancer Father     No Known Problems Sister     No Known Problems Brother     Cancer Sister          Exam  ED Triage Vitals [08/25/22 0717]   BP Temp Temp Source Heart Rate Resp SpO2 Height Weight   (!) 157/91 97.2 °F (36.2 °C) Temporal 64 18 96 % -- (!) 319 lb 3.6 oz (144.8 kg)     Nursing note and vitals reviewed. Constitutional: Well developed, well nourished. Non-toxic in appearance. HENT:      Head: Normocephalic and atraumatic. Ears: External ears normal.      Nose: Nose normal.     Mouth: Membrane mucosa moist and pink. Eyes: Anicteric sclera. No discharge. Neck: Supple. Trachea midline. Cardiovascular: RRR; no murmurs, rubs, or gallops. DP 2+ and symmetric. Pulmonary/Chest: Effort normal. No respiratory distress. CTAB. No stridor. No wheezes. No rales. Right TDC in place anterior chest wall without surrounding erythema. Abdominal: Soft. No distension. Nontender to deep palpation all quadrants. Musculoskeletal: Moves all extremities. No gross deformity. No midline lumbar tenderness. Tenderness palpation overlying left SI joint. No overlying skin changes. Bilateral calf and thigh compartments are soft, nontender to compression. No palpable cords. Neurological: Alert and orientedx4. Face symmetric. Speech is clear. 5 out of 5 motor and sensation grossly intact bilateral lower extremities. Skin: Warm and dry. No rash. Psychiatric: Normal mood and affect.  Behavior is normal.        Radiology  No orders to display       Labs  No results found for this visit on 08/25/22. Screenings   Kurtis Coma Scale  Eye Opening: Spontaneous  Best Verbal Response: Oriented  Best Motor Response: Obeys commands  [place] Coma Scale Score: 15       Is this patient to be included in the SEP-1 Core Measure due to severe sepsis or septic shock? No   Exclusion criteria - the patient is NOT to be included for SEP-1 Core Measure due to: Infection is not suspected      MDM and ED Course    Patient afebrile and nontoxic. No distress. Lower extremities are neurovascularly intact, nothing to suggest limb ischemia or compartment syndrome. No midline lumbar tenderness, no trauma, no anticipated benefit from emergent spinal imaging at this time. No findings to suggest skin or soft tissue infection, similar my suspicion for SEA is extremely low. No red flags for cauda equina. Venous thrombus is considered however there are no exam findings to suggest this and this is not likely diagnosis. Presentation is most consistent with lumbar radiculopathy, patient felt modestly improved with ED treatment. He was ambulatory in the emergency department with his cane (uses at baseline). Patient felt safe for discharge to self-care with very close PCP follow-up. We will also give referral for spine specialist for reevaluation. Patient is agreeable with plan and feels comfortable returning to home. He will contact his dialysis center upon returning home to arrange for his routine dialysis. Strict immediate return precautions to the emergency department were discussed at length. I Dr. Katlin Mendoza am the primary clinician of record. Final Impression  1. Lumbar radicular pain    2. ESRD on hemodialysis ([facility])        Blood pressure (!) 155/90, pulse 61, temperature 97.2 °F (36.2 °C), temperature source Temporal, resp. rate 17, weight (!) 319 lb 3.6 oz (144.8 kg), SpO2 96 %.      Disposition:  DISPOSITION Decision To Discharge 08/25/2022 09:57:29 AM      Patient Referrals:  Brant Fuentes MD  [address] [place]  [facility] [phone]    In 2 days      [address] [facility]  [address]  [phone]  In 3 days  For your low back pain and suspected sciatica      Discharge Medications:  Discharge Medication List as of 8/25/2022 10:00 AM        START taking these medications    Details   methylPREDNISolone (MEDROL, VICTORINO,) 4 MG tablet Take by mouth., Disp-1 kit, R-0Print      lidocaine (LIDODERM) 5 % Place 1 patch onto the skin daily for 10 days 12 hours on, 12 hours off., Disp-10 patch, R-0Print      HYDROcodone-acetaminophen (NORCO) 5-325 MG per tablet Take 1 tablet by mouth every 8 hours as needed for Pain for up to 3 days. Intended supply: 3 days. Take lowest dose possible to manage pain, Disp-10 tablet, R-0Print             Discontinued Medications:  Discharge Medication List as of 8/25/2022 10:00 AM          This chart was generated using the [address] [place] [address] dictation system. I created this record but it may contain dictation errors given the limitations of this technology.     Zayra Peace DO (electronically signed)  Attending Emergency Physician       Zayra Peace DO  08/27/22 5715

## 2023-02-27 ENCOUNTER — OFFICE VISIT (OUTPATIENT)
Dept: VASCULAR SURGERY | Age: 78
End: 2023-02-27
Payer: MEDICARE

## 2023-02-27 ENCOUNTER — TELEPHONE (OUTPATIENT)
Dept: CARDIOLOGY CLINIC | Age: 78
End: 2023-02-27

## 2023-02-27 VITALS — HEIGHT: 70 IN | BODY MASS INDEX: 45.8 KG/M2

## 2023-02-27 DIAGNOSIS — Z99.2 END STAGE RENAL DISEASE ON DIALYSIS (HCC): Primary | ICD-10-CM

## 2023-02-27 DIAGNOSIS — N18.6 END STAGE RENAL DISEASE ON DIALYSIS (HCC): Primary | ICD-10-CM

## 2023-02-27 PROCEDURE — 99214 OFFICE O/P EST MOD 30 MIN: CPT | Performed by: STUDENT IN AN ORGANIZED HEALTH CARE EDUCATION/TRAINING PROGRAM

## 2023-02-27 PROCEDURE — G8427 DOCREV CUR MEDS BY ELIG CLIN: HCPCS | Performed by: STUDENT IN AN ORGANIZED HEALTH CARE EDUCATION/TRAINING PROGRAM

## 2023-02-27 PROCEDURE — G8484 FLU IMMUNIZE NO ADMIN: HCPCS | Performed by: STUDENT IN AN ORGANIZED HEALTH CARE EDUCATION/TRAINING PROGRAM

## 2023-02-27 PROCEDURE — G8417 CALC BMI ABV UP PARAM F/U: HCPCS | Performed by: STUDENT IN AN ORGANIZED HEALTH CARE EDUCATION/TRAINING PROGRAM

## 2023-02-27 PROCEDURE — 1036F TOBACCO NON-USER: CPT | Performed by: STUDENT IN AN ORGANIZED HEALTH CARE EDUCATION/TRAINING PROGRAM

## 2023-02-27 PROCEDURE — 1123F ACP DISCUSS/DSCN MKR DOCD: CPT | Performed by: STUDENT IN AN ORGANIZED HEALTH CARE EDUCATION/TRAINING PROGRAM

## 2023-02-27 NOTE — PROGRESS NOTES
Justine Wilks (:  1945) is a 69 y.o. male,Established patient, here for evaluation of the following chief complaint(s):  Consultation and Vascular Access Problem (T;Th; Sat   Right Tunneled cath; right hand dominant)         ASSESSMENT/PLAN:  1. End stage renal disease on dialysis [facility])      This is a 69year old male patient on dialysis. He has vein mapping completed and appears to have a vessel suitable for autogenous creation. Discussed with patient that primary dialysis access fails 33% of the time. Fistulas require several months for maturation and there is no ability to prevent the enlargement over time. Risks/benefits/alternatives of surgery including: pain, infection, bleeding, MI, death, respiratory failure, steal phenomenon, ischemic monomelic neuropathy clearly reviewed with patient. Will request PCP clearance and cardiac clearance if necessary. All questions answered. Subjective   SUBJECTIVE/OBJECTIVE:  This is a 69year old who presents to the office today to discuss dialysis access. The patient has been on dialysis for 2 years but has been reluctant to pursue AV access placement. He did see Dr. Rob Braun but did not follow up for surgery. He is right hand dominant. He has history of coronary stents and valve placement and is maintained on aspirin and plavix chronically. No hand discomfort. Vein mapping done at the access center suggests usable cephalic vein throughout the arm on the left. Objective   Physical Exam  Constitutional:       Appearance: He is obese. Cardiovascular:      Rate and Rhythm: Normal rate and regular rhythm. Pulses: Normal pulses. Pulmonary:      Effort: Pulmonary effort is normal. No respiratory distress. Musculoskeletal:         General: Normal range of motion. Skin:     General: Skin is warm and dry. Neurological:      Mental Status: He is alert.           Merlyn Leigh, , FACS, FSVS, [address] [facility] Vascular and Endovascular Surgery

## 2023-02-27 NOTE — LETTER
March 2, 2023       Phylicia Almaraz YOB: 1945   [address] [facility] Date of Visit:  2/27/2023       To Whom It May Concern: It is my medical opinion that Phylicia Almaraz {participation release, (activity restriction):[id number]}. If you have any questions or concerns, please don't hesitate to call.     Sincerely,        Didier Valdivia MD

## 2023-02-27 NOTE — TELEPHONE ENCOUNTER
CARDIAC CLEARANCE REQUEST    What type of procedure are you having: AV Fistula    Are you taking any blood thinners: Plavix and Aspirin - need to hold 7 days prior     When is your procedure scheduled for: not scheduled yet     What physician is performing your procedure: Dr. Earline Miles     Phone Number: [phone]    Fax number to send the letter:  They will see the clearance in EPIC

## 2023-03-01 ENCOUNTER — TELEPHONE (OUTPATIENT)
Dept: VASCULAR SURGERY | Age: 78
End: 2023-03-01

## 2023-03-01 NOTE — TELEPHONE ENCOUNTER
Spoke to Dr Maldonado's office.  Pt has an appointment on 3-6-23, but he was unsure if it was for a pre-op.  H&P form and Dr Jain's office note faxed to [phone].

## 2023-03-02 NOTE — TELEPHONE ENCOUNTER
Lon called returning Heather's call. I relayed the message that he was cleared for surgery and will need to hold his blood thinners 7 days prior to procedure. Jeremías v/teddy.

## 2023-03-02 NOTE — TELEPHONE ENCOUNTER
Discussed with Dr Alma Velasco and okay to proceed with the procedure and may hold Asa and Plavix x 7 days. Please prepare letter and make patient aware.

## 2023-03-21 ENCOUNTER — ANESTHESIA EVENT (OUTPATIENT)
Dept: OPERATING ROOM | Age: 78
End: 2023-03-21
Payer: MEDICARE

## 2023-03-22 ENCOUNTER — ANESTHESIA (OUTPATIENT)
Dept: OPERATING ROOM | Age: 78
End: 2023-03-22
Payer: MEDICARE

## 2023-03-22 ENCOUNTER — HOSPITAL ENCOUNTER (OUTPATIENT)
Age: 78
Setting detail: OUTPATIENT SURGERY
Discharge: HOME | End: 2023-03-22
Attending: STUDENT IN AN ORGANIZED HEALTH CARE EDUCATION/TRAINING PROGRAM | Admitting: STUDENT IN AN ORGANIZED HEALTH CARE EDUCATION/TRAINING PROGRAM
Payer: MEDICARE

## 2023-03-22 VITALS
TEMPERATURE: 97.2 F | HEART RATE: 73 BPM | SYSTOLIC BLOOD PRESSURE: 164 MMHG | WEIGHT: 315 LBS | HEIGHT: 70 IN | RESPIRATION RATE: 14 BRPM | BODY MASS INDEX: 45.1 KG/M2 | DIASTOLIC BLOOD PRESSURE: 82 MMHG | OXYGEN SATURATION: 93 %

## 2023-03-22 DIAGNOSIS — G89.18 POST-OP PAIN: Primary | ICD-10-CM

## 2023-03-22 PROBLEM — N18.6 END STAGE RENAL DISEASE (HCC): Status: ACTIVE | Noted: 2023-03-22

## 2023-03-22 LAB
ANION GAP SERPL CALCULATED.3IONS-SCNC: 16 MMOL/L (ref 3–16)
BUN SERPL-MCNC: 38 MG/DL (ref 7–20)
CALCIUM SERPL-MCNC: 9.3 MG/DL (ref 8.3–10.6)
CHLORIDE SERPL-SCNC: 100 MMOL/L (ref 99–110)
CO2 SERPL-SCNC: 26 MMOL/L (ref 21–32)
CREAT SERPL-MCNC: 6.5 MG/DL (ref 0.8–1.3)
DEPRECATED RDW RBC AUTO: 15.1 % (ref 12.4–15.4)
GFR SERPLBLD CREATININE-BSD FMLA CKD-EPI: 8 ML/MIN/{1.73_M2}
GLUCOSE SERPL-MCNC: 115 MG/DL (ref 70–99)
HCT VFR BLD AUTO: 41 % (ref 40.5–52.5)
HGB BLD-MCNC: 13.2 G/DL (ref 13.5–17.5)
MCH RBC QN AUTO: 32 PG (ref 26–34)
MCHC RBC AUTO-ENTMCNC: 32.2 G/DL (ref 31–36)
MCV RBC AUTO: 99.2 FL (ref 80–100)
PLATELET # BLD AUTO: 233 K/UL (ref 135–450)
PMV BLD AUTO: 8.5 FL (ref 5–10.5)
POTASSIUM BLD-SCNC: 3.9 MMOL/L (ref 3.5–5.1)
POTASSIUM SERPL-SCNC: 4.1 MMOL/L (ref 3.5–5.1)
RBC # BLD AUTO: 4.14 M/UL (ref 4.2–5.9)
SODIUM SERPL-SCNC: 142 MMOL/L (ref 136–145)
WBC # BLD AUTO: 10.7 K/UL (ref 4–11)

## 2023-03-22 PROCEDURE — 3600000015 HC SURGERY LEVEL 5 ADDTL 15MIN: Performed by: STUDENT IN AN ORGANIZED HEALTH CARE EDUCATION/TRAINING PROGRAM

## 2023-03-22 PROCEDURE — A4217 STERILE WATER/SALINE, 500 ML: HCPCS | Performed by: STUDENT IN AN ORGANIZED HEALTH CARE EDUCATION/TRAINING PROGRAM

## 2023-03-22 PROCEDURE — 6360000002 HC RX W HCPCS

## 2023-03-22 PROCEDURE — 7100000010 HC PHASE II RECOVERY - FIRST 15 MIN: Performed by: STUDENT IN AN ORGANIZED HEALTH CARE EDUCATION/TRAINING PROGRAM

## 2023-03-22 PROCEDURE — 2580000003 HC RX 258: Performed by: STUDENT IN AN ORGANIZED HEALTH CARE EDUCATION/TRAINING PROGRAM

## 2023-03-22 PROCEDURE — 84132 ASSAY OF SERUM POTASSIUM: CPT

## 2023-03-22 PROCEDURE — 80048 BASIC METABOLIC PNL TOTAL CA: CPT

## 2023-03-22 PROCEDURE — 3600000005 HC SURGERY LEVEL 5 BASE: Performed by: STUDENT IN AN ORGANIZED HEALTH CARE EDUCATION/TRAINING PROGRAM

## 2023-03-22 PROCEDURE — 6360000002 HC RX W HCPCS: Performed by: STUDENT IN AN ORGANIZED HEALTH CARE EDUCATION/TRAINING PROGRAM

## 2023-03-22 PROCEDURE — 2709999900 HC NON-CHARGEABLE SUPPLY: Performed by: STUDENT IN AN ORGANIZED HEALTH CARE EDUCATION/TRAINING PROGRAM

## 2023-03-22 PROCEDURE — 2500000003 HC RX 250 WO HCPCS: Performed by: STUDENT IN AN ORGANIZED HEALTH CARE EDUCATION/TRAINING PROGRAM

## 2023-03-22 PROCEDURE — 7100000011 HC PHASE II RECOVERY - ADDTL 15 MIN: Performed by: STUDENT IN AN ORGANIZED HEALTH CARE EDUCATION/TRAINING PROGRAM

## 2023-03-22 PROCEDURE — 2720000010 HC SURG SUPPLY STERILE: Performed by: STUDENT IN AN ORGANIZED HEALTH CARE EDUCATION/TRAINING PROGRAM

## 2023-03-22 PROCEDURE — 3700000001 HC ADD 15 MINUTES (ANESTHESIA): Performed by: STUDENT IN AN ORGANIZED HEALTH CARE EDUCATION/TRAINING PROGRAM

## 2023-03-22 PROCEDURE — 3700000000 HC ANESTHESIA ATTENDED CARE: Performed by: STUDENT IN AN ORGANIZED HEALTH CARE EDUCATION/TRAINING PROGRAM

## 2023-03-22 PROCEDURE — 7100000000 HC PACU RECOVERY - FIRST 15 MIN: Performed by: STUDENT IN AN ORGANIZED HEALTH CARE EDUCATION/TRAINING PROGRAM

## 2023-03-22 PROCEDURE — 64415 NJX AA&/STRD BRCH PLXS IMG: CPT | Performed by: STUDENT IN AN ORGANIZED HEALTH CARE EDUCATION/TRAINING PROGRAM

## 2023-03-22 PROCEDURE — 2500000003 HC RX 250 WO HCPCS

## 2023-03-22 PROCEDURE — 85027 COMPLETE CBC AUTOMATED: CPT

## 2023-03-22 PROCEDURE — 2580000003 HC RX 258: Performed by: ANESTHESIOLOGY

## 2023-03-22 PROCEDURE — 7100000001 HC PACU RECOVERY - ADDTL 15 MIN: Performed by: STUDENT IN AN ORGANIZED HEALTH CARE EDUCATION/TRAINING PROGRAM

## 2023-03-22 PROCEDURE — A4216 STERILE WATER/SALINE, 10 ML: HCPCS | Performed by: STUDENT IN AN ORGANIZED HEALTH CARE EDUCATION/TRAINING PROGRAM

## 2023-03-22 RX ORDER — LIDOCAINE HYDROCHLORIDE 10 MG/ML
INJECTION, SOLUTION EPIDURAL; INFILTRATION; INTRACAUDAL; PERINEURAL
Status: COMPLETED | OUTPATIENT
Start: 2023-03-22 | End: 2023-03-22

## 2023-03-22 RX ORDER — LABETALOL HYDROCHLORIDE 5 MG/ML
10 INJECTION, SOLUTION INTRAVENOUS
Status: DISCONTINUED | OUTPATIENT
Start: 2023-03-22 | End: 2023-03-22 | Stop reason: HOSPADM

## 2023-03-22 RX ORDER — EPHEDRINE SULFATE/0.9% NACL/PF 50 MG/5 ML
SYRINGE (ML) INTRAVENOUS PRN
Status: DISCONTINUED | OUTPATIENT
Start: 2023-03-22 | End: 2023-03-22 | Stop reason: SDUPTHER

## 2023-03-22 RX ORDER — SODIUM CHLORIDE 0.9 % (FLUSH) 0.9 %
5-40 SYRINGE (ML) INJECTION EVERY 12 HOURS SCHEDULED
Status: DISCONTINUED | OUTPATIENT
Start: 2023-03-22 | End: 2023-03-22 | Stop reason: HOSPADM

## 2023-03-22 RX ORDER — OXYCODONE HYDROCHLORIDE 5 MG/1
5 TABLET ORAL EVERY 6 HOURS PRN
Qty: 12 TABLET | Refills: 0 | Status: SHIPPED | OUTPATIENT
Start: 2023-03-22 | End: 2023-03-25

## 2023-03-22 RX ORDER — HEPARIN SODIUM 5000 [USP'U]/ML
INJECTION, SOLUTION INTRAVENOUS; SUBCUTANEOUS PRN
Status: DISCONTINUED | OUTPATIENT
Start: 2023-03-22 | End: 2023-03-22 | Stop reason: SDUPTHER

## 2023-03-22 RX ORDER — SODIUM CHLORIDE 9 MG/ML
INJECTION, SOLUTION INTRAVENOUS PRN
Status: DISCONTINUED | OUTPATIENT
Start: 2023-03-22 | End: 2023-03-22 | Stop reason: SDUPTHER

## 2023-03-22 RX ORDER — SODIUM CHLORIDE 0.9 % (FLUSH) 0.9 %
5-40 SYRINGE (ML) INJECTION PRN
Status: DISCONTINUED | OUTPATIENT
Start: 2023-03-22 | End: 2023-03-22 | Stop reason: SDUPTHER

## 2023-03-22 RX ORDER — PROPOFOL 10 MG/ML
INJECTION, EMULSION INTRAVENOUS PRN
Status: DISCONTINUED | OUTPATIENT
Start: 2023-03-22 | End: 2023-03-22 | Stop reason: SDUPTHER

## 2023-03-22 RX ORDER — FENTANYL CITRATE 50 UG/ML
50 INJECTION, SOLUTION INTRAMUSCULAR; INTRAVENOUS EVERY 5 MIN PRN
Status: DISCONTINUED | OUTPATIENT
Start: 2023-03-22 | End: 2023-03-22 | Stop reason: HOSPADM

## 2023-03-22 RX ORDER — ONDANSETRON 2 MG/ML
4 INJECTION INTRAMUSCULAR; INTRAVENOUS ONCE
Status: COMPLETED | OUTPATIENT
Start: 2023-03-22 | End: 2023-03-22

## 2023-03-22 RX ORDER — FENTANYL CITRATE 50 UG/ML
INJECTION, SOLUTION INTRAMUSCULAR; INTRAVENOUS PRN
Status: DISCONTINUED | OUTPATIENT
Start: 2023-03-22 | End: 2023-03-22 | Stop reason: SDUPTHER

## 2023-03-22 RX ORDER — IPRATROPIUM BROMIDE AND ALBUTEROL SULFATE 2.5; .5 MG/3ML; MG/3ML
1 SOLUTION RESPIRATORY (INHALATION)
Status: DISCONTINUED | OUTPATIENT
Start: 2023-03-22 | End: 2023-03-22 | Stop reason: HOSPADM

## 2023-03-22 RX ORDER — MIDAZOLAM HYDROCHLORIDE 1 MG/ML
INJECTION INTRAMUSCULAR; INTRAVENOUS PRN
Status: DISCONTINUED | OUTPATIENT
Start: 2023-03-22 | End: 2023-03-22 | Stop reason: SDUPTHER

## 2023-03-22 RX ORDER — SODIUM CHLORIDE 9 MG/ML
INJECTION, SOLUTION INTRAVENOUS PRN
Status: DISCONTINUED | OUTPATIENT
Start: 2023-03-22 | End: 2023-03-22 | Stop reason: HOSPADM

## 2023-03-22 RX ORDER — GLYCOPYRROLATE 0.2 MG/ML
INJECTION INTRAMUSCULAR; INTRAVENOUS PRN
Status: DISCONTINUED | OUTPATIENT
Start: 2023-03-22 | End: 2023-03-22 | Stop reason: SDUPTHER

## 2023-03-22 RX ORDER — SODIUM CHLORIDE 0.9 % (FLUSH) 0.9 %
5-40 SYRINGE (ML) INJECTION PRN
Status: DISCONTINUED | OUTPATIENT
Start: 2023-03-22 | End: 2023-03-22 | Stop reason: HOSPADM

## 2023-03-22 RX ORDER — PHENYLEPHRINE HCL IN 0.9% NACL 1 MG/10 ML
SYRINGE (ML) INTRAVENOUS PRN
Status: DISCONTINUED | OUTPATIENT
Start: 2023-03-22 | End: 2023-03-22 | Stop reason: SDUPTHER

## 2023-03-22 RX ORDER — FENTANYL CITRATE 50 UG/ML
25 INJECTION, SOLUTION INTRAMUSCULAR; INTRAVENOUS EVERY 5 MIN PRN
Status: DISCONTINUED | OUTPATIENT
Start: 2023-03-22 | End: 2023-03-22 | Stop reason: HOSPADM

## 2023-03-22 RX ORDER — HYDRALAZINE HYDROCHLORIDE 20 MG/ML
10 INJECTION INTRAMUSCULAR; INTRAVENOUS
Status: DISCONTINUED | OUTPATIENT
Start: 2023-03-22 | End: 2023-03-22 | Stop reason: HOSPADM

## 2023-03-22 RX ORDER — MIDAZOLAM HYDROCHLORIDE 1 MG/ML
INJECTION INTRAMUSCULAR; INTRAVENOUS
Status: COMPLETED
Start: 2023-03-22 | End: 2023-03-22

## 2023-03-22 RX ORDER — SODIUM CHLORIDE 0.9 % (FLUSH) 0.9 %
5-40 SYRINGE (ML) INJECTION EVERY 12 HOURS SCHEDULED
Status: DISCONTINUED | OUTPATIENT
Start: 2023-03-22 | End: 2023-03-22 | Stop reason: SDUPTHER

## 2023-03-22 RX ORDER — ONDANSETRON 2 MG/ML
4 INJECTION INTRAMUSCULAR; INTRAVENOUS
Status: DISCONTINUED | OUTPATIENT
Start: 2023-03-22 | End: 2023-03-22 | Stop reason: HOSPADM

## 2023-03-22 RX ORDER — ROPIVACAINE HYDROCHLORIDE 5 MG/ML
INJECTION, SOLUTION EPIDURAL; INFILTRATION; PERINEURAL
Status: COMPLETED | OUTPATIENT
Start: 2023-03-22 | End: 2023-03-22

## 2023-03-22 RX ORDER — LIDOCAINE HYDROCHLORIDE 20 MG/ML
INJECTION, SOLUTION EPIDURAL; INFILTRATION; INTRACAUDAL; PERINEURAL PRN
Status: DISCONTINUED | OUTPATIENT
Start: 2023-03-22 | End: 2023-03-22 | Stop reason: SDUPTHER

## 2023-03-22 RX ORDER — DEXAMETHASONE SODIUM PHOSPHATE 4 MG/ML
INJECTION, SOLUTION INTRA-ARTICULAR; INTRALESIONAL; INTRAMUSCULAR; INTRAVENOUS; SOFT TISSUE
Status: COMPLETED
Start: 2023-03-22 | End: 2023-03-22

## 2023-03-22 RX ADMIN — PROPOFOL 40 MG: 10 INJECTION, EMULSION INTRAVENOUS at 12:31

## 2023-03-22 RX ADMIN — FAMOTIDINE 20 MG: 10 INJECTION, SOLUTION INTRAVENOUS at 10:47

## 2023-03-22 RX ADMIN — FENTANYL CITRATE 25 MCG: 50 INJECTION INTRAMUSCULAR; INTRAVENOUS at 12:38

## 2023-03-22 RX ADMIN — Medication 3000 MG: at 12:30

## 2023-03-22 RX ADMIN — Medication 100 MCG: at 13:02

## 2023-03-22 RX ADMIN — Medication 5 MG: at 13:42

## 2023-03-22 RX ADMIN — Medication 100 MCG: at 13:13

## 2023-03-22 RX ADMIN — FENTANYL CITRATE 25 MCG: 50 INJECTION INTRAMUSCULAR; INTRAVENOUS at 12:33

## 2023-03-22 RX ADMIN — HEPARIN SODIUM 3000 UNITS: 5000 INJECTION INTRAVENOUS; SUBCUTANEOUS at 12:59

## 2023-03-22 RX ADMIN — ROPIVACAINE HYDROCHLORIDE 25 ML: 5 INJECTION, SOLUTION EPIDURAL; INFILTRATION; PERINEURAL at 10:59

## 2023-03-22 RX ADMIN — PROPOFOL 50 MCG/KG/MIN: 10 INJECTION, EMULSION INTRAVENOUS at 12:32

## 2023-03-22 RX ADMIN — GLYCOPYRROLATE 0.2 MG: 0.2 INJECTION, SOLUTION INTRAMUSCULAR; INTRAVENOUS at 13:13

## 2023-03-22 RX ADMIN — LIDOCAINE HYDROCHLORIDE 100 MG: 20 INJECTION, SOLUTION EPIDURAL; INFILTRATION; INTRACAUDAL; PERINEURAL at 12:30

## 2023-03-22 RX ADMIN — ONDANSETRON 4 MG: 2 INJECTION INTRAMUSCULAR; INTRAVENOUS at 10:48

## 2023-03-22 RX ADMIN — Medication 10 MG: at 13:25

## 2023-03-22 RX ADMIN — MIDAZOLAM 1 MG: 1 INJECTION INTRAMUSCULAR; INTRAVENOUS at 10:56

## 2023-03-22 RX ADMIN — Medication 5 MG: at 13:22

## 2023-03-22 RX ADMIN — SODIUM CHLORIDE: 9 INJECTION, SOLUTION INTRAVENOUS at 10:43

## 2023-03-22 RX ADMIN — MIDAZOLAM 1 MG: 1 INJECTION INTRAMUSCULAR; INTRAVENOUS at 10:55

## 2023-03-22 ASSESSMENT — LIFESTYLE VARIABLES: SMOKING_STATUS: 0

## 2023-03-22 ASSESSMENT — PAIN SCALES - GENERAL: PAINLEVEL_OUTOF10: 0

## 2023-03-22 ASSESSMENT — PAIN - FUNCTIONAL ASSESSMENT: PAIN_FUNCTIONAL_ASSESSMENT: NONE - DENIES PAIN

## 2023-03-22 NOTE — BRIEF OP NOTE
Brief Postoperative Note      Patient: Gilda Miller  YOB: 1945  MRN: [id number]    Date of Procedure: 3/22/2023    Pre-Op Diagnosis: END STAGE RENAL DISEASE ON DIALYSIS    Post-Op Diagnosis: Same       Procedure(s):  LEFT BRACHIAL CEPHALIC FISTULA    Surgeon(s):  Jocy Herman DO    Assistant:  Surgical Assistant: Danita Turner    Anesthesia: Choice    Estimated Blood Loss (mL): less than 50     Complications: None    Specimens:   * No specimens in log *    Implants:  * No implants in log *      Drains: * No LDAs found *      Electronically signed by Jocy Herman DO on 3/22/2023 at 1:38 PM Reason For Visit  Alise Teixeira is an established patient here today for an annual physical.   :  services not used. A chaperone is not applicable. She is unaccompanied. Quality    Adult Wellness CI height documented, discussion of regular exercise, exercising regularly, printed information given for activities, discussion of nutritional quality of diet, patient education given about proper diet, not having considered quitting drinking, not getting angry when talked to about drinking, not having a drink or two in the morning to get going, not drinking alcohol regularly, and feeling guilty about it, pap smear performed: 07/26/2017, does not have feelings of hopelessness (PHQ-2), no Anhedonia (PHQ-2), not referred to local mental health center, not taking medication for depression and no monitoring patient. History of Present Illness  Here today for a biometric screening and physical for work. Her last pap was 7/26/2017 and normal.   Mammogram-- 10/2018  Colonoscopy -- about 3 years ago    Exercise -- walking, weights    She is on an ointment for rosacea and doxycycline as needed for flare ups. She sees derm. She is also seeing ophthalmology for a right eye vitreous detachment. She has an occasional cough. She just got a Rx for Xanax as needed. She is under a lot of stress with her mother-in-law who has dementia and is in the nursing home. Review of Systems    Const: no fever. CV: no chest pain. Resp: not expressed as feeling short of breath. GI: no abdominal pain. Musc: no joint pain. Neuro: no dizziness. Psych:. Per HPI. Skin:. Per HPI. Allergies  No Known Drug Allergies    Current Meds   1. ALPRAZolam 0.25 MG Oral Tablet; TAKE 1 TABLET EVERY 6 HOURS PRN ANXIETY; Therapy: [id number] to (Giovany Liang)  Requested for: [id number]; Last   Rx:08Nov2018 Ordered   2. Doxycycline Hyclate 50 MG Oral Capsule;    Therapy: (Recorded:15Nov2018) to Recorded   3. Soolantra 1 % External Cream;   Therapy: (Recorded:[id number]) to Recorded    Active Problems  Anxiety (F41.9)  Arnold-Chiari malformation, type II (Q07.00)  Costochondritis (M94.0)  Counseling regarding goals of care (Z71.89)  External hemorrhoid (K64.4)  Flu vaccine need (Z23)  Migraine with aura and without status migrainosus, not intractable (G43.109)  Restless leg syndrome (G25.81)    Past Medical History  History of Cystocele  History of acute sinusitis (Z87.09)  History of backache (Z87.39)  History of Hospital discharge follow-up (Z09)  History of Pelvic and perineal pain (R10.2)  History of Shortness of breath (R06.02)  History of Voice hoarseness (R49.0)    Surgical History  History of Colonoscopy (Fiberoptic)  History of Nerve Block Transforaminal Epidural Lumbar    Family History  Mother   Family history of Breast cancer  Family history of osteoporosis (Z82.62)  Family history of Thyroid disease  Father   Family history of diabetes mellitus (Z83.3)  Brother   Family history of Seizures    Social History  Always uses seat belt  Brushes teeth daily  Diet is normal for age  Diet is nutritious and balanced  Employed  Exercises occasionally (Z78.9)  Feels safe at home  Has carbon monoxide detectors in home  Has smoke detectors  Heterosexuality (Z91.89)    Never a smoker  No guns in the home  No illicit drug use  No secondhand smoke exposure (Z78.9)  Occasional alcohol use  Occasional caffeine consumption  Occupation    Review  Past medical history and problem list reviewed. Vitals  Signs   Recorded: [id number] 08:15AM   Height: 5 ft 6 in  Weight: 147 lb 7.84 oz  BMI Calculated: 23.81  BSA Calculated: 1.74  Systolic - Sittin, LUE  Diastolic - Sittin, LUE  Temperature: 97.3 F, Tympanic  Heart Rate: 76  Respiration: 16  O2 Saturation: 99  Pain Scale: 00  Waist Circumference: 35 in    Physical Exam  Constitutional: alert, in no acute distress and current vital signs reviewed. Eyes: normal conjunctiva and the sclerae were normal.   ENT: normal appearing outer ear, normal appearing nose. examination of the tympanic membrane showed normal landmarks, normal appearing external canal. normal lips. oral mucosa pink and moist, normal appearing pharynx. Neck: supple neck. thyroid not enlarged and no thyroid nodules. Lymphatic: Lymph Nodes: no anterior cervical node enlargement and no posterior cervical node enlargement. Pulmonary: no respiratory distress, normal respiratory rate and effort and no accessory muscle use. breath sounds clear to auscultation bilaterally. Cardiovascular: normal rate, no murmurs were heard, regular rhythm, normal S1 and normal S2. edema was not present in the lower extremities. Abdomen: soft, nontender, nondistended, normal bowel sounds and no abdominal mass. Neurologic: 2+ right patella 2+ left patella   Psychiatric: alert and awake, interactive and mood/affect were appropriate. Results/Data    31Oct2018 07:30AM   LIPID PNL   FASTING STATUS: PT IS FASTING FOR 12HRS hrs  CHOLESTEROL: 212 mg/dl Abnormal High Reference Range <200  LDL CHOLESTEROL (CALCULATED): 112 mg/dl Reference Range <130  HDL CHOLESTEROL: 88 mg/dl Reference Range >49  TRIGLYCERIDES: 60 mg/dl Reference Range <150  NON-HDL CHOLESTEROL: 124 mg/dl  CHOLESTEROL/HDL RATIO: 2.4  Reference Range <6.1  BASIC METABOLIC PNL   FASTING STATUS: 12 hrs  SODIUM: 138 mmol/L Reference Range 135-145  POTASSIUM: 4.5 mmol/L Reference Range 3.4-5.1  CHLORIDE: 103 mmol/L Reference Range   CARBON DIOXIDE: 27 mmol/L Reference Range 21-32  ANION GAP: 12 mmol/L Reference Range 10-20  GLUCOSE: 88 mg/dl Reference Range 65-99  BUN: 20 mg/dl Reference Range 6-20  CREATININE: 0.76 mg/dl Reference Range 0.51-0.95  GFR EST.  AMER: >90   GFR EST. NONAFRI AMER: 88   BUN/CREATININE RATIO: 26  Abnormal High Reference Range 7-25  CALCIUM: 9.1 mg/dl Reference Range 8.4-10.2  Immunizations  Hepatitis B --- Series1: [id number] Mirnad: [id number] Newer: 17-Jul-2006   Influenza --- Series1: 06-Nov-2013; Héctor Mirnad: 09-Oct-2014; Shasta Newer: 16-Nov-2016; Series4:  28-Sep-2017   Tdap --- Series1: 14-Apr-2011   Zoster --- Series1: 06-Nov-2013     Assessment  Encounter for preventive health examination (Z00.00)  Flu vaccine need (Z23)    Plan  Fluzone Quadrivalent 0.5 ML Intramuscular Suspension  Plans:     Plan:   Continue good health habits. Follow up as needed. Signatures   Electronically signed by : Glenn Mays, [address] [place] [address];  Nov 15 2018  8:31AM CST    Electronically signed by : STEPHANIE Quezada; Nov 15 2018  9:07AM CST (Author)    Electronically signed by : STEPHANIE Quezada; Nov 15 2018  9:09AM CST (Author)    Electronically signed by : Clement Stevens M.D.; Nov 16 2018  9:05AM CST

## 2023-03-22 NOTE — H&P
H&P Update    I have reviewed the history and physical and examined the patient and find no relevant changes. I have reviewed with the patient and/or family the risks, benefits, and alternatives to the procedure. I HAVE PRESENTED REASONABLE ALTERNATIVES TO THE PATIENT'S PROPOSED CARE, TREATMENT, AND SERVICES. THE DISCUSSION I HAVE DONE ENCOMPASSED RISKS, BENEFITS, AND SIDE EFFECTS RELATED TO THE ALTERNATIVES AND THE RISKS RELATED TO NOT RECEIVING THE PROPOSED CARE, TREATMENT, SERVICES. Patient:  Oksana Mohs   :   1945    Intended Procedure: left upper extremity fistula vs graft     Vitals:    23 1110   BP: (!) 144/72   Pulse: 63   Resp: 14   Temp:    SpO2: 98%       Nursing notes reviewed and agreed. Medications reviewed  Allergies:    Allergies   Allergen Reactions    No Known Allergies          Post Procedure plan: home    Christian Crawley DO, FACS, FSVS, [address] [facility] Vascular and Endovascular Surgery

## 2023-03-22 NOTE — ANESTHESIA POSTPROCEDURE EVALUATION
Department of Anesthesiology  Postprocedure Note    Patient: Sophia Marvin  MRN: [id number]  YOB: 1945  Date of evaluation: 3/22/2023      Procedure Summary     Date: 03/22/23 Room / Location: [facility] OR [address] [place] [address]    Anesthesia Start: 1895 Anesthesia Stop: 1140    Procedure: LEFT RADIAL ARTERY TO CEPHALIC VEIN ARTERIOVENOUS FISTULA CREATION, POSSIBLE BRACHIAL ARTERY TO CEPHALIC VEIN ARTERIOVENOUS FISTULA CREATION (Left: Arm Lower) Diagnosis:       End stage renal disease on dialysis ([address]      (END STAGE RENAL DISEASE ON DIALYSIS)    Surgeons: Andrew Chang DO Responsible Provider: Rose Marie Payne MD    Anesthesia Type: MAC, regional ASA Status: 4          Anesthesia Type: MAC, regional    Shelbie Phase I: Shelbie Score: 8    Shelbie Phase II: Shelbie Score: 10      Anesthesia Post Evaluation    Patient location during evaluation: PACU  Patient participation: complete - patient participated  Level of consciousness: awake  Airway patency: patent  Nausea & Vomiting: no nausea and no vomiting  Complications: no  Cardiovascular status: hemodynamically stable and blood pressure returned to baseline  Respiratory status: spontaneous ventilation and nonlabored ventilation  Hydration status: stable  Comments: Procedure completed uneventfully as MAC after left axillary performed preoperatively. Mr. Ahmet Azar was seen resting comfortably following procedure. Expectations for resolution of block discussed. Appropriate for return to [facility] for planned discharge home with . No acute concerns.

## 2023-03-22 NOTE — PROGRESS NOTES
Patient admitted to PACU # 8 from OR at [address] POSSIBLE BRACHIAL ARTERY TO CEPHALIC VEIN ARTERIOVENOUS FISTULA CREATION per Dr Radha Ngo. Attached to PACU monitoring system and report received from anesthesia provider. Patient was reported to be hemodynamically stable during procedure. Patient drowsy on admission and denied pain.
Patient to Phase 2 from PACU. VSS on room air. Denies pain. Tolerating PO snack well.
room.    For your convenience, we will provide you with a container. If you wear contact lenses or glasses, they will be removed, please bring a case for them. If you have a living will and a durable power of  for healthcare, please bring in a copy. As part of our patient safety program to minimize surgical site infections, we ask you to do the following:    Please notify your surgeon if you develop any illness between         now and the day of your surgery. This includes a cough, cold, fever, sore throat, nausea,         or vomiting, and diarrhea, etc.   Please notify your surgeon if you experience dizziness, shortness         of breath or blurred vision between now and the time of your surgery. Do not shave your operative site 96 hours prior to surgery. For face and neck surgery, men may use an electric razor 48 hours   prior to surgery. You may shower the night before surgery or the morning of   your surgery with an antibacterial soap. You will need to bring a photo ID and insurance card     If you use a C-pap or Bi-pap machine, please bring your machine with you to the hospital     Our goal is to provide you with excellent care, therefore, visitors will be limited to so that we may focus on providing this care for you. Please contact your surgeon office, if you have any further questions. [facility] phone number:  [phone] PAT fax number:  [phone]    Please note these are generalized instructions for all surgical cases, you may be provided with more specific instructions according to your surgery.

## 2023-03-23 NOTE — OP NOTE
patient, and laterality. A transverse  incision was made just distal to the antecubital space. Dissection was  carried down to identify the cephalic vein in its native space. There  was a bridging vein connecting to the brachial vein that was ligated. The additional length of the cephalic vein was removed from the wound  bed and transected distally. It was distended with heparinized saline  and bulldog clamp was placed on it. After this, the bicipital  aponeurosis was subsequently incised and identified the brachial artery  was soft pulsatile. The proximal and distal control was obtained. The  patient was given 3000 units of heparin. The artery was clamped. An  arteriotomy was made, extended with Bernal scissors, and then the vein  was spatulated accordingly. The arteriovenous anastomosis was then  created using a running 7-0 Prolene suture and it was appropriately  deaired before tying it down. After this, additional sutures were used  as necessary for hemostasis. The patient does exhibit a thrill in his  arm above the incision line. The wound was irrigated copiously with  Irrisept and closed with interrupted 3-0 Vicryl suture, followed by 4-0  Monocryl with Dermabond. There were no immediate complications. I was  present and performed all critical aspects of this procedure. He was  taken to the recovery room in stable condition.         Houston Elizabeth DO    D: 03/22/2023 13:49:01       T: 03/22/2023 23:40:27     [email or web address]  Job#: [id number]     Doc#: [id number]    CC:

## 2023-03-27 ENCOUNTER — TELEPHONE (OUTPATIENT)
Dept: CARDIOLOGY CLINIC | Age: 78
End: 2023-03-27

## 2023-03-27 RX ORDER — CLOPIDOGREL BISULFATE 75 MG/1
75 TABLET ORAL DAILY
Qty: 90 TABLET | Refills: 1 | Status: SHIPPED | OUTPATIENT
Start: 2023-03-27

## 2023-03-27 NOTE — TELEPHONE ENCOUNTER
I Medication Refills:    clopidogrel (PLAVIX) 75 MG tablet [[id number]]     Order Details  Dose, Route, Frequency: As Directed   Dispense Quantity: 90 tablet Refills: 3          Sig: TAKE 1 TABLET BY MOUTH EVERY DAY       Last Office Visit: 8/19/22    Next Office Visit: none    [facility]- [place], [place] - [address]  [place] [place] [phone] - F [phone]

## 2023-04-19 ENCOUNTER — HOSPITAL ENCOUNTER (OUTPATIENT)
Dept: NON INVASIVE DIAGNOSTICS | Age: 78
Discharge: HOME | End: 2023-04-19
Payer: MEDICARE

## 2023-04-19 DIAGNOSIS — I25.5 ISCHEMIC CARDIOMYOPATHY: ICD-10-CM

## 2023-04-19 DIAGNOSIS — Z95.2 S/P TAVR (TRANSCATHETER AORTIC VALVE REPLACEMENT): ICD-10-CM

## 2023-04-19 DIAGNOSIS — I35.0 NONRHEUMATIC AORTIC VALVE STENOSIS: ICD-10-CM

## 2023-04-19 LAB
LV EF: 55 %
LVEF MODALITY: NORMAL

## 2023-04-19 PROCEDURE — C8929 TTE W OR WO FOL WCON,DOPPLER: HCPCS

## 2023-04-19 PROCEDURE — 6360000004 HC RX CONTRAST MEDICATION: Performed by: INTERNAL MEDICINE

## 2023-04-19 RX ADMIN — PERFLUTREN 1.5 ML: 6.52 INJECTION, SUSPENSION INTRAVENOUS at 08:09

## 2023-05-22 ENCOUNTER — OFFICE VISIT (OUTPATIENT)
Dept: VASCULAR SURGERY | Age: 78
End: 2023-05-22

## 2023-05-22 VITALS — DIASTOLIC BLOOD PRESSURE: 80 MMHG | SYSTOLIC BLOOD PRESSURE: 138 MMHG | WEIGHT: 315 LBS | BODY MASS INDEX: 47.49 KG/M2

## 2023-05-22 DIAGNOSIS — N18.6 END STAGE RENAL DISEASE (HCC): Primary | ICD-10-CM

## 2023-05-22 PROCEDURE — 99024 POSTOP FOLLOW-UP VISIT: CPT | Performed by: NURSE PRACTITIONER

## 2023-05-22 NOTE — PROGRESS NOTES
Jeremías Del Rosario (:  1945) is a 69 y.o. male,Established patient, here for evaluation of the following chief complaint(s):  Follow-up (6 week post op check. Pt states he is still healing well, no issues at this time. Dialysis is not using it yet)         ASSESSMENT/PLAN:  Post-op check    This is a 69year old male patient who presents to the office today for follow up after brachial cephalic fistula creation. Overall he is healing well. There is a decent thrill. Encouraged patient to do ball exercises to mature fistula. Will see again in 4 weeks to determine if it is mature or if it needs to be superficialized  Avoid heavy lifting with left arm. All questions answered. Subjective   SUBJECTIVE/OBJECTIVE:  This is a 69year old male patient s/p left brachial-cephalic fistula creation. Overall he is doing well. He has no major complaints. Denies excessive pain. Objective   Physical Exam  Constitutional:       Appearance: He is obese. Cardiovascular:      Rate and Rhythm: Normal rate and regular rhythm. Comments: Strong bruit & Palp thrill  Musculoskeletal:         General: Normal range of motion. Skin:     General: Skin is warm and dry. Comments:   Left hand is warm and pink; equal grasp    Incision appears to be healed   Neurological:      Mental Status: He is alert.         Electronically signed by DOC Valdovinos CNP on 2023 at 3:41 PM

## 2023-06-19 ENCOUNTER — OFFICE VISIT (OUTPATIENT)
Dept: VASCULAR SURGERY | Age: 78
End: 2023-06-19
Payer: MEDICARE

## 2023-06-19 VITALS — BODY MASS INDEX: 47.49 KG/M2 | HEIGHT: 70 IN

## 2023-06-19 DIAGNOSIS — Z98.890 S/P ARTERIOVENOUS (AV) FISTULA CREATION: Primary | ICD-10-CM

## 2023-06-19 PROCEDURE — 99213 OFFICE O/P EST LOW 20 MIN: CPT | Performed by: STUDENT IN AN ORGANIZED HEALTH CARE EDUCATION/TRAINING PROGRAM

## 2023-06-19 PROCEDURE — G8427 DOCREV CUR MEDS BY ELIG CLIN: HCPCS | Performed by: STUDENT IN AN ORGANIZED HEALTH CARE EDUCATION/TRAINING PROGRAM

## 2023-06-19 PROCEDURE — 1123F ACP DISCUSS/DSCN MKR DOCD: CPT | Performed by: STUDENT IN AN ORGANIZED HEALTH CARE EDUCATION/TRAINING PROGRAM

## 2023-06-19 PROCEDURE — G8417 CALC BMI ABV UP PARAM F/U: HCPCS | Performed by: STUDENT IN AN ORGANIZED HEALTH CARE EDUCATION/TRAINING PROGRAM

## 2023-06-19 PROCEDURE — 1036F TOBACCO NON-USER: CPT | Performed by: STUDENT IN AN ORGANIZED HEALTH CARE EDUCATION/TRAINING PROGRAM

## 2023-06-19 NOTE — PROGRESS NOTES
Jeremías Nuñez (:  1945) is a 69 y.o. male,Established patient, here for evaluation of the following chief complaint(s):  Post-Op Check         ASSESSMENT/PLAN:  1. S/P arteriovenous (AV) fistula creation    This is a 69year old male patient who is s/p left BCF creation. It feels deep therefore will obtain duplex to evaluate size and depth. May need superficialization. Patient agrees with plan for ultrasound. All questions answered. Subjective   SUBJECTIVE/OBJECTIVE:  This is a 69year old male patient who presents for third post operative visit after left BCF creation. No pain in the hand. No discomfort when on dialysis. Objective   Physical Exam  Constitutional:       Appearance: He is obese. Cardiovascular:      Rate and Rhythm: Normal rate and regular rhythm. Comments: Strong thrill in fistula  Skin:     General: Skin is warm and dry. Capillary Refill: Capillary refill takes less than 2 seconds. Comments: Incision well healed   Neurological:      Mental Status: He is alert.           Holly Jarrell DO, FACS, FSVS, [address] [facility]

## 2023-06-23 ENCOUNTER — PROCEDURE VISIT (OUTPATIENT)
Dept: SURGERY | Age: 78
End: 2023-06-23

## 2023-06-23 DIAGNOSIS — T82.9XXA COMPLICATION OF ARTERIOVENOUS DIALYSIS FISTULA, INITIAL ENCOUNTER: ICD-10-CM

## 2023-06-23 DIAGNOSIS — N18.6 END STAGE RENAL DISEASE (HCC): Primary | ICD-10-CM

## 2023-06-29 ENCOUNTER — TELEPHONE (OUTPATIENT)
Dept: SURGERY | Age: 78
End: 2023-06-29

## 2023-07-03 ENCOUNTER — ANESTHESIA EVENT (OUTPATIENT)
Dept: OPERATING ROOM | Age: 78
End: 2023-07-03
Payer: MEDICARE

## 2023-07-05 ENCOUNTER — ANESTHESIA (OUTPATIENT)
Dept: OPERATING ROOM | Age: 78
End: 2023-07-05
Payer: MEDICARE

## 2023-07-05 ENCOUNTER — HOSPITAL ENCOUNTER (OUTPATIENT)
Age: 78
Setting detail: OUTPATIENT SURGERY
Discharge: HOME | End: 2023-07-05
Attending: STUDENT IN AN ORGANIZED HEALTH CARE EDUCATION/TRAINING PROGRAM | Admitting: STUDENT IN AN ORGANIZED HEALTH CARE EDUCATION/TRAINING PROGRAM
Payer: MEDICARE

## 2023-07-05 VITALS
RESPIRATION RATE: 18 BRPM | DIASTOLIC BLOOD PRESSURE: 50 MMHG | HEIGHT: 69 IN | SYSTOLIC BLOOD PRESSURE: 124 MMHG | TEMPERATURE: 97 F | OXYGEN SATURATION: 95 % | BODY MASS INDEX: 46.65 KG/M2 | WEIGHT: 315 LBS | HEART RATE: 59 BPM

## 2023-07-05 DIAGNOSIS — G89.18 POST-OP PAIN: Primary | ICD-10-CM

## 2023-07-05 PROBLEM — T82.590A DIALYSIS AV FISTULA MALFUNCTION (HCC): Status: ACTIVE | Noted: 2023-07-05

## 2023-07-05 LAB
ANION GAP SERPL CALCULATED.3IONS-SCNC: 18 MMOL/L (ref 3–16)
BUN SERPL-MCNC: 65 MG/DL (ref 7–20)
CALCIUM SERPL-MCNC: 9.1 MG/DL (ref 8.3–10.6)
CHLORIDE SERPL-SCNC: 101 MMOL/L (ref 99–110)
CO2 SERPL-SCNC: 23 MMOL/L (ref 21–32)
CREAT SERPL-MCNC: 8.4 MG/DL (ref 0.8–1.3)
DEPRECATED RDW RBC AUTO: 15.8 % (ref 12.4–15.4)
GFR SERPLBLD CREATININE-BSD FMLA CKD-EPI: 6 ML/MIN/{1.73_M2}
GLUCOSE SERPL-MCNC: 129 MG/DL (ref 70–99)
HCT VFR BLD AUTO: 37.2 % (ref 40.5–52.5)
HGB BLD-MCNC: 12.5 G/DL (ref 13.5–17.5)
MCH RBC QN AUTO: 33.7 PG (ref 26–34)
MCHC RBC AUTO-ENTMCNC: 33.6 G/DL (ref 31–36)
MCV RBC AUTO: 100.1 FL (ref 80–100)
PLATELET # BLD AUTO: 201 K/UL (ref 135–450)
PMV BLD AUTO: 8.1 FL (ref 5–10.5)
POTASSIUM SERPL-SCNC: 4 MMOL/L (ref 3.5–5.1)
RBC # BLD AUTO: 3.71 M/UL (ref 4.2–5.9)
SODIUM SERPL-SCNC: 142 MMOL/L (ref 136–145)
WBC # BLD AUTO: 9.3 K/UL (ref 4–11)

## 2023-07-05 PROCEDURE — 3600000015 HC SURGERY LEVEL 5 ADDTL 15MIN: Performed by: STUDENT IN AN ORGANIZED HEALTH CARE EDUCATION/TRAINING PROGRAM

## 2023-07-05 PROCEDURE — 2580000003 HC RX 258: Performed by: STUDENT IN AN ORGANIZED HEALTH CARE EDUCATION/TRAINING PROGRAM

## 2023-07-05 PROCEDURE — 3600000005 HC SURGERY LEVEL 5 BASE: Performed by: STUDENT IN AN ORGANIZED HEALTH CARE EDUCATION/TRAINING PROGRAM

## 2023-07-05 PROCEDURE — 6360000002 HC RX W HCPCS: Performed by: STUDENT IN AN ORGANIZED HEALTH CARE EDUCATION/TRAINING PROGRAM

## 2023-07-05 PROCEDURE — 85027 COMPLETE CBC AUTOMATED: CPT

## 2023-07-05 PROCEDURE — 80048 BASIC METABOLIC PNL TOTAL CA: CPT

## 2023-07-05 PROCEDURE — 7100000001 HC PACU RECOVERY - ADDTL 15 MIN: Performed by: STUDENT IN AN ORGANIZED HEALTH CARE EDUCATION/TRAINING PROGRAM

## 2023-07-05 PROCEDURE — 6360000002 HC RX W HCPCS

## 2023-07-05 PROCEDURE — 3700000000 HC ANESTHESIA ATTENDED CARE: Performed by: STUDENT IN AN ORGANIZED HEALTH CARE EDUCATION/TRAINING PROGRAM

## 2023-07-05 PROCEDURE — 2500000003 HC RX 250 WO HCPCS

## 2023-07-05 PROCEDURE — 2709999900 HC NON-CHARGEABLE SUPPLY: Performed by: STUDENT IN AN ORGANIZED HEALTH CARE EDUCATION/TRAINING PROGRAM

## 2023-07-05 PROCEDURE — A4217 STERILE WATER/SALINE, 500 ML: HCPCS | Performed by: STUDENT IN AN ORGANIZED HEALTH CARE EDUCATION/TRAINING PROGRAM

## 2023-07-05 PROCEDURE — 3700000001 HC ADD 15 MINUTES (ANESTHESIA): Performed by: STUDENT IN AN ORGANIZED HEALTH CARE EDUCATION/TRAINING PROGRAM

## 2023-07-05 PROCEDURE — 2580000003 HC RX 258: Performed by: ANESTHESIOLOGY

## 2023-07-05 PROCEDURE — 7100000011 HC PHASE II RECOVERY - ADDTL 15 MIN: Performed by: STUDENT IN AN ORGANIZED HEALTH CARE EDUCATION/TRAINING PROGRAM

## 2023-07-05 PROCEDURE — 7100000000 HC PACU RECOVERY - FIRST 15 MIN: Performed by: STUDENT IN AN ORGANIZED HEALTH CARE EDUCATION/TRAINING PROGRAM

## 2023-07-05 PROCEDURE — 7100000010 HC PHASE II RECOVERY - FIRST 15 MIN: Performed by: STUDENT IN AN ORGANIZED HEALTH CARE EDUCATION/TRAINING PROGRAM

## 2023-07-05 PROCEDURE — 36832 AV FISTULA REVISION OPEN: CPT | Performed by: STUDENT IN AN ORGANIZED HEALTH CARE EDUCATION/TRAINING PROGRAM

## 2023-07-05 RX ORDER — ONDANSETRON 2 MG/ML
INJECTION INTRAMUSCULAR; INTRAVENOUS PRN
Status: DISCONTINUED | OUTPATIENT
Start: 2023-07-05 | End: 2023-07-05 | Stop reason: SDUPTHER

## 2023-07-05 RX ORDER — SODIUM CHLORIDE 0.9 % (FLUSH) 0.9 %
5-40 SYRINGE (ML) INJECTION PRN
Status: DISCONTINUED | OUTPATIENT
Start: 2023-07-05 | End: 2023-07-05 | Stop reason: SDUPTHER

## 2023-07-05 RX ORDER — SODIUM CHLORIDE 0.9 % (FLUSH) 0.9 %
5-40 SYRINGE (ML) INJECTION EVERY 12 HOURS SCHEDULED
Status: DISCONTINUED | OUTPATIENT
Start: 2023-07-05 | End: 2023-07-05 | Stop reason: SDUPTHER

## 2023-07-05 RX ORDER — ROCURONIUM BROMIDE 10 MG/ML
INJECTION, SOLUTION INTRAVENOUS PRN
Status: DISCONTINUED | OUTPATIENT
Start: 2023-07-05 | End: 2023-07-05 | Stop reason: SDUPTHER

## 2023-07-05 RX ORDER — PROPOFOL 10 MG/ML
INJECTION, EMULSION INTRAVENOUS PRN
Status: DISCONTINUED | OUTPATIENT
Start: 2023-07-05 | End: 2023-07-05 | Stop reason: SDUPTHER

## 2023-07-05 RX ORDER — LIDOCAINE HYDROCHLORIDE 20 MG/ML
INJECTION, SOLUTION EPIDURAL; INFILTRATION; INTRACAUDAL; PERINEURAL PRN
Status: DISCONTINUED | OUTPATIENT
Start: 2023-07-05 | End: 2023-07-05 | Stop reason: SDUPTHER

## 2023-07-05 RX ORDER — PHENYLEPHRINE HCL IN 0.9% NACL 1 MG/10 ML
SYRINGE (ML) INTRAVENOUS PRN
Status: DISCONTINUED | OUTPATIENT
Start: 2023-07-05 | End: 2023-07-05 | Stop reason: SDUPTHER

## 2023-07-05 RX ORDER — FENTANYL CITRATE 50 UG/ML
50 INJECTION, SOLUTION INTRAMUSCULAR; INTRAVENOUS EVERY 5 MIN PRN
Status: DISCONTINUED | OUTPATIENT
Start: 2023-07-05 | End: 2023-07-05 | Stop reason: HOSPADM

## 2023-07-05 RX ORDER — SODIUM CHLORIDE 0.9 % (FLUSH) 0.9 %
5-40 SYRINGE (ML) INJECTION EVERY 12 HOURS SCHEDULED
Status: DISCONTINUED | OUTPATIENT
Start: 2023-07-05 | End: 2023-07-05 | Stop reason: HOSPADM

## 2023-07-05 RX ORDER — SODIUM CHLORIDE 0.9 % (FLUSH) 0.9 %
5-40 SYRINGE (ML) INJECTION PRN
Status: DISCONTINUED | OUTPATIENT
Start: 2023-07-05 | End: 2023-07-05 | Stop reason: HOSPADM

## 2023-07-05 RX ORDER — SODIUM CHLORIDE 9 MG/ML
INJECTION, SOLUTION INTRAVENOUS PRN
Status: DISCONTINUED | OUTPATIENT
Start: 2023-07-05 | End: 2023-07-05 | Stop reason: SDUPTHER

## 2023-07-05 RX ORDER — FENTANYL CITRATE 50 UG/ML
INJECTION, SOLUTION INTRAMUSCULAR; INTRAVENOUS PRN
Status: DISCONTINUED | OUTPATIENT
Start: 2023-07-05 | End: 2023-07-05 | Stop reason: SDUPTHER

## 2023-07-05 RX ORDER — SODIUM CHLORIDE 9 MG/ML
INJECTION, SOLUTION INTRAVENOUS PRN
Status: DISCONTINUED | OUTPATIENT
Start: 2023-07-05 | End: 2023-07-05 | Stop reason: HOSPADM

## 2023-07-05 RX ORDER — ONDANSETRON 2 MG/ML
4 INJECTION INTRAMUSCULAR; INTRAVENOUS
Status: DISCONTINUED | OUTPATIENT
Start: 2023-07-05 | End: 2023-07-05 | Stop reason: HOSPADM

## 2023-07-05 RX ORDER — FENTANYL CITRATE 50 UG/ML
25 INJECTION, SOLUTION INTRAMUSCULAR; INTRAVENOUS EVERY 5 MIN PRN
Status: DISCONTINUED | OUTPATIENT
Start: 2023-07-05 | End: 2023-07-05 | Stop reason: HOSPADM

## 2023-07-05 RX ORDER — SUCCINYLCHOLINE/SOD CL,ISO/PF 200MG/10ML
SYRINGE (ML) INTRAVENOUS PRN
Status: DISCONTINUED | OUTPATIENT
Start: 2023-07-05 | End: 2023-07-05 | Stop reason: SDUPTHER

## 2023-07-05 RX ORDER — OXYCODONE HYDROCHLORIDE 5 MG/1
5 TABLET ORAL EVERY 6 HOURS PRN
Qty: 12 TABLET | Refills: 0 | Status: SHIPPED | OUTPATIENT
Start: 2023-07-05 | End: 2023-07-08

## 2023-07-05 RX ORDER — EPHEDRINE SULFATE/0.9% NACL/PF 50 MG/5 ML
SYRINGE (ML) INTRAVENOUS PRN
Status: DISCONTINUED | OUTPATIENT
Start: 2023-07-05 | End: 2023-07-05 | Stop reason: SDUPTHER

## 2023-07-05 RX ORDER — DEXAMETHASONE SODIUM PHOSPHATE 4 MG/ML
INJECTION, SOLUTION INTRA-ARTICULAR; INTRALESIONAL; INTRAMUSCULAR; INTRAVENOUS; SOFT TISSUE PRN
Status: DISCONTINUED | OUTPATIENT
Start: 2023-07-05 | End: 2023-07-05 | Stop reason: SDUPTHER

## 2023-07-05 RX ADMIN — Medication 100 MCG: at 13:45

## 2023-07-05 RX ADMIN — ONDANSETRON 4 MG: 2 INJECTION INTRAMUSCULAR; INTRAVENOUS at 13:33

## 2023-07-05 RX ADMIN — SUGAMMADEX 500 MG: 100 INJECTION, SOLUTION INTRAVENOUS at 14:08

## 2023-07-05 RX ADMIN — Medication 3000 MG: at 13:16

## 2023-07-05 RX ADMIN — SODIUM CHLORIDE: 9 INJECTION, SOLUTION INTRAVENOUS at 13:16

## 2023-07-05 RX ADMIN — Medication 10 MG: at 13:42

## 2023-07-05 RX ADMIN — LIDOCAINE HYDROCHLORIDE 100 MG: 20 INJECTION, SOLUTION EPIDURAL; INFILTRATION; INTRACAUDAL; PERINEURAL at 13:23

## 2023-07-05 RX ADMIN — Medication 140 MG: at 13:24

## 2023-07-05 RX ADMIN — Medication 5 MG: at 13:37

## 2023-07-05 RX ADMIN — Medication 5 MG: at 13:45

## 2023-07-05 RX ADMIN — PROPOFOL 160 MG: 10 INJECTION, EMULSION INTRAVENOUS at 13:23

## 2023-07-05 RX ADMIN — ROCURONIUM BROMIDE 40 MG: 10 INJECTION INTRAVENOUS at 13:34

## 2023-07-05 RX ADMIN — FENTANYL CITRATE 25 MCG: 50 INJECTION INTRAMUSCULAR; INTRAVENOUS at 14:02

## 2023-07-05 RX ADMIN — DEXAMETHASONE SODIUM PHOSPHATE 8 MG: 4 INJECTION, SOLUTION INTRAMUSCULAR; INTRAVENOUS at 13:33

## 2023-07-05 ASSESSMENT — PAIN DESCRIPTION - ORIENTATION
ORIENTATION: LEFT

## 2023-07-05 ASSESSMENT — PAIN DESCRIPTION - DESCRIPTORS
DESCRIPTORS: TINGLING
DESCRIPTORS: TINGLING;DISCOMFORT
DESCRIPTORS: DISCOMFORT;TINGLING

## 2023-07-05 ASSESSMENT — PAIN DESCRIPTION - FREQUENCY
FREQUENCY: CONTINUOUS
FREQUENCY: INTERMITTENT
FREQUENCY: CONTINUOUS

## 2023-07-05 ASSESSMENT — LIFESTYLE VARIABLES: SMOKING_STATUS: 0

## 2023-07-05 ASSESSMENT — PAIN - FUNCTIONAL ASSESSMENT
PAIN_FUNCTIONAL_ASSESSMENT: ACTIVITIES ARE NOT PREVENTED
PAIN_FUNCTIONAL_ASSESSMENT: PREVENTS OR INTERFERES SOME ACTIVE ACTIVITIES AND ADLS
PAIN_FUNCTIONAL_ASSESSMENT: ACTIVITIES ARE NOT PREVENTED

## 2023-07-05 ASSESSMENT — PAIN DESCRIPTION - PAIN TYPE
TYPE: SURGICAL PAIN

## 2023-07-05 ASSESSMENT — PAIN DESCRIPTION - LOCATION
LOCATION: ARM

## 2023-07-05 ASSESSMENT — PAIN DESCRIPTION - ONSET
ONSET: ON-GOING

## 2023-07-05 ASSESSMENT — PAIN SCALES - GENERAL
PAINLEVEL_OUTOF10: 0
PAINLEVEL_OUTOF10: 2

## 2023-07-05 NOTE — DISCHARGE INSTRUCTIONS
Keep incision clean and dry. Okay to shower in 5 days. Plan for follow up in 2 weeks in office. Please call office with questions or concerns. Pain medication sent to pharmacy if needed.

## 2023-07-05 NOTE — ANESTHESIA POSTPROCEDURE EVALUATION
Department of Anesthesiology  Postprocedure Note    Patient: Ciera Gallardo  MRN: [id number]  YOB: 1945  Date of evaluation: 7/5/2023      Procedure Summary     Date: 07/05/23 Room / Location: [address]    Anesthesia Start: 6939 Anesthesia Stop: 6551    Procedure: SUPERFICIALIZATION OF LEFT BRACHIOCEPHALIC ARTERIOVENOUS FISTULA (Left: Arm Upper) Diagnosis:       End stage renal disease ([address])      (End stage renal disease ([address]) [N18.6])    Surgeons: Tanisha Cui DO Responsible Provider: Lyn Palomares MD    Anesthesia Type: general ASA Status: 4          Anesthesia Type: No value filed.     Shelbie Phase I: Shelbie Score: 10    Shelbie Phase II: Shelbie Score: 9      Anesthesia Post Evaluation    Patient location during evaluation: bedside  Patient participation: complete - patient participated  Level of consciousness: awake and alert  Pain score: 2  Nausea & Vomiting: no nausea  Complications: no  Cardiovascular status: hemodynamically stable  Respiratory status: acceptable  Hydration status: stable

## 2023-07-05 NOTE — PROGRESS NOTES
Tolerating oral intake and being up in chair. Discharge instructions, verbal and written, given to patient. Verbalize understanding. States pain is tolerable.

## 2023-07-05 NOTE — ANESTHESIA PRE PROCEDURE
history of anesthetic complications:   Airway: Mallampati: III  TM distance: >3 FB     Comment: Increased neck circumference  Mouth opening: > = 3 FB   Dental:      Comment: Fair dentition. 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 Extensive anterior-lateral infarct  (age undetermined). ABNORMAL     Echocardiogram:  Summary  Unable to make proper measurements due to poor acoustical window. Ejection fraction is visually estimated to be 50-55%. Grade II diastolic dysfunction with elevated LV filling pressures. Mitral annular calcification is present. Moderate calcification of the posterior leaflet of the mitral valve. Trivial mitral regurgitation. No evidence of mitral stenosis. A bioprosthetic artificial aortic valve appears well seated with a maximum velocity of 2.6m/s and a mean gradient of 15mmHg, max gradient of 27mmHg. No evidence of aortic valve regurgitation. The aortic root is not well visualized. Neuro/Psych:      (-) seizuresNeuromuscular disease: h/o Bell's palsy. ROS comment: Neuropathy GI/Hepatic/Renal:   (+) renal disease: ESRD and dialysis, morbid obesity (BMI: 46)     (-) liver disease       Endo/Other:    (+) blood dyscrasia (DAPT: plavix): arthritis:., malignancy/cancer (Plasmacytoma with bony metastases ).     Diabetes: prediabetic, last HgbA1c:
No

## 2023-07-05 NOTE — BRIEF OP NOTE
Brief Postoperative Note      Patient: Edward Ahn  YOB: 1945  MRN: [id number]    Date of Procedure: 7/5/2023    Pre-Op Diagnosis Codes:     * End stage renal disease ([address]) [N18.6]    Post-Op Diagnosis: Post-Op Diagnosis Codes:     * End stage renal disease ([address]) [N18.6]       Procedure(s):  SUPERFICIALIZATION OF LEFT BRACHIOCEPHALIC ARTERIOVENOUS FISTULA    Surgeon(s):  Jose Trujillo DO    Assistant:  Surgical Assistant: Prabha Dhillon; Dania Ortega    Anesthesia: General    Estimated Blood Loss (mL): less than 50     Complications: None    Specimens:   * No specimens in log *    Implants:  * No implants in log *      Drains: * No LDAs found *          Electronically signed by Jose Trujillo DO on 7/5/2023 at 2:06 PM

## 2023-07-06 NOTE — OP NOTE
solution. A  time-out was called for indication, patient, and laterality. A  longitudinal incision was made over the anterior upper arm. Dissection  was carried down to skin and subcutaneous tissue. We identified the  cephalic vein in its native space. We exposed it throughout the entire  length of the incision. All branches were appropriately ligated with  silk suture and hemoclips. After we had enough length, we then  proceeded to reapproximate the deep tissue after irrigating the wound. It was reapproximated with interrupted 2-0 Vicryl suture. The dermis  was closed with interrupted 3-0 Vicryl suture and skin was closed with  4-0 Monocryl and glue. The patient was extubated in the operating room  and taken to the recovery area in stable condition. There were no  immediate complications. I was present and performed all critical aspects of this procedure.         Jessenia Garcia DO    D: 07/05/2023 14:05:57       T: 07/05/2023 21:50:55     [email or web address]  Job#: [id number]     Doc#: [id number]    CC:

## 2023-07-24 ENCOUNTER — OFFICE VISIT (OUTPATIENT)
Dept: VASCULAR SURGERY | Age: 78
End: 2023-07-24

## 2023-07-24 VITALS — HEIGHT: 69 IN | BODY MASS INDEX: 46.52 KG/M2

## 2023-07-24 DIAGNOSIS — Z09 POSTOP CHECK: Primary | ICD-10-CM

## 2023-07-24 PROCEDURE — 99024 POSTOP FOLLOW-UP VISIT: CPT | Performed by: STUDENT IN AN ORGANIZED HEALTH CARE EDUCATION/TRAINING PROGRAM

## 2023-09-27 RX ORDER — CLOPIDOGREL BISULFATE 75 MG/1
75 TABLET ORAL DAILY
Qty: 60 TABLET | Refills: 0 | Status: SHIPPED | OUTPATIENT
Start: 2023-09-27

## 2023-09-27 NOTE — TELEPHONE ENCOUNTER
Last OV: 8/19/22  Next OV: 11/10/23  Last refill: 3/27/23  #90  1 R/F  Most recent Labs: 7/5/23  Last EKG (if needed): 8/19/22

## 2023-10-27 RX ORDER — CLOPIDOGREL BISULFATE 75 MG/1
75 TABLET ORAL DAILY
Qty: 90 TABLET | Refills: 0 | Status: SHIPPED | OUTPATIENT
Start: 2023-10-27

## 2023-10-27 NOTE — TELEPHONE ENCOUNTER
Requested Prescriptions     Pending Prescriptions Disp Refills    clopidogrel (PLAVIX) 75 MG tablet 60 tablet 0     Sig: Take 1 tablet by mouth daily          Number: 90    Refills: 0    Last Office Visit: 8/19/2022     Next Office Visit: 11/10/2023     Last Refill: 09/27/2023    Last Labs:  07/05/2023    PATIENT REQUESTING #90

## 2023-11-10 ENCOUNTER — OFFICE VISIT (OUTPATIENT)
Dept: CARDIOLOGY CLINIC | Age: 78
End: 2023-11-10
Payer: MEDICARE

## 2023-11-10 VITALS
WEIGHT: 315 LBS | SYSTOLIC BLOOD PRESSURE: 138 MMHG | HEIGHT: 69 IN | DIASTOLIC BLOOD PRESSURE: 76 MMHG | OXYGEN SATURATION: 90 % | HEART RATE: 91 BPM | BODY MASS INDEX: 46.65 KG/M2

## 2023-11-10 DIAGNOSIS — Z95.2 S/P TAVR (TRANSCATHETER AORTIC VALVE REPLACEMENT): ICD-10-CM

## 2023-11-10 DIAGNOSIS — I10 ESSENTIAL HYPERTENSION: ICD-10-CM

## 2023-11-10 DIAGNOSIS — I25.5 ISCHEMIC CARDIOMYOPATHY: ICD-10-CM

## 2023-11-10 DIAGNOSIS — E78.2 MIXED HYPERLIPIDEMIA: ICD-10-CM

## 2023-11-10 DIAGNOSIS — I25.10 CORONARY ARTERY DISEASE INVOLVING NATIVE CORONARY ARTERY OF NATIVE HEART WITHOUT ANGINA PECTORIS: Primary | ICD-10-CM

## 2023-11-10 PROCEDURE — G8427 DOCREV CUR MEDS BY ELIG CLIN: HCPCS | Performed by: INTERNAL MEDICINE

## 2023-11-10 PROCEDURE — 3075F SYST BP GE 130 - 139MM HG: CPT | Performed by: INTERNAL MEDICINE

## 2023-11-10 PROCEDURE — 93000 ELECTROCARDIOGRAM COMPLETE: CPT | Performed by: INTERNAL MEDICINE

## 2023-11-10 PROCEDURE — 1123F ACP DISCUSS/DSCN MKR DOCD: CPT | Performed by: INTERNAL MEDICINE

## 2023-11-10 PROCEDURE — 1036F TOBACCO NON-USER: CPT | Performed by: INTERNAL MEDICINE

## 2023-11-10 PROCEDURE — 99213 OFFICE O/P EST LOW 20 MIN: CPT | Performed by: INTERNAL MEDICINE

## 2023-11-10 PROCEDURE — 3078F DIAST BP <80 MM HG: CPT | Performed by: INTERNAL MEDICINE

## 2023-11-10 PROCEDURE — G8484 FLU IMMUNIZE NO ADMIN: HCPCS | Performed by: INTERNAL MEDICINE

## 2023-11-10 PROCEDURE — G8417 CALC BMI ABV UP PARAM F/U: HCPCS | Performed by: INTERNAL MEDICINE

## 2023-11-10 RX ORDER — CLOPIDOGREL BISULFATE 75 MG/1
75 TABLET ORAL DAILY
Qty: 90 TABLET | Refills: 3 | Status: SHIPPED | OUTPATIENT
Start: 2023-11-10

## 2023-11-10 RX ORDER — AMIODARONE HYDROCHLORIDE 200 MG/1
200 TABLET ORAL DAILY
Qty: 90 TABLET | Refills: 4 | Status: SHIPPED | OUTPATIENT
Start: 2023-11-10

## 2023-11-10 NOTE — PROGRESS NOTES
intervened on the proximal left anterior descending artery, placed a stent 3.0 x 18 post-dilated to 3.9 mm  3/10/2021: Successful revascularization of LAD, placement of one stent,  3.5 x 20 expanding up to 3.9 mm. Asymptomatic. Continue Asa, Plavix, B-blocker and statin therapy. Ischemic Cardiomyopathy/systolic heart failure:   Appears compensated on exam. Continue Asa and B-blocker. Hypertension  Metoprolol 25mg    Hyperlipidemia  Lipitor 20mg  LDL 59 (9/26/2020)       Chronic kidney disease  Following with nephrology. Diabetes. Managed by PCP     Morbid obesity  Encouraged increase aerobic exercise and heart healthy diet    Transient atrial fib  Amiodarone    Echo  Follow up in 1 year    Thank you very much for allowing me to participate in the care of your patient. Please do not hesitate to contact me if you have any questions. Sincerely,    Floyd Frances MD, MPH      [facility]  [address], [address] [facility], [address] [facility]  Ph: [phone]  Fax: [phone]      This note was scribed in my presence by Stefanie Love   Physician Attestation:  The scribes documentation has been prepared under my direction and personally reviewed by me in its entirety. I confirm that the note above accurately reflects all work, treatment, procedures, and medical decision making performed by me.

## 2024-01-12 NOTE — PROGRESS NOTES
[facility], [facility], [address] [address]    PrimaryCare Doctor:  Jose R Benjamin MD  Primary Cardiologist: Dr. Ayaka Ortiz    Type of visit: Virtual visit using HIPAA-compliant videoconferencing technology, limited physical exam.     Chief Complaint   Patient presents with    1 Month Follow-Up     patient has palpitations    Other     low O2 on exertion     History of Present Illness:  Citlali Michele is a 77 y.o. male with PMH CAD, SHF/ICM CKD4 (Dr. Joya Boyle), HTN, HLP, DM, former smoker,  obesity. Admitted to [facility] with SOB and chest discomfort for several weeks. Occurs with exertion. Associated with \"racing heart. \"  CXR indicates pulm edema and HF. He was given IV lasix and Neph consulted. + systolic murmur> ECHO showed mod to severe AS. Patient scheduled for virtual visit with [facility] cardiology for heart failure and aortic stenosis. D/T COVID 19, he is reluctant to come in for an OV, lab draws or any other procedures. He continues feeling much better. Energy level improved. He is walking 1/2mile QOD. Continues with mild SOB that occurs with activity. Improves with rest. Says his O2 sat is typically in the low 90s at rest but does drop into the 70s at times with activity, It returns to 90s with rest. He is wondering if he needs home O2. Denies any associated LH or dizziness. His BLE edema is significantly improved. His weight is down to 313 lbs today. Was  326lbs at DC >321lbs last OV. He is improving his diet and trying to lose weight. Today he states he is noticing more \"palpitations. \" It occurs mostly at night when laying down. It improves when he sits up so he mostly sleeps in a chair. He denies orthopnea. He has spoken with Dr. Sanjuana Pacheco on the phone but has not had a visit with him. Again, he is reluctant D/T COVID 19.      Review of Systems:   General: Denies fever, chills, +fatigue  Skin: Denies skin changes, rash, itching, lesions. HEENT: Denies headache, dizziness, vision changes, nosebleeds, sore throat, nasal drainage  RESP: Denies cough, sputum, dyspnea, wheeze, snoring  CARD: Denies Murmur, chest pain  GI:Denies nausea, vomiting, heartburn, loss of appetite, change in bowels  : Denies frequency, pain, incontinence, polyuria  VASC: Denies claudication, leg cramps, clots  MUSC/SKEL: Denies pain, stiffness, arthritis  PSYCH: Denies anxiety, depression, stress  NEURO: Denies numbness, tingling, weakness,change in mood or memory  HEME: Denies abn bruising, bleeding, anemia  ENDO: Denies intolerance to heat, cold, excessive thirst or hunger, hx thyroid disease    11/3/2020 Reported Wt: 313 lbs  Wt Readings from Last 3 Encounters:   09/27/20 (!) 326 lb 4.5 oz (148 kg)   06/11/19 (!) 338 lb (153.3 kg)   01/11/19 (!) 334 lb (151.5 kg)        Past Medical History:   has a past medical history of Arthritis, Bell's palsy, Cancer ([facility] [address] CHF (congestive heart failure) ([facility] [address] Hyperlipidemia, Hypertension, Influenza B, Kidney failure, Nosebleed, and Radiation. Surgical History:   has no past surgical history on file. Social History:   reports that he quit smoking about 34 years ago. He has never used smokeless tobacco. He reports that he does not drink alcohol or use drugs. Family History:   Family History   Problem Relation Age of Onset    Cancer Mother     Cancer Father        HomeMedications:  Prior to Admission medications    Medication Sig Start Date End Date Taking?  Authorizing Provider   furosemide (LASIX) 40 MG tablet Take 1 tablet by mouth 2 times daily 9/27/20  Yes Donna Wu MD   clopidogrel (PLAVIX) 75 MG tablet TAKE 1 TABLET BY MOUTH EVERY DAY 7/1/20  Yes Faviola Pina MD   metoprolol succinate (TOPROL XL) 100 MG extended release tablet TAKE 1 TABLET BY MOUTH EVERY DAY 7/1/20  Yes Faviola Pina MD   NIFEdipine (ADALAT CC) 60 MG extended release tablet Take 60 mg by mouth 2 times daily    Yes Historical Provider, MD aspirin 81 MG chewable tablet Take 81 mg by mouth daily. Yes Historical Provider, MD   atorvastatin (LIPITOR) 20 MG tablet Take 20 mg by mouth daily. Yes Historical Provider, MD   Multiple Vitamins-Minerals (THERAPEUTIC MULTIVITAMIN-MINERALS) tablet Take 1 tablet by mouth nightly. Yes Historical Provider, MD   Glucosamine-Chondroit-Vit C-Mn (GLUCOSAMINE CHONDR 1500 COMPLX PO) Take 1 each by mouth 2 times daily Indications: 2700 mg    Yes Historical Provider, MD        Allergies:  Patient has no known allergies. VS: /69 HR 72  O2 sat 91%    Physical Exam:   GEN: Appears well, no acute distress  SKIN: Pink, warm, dry. LUNG: Respiratory effort normal. No visualized signs of difficulty breathing or respiratory distress  ABD: Nondistended. EXT: Mild edema. MUSCSKEL: Good ROM X4 extremities. No deformity. NEURO: A/O X3. Calm and cooperative. LABS: Results reviewed with patient today.     CBC:   Lab Results   Component Value Date    WBC 9.0 09/26/2020    WBC 9.5 09/25/2020    WBC 8.5 11/05/2019    RBC 3.75 09/26/2020    RBC 3.66 09/25/2020    RBC 4.50 11/05/2019    HGB 11.4 09/26/2020    HGB 11.3 09/25/2020    HGB 14.0 11/05/2019    HCT 35.2 09/26/2020    HCT 34.3 09/25/2020    HCT 42.2 11/05/2019    MCV 93.8 09/26/2020    MCV 93.7 09/25/2020    MCV 93.7 11/05/2019    RDW 15.7 09/26/2020    RDW 15.8 09/25/2020    RDW 14.5 11/05/2019     09/26/2020     09/25/2020     11/05/2019     BMP:  Lab Results   Component Value Date     09/27/2020     09/26/2020     09/25/2020    K 3.8 09/27/2020    K 3.7 09/26/2020    K 3.8 09/25/2020    K 4.1 11/05/2019     09/27/2020     09/26/2020     09/25/2020    CO2 23 09/27/2020    CO2 25 09/26/2020    CO2 26 09/25/2020    PHOS 4.6 11/05/2019    PHOS 5.0 08/15/2019    PHOS 4.6 05/15/2019    BUN 58 09/27/2020    BUN 47 09/26/2020    BUN 48 09/25/2020    CREATININE 4.7 09/27/2020    CREATININE 4.5 09/26/2020 CREATININE 4.3 09/25/2020     BNP:   Lab Results   Component Value Date    PROBNP 5,854 09/27/2020    PROBNP 5,889 09/25/2020    PROBNP 1,353 06/15/2018     Iron Studies:  No results found for: TIBC, FERRITIN  GLUCOSE: No results for input(s): GLUCOSE in the last 72 hours. LIVER PROFILE:   Lab Results   Component Value Date    AST 15 09/25/2020    ALT 17 09/25/2020    LABALBU 3.7 09/25/2020    BILIDIR <0.2 11/05/2019    BILITOT <0.2 09/25/2020    ALKPHOS 100 09/25/2020     PT/INR:   Lab Results   Component Value Date    PROTIME 10.9 01/27/2015    INR 1.01 01/27/2015     Cardiac Enzymes:  Lab Results   Component Value Date    CKTOTAL 477 01/30/2015    TROPONINI 0.03 09/25/2020     FASTING LIPID PANEL:  Lab Results   Component Value Date    CHOL 107 09/26/2020    HDL 34 09/26/2020    LDLCALC 59 09/26/2020    TRIG 71 09/26/2020       Cardiac Imaging: Reports reviewed with patient today.      EKG: Sinus rhythm with occasional Premature ventricular complexes and Fusion complexesLeft axis deviationLow voltage QRSInferior infarct (cited on or before 16-APR-2017)Cannot rule out Anterior infarct (cited on or before 16-APR-2017)Prolonged QTAbnormal ECGWhen compared with ECG of 16-APR-2017 10:46,Questionable change in initial forces of Septal leadspremature ventricular complex is now seenQT has lengthenedConfirmed by Vernon, [address] [place] [address] ([facility]) on 9/25/2020 9:47:18 PM      ECHO: 9/26/20   Summary   Ejection fraction is visually estimated to be 40%.  Lubna Cheers is severe hypokinesis of the entire apical myocardium and and   hypokinesis of the distal septal wall.   There is severe concentric left ventricular hypertrophy.   Grade II diastolic dysfunction with elevated LV filling pressures.   Mild mitral regurgitation is present.   No evidence of mitral stenosis.   Mild calcification of the mitral valve noted.   Moderate to severe aortic stenosis with a peak velocity of 437m/s and a mean   pressure gradient of 50mmHg.   Mild aortic regurgitation.   Moderately dilated right ventricle.   Right ventricular systolic function is normal .      8/2018:  Summary   Suboptimal parasternal image quality.   Ejection fraction is visually estimated to be 50-55%.   There is hypokinesis of the distal anteroseptal wall.   Right ventricular size and function are normal.   Mildly dilated left atrium.   There are no significant valvular abnormalities appreciated in this study.     4/17/17   Summary   Appears to have normal left ventricular size and wall thickness. Mildly   decreased left ventricular systolic function with an estimated ejection fraction of 40%.   Mid distal anterior wall hypokinesis. The apex is akinetic.   Normal right ventricular size and function.     Stress Test:      Cardiac Angiography: 4/19/2017  ANGIOGRAPHIC FINDINGS:  1.  Left main coronary comes from the left coronary cusp, has FERMÍN-3 flow, no  significant stenosis. 2.  The left anterior descending artery comes from the left main coronary  artery, is occluded in the proximal portion and mid portion has around 70%  stenosis present and distal also 70% stenosis present. 3.  The left circumflex comes from the left main coronary artery and gives  rise to obtuse marginal branches.  The mid portion has approximately 60%  stenosis. 4.  The right coronary artery comes from the right coronary cusp, giving rise  to posterior descending artery and posterolateral branch.  The proximal mid  portion has 60% stenosis present.     INTERVENTION PERFORMED:  We intervened on the proximal left anterior  descending artery, placed a stent 3.0 x 18 post-dilated to 3.9 mm. 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 + Systolic murmur. Improved. Decreased SOB, edema and weight down to 313lbs. Will be difficult to manage long term with CKD.  He needs to be seen by Neph and get labs done but is strongly reluctant to come into clinic/ hospital D/T COVID 19. BMP, BNP ordered for when he feels comfortable to go in. Will continue current treatment for now as he seems stable. NYHA Class III              Stage 3  Diuretic: defer to Neph- lasix 40mg BID  Beta Blocker: Toprol XL  ACEi/ARB/ARNI: none - CKD  Aldosterone Antagonist: none CKD  SGLT2 Inhibitor: none CKD  2gm Na diet, daily weight, 64 oz fluid restriction  Avoid NSAIDS and other nephrotoxic meds  Cardiac Rehab:   ICD: Not indicated for EF>35%  [facility] Referral: outpatient  Palliative Care consult for re admit <30 days or Stage IV: NA  May need supplemental O2 at home - offered him a home sleep eval/ 6 min walk test but will not come in at this time D/T COVID 19.     2.) CAD s/p [place]ERETT to LAD 2018: Stable, denies CP today. No evidence of ischemia. Difficult to do ischemic work up/ LHC with creat 4.7. DAPT: asa, plavix  Beta Blocker: Toprol XL  Lipid management/high intensity statin: lipitor  Risk factor management: high blood pressure, high cholesterol, Diabetes, smoking, obesity, family hx  Lifestyle modification: Heart healthy diet, regular exercise, weight loss, smoking cessation, stress reduction  Cardiac Rehab: Phase 2     3.) Hypertension:   Goal BP <130/80. Met. Continue med management  Non pharmacologic interventions include:   -weight loss  -heart healthy low sodium and low fat diet that consist of mostly fruits, vegetables and grains (Dash diet)  -limited amount of alcohol (no more than 1 drink/day for women, 2 drinks/day for men)  -regular physical activity  -no smoking  -stress reduction    5.) Mod-Severe AS: Stable without CP, syncope. SOB multifactorial.   Patient has opted for conservative medical treatment. 6.) Palpitations: Could be PVCs - would like to check labs/electrolytes when patient willing. Since it occurs more when laying flat could be manifestation/sensation of orthopnea.  Also concern for arrhythmia such as AF. Agrees to monitor that can be sent to his home. Paulie Sanches is a 77 y.o. male being evaluated by a Virtual Visit (video visit) encounter to address concerns as mentioned above. A caregiver was present when appropriate. Due to this being a TeleHealth encounter (During KIJMI-17 public health emergency), evaluation of the following organ systems was limited: Vitals/Constitutional/EENT/Resp/CV/GI//MS/Neuro/Skin/Heme-Lymph-Imm. Pursuant to the emergency declaration under the [facility], [address] [place] [address] authority and the [employer] Act, this Virtual Visit was conducted with patient's (and/or legal guardian's) consent, to reduce the patient's risk of exposure to COVID-19 and provide necessary medical care. The patient (and/or legal guardian) has also been advised to contact this office for worsening conditions or problems, and seek emergency medical treatment and/or call 911 if deemed necessary. Instructions:   1. Medications: Continue current meds  2. Labs: BNP, BMP  3. Follow up: 1 month- agrees to VV. 4. Daily weight: Call for increase 3 lbs/day or 5 lbs/week. 5. 2 gm sodium diet:  6. Fluid Restriction: 64 oz. 7.[facility]erred to [facility] for Education:  Declines  8.  Event monitor    I appreciate the opportunity of cooperating in the care of this individual.    STEPHANY Bob CNP, 11/3/2020,12:55 PM No difficulties

## 2024-02-23 ENCOUNTER — HOSPITAL ENCOUNTER (OUTPATIENT)
Dept: NON INVASIVE DIAGNOSTICS | Age: 79
Discharge: HOME | End: 2024-02-23
Payer: MEDICARE

## 2024-02-23 DIAGNOSIS — I25.10 CORONARY ARTERY DISEASE INVOLVING NATIVE CORONARY ARTERY OF NATIVE HEART WITHOUT ANGINA PECTORIS: ICD-10-CM

## 2024-02-23 PROCEDURE — 93306 TTE W/DOPPLER COMPLETE: CPT

## 2024-02-23 PROCEDURE — 93356 MYOCRD STRAIN IMG SPCKL TRCK: CPT

## 2024-09-24 ENCOUNTER — HOSPITAL ENCOUNTER (OUTPATIENT)
Dept: GENERAL RADIOLOGY | Age: 79
Discharge: HOME | End: 2024-09-24
Attending: INTERNAL MEDICINE
Payer: MEDICARE

## 2024-09-24 ENCOUNTER — HOSPITAL ENCOUNTER (OUTPATIENT)
Age: 79
Discharge: HOME | End: 2024-09-24
Payer: MEDICARE

## 2024-09-24 PROCEDURE — 71046 X-RAY EXAM CHEST 2 VIEWS: CPT

## 2024-11-17 DIAGNOSIS — I25.10 CORONARY ARTERY DISEASE INVOLVING NATIVE CORONARY ARTERY OF NATIVE HEART WITHOUT ANGINA PECTORIS: ICD-10-CM

## 2024-11-17 DIAGNOSIS — Z95.2 S/P TAVR (TRANSCATHETER AORTIC VALVE REPLACEMENT): ICD-10-CM

## 2024-11-17 DIAGNOSIS — I10 ESSENTIAL HYPERTENSION: Primary | ICD-10-CM

## 2024-11-18 RX ORDER — AMIODARONE HYDROCHLORIDE 200 MG/1
200 TABLET ORAL DAILY
Qty: 30 TABLET | Refills: 1 | Status: SHIPPED | OUTPATIENT
Start: 2024-11-18 | End: 2024-12-23

## 2024-11-18 NOTE — TELEPHONE ENCOUNTER
Last OV: 11/10/23  Next OV: 2/28/24  Last refill: 11/10/23 #90 4 R/F  Most recent Labs:   Last EKG (if needed): 11/10/23    Called spoke with patient. States he gets dialysis 3 X a week at the [facility] location. Would like lab orders faxed.    [facility]a [facility]# [phone]  Fax# [phone]    Called Elyssa Rosen states she has to ask the nurse practitioner if they are able to draw labs-TSH, CMP and or Hepatic Panel.     Got a returned call states yes they can draw the labs we need at dialysis.    Faxed over lab orders for TSH,CMP

## 2024-11-19 DIAGNOSIS — N18.6 END STAGE RENAL DISEASE (HCC): Primary | ICD-10-CM

## 2024-12-01 DIAGNOSIS — I25.10 CORONARY ARTERY DISEASE INVOLVING NATIVE CORONARY ARTERY OF NATIVE HEART WITHOUT ANGINA PECTORIS: ICD-10-CM

## 2024-12-02 RX ORDER — CLOPIDOGREL BISULFATE 75 MG/1
75 TABLET ORAL DAILY
Qty: 90 TABLET | Refills: 3 | Status: SHIPPED | OUTPATIENT
Start: 2024-12-02

## 2024-12-02 NOTE — TELEPHONE ENCOUNTER
Last OV: 11/10/23 - Kimberli  Next OV: 25 - Kimberli  Last Labs: 24  Last EK/10/23   Last Filled:     Disp Refills Start End     clopidogrel (PLAVIX) 75 MG tablet 90 tablet 3 11/10/2023 --    Sig - Route: Take 1 tablet by mouth daily - Oral    Sent to pharmacy as: Clopidogrel Bisulfate 75 MG Oral Tablet (PLAVIX)    Cosign for Ordering: Accepted by Preston Ag MD on 11/10/2023  4:32 PM    E-Prescribing Status: Receipt confirmed by pharmacy (11/10/2023  4:13 PM EST)

## 2024-12-21 DIAGNOSIS — I25.10 CORONARY ARTERY DISEASE INVOLVING NATIVE CORONARY ARTERY OF NATIVE HEART WITHOUT ANGINA PECTORIS: ICD-10-CM

## 2024-12-23 RX ORDER — AMIODARONE HYDROCHLORIDE 200 MG/1
200 TABLET ORAL DAILY
Qty: 90 TABLET | Refills: 0 | Status: SHIPPED | OUTPATIENT
Start: 2024-12-23

## 2024-12-23 NOTE — TELEPHONE ENCOUNTER
Last OV: 11/10/23  Next OV: 2/28/25  Last refill: 11/18/24 #30 1 R/F  Most recent Labs: 11/22/24-TSH  Last EKG (if needed): 11/10/23

## 2025-01-03 ENCOUNTER — TELEPHONE (OUTPATIENT)
Dept: CARDIOLOGY CLINIC | Age: 80
End: 2025-01-03

## 2025-01-03 DIAGNOSIS — Z95.2 HISTORY OF TRANSCATHETER AORTIC VALVE REPLACEMENT (TAVR): Primary | ICD-10-CM

## 2025-01-03 NOTE — TELEPHONE ENCOUNTER
Katarzyna, can you schedule pt for an echo same day or close to his OV with Dr. Ag on 2/28/25 for his yearly TAVR check? Thanks, Juan Diego CARPENTER

## 2025-02-12 ENCOUNTER — APPOINTMENT (OUTPATIENT)
Dept: GENERAL RADIOLOGY | Age: 80
End: 2025-02-12
Payer: MEDICARE

## 2025-02-12 ENCOUNTER — HOSPITAL ENCOUNTER (INPATIENT)
Age: 80
LOS: 9 days | Discharge: HOME | End: 2025-02-21
Attending: EMERGENCY MEDICINE | Admitting: INTERNAL MEDICINE
Payer: MEDICARE

## 2025-02-12 DIAGNOSIS — J96.01 ACUTE RESPIRATORY FAILURE WITH HYPOXIA (HCC): Primary | ICD-10-CM

## 2025-02-12 DIAGNOSIS — Z95.2 S/P TAVR (TRANSCATHETER AORTIC VALVE REPLACEMENT): ICD-10-CM

## 2025-02-12 DIAGNOSIS — J18.9 MULTIFOCAL PNEUMONIA: ICD-10-CM

## 2025-02-12 PROBLEM — U07.1 PNEUMONIA DUE TO 2019 NOVEL CORONAVIRUS: Status: ACTIVE | Noted: 2025-02-12

## 2025-02-12 PROBLEM — J12.82 PNEUMONIA DUE TO 2019 NOVEL CORONAVIRUS: Status: ACTIVE | Noted: 2025-02-12

## 2025-02-12 LAB
ALBUMIN SERPL-MCNC: 3.6 G/DL (ref 3.4–5)
ALBUMIN/GLOB SERPL: 1 {RATIO} (ref 1.1–2.2)
ALP SERPL-CCNC: 65 U/L (ref 40–129)
ALT SERPL-CCNC: 24 U/L (ref 10–40)
ANION GAP SERPL CALCULATED.3IONS-SCNC: 19 MMOL/L (ref 3–16)
AST SERPL-CCNC: 30 U/L (ref 15–37)
BASE EXCESS BLDV CALC-SCNC: 5.9 MMOL/L
BASOPHILS # BLD: 0 K/UL (ref 0–0.2)
BASOPHILS NFR BLD: 0.3 %
BILIRUB SERPL-MCNC: 0.4 MG/DL (ref 0–1)
BUN SERPL-MCNC: 37 MG/DL (ref 7–20)
CALCIUM SERPL-MCNC: 9.4 MG/DL (ref 8.3–10.6)
CHLORIDE SERPL-SCNC: 91 MMOL/L (ref 99–110)
CO2 BLDV-SCNC: 33 MMOL/L
CO2 SERPL-SCNC: 27 MMOL/L (ref 21–32)
COHGB MFR BLDV: 0.7 %
CREAT SERPL-MCNC: 6.2 MG/DL (ref 0.8–1.3)
DEPRECATED RDW RBC AUTO: 15.4 % (ref 12.4–15.4)
EKG ATRIAL RATE: 77 BPM
EKG DIAGNOSIS: NORMAL
EKG P AXIS: 81 DEGREES
EKG P-R INTERVAL: 204 MS
EKG Q-T INTERVAL: 428 MS
EKG QRS DURATION: 98 MS
EKG QTC CALCULATION (BAZETT): 484 MS
EKG R AXIS: 45 DEGREES
EKG T AXIS: 77 DEGREES
EKG VENTRICULAR RATE: 77 BPM
EOSINOPHIL # BLD: 0 K/UL (ref 0–0.6)
EOSINOPHIL NFR BLD: 0.6 %
FLUAV + FLUBV AG NOSE IA.RAPID: NOT DETECTED
FLUAV + FLUBV AG NOSE IA.RAPID: NOT DETECTED
GFR SERPLBLD CREATININE-BSD FMLA CKD-EPI: 9 ML/MIN/{1.73_M2}
GLUCOSE SERPL-MCNC: 147 MG/DL (ref 70–99)
HBV SURFACE AB SERPL IA-ACNC: <3.5 MIU/ML
HBV SURFACE AG SERPL QL IA: NORMAL
HCO3 BLDV-SCNC: 32 MMOL/L (ref 23–29)
HCT VFR BLD AUTO: 37.3 % (ref 40.5–52.5)
HGB BLD-MCNC: 12.5 G/DL (ref 13.5–17.5)
LACTATE BLDV-SCNC: 1.2 MMOL/L (ref 0.4–1.9)
LACTATE BLDV-SCNC: 1.3 MMOL/L (ref 0.4–1.9)
LYMPHOCYTES # BLD: 0.2 K/UL (ref 1–5.1)
LYMPHOCYTES NFR BLD: 3.1 %
MCH RBC QN AUTO: 33.7 PG (ref 26–34)
MCHC RBC AUTO-ENTMCNC: 33.5 G/DL (ref 31–36)
MCV RBC AUTO: 100.4 FL (ref 80–100)
METHGB MFR BLDV: 0.6 %
MONOCYTES # BLD: 1 K/UL (ref 0–1.3)
MONOCYTES NFR BLD: 14.1 %
NEUTROPHILS # BLD: 5.8 K/UL (ref 1.7–7.7)
NEUTROPHILS NFR BLD: 81.9 %
NT-PROBNP SERPL-MCNC: ABNORMAL PG/ML (ref 0–449)
O2 THERAPY: ABNORMAL
ORGANISM: ABNORMAL
ORGANISM: ABNORMAL
PCO2 BLDV: 48.9 MMHG (ref 40–50)
PH BLDV: 7.42 [PH] (ref 7.35–7.45)
PLATELET # BLD AUTO: 219 K/UL (ref 135–450)
PMV BLD AUTO: 8.4 FL (ref 5–10.5)
PO2 BLDV: 63 MMHG
POTASSIUM SERPL-SCNC: 3.7 MMOL/L (ref 3.5–5.1)
PROT SERPL-MCNC: 7.2 G/DL (ref 6.4–8.2)
RBC # BLD AUTO: 3.72 M/UL (ref 4.2–5.9)
REPORT: NORMAL
RESP PATH DNA+RNA PNL L RESP NAA+NON-PRB: ABNORMAL
RESP PATH DNA+RNA PNL L RESP NAA+NON-PRB: ABNORMAL
SAO2 % BLDV: 90 %
SARS-COV-2 RDRP RESP QL NAA+PROBE: NOT DETECTED
SODIUM SERPL-SCNC: 137 MMOL/L (ref 136–145)
TROPONIN, HIGH SENSITIVITY: 131 NG/L (ref 0–22)
TROPONIN, HIGH SENSITIVITY: 136 NG/L (ref 0–22)
TROPONIN, HIGH SENSITIVITY: 139 NG/L (ref 0–22)
WBC # BLD AUTO: 7 K/UL (ref 4–11)

## 2025-02-12 PROCEDURE — 2500000003 HC RX 250 WO HCPCS: Performed by: EMERGENCY MEDICINE

## 2025-02-12 PROCEDURE — 2700000000 HC OXYGEN THERAPY PER DAY

## 2025-02-12 PROCEDURE — 71045 X-RAY EXAM CHEST 1 VIEW: CPT

## 2025-02-12 PROCEDURE — 82803 BLOOD GASES ANY COMBINATION: CPT

## 2025-02-12 PROCEDURE — 84484 ASSAY OF TROPONIN QUANT: CPT

## 2025-02-12 PROCEDURE — 87635 SARS-COV-2 COVID-19 AMP PRB: CPT

## 2025-02-12 PROCEDURE — 86706 HEP B SURFACE ANTIBODY: CPT

## 2025-02-12 PROCEDURE — 6360000002 HC RX W HCPCS: Performed by: INTERNAL MEDICINE

## 2025-02-12 PROCEDURE — 83605 ASSAY OF LACTIC ACID: CPT

## 2025-02-12 PROCEDURE — 85025 COMPLETE CBC W/AUTO DIFF WBC: CPT

## 2025-02-12 PROCEDURE — 2580000003 HC RX 258: Performed by: EMERGENCY MEDICINE

## 2025-02-12 PROCEDURE — 93010 ELECTROCARDIOGRAM REPORT: CPT | Performed by: INTERNAL MEDICINE

## 2025-02-12 PROCEDURE — 80053 COMPREHEN METABOLIC PANEL: CPT

## 2025-02-12 PROCEDURE — 87040 BLOOD CULTURE FOR BACTERIA: CPT

## 2025-02-12 PROCEDURE — 94760 N-INVAS EAR/PLS OXIMETRY 1: CPT

## 2025-02-12 PROCEDURE — 96365 THER/PROPH/DIAG IV INF INIT: CPT

## 2025-02-12 PROCEDURE — 99223 1ST HOSP IP/OBS HIGH 75: CPT | Performed by: STUDENT IN AN ORGANIZED HEALTH CARE EDUCATION/TRAINING PROGRAM

## 2025-02-12 PROCEDURE — 6360000002 HC RX W HCPCS

## 2025-02-12 PROCEDURE — 2500000003 HC RX 250 WO HCPCS: Performed by: INTERNAL MEDICINE

## 2025-02-12 PROCEDURE — 36415 COLL VENOUS BLD VENIPUNCTURE: CPT

## 2025-02-12 PROCEDURE — 96375 TX/PRO/DX INJ NEW DRUG ADDON: CPT

## 2025-02-12 PROCEDURE — 1200000000 HC SEMI PRIVATE

## 2025-02-12 PROCEDURE — 6370000000 HC RX 637 (ALT 250 FOR IP): Performed by: EMERGENCY MEDICINE

## 2025-02-12 PROCEDURE — 94640 AIRWAY INHALATION TREATMENT: CPT

## 2025-02-12 PROCEDURE — 87340 HEPATITIS B SURFACE AG IA: CPT

## 2025-02-12 PROCEDURE — 83880 ASSAY OF NATRIURETIC PEPTIDE: CPT

## 2025-02-12 PROCEDURE — 87633 RESP VIRUS 12-25 TARGETS: CPT

## 2025-02-12 PROCEDURE — 87502 INFLUENZA DNA AMP PROBE: CPT

## 2025-02-12 PROCEDURE — 6360000002 HC RX W HCPCS: Performed by: EMERGENCY MEDICINE

## 2025-02-12 PROCEDURE — 99285 EMERGENCY DEPT VISIT HI MDM: CPT

## 2025-02-12 PROCEDURE — 93005 ELECTROCARDIOGRAM TRACING: CPT | Performed by: EMERGENCY MEDICINE

## 2025-02-12 RX ORDER — DEXAMETHASONE SODIUM PHOSPHATE 4 MG/ML
6 INJECTION, SOLUTION INTRA-ARTICULAR; INTRALESIONAL; INTRAMUSCULAR; INTRAVENOUS; SOFT TISSUE EVERY 24 HOURS
Status: DISCONTINUED | OUTPATIENT
Start: 2025-02-12 | End: 2025-02-21 | Stop reason: HOSPADM

## 2025-02-12 RX ORDER — HEPARIN SODIUM 5000 [USP'U]/ML
5000 INJECTION, SOLUTION INTRAVENOUS; SUBCUTANEOUS EVERY 8 HOURS SCHEDULED
Status: DISCONTINUED | OUTPATIENT
Start: 2025-02-12 | End: 2025-02-21 | Stop reason: HOSPADM

## 2025-02-12 RX ORDER — SODIUM CHLORIDE 0.9 % (FLUSH) 0.9 %
5-40 SYRINGE (ML) INJECTION EVERY 12 HOURS SCHEDULED
Status: DISCONTINUED | OUTPATIENT
Start: 2025-02-12 | End: 2025-02-21 | Stop reason: HOSPADM

## 2025-02-12 RX ORDER — BENZONATATE 100 MG/1
100 CAPSULE ORAL 3 TIMES DAILY PRN
Status: DISCONTINUED | OUTPATIENT
Start: 2025-02-12 | End: 2025-02-21 | Stop reason: HOSPADM

## 2025-02-12 RX ORDER — ACETAMINOPHEN 325 MG/1
650 TABLET ORAL EVERY 6 HOURS PRN
Status: DISCONTINUED | OUTPATIENT
Start: 2025-02-12 | End: 2025-02-21 | Stop reason: HOSPADM

## 2025-02-12 RX ORDER — SODIUM CHLORIDE 9 MG/ML
INJECTION, SOLUTION INTRAVENOUS PRN
Status: DISCONTINUED | OUTPATIENT
Start: 2025-02-12 | End: 2025-02-21 | Stop reason: HOSPADM

## 2025-02-12 RX ORDER — HYDRALAZINE HYDROCHLORIDE 20 MG/ML
10 INJECTION INTRAMUSCULAR; INTRAVENOUS ONCE
Status: COMPLETED | OUTPATIENT
Start: 2025-02-12 | End: 2025-02-12

## 2025-02-12 RX ORDER — SODIUM CHLORIDE 0.9 % (FLUSH) 0.9 %
5-40 SYRINGE (ML) INJECTION PRN
Status: DISCONTINUED | OUTPATIENT
Start: 2025-02-12 | End: 2025-02-21 | Stop reason: HOSPADM

## 2025-02-12 RX ORDER — ACETAMINOPHEN 650 MG/1
650 SUPPOSITORY RECTAL EVERY 6 HOURS PRN
Status: DISCONTINUED | OUTPATIENT
Start: 2025-02-12 | End: 2025-02-12

## 2025-02-12 RX ORDER — IPRATROPIUM BROMIDE AND ALBUTEROL SULFATE 2.5; .5 MG/3ML; MG/3ML
1 SOLUTION RESPIRATORY (INHALATION) ONCE
Status: COMPLETED | OUTPATIENT
Start: 2025-02-12 | End: 2025-02-12

## 2025-02-12 RX ORDER — ACETAMINOPHEN 650 MG/1
650 SUPPOSITORY RECTAL EVERY 6 HOURS PRN
Status: DISCONTINUED | OUTPATIENT
Start: 2025-02-12 | End: 2025-02-21 | Stop reason: HOSPADM

## 2025-02-12 RX ORDER — ONDANSETRON 4 MG/1
4 TABLET, ORALLY DISINTEGRATING ORAL EVERY 8 HOURS PRN
Status: DISCONTINUED | OUTPATIENT
Start: 2025-02-12 | End: 2025-02-21 | Stop reason: HOSPADM

## 2025-02-12 RX ORDER — GUAIFENESIN/DEXTROMETHORPHAN 100-10MG/5
5 SYRUP ORAL EVERY 4 HOURS PRN
Status: DISCONTINUED | OUTPATIENT
Start: 2025-02-12 | End: 2025-02-21 | Stop reason: HOSPADM

## 2025-02-12 RX ORDER — ACETAMINOPHEN 325 MG/1
650 TABLET ORAL EVERY 6 HOURS PRN
Status: DISCONTINUED | OUTPATIENT
Start: 2025-02-12 | End: 2025-02-12

## 2025-02-12 RX ORDER — ONDANSETRON 2 MG/ML
4 INJECTION INTRAMUSCULAR; INTRAVENOUS EVERY 6 HOURS PRN
Status: DISCONTINUED | OUTPATIENT
Start: 2025-02-12 | End: 2025-02-21 | Stop reason: HOSPADM

## 2025-02-12 RX ORDER — TORSEMIDE 100 MG/1
100 TABLET ORAL
Status: DISCONTINUED | OUTPATIENT
Start: 2025-02-13 | End: 2025-02-21 | Stop reason: HOSPADM

## 2025-02-12 RX ORDER — POLYETHYLENE GLYCOL 3350 17 G/17G
17 POWDER, FOR SOLUTION ORAL DAILY PRN
Status: DISCONTINUED | OUTPATIENT
Start: 2025-02-12 | End: 2025-02-21 | Stop reason: HOSPADM

## 2025-02-12 RX ADMIN — HEPARIN SODIUM 5000 UNITS: 5000 INJECTION INTRAVENOUS; SUBCUTANEOUS at 20:16

## 2025-02-12 RX ADMIN — SODIUM CHLORIDE, PRESERVATIVE FREE 10 ML: 5 INJECTION INTRAVENOUS at 20:16

## 2025-02-12 RX ADMIN — IPRATROPIUM BROMIDE AND ALBUTEROL SULFATE 1 DOSE: .5; 2.5 SOLUTION RESPIRATORY (INHALATION) at 06:45

## 2025-02-12 RX ADMIN — AZITHROMYCIN MONOHYDRATE 500 MG: 500 INJECTION, POWDER, LYOPHILIZED, FOR SOLUTION INTRAVENOUS at 08:55

## 2025-02-12 RX ADMIN — DEXAMETHASONE SODIUM PHOSPHATE 6 MG: 4 INJECTION, SOLUTION INTRAMUSCULAR; INTRAVENOUS at 11:33

## 2025-02-12 RX ADMIN — HYDRALAZINE HYDROCHLORIDE 10 MG: 20 INJECTION INTRAMUSCULAR; INTRAVENOUS at 21:45

## 2025-02-12 RX ADMIN — WATER 1000 MG: 1 INJECTION INTRAMUSCULAR; INTRAVENOUS; SUBCUTANEOUS at 08:51

## 2025-02-12 ASSESSMENT — PAIN SCALES - GENERAL: PAINLEVEL_OUTOF10: 0

## 2025-02-12 NOTE — CARE COORDINATION
Case Management Assessment  Initial Evaluation    Date/Time of Evaluation: 2/12/2025 3:05 PM  Assessment Completed by: ELSA Ayala    If patient is discharged prior to next notation, then this note serves as note for discharge by case management.    Patient Name: Jeremías Bray                   YOB: 1945  Diagnosis: Acute respiratory failure with hypoxia [J96.01]  Multifocal pneumonia [J18.9]  Pneumonia due to 2019 novel coronavirus [U07.1, J12.82]                   Date / Time: 2/12/2025  6:17 AM    Patient Admission Status: Inpatient   Readmission Risk (Low < 19, Mod (19-27), High > 27): Readmission Risk Score: 16    Current PCP: Diogo Maldonado MD  PCP verified by CM? (P) Yes    Chart Reviewed: Yes      History Provided by: (P) Patient  Patient Orientation: (P) Alert and Oriented    Patient Cognition: (P) Alert    Hospitalization in the last 30 days (Readmission):  No    If yes, Readmission Assessment in CM Navigator will be completed.    Advance Directives:      Code Status: Full Code   Patient's Primary Decision Maker is: (P) Legal Next of Kin    Primary Decision Maker: Juliano Bray - Child - [phone]    Secondary Decision Maker (Active): Sumeet Bray (sister) - Brother/Sister - [phone]    Discharge Planning:    Patient lives with: (P) Alone Type of Home: (P) House  Primary Care Giver: (P) Self  Patient Support Systems include: (P) Family Members   Current Financial resources: (P) None  Current community resources: (P) None  Current services prior to admission: (P) None            Current DME:              Type of Home Care services:  (P) None    ADLS  Prior functional level: (P) Independent in ADLs/IADLs  Current functional level: (P) Independent in ADLs/IADLs    PT AM-PAC:   /24  OT AM-PAC:   /24    Family can provide assistance at DC: (P) Yes  Would you like Case Management to discuss the discharge plan with any other family members/significant others, and if so, who? (P)

## 2025-02-12 NOTE — H&P
V2.0  History and Physical      Name:  Jeremías Bray /Age/Sex: 1945  (79 y.o. male)   MRN & CSN:  [id number] & [id number] Encounter Date/Time: 2025 11:21 AM EST   Location:   PCP: Diogo Maldonado MD       Hospital Day: 1    Assessment and Plan:   Jeremías Bray is a 79 y.o. male with a pmh of aortic stenosis status post TAVR, CAD status post PCI, paroxysmal A-fib, end-stage renal disease on dialysis, hypertension who presents with shortness of breath.  Patient reports gradually worsening shortness of breath in the last 7 days.  He tested positive for COVID-19 per home test 5 days ago.  But his symptoms did not improve.  Having trouble to ambulation due to shortness of breath presented to hospital.  He was placed on nasal cannula oxygen due to hypoxia.  Chest x-ray in ER showing multifocal infiltrations concerning for viral infection versus pulmonary edema.    Hospital Problems             Last Modified POA    * (Principal) Pneumonia due to 2019 novel coronavirus 2025 Yes       Plan:  Acute hypoxic respiratory failure due to COVID-19 pneumonia  COVID-19 pneumonia, continue Decadron, COVID isolation  Rule out superimposed bacterial infection ordered pneumonia panel, respiratory cultures, start ceftriaxone and azithromycin empirically  History of paroxysmal A-fib, resume metoprolol, amiodarone.  Patient is not on anticoagulation.  CAD resume aspirin, Plavix, Lipitor, beta-blocker  End-stage renal disease, continue dialysis per nephrology  Elevated troponin, abnormalities on EKG, will consult cardiology for further evaluation    Disposition:       Diet ADULT DIET; Regular   DVT Prophylaxis [] Lovenox, [x]  Heparin, [] SCDs, [] Ambulation,  [] Eliquis, [] Xarelto, [] Coumadin   Code Status Full Code         Personally reviewed Lab Studies and Imaging     EKG interpreted personally and results nonspecific ST changes    Drugs that require monitoring for toxicity include ceftriaxone and the method of

## 2025-02-12 NOTE — ED NOTES
This EDT observed Dahiana EDT attempt to draw blood cx 3x. One culture obtained, other sticks had a flash but no blood return. Additional sticks not attempted d/t fistua in left arm. RN notified.

## 2025-02-12 NOTE — PROGRESS NOTES
4 Eyes Skin Assessment     NAME:  Jeremías Bray  YOB: 1945  MEDICAL RECORD NUMBER:  [id number]    The patient is being assessed for  Admission    I agree that at least one RN has performed a thorough Head to Toe Skin Assessment on the patient. ALL assessment sites listed below have been assessed.      Areas assessed by both nurses:    Head, Face, Ears, Shoulders, Back, Chest, Arms, Elbows, Hands, Sacrum. Buttock, Coccyx, Ischium, Legs. Feet and Heels, and Under Medical Devices         Does the Patient have a Wound? No noted wound(s)       Bryn Prevention initiated by RN: Yes  Wound Care Orders initiated by RN: No    Pressure Injury (Stage 3,4, Unstageable, DTI, NWPT, and Complex wounds) if present, place Wound referral order by RN under : No    New Ostomies, if present place, Ostomy referral order under : No     Nurse 1 eSignature: Electronically signed by Renae Gutierrez RN on 2/12/25 at 2:45 PM EST    **SHARE this note so that the co-signing nurse can place an eSignature**    Nurse 2 eSignature: Electronically signed by Ria Loja RN on 2/12/25 at 2:50 PM EST

## 2025-02-12 NOTE — ED PROVIDER NOTES
[facility] EMERGENCY DEPARTMENT     EMERGENCY DEPARTMENT ENCOUNTER            Pt Name: Jeremías Bray   MRN: [id number]   Birthdate 1945   Date of evaluation: 2/12/2025   Provider: Macario Felipe MD   PCP: Diogo Maldonado MD   Note Started: 1:11 PM EST 2/12/25          CHIEF COMPLAINT     Chief Complaint   Patient presents with    Shortness of Breath     Tested Covid positive 5 days ago. On dialysis Tues Thursday Saturday. YODIT fistula.  On RA saturating 75%, but him on 6L NC. Doesn't wear O2 at home. No CHF or COPD. Hx: AVR, on plavix           HISTORY OF PRESENT ILLNESS:   History from : Patient and EMS   Limitations to history : None     Jeremías Bray is a 79 y.o. male who  has a past medical history of Arthritis, Bell's palsy, Cancer (HCC), CHF (congestive heart failure) (HCC), Coronary artery disease involving native coronary artery of native heart without angina pectoris, Hyperlipidemia, Hypertension, Influenza B, Kidney failure, Neuropathy, Nosebleed, Radiation, and Sciatica. presents to the emergency room complaining of shortness of breath.  Patient arrives with EMS from home.  Patient was found to be hypoxic on room air at 75% and patient was placed on 6 L nasal cannula which is a new oxygen requirement.  Patient denies any history of CHF or COPD but on chart review patient does have a history of coronary artery disease, TVAR, ischemic cardiomyopathy, hypertension hyperlipidemia.  Patient is on Plavix and states he has been taking his medication.  Patient denies any tobacco use.  Does have a history of ESRD on HD and has dialysis Tuesday Thursday Saturday.  States he did have a full session of dialysis yesterday.  Patient has had a cough over the past week and states he was diagnosed with COVID-19 5 days ago.  Patient states his cough is gradually gotten worse.  States he is very short of breath especially with exertion.  Denies any chest pain.  States he been having normal urinary bowel

## 2025-02-12 NOTE — ED NOTES
Report called to PAULINE Blandon.   Patient is going to room [facility] on [address].     At time of departure, patient is alert and oriented x4.  He is sitting up in a chair wearing 5L of oxygen.

## 2025-02-12 NOTE — CONSULTS
Nephrology Consult Note   [employer]      Reason for consultation: ESRD on HD TTS    History of Present Illness: Jeremías Bray is a 80 yo male with a PMHx of ESRD, CHF, HTN, HLD, cancer, OA. Patient presents to  ED on 2/12/2025 with complaints of SOB. He tested positive for COVID-19 5 days ago. Currently on 5 L NC. CXR reveals multifocal airspace opacities. He is receiving azithromycin, rocephin, and decadron.     We have been consulted for ESRD on HD management. Last HD was yesterday. No acute electrolyte derangements noted. 3.4 kg above DW of 140 kg per weight obtained.     Subjective:      Patient seen and examined. Labs and chart reviewed. Resting in chair on 5 L NC.     There were not complications last night.    Patient review of systems: Endorses SOB    Past Medical History:   Diagnosis Date    Arthritis     Pt denies    Bell's palsy     Cancer (HCC) 2012    Soft tissue over Sternum Bx'd treated with radiation    CHF (congestive heart failure) (HCC)     Coronary artery disease involving native coronary artery of native heart without angina pectoris     Hyperlipidemia     Hypertension     Influenza B 04/02/2015    Kidney failure     sees Dr Camp    Neuropathy     Nosebleed     Radiation     Sciatica        Past Surgical History:   Procedure Laterality Date    AORTIC VALVE REPLACEMENT N/A 05/18/2021    TRANSCATHETER AORTIC VALVE REPLACEMENT FEMORAL APPROACH performed by Tristan Salazar MD at [facility]    AORTIC VALVE REPLACEMENT N/A 05/18/2021    TRANSCATHETER AORTIC VALVE REPLACEMENT FEMORAL APPROACH performed by Arnaldo Marley MD at [facility]    CARDIAC CATHETERIZATION  03/10/2021    x 1 Stent placement    COLONOSCOPY      CORONARY ANGIOPLASTY WITH STENT PLACEMENT  04/16/2017    EVERETT to LAD    DIALYSIS FISTULA CREATION Left 03/22/2023    LEFT RADIAL ARTERY TO CEPHALIC VEIN ARTERIOVENOUS FISTULA CREATION, POSSIBLE BRACHIAL ARTERY TO CEPHALIC VEIN ARTERIOVENOUS FISTULA CREATION performed by Dashawn

## 2025-02-12 NOTE — ED TRIAGE NOTES
Tested Covid positive 5 days ago. On dialysis Tues Thursday Saturday. YODIT fistula.  On RA saturating 75%, put him on 6L NC. Doesn't wear O2 at home. No CHF or COPD. Hx: AVR, on plavix. A&O x4, walked to stretcher

## 2025-02-12 NOTE — DISCHARGE INSTR - COC
Continuity of Care Form    Patient Name: Jeremías Bray   :  1945  MRN:  [id number]    Admit date:  2025  Discharge date:  ***    Code Status Order: Full Code   Advance Directives:   Advance Care Flowsheet Documentation             Admitting Physician:  Neisha New MD  PCP: Diogo Maldonado MD    Discharging Nurse: ***  Discharging Hospital Unit/Room#: T3Z-0586/4277-01  Discharging Unit Phone Number: ***    Emergency Contact:   Extended Emergency Contact Information  Primary Emergency Contact: addykelvin  Home Phone: [phone]  Mobile Phone: [phone]  Relation: Other  Preferred language: English   needed? No  Secondary Emergency Contact: Sumeet Bray (sister)  Home Phone: [phone]  Mobile Phone: [phone]  Relation: Brother/Sister    Past Surgical History:  Past Surgical History:   Procedure Laterality Date    AORTIC VALVE REPLACEMENT N/A 2021    TRANSCATHETER AORTIC VALVE REPLACEMENT FEMORAL APPROACH performed by Tristan Salazar MD at [facility]    AORTIC VALVE REPLACEMENT N/A 2021    TRANSCATHETER AORTIC VALVE REPLACEMENT FEMORAL APPROACH performed by Arnaldo Marley MD at [facility]    CARDIAC CATHETERIZATION  03/10/2021    x 1 Stent placement    COLONOSCOPY      CORONARY ANGIOPLASTY WITH STENT PLACEMENT  2017    EVERETT to LAD    DIALYSIS FISTULA CREATION Left 2023    LEFT RADIAL ARTERY TO CEPHALIC VEIN ARTERIOVENOUS FISTULA CREATION, POSSIBLE BRACHIAL ARTERY TO CEPHALIC VEIN ARTERIOVENOUS FISTULA CREATION performed by Dashawn Jain DO at [facility] OR    DIALYSIS FISTULA CREATION Left 2023    SUPERFICIALIZATION OF LEFT BRACHIOCEPHALIC ARTERIOVENOUS FISTULA performed by Dashawn Jain DO at [facility] OR    IR TUNNELED CVC PLACE WO SQ PORT/PUMP > 5 YEARS  2021    IR TUNNELED CATHETER PLACEMENT GREATER THAN 5 YEARS 2021 [facility] SPECIAL PROCEDURES    LUMBAR EPIDURAL INJECTION      2022    [facility]      TUNNELED VENOUS CATHETER PLACEMENT Right

## 2025-02-12 NOTE — CONSULTS
[facility]   CONSULTATION        Chief Complaint   Patient presents with    Shortness of Breath     Tested Covid positive 5 days ago. On dialysis Tues Thursday Saturday. YODIT fistula.  On RA saturating 75%, but him on 6L NC. Doesn't wear O2 at home. No CHF or COPD. Hx: AVR, on plavix            History of Present Illness:  The patient is 71 y.o. male with a past medical history significant for AS, s/p TAVR 5/18/21, CAD S/P PCI, PAF, CKD, HTN, CKD with Cr of 2.0 came into [facility] with chest pain started 4/15/17.     Presents with shortness of breath  Positive for influenza  No chest pain  Trop moderately elevated      Past Medical History:   has a past medical history of Arthritis, Bell's palsy, Cancer ([facility]), CHF (congestive heart failure) ([facility]), Coronary artery disease involving native coronary artery of native heart without angina pectoris, Hyperlipidemia, Hypertension, Influenza B, Kidney failure, Neuropathy, Nosebleed, Radiation, and Sciatica.    Surgical History:   has a past surgical history that includes Tunneled venous catheter placement (Right, 01/25/2021); IR TUNNELED CVC PLACE WO SQ PORT/PUMP > 5 YEARS (01/25/2021); Coronary angioplasty with stent (04/16/2017); Percutaneous Transluminal Coronary Angio; Cardiac catheterization (03/10/2021); Aortic valve replacement (N/A, 05/18/2021); Aortic valve replacement (N/A, 05/18/2021); Lumbar epidural injection; Dialysis fistula creation (Left, 03/22/2023); Colonoscopy; and Dialysis fistula creation (Left, 7/5/2023).     Social History:   reports that he quit smoking about 41 years ago. His smoking use included cigarettes. He started smoking about 61 years ago. He has a 10 pack-year smoking history. He has never used smokeless tobacco. He reports that he does not drink alcohol and does not use drugs.     Family History:  No family history of premature coronary artery disease, aortic disease, or valve disease.    Home Medications:  Were reviewed and are  listed in nursing record. and/or listed below  Prior to Admission medications    Medication Sig Start Date End Date Taking? Authorizing Provider   amiodarone (CORDARONE) 200 MG tablet TAKE 1 TABLET BY MOUTH EVERY DAY 12/23/24  Yes Preston Ag MD   clopidogrel (PLAVIX) 75 MG tablet TAKE 1 TABLET BY MOUTH EVERY DAY 12/2/24  Yes Preston Ag MD   B Complex-C-Folic Acid (DIALYVITE 800) 0.8 MG TABS Take 1 tablet by mouth nightly 2/10/23  Yes Gricelda Smith MD   calcium acetate 667 MG TABS Take 667 mg by mouth 3 times daily (with meals) 4/7/21  Yes Gricelda Smith MD   GLUCOSAMINE HCL PO Take 3,000 mg by mouth 2 times daily   Yes Gricelda Smith MD   metoprolol succinate (TOPROL XL) 25 MG extended release tablet Take 1 tablet by mouth daily  Patient taking differently: Take 1 tablet by mouth every evening 1/28/21  Yes Sofi Cobos Jr., MD   atorvastatin (LIPITOR) 20 MG tablet Take 1 tablet by mouth at bedtime   Yes Gricelda Smith MD   torsemide (DEMADEX) 100 MG tablet TAKE 1 TABLET BY MOUTH EVERY DAY ON NON DIALYSIS DAYS 3/18/23   Gricelda Smith MD        Current Medications:  Current Facility-Administered Medications   Medication Dose Route Frequency Provider Last Rate Last Admin    sodium chloride flush 0.9 % injection 5-40 mL  5-40 mL IntraVENous 2 times per day Neisha New MD        sodium chloride flush 0.9 % injection 5-40 mL  5-40 mL IntraVENous PRN Neisha New MD        0.9 % sodium chloride infusion   IntraVENous PRN Neisha New MD        heparin (porcine) injection 5,000 Units  5,000 Units SubCUTAneous 3 times per day Neisha New MD        ondansetron (ZOFRAN-ODT) disintegrating tablet 4 mg  4 mg Oral Q8H PRN Neisha New MD        Or    ondansetron (ZOFRAN) injection 4 mg  4 mg IntraVENous Q6H PRN Neisha New MD        polyethylene glycol (GLYCOLAX) packet 17 g  17 g Oral Daily PRN Neisha New MD        acetaminophen (TYLENOL) tablet 650 mg  650 mg Oral

## 2025-02-12 NOTE — PROGRESS NOTES
Medication Reconciliation    List of medications patient is currently taking is complete.     Source of information: 1. Conversation with patient at bedside                                      2. EPIC records         Notes regarding home medications:   1. Patient reports he hasn't had any home medications this morning      Andrea Novak [facility]   2/12/2025  12:19 PM

## 2025-02-13 LAB
25(OH)D3 SERPL-MCNC: 62.1 NG/ML
ALBUMIN SERPL-MCNC: 3.5 G/DL (ref 3.4–5)
ANION GAP SERPL CALCULATED.3IONS-SCNC: 16 MMOL/L (ref 3–16)
BASOPHILS # BLD: 0 K/UL (ref 0–0.2)
BASOPHILS NFR BLD: 0.5 %
BUN SERPL-MCNC: 57 MG/DL (ref 7–20)
CALCIUM SERPL-MCNC: 9.3 MG/DL (ref 8.3–10.6)
CHLORIDE SERPL-SCNC: 92 MMOL/L (ref 99–110)
CO2 SERPL-SCNC: 29 MMOL/L (ref 21–32)
CREAT SERPL-MCNC: 7.8 MG/DL (ref 0.8–1.3)
CRP SERPL-MCNC: 378 MG/L (ref 0–5.1)
DEPRECATED RDW RBC AUTO: 15.9 % (ref 12.4–15.4)
EOSINOPHIL # BLD: 0 K/UL (ref 0–0.6)
EOSINOPHIL NFR BLD: 0.1 %
GFR SERPLBLD CREATININE-BSD FMLA CKD-EPI: 7 ML/MIN/{1.73_M2}
GLUCOSE SERPL-MCNC: 137 MG/DL (ref 70–99)
HCT VFR BLD AUTO: 36.2 % (ref 40.5–52.5)
HGB BLD-MCNC: 12.2 G/DL (ref 13.5–17.5)
LYMPHOCYTES # BLD: 0.3 K/UL (ref 1–5.1)
LYMPHOCYTES NFR BLD: 5.3 %
MCH RBC QN AUTO: 33.8 PG (ref 26–34)
MCHC RBC AUTO-ENTMCNC: 33.6 G/DL (ref 31–36)
MCV RBC AUTO: 100.5 FL (ref 80–100)
MONOCYTES # BLD: 0.7 K/UL (ref 0–1.3)
MONOCYTES NFR BLD: 11.4 %
NEUTROPHILS # BLD: 4.9 K/UL (ref 1.7–7.7)
NEUTROPHILS NFR BLD: 82.7 %
PHOSPHATE SERPL-MCNC: 6.1 MG/DL (ref 2.5–4.9)
PLATELET # BLD AUTO: 243 K/UL (ref 135–450)
PMV BLD AUTO: 8.7 FL (ref 5–10.5)
POTASSIUM SERPL-SCNC: 3.9 MMOL/L (ref 3.5–5.1)
RBC # BLD AUTO: 3.61 M/UL (ref 4.2–5.9)
SODIUM SERPL-SCNC: 137 MMOL/L (ref 136–145)
WBC # BLD AUTO: 5.9 K/UL (ref 4–11)

## 2025-02-13 PROCEDURE — 6360000002 HC RX W HCPCS: Performed by: INTERNAL MEDICINE

## 2025-02-13 PROCEDURE — 6370000000 HC RX 637 (ALT 250 FOR IP): Performed by: INTERNAL MEDICINE

## 2025-02-13 PROCEDURE — 5A1D70Z PERFORMANCE OF URINARY FILTRATION, INTERMITTENT, LESS THAN 6 HOURS PER DAY: ICD-10-PCS | Performed by: INTERNAL MEDICINE

## 2025-02-13 PROCEDURE — 85025 COMPLETE CBC W/AUTO DIFF WBC: CPT

## 2025-02-13 PROCEDURE — 2700000000 HC OXYGEN THERAPY PER DAY

## 2025-02-13 PROCEDURE — 36415 COLL VENOUS BLD VENIPUNCTURE: CPT

## 2025-02-13 PROCEDURE — 90935 HEMODIALYSIS ONE EVALUATION: CPT

## 2025-02-13 PROCEDURE — 86140 C-REACTIVE PROTEIN: CPT

## 2025-02-13 PROCEDURE — 99232 SBSQ HOSP IP/OBS MODERATE 35: CPT | Performed by: NURSE PRACTITIONER

## 2025-02-13 PROCEDURE — 80069 RENAL FUNCTION PANEL: CPT

## 2025-02-13 PROCEDURE — 94640 AIRWAY INHALATION TREATMENT: CPT

## 2025-02-13 PROCEDURE — 82306 VITAMIN D 25 HYDROXY: CPT

## 2025-02-13 PROCEDURE — 1200000000 HC SEMI PRIVATE

## 2025-02-13 PROCEDURE — 94761 N-INVAS EAR/PLS OXIMETRY MLT: CPT

## 2025-02-13 PROCEDURE — 2500000003 HC RX 250 WO HCPCS: Performed by: INTERNAL MEDICINE

## 2025-02-13 PROCEDURE — 2580000003 HC RX 258: Performed by: INTERNAL MEDICINE

## 2025-02-13 RX ORDER — ALBUTEROL SULFATE 0.83 MG/ML
2.5 SOLUTION RESPIRATORY (INHALATION)
Status: DISCONTINUED | OUTPATIENT
Start: 2025-02-13 | End: 2025-02-13

## 2025-02-13 RX ORDER — AMIODARONE HYDROCHLORIDE 200 MG/1
200 TABLET ORAL DAILY
Status: DISCONTINUED | OUTPATIENT
Start: 2025-02-13 | End: 2025-02-21 | Stop reason: HOSPADM

## 2025-02-13 RX ORDER — CLOPIDOGREL BISULFATE 75 MG/1
75 TABLET ORAL DAILY
Status: DISCONTINUED | OUTPATIENT
Start: 2025-02-13 | End: 2025-02-21 | Stop reason: HOSPADM

## 2025-02-13 RX ORDER — ALBUTEROL SULFATE 0.83 MG/ML
2.5 SOLUTION RESPIRATORY (INHALATION)
Status: DISCONTINUED | OUTPATIENT
Start: 2025-02-13 | End: 2025-02-20

## 2025-02-13 RX ORDER — ATORVASTATIN CALCIUM 20 MG/1
20 TABLET, FILM COATED ORAL NIGHTLY
Status: DISCONTINUED | OUTPATIENT
Start: 2025-02-13 | End: 2025-02-21 | Stop reason: HOSPADM

## 2025-02-13 RX ORDER — METOPROLOL SUCCINATE 25 MG/1
25 TABLET, EXTENDED RELEASE ORAL EVERY EVENING
Status: DISCONTINUED | OUTPATIENT
Start: 2025-02-13 | End: 2025-02-21 | Stop reason: HOSPADM

## 2025-02-13 RX ADMIN — AMIODARONE HYDROCHLORIDE 200 MG: 200 TABLET ORAL at 09:54

## 2025-02-13 RX ADMIN — METOPROLOL SUCCINATE 25 MG: 25 TABLET, EXTENDED RELEASE ORAL at 17:43

## 2025-02-13 RX ADMIN — DEXAMETHASONE SODIUM PHOSPHATE 6 MG: 4 INJECTION, SOLUTION INTRAMUSCULAR; INTRAVENOUS at 11:27

## 2025-02-13 RX ADMIN — HEPARIN SODIUM 5000 UNITS: 5000 INJECTION INTRAVENOUS; SUBCUTANEOUS at 20:32

## 2025-02-13 RX ADMIN — WATER 1000 MG: 1 INJECTION INTRAMUSCULAR; INTRAVENOUS; SUBCUTANEOUS at 09:53

## 2025-02-13 RX ADMIN — AZITHROMYCIN MONOHYDRATE 250 MG: 500 INJECTION, POWDER, LYOPHILIZED, FOR SOLUTION INTRAVENOUS at 09:53

## 2025-02-13 RX ADMIN — GUAIFENESIN SYRUP AND DEXTROMETHORPHAN 5 ML: 100; 10 SYRUP ORAL at 05:08

## 2025-02-13 RX ADMIN — ALBUTEROL SULFATE 2.5 MG: 2.5 SOLUTION RESPIRATORY (INHALATION) at 21:28

## 2025-02-13 RX ADMIN — SODIUM CHLORIDE: 9 INJECTION, SOLUTION INTRAVENOUS at 09:53

## 2025-02-13 RX ADMIN — CLOPIDOGREL BISULFATE 75 MG: 75 TABLET ORAL at 09:54

## 2025-02-13 RX ADMIN — SODIUM CHLORIDE, PRESERVATIVE FREE 10 ML: 5 INJECTION INTRAVENOUS at 20:33

## 2025-02-13 RX ADMIN — ATORVASTATIN CALCIUM 20 MG: 20 TABLET, FILM COATED ORAL at 20:33

## 2025-02-13 RX ADMIN — HEPARIN SODIUM 5000 UNITS: 5000 INJECTION INTRAVENOUS; SUBCUTANEOUS at 05:05

## 2025-02-13 ASSESSMENT — PAIN SCALES - GENERAL: PAINLEVEL_OUTOF10: 0

## 2025-02-13 NOTE — PROGRESS NOTES
[facility] [place]   Daily Progress Note      Admit Date:  2/12/2025    CC: SOB    HPI:   Jeremías Bray is a 79 y.o. male with PMH AS s/p TAVR 2021, CAD s/p PCI 2017, PAF, CKD/HD, HTN, remote tobacco abuse.    Adm to W with influenza and COVID    Cardiology consulted for elevated troponin.  Seen by Dr Faye.   ECHO pending    Getting HD today   Remains on 5L NC  Tells me his SOB is improving  Cont with generalized fatigue  Denies CP    Review of Systems:   General: Denies fever, chills  RESP: Denies cough, sputum, wheeze, snoring  CARD: Denies palpitations,  murmur  GI:Denies nausea, vomiting, heartburn, loss of appetite, change in bowels  VASC: Denies claudication, leg cramps  NEURO: Denies numbness, tingling, weakness,change in mood or memory  HEME: Denies abn bruising, bleeding, anemia    Objective:   BP (!) 162/75   Pulse 71   Temp 98 °F (36.7 °C) (Oral)   Resp 18   Ht 1.753 m (5' 9\")   Wt (!) 141.7 kg (312 lb 6.3 oz)   SpO2 96%   BMI 46.13 kg/m²         Intake/Output Summary (Last 24 hours) at 2/13/2025 0815  Last data filed at 2/12/2025 2343  Gross per 24 hour   Intake 240 ml   Output --   Net 240 ml     I/O since adm:     WEIGHT:Admit Weight - Scale: (!) 143.4 kg (316 lb 2.2 oz)         Today  Weight - Scale: (!) 141.7 kg (312 lb 6.3 oz)   DRY WEIGHT:  Wt Readings from Last 3 Encounters:   02/13/25 (!) 141.7 kg (312 lb 6.3 oz)   11/10/23 (!) 147.4 kg (325 lb)   06/29/23 (!) 142.9 kg (315 lb)       Physical Exam:  GEN: Appears frail,, no acute distress  SKIN: Pink, warm, dry.   LUNG: AP diameter normal. Decreased bilateral.. Respiratory effort increased.  HEART: S1S2 A/R. No JVD. No carotid bruit. No murmur, rub or gallop.  ABD: Soft, nontender. +BS X 4 quads. No hepatomegaly.   EXT: Radial and pedal pulses 2+ and symmetric. Without varicosities. + BLE edema.  MUSCSKEL: Good ROM X4 extremities. No deformity.   NEURO: A/O X3. Calm and cooperative.     Telemetry: NSR    Medications:    albuterol

## 2025-02-13 NOTE — PROGRESS NOTES
V2.0    [facility] [place]      Name:  Jeremías Bray /Age/Sex: 1945  (79 y.o. male)   MRN & CSN:  [id number] & [id number] Encounter Date/Time: 2025 12:21 PM EST   Location:  [id number] PCP: Diogo Maldonado MD     Attending:Neisha New MD       Hospital Day: 2    Assessment and Recommendations   Jeremías Bray is a 79 y.o. male with a pmh of aortic stenosis status post TAVR, CAD status post PCI, paroxysmal A-fib, end-stage renal disease on dialysis, hypertension who presents with shortness of breath.  Patient recently tested COVID-19 positive at home.    Plan:   Acute hypoxic respiratory failure due viral and superimposed bacterial pneumonia.  Continue treating underlying etiologies.  Patient does not use oxygen at home but here still on oxygen.  Likely require home oxygen evaluation on discharge.  COVID-19 pneumonia, continue Decadron and COVID isolation  RSV respiratory infection, continue supportive care  Haemophilus influenza upper respiratory infection, continue ceftriaxone  History of paroxysmal A-fib, cont metoprolol, amiodarone.  Patient is not on anticoagulation.  CAD cont aspirin, Plavix, Lipitor, beta-blocker  End-stage renal disease, continue dialysis per nephrology  Elevated troponin, abnormalities on EKG, patient seen by cardiology likely demand ischemia no further cardiac Workup recommended  History of prosthetic aortic valve, due for surveillance echo, ordered to be done      Diet ADULT DIET; Regular   DVT Prophylaxis [] Lovenox, [x]  Heparin, [] SCDs, [] Ambulation,  [] Eliquis, [] Xarelto  [] Coumadin   Code Status Full Code             Personally reviewed Lab Studies and Imaging   EKG rhythm strip reviewed showing sinus rhythm      Medical Decision Making:  The following items were considered in medical decision making:  Discussion of patient care with other providers  Reviewed clinical lab tests  Reviewed radiology tests  Reviewed other diagnostic

## 2025-02-13 NOTE — PROGRESS NOTES
Treatment time: 3 hours and 45 minutes    Net UF: 4 liters doctor Amrik was aware of it. Patient had crackles as well as  on 02 at 6 lpm.    Pre weight: 141.7 kg  Post weight: 137.7 kg  EDW: 140 kg    Access used: Left AVF  Access function:  Access site located on the Right upper arm had good bruit and thrill. NO signs of infection noted. NO redness, hematoma, nor swelling was observed. NO discharges was seen. Cleaned site aseptically and cannulated. without any problem.    Medications or blood products given: none    Regular outpatient schedule: Tuesday. Thursday, Saturday.    Summary of response to treatment: 12:20: Treatment set at 4 liters for 3 hours and 45 minutes via Right AVF.as ordered. Access site located on the Right upper arm had good bruit and thrill. NO signs of infection noted. NO redness, hematoma, nor swelling was observed. NO discharges was seen. Cleaned site aseptically and cannulated. without any problem. Parameters set accordingly. Hooked to HD machine. Monitored from time to time. 16:08: Treatment completed. Returned blood aseptically. HD tolerated well.     Copy of dialysis treatment record placed in chart, to be scanned into EMR.

## 2025-02-13 NOTE — PROGRESS NOTES
Patient states he did not receive any of his home medications. Message sent to NP on call regarding BP and if ok to resume home medications. Order received for 1x dose of hydralazine

## 2025-02-13 NOTE — PROGRESS NOTES
Nephrology Progress Note   [facility]      Reason for consultation: ESRD on HD TTS     History of Present Illness: Jeremías Bray is a 78 yo male with a PMHx of ESRD, CHF, HTN, HLD, cancer, OA. Patient presents to  ED on 2025 with complaints of SOB. He tested positive for COVID-19 5 days ago. Currently on 5 L NC. CXR reveals multifocal airspace opacities. He is receiving azithromycin, rocephin, and decadron.     We have been consulted for ESRD on HD management. Last HD was yesterday. No acute electrolyte derangements noted. 3.4 kg above DW of 140 kg per weight obtained.     Subjective:    The patient has been seen and examined. Labs and chart reviewed. Sitting up in chair on 5 L NC. HD today with 140 kg DW.     There were not complications overnight.    Patient review of systems: Improved SOB and cough      Allergies:  Allergies   Allergen Reactions    No Known Allergies         Scheduled Meds:   albuterol  2.5 mg Nebulization Q4H WA RT    amiodarone  200 mg Oral Daily    atorvastatin  20 mg Oral Nightly    clopidogrel  75 mg Oral Daily    metoprolol succinate  25 mg Oral QPM    sodium chloride flush  5-40 mL IntraVENous 2 times per day    heparin (porcine)  5,000 Units SubCUTAneous 3 times per day    azithromycin  250 mg IntraVENous Q24H    cefTRIAXone (ROCEPHIN) IV  1,000 mg IntraVENous Q24H    dexAMETHasone  6 mg IntraVENous Q24H    torsemide  100 mg Oral Once per day on         sodium chloride 5 mL/hr at 25 0953       PRN Meds:ipratropium, sodium chloride flush, sodium chloride, ondansetron **OR** ondansetron, polyethylene glycol, acetaminophen **OR** acetaminophen, benzonatate, guaiFENesin-dextromethorphan    Physical Exam:    TEMPERATURE:  Current - Temp: 97.7 °F (36.5 °C); Max - Temp  Av.2 °F (36.8 °C)  Min: 97.7 °F (36.5 °C)  Max: 98.6 °F (37 °C)  RESPIRATIONS RANGE: Resp  Av.8  Min: 18  Max: 26  PULSE RANGE: Pulse  Av.6  Min: 63  Max: 75  BLOOD

## 2025-02-13 NOTE — PROGRESS NOTES
Patient is coughing a lot more this morning and lungs sound very wet.Patient will be going to dialysis this morning- robitussin given and message sent to MD Thakkar for possible breathing treatments

## 2025-02-13 NOTE — CARE COORDINATION
Chart review done, rounds completed. Likely dc tomorrow. Watching O2 needs.    Dillon Cowan LMSW, [facility] Social Work Case Management   Phone: [phone]  Fax: [phone]

## 2025-02-13 NOTE — PLAN OF CARE
Problem: Chronic Conditions and Co-morbidities  Goal: Patient's chronic conditions and co-morbidity symptoms are monitored and maintained or improved  2/13/2025 1027 by Renae Gutierrez RN  Outcome: Progressing  2/13/2025 0448 by Chantal Porras RN  Outcome: Progressing     Problem: Discharge Planning  Goal: Discharge to home or other facility with appropriate resources  2/13/2025 1027 by Renae Gutierrez RN  Outcome: Progressing  2/13/2025 0448 by Chantal Porras RN  Outcome: Progressing     Problem: Safety - Adult  Goal: Free from fall injury  2/13/2025 1027 by Renae Gutierrez RN  Outcome: Progressing  2/13/2025 0448 by Chantal Porras RN  Outcome: Progressing     Problem: Pain  Goal: Verbalizes/displays adequate comfort level or baseline comfort level  Outcome: Progressing

## 2025-02-13 NOTE — PLAN OF CARE
Problem: Chronic Conditions and Co-morbidities  Goal: Patient's chronic conditions and co-morbidity symptoms are monitored and maintained or improved  2/13/2025 0448 by Chantal Porras, RN  Outcome: Progressing     Problem: Discharge Planning  Goal: Discharge to home or other facility with appropriate resources  Outcome: Progressing     Problem: Safety - Adult  Goal: Free from fall injury  Outcome: Progressing

## 2025-02-14 ENCOUNTER — APPOINTMENT (OUTPATIENT)
Age: 80
End: 2025-02-14
Attending: STUDENT IN AN ORGANIZED HEALTH CARE EDUCATION/TRAINING PROGRAM
Payer: MEDICARE

## 2025-02-14 LAB
ECHO AO ROOT DIAM: 2.7 CM
ECHO AO ROOT INDEX: 1.09 CM/M2
ECHO AV AREA PEAK VELOCITY: 1.8 CM2
ECHO AV AREA VTI: 1.8 CM2
ECHO AV AREA/BSA PEAK VELOCITY: 0.7 CM2/M2
ECHO AV AREA/BSA VTI: 0.7 CM2/M2
ECHO AV MEAN GRADIENT: 27 MMHG
ECHO AV MEAN VELOCITY: 2.6 M/S
ECHO AV PEAK GRADIENT: 43 MMHG
ECHO AV PEAK VELOCITY: 3.3 M/S
ECHO AV VELOCITY RATIO: 0.48
ECHO AV VTI: 73.8 CM
ECHO BSA: 2.64 M2
ECHO LA AREA 2C: 24.1 CM2
ECHO LA AREA 4C: 21 CM2
ECHO LA DIAMETER INDEX: 2.42 CM/M2
ECHO LA DIAMETER: 6 CM
ECHO LA MAJOR AXIS: 5.7 CM
ECHO LA MINOR AXIS: 5.6 CM
ECHO LA TO AORTIC ROOT RATIO: 2.22
ECHO LA VOL BP: 71 ML (ref 18–58)
ECHO LA VOL MOD A2C: 80 ML (ref 18–58)
ECHO LA VOL MOD A4C: 64 ML (ref 18–58)
ECHO LA VOL/BSA BIPLANE: 29 ML/M2 (ref 16–34)
ECHO LA VOLUME INDEX MOD A2C: 32 ML/M2 (ref 16–34)
ECHO LA VOLUME INDEX MOD A4C: 26 ML/M2 (ref 16–34)
ECHO LV E' LATERAL VELOCITY: 9.81 CM/S
ECHO LV E' SEPTAL VELOCITY: 7.83 CM/S
ECHO LV EF PHYSICIAN: 53 %
ECHO LV FRACTIONAL SHORTENING: 45 % (ref 28–44)
ECHO LV INTERNAL DIMENSION DIASTOLE INDEX: 2.5 CM/M2
ECHO LV INTERNAL DIMENSION DIASTOLIC: 6.2 CM (ref 4.2–5.9)
ECHO LV INTERNAL DIMENSION SYSTOLIC INDEX: 1.37 CM/M2
ECHO LV INTERNAL DIMENSION SYSTOLIC: 3.4 CM
ECHO LV IVSD: 1.5 CM (ref 0.6–1)
ECHO LV MASS 2D: 450.2 G (ref 88–224)
ECHO LV MASS INDEX 2D: 181.5 G/M2 (ref 49–115)
ECHO LV POSTERIOR WALL DIASTOLIC: 1.5 CM (ref 0.6–1)
ECHO LV RELATIVE WALL THICKNESS RATIO: 0.48
ECHO LVOT AREA: 3.8 CM2
ECHO LVOT AV VTI INDEX: 0.49
ECHO LVOT DIAM: 2.2 CM
ECHO LVOT MEAN GRADIENT: 7 MMHG
ECHO LVOT PEAK GRADIENT: 10 MMHG
ECHO LVOT PEAK VELOCITY: 1.6 M/S
ECHO LVOT STROKE VOLUME INDEX: 55.2 ML/M2
ECHO LVOT SV: 136.8 ML
ECHO LVOT VTI: 36 CM
ECHO MV A VELOCITY: 1.56 M/S
ECHO MV AREA VTI: 3.3 CM2
ECHO MV E DECELERATION TIME (DT): 166 MS
ECHO MV E VELOCITY: 1.28 M/S
ECHO MV E/A RATIO: 0.82
ECHO MV E/E' LATERAL: 13.05
ECHO MV E/E' RATIO (AVERAGED): 14.7
ECHO MV E/E' SEPTAL: 16.35
ECHO MV LVOT VTI INDEX: 1.14
ECHO MV MAX VELOCITY: 1.6 M/S
ECHO MV MEAN GRADIENT: 4 MMHG
ECHO MV MEAN VELOCITY: 1 M/S
ECHO MV PEAK GRADIENT: 10 MMHG
ECHO MV VTI: 41.1 CM
ECHO PV MAX VELOCITY: 1.1 M/S
ECHO PV PEAK GRADIENT: 5 MMHG
ECHO RA AREA 4C: 14.2 CM2
ECHO RA END SYSTOLIC VOLUME APICAL 4 CHAMBER INDEX BSA: 15 ML/M2
ECHO RA VOLUME: 38 ML
ECHO RV BASAL DIMENSION: 3.8 CM
ECHO RV FREE WALL PEAK S': 10.1 CM/S
ECHO RV INTERNAL DIMENSION: 4.2 CM
ECHO RV TAPSE: 2.1 CM (ref 1.7–?)
ECHO TV REGURGITANT MAX VELOCITY: 2.95 M/S
ECHO TV REGURGITANT PEAK GRADIENT: 35 MMHG

## 2025-02-14 PROCEDURE — 9990000010 HC NO CHARGE VISIT

## 2025-02-14 PROCEDURE — C8929 TTE W OR WO FOL WCON,DOPPLER: HCPCS

## 2025-02-14 PROCEDURE — 94640 AIRWAY INHALATION TREATMENT: CPT

## 2025-02-14 PROCEDURE — 2500000003 HC RX 250 WO HCPCS: Performed by: INTERNAL MEDICINE

## 2025-02-14 PROCEDURE — 1200000000 HC SEMI PRIVATE

## 2025-02-14 PROCEDURE — 2500000003 HC RX 250 WO HCPCS: Performed by: HOSPITALIST

## 2025-02-14 PROCEDURE — 93306 TTE W/DOPPLER COMPLETE: CPT | Performed by: STUDENT IN AN ORGANIZED HEALTH CARE EDUCATION/TRAINING PROGRAM

## 2025-02-14 PROCEDURE — 6360000002 HC RX W HCPCS: Performed by: INTERNAL MEDICINE

## 2025-02-14 PROCEDURE — 6370000000 HC RX 637 (ALT 250 FOR IP): Performed by: INTERNAL MEDICINE

## 2025-02-14 PROCEDURE — 94761 N-INVAS EAR/PLS OXIMETRY MLT: CPT

## 2025-02-14 PROCEDURE — 6360000004 HC RX CONTRAST MEDICATION: Performed by: STUDENT IN AN ORGANIZED HEALTH CARE EDUCATION/TRAINING PROGRAM

## 2025-02-14 PROCEDURE — 2580000003 HC RX 258: Performed by: INTERNAL MEDICINE

## 2025-02-14 PROCEDURE — 2700000000 HC OXYGEN THERAPY PER DAY

## 2025-02-14 RX ORDER — GLUCOSAMINE/CHONDR SU A SOD 750-600 MG
3000 TABLET ORAL 2 TIMES DAILY
Status: DISCONTINUED | OUTPATIENT
Start: 2025-02-14 | End: 2025-02-14 | Stop reason: RX

## 2025-02-14 RX ORDER — CALCIUM ACETATE 667 MG/1
667 CAPSULE ORAL
Status: DISCONTINUED | OUTPATIENT
Start: 2025-02-14 | End: 2025-02-21 | Stop reason: HOSPADM

## 2025-02-14 RX ADMIN — ALBUTEROL SULFATE 2.5 MG: 2.5 SOLUTION RESPIRATORY (INHALATION) at 16:13

## 2025-02-14 RX ADMIN — AMIODARONE HYDROCHLORIDE 200 MG: 200 TABLET ORAL at 08:29

## 2025-02-14 RX ADMIN — DEXAMETHASONE SODIUM PHOSPHATE 6 MG: 4 INJECTION, SOLUTION INTRAMUSCULAR; INTRAVENOUS at 12:15

## 2025-02-14 RX ADMIN — ATORVASTATIN CALCIUM 20 MG: 20 TABLET, FILM COATED ORAL at 21:07

## 2025-02-14 RX ADMIN — METOPROLOL SUCCINATE 25 MG: 25 TABLET, EXTENDED RELEASE ORAL at 17:13

## 2025-02-14 RX ADMIN — WATER 1000 MG: 1 INJECTION INTRAMUSCULAR; INTRAVENOUS; SUBCUTANEOUS at 08:30

## 2025-02-14 RX ADMIN — SULFUR HEXAFLUORIDE 2 ML: 60.7; .19; .19 INJECTION, POWDER, LYOPHILIZED, FOR SUSPENSION INTRAVENOUS; INTRAVESICAL at 14:42

## 2025-02-14 RX ADMIN — ALBUTEROL SULFATE 2.5 MG: 2.5 SOLUTION RESPIRATORY (INHALATION) at 12:02

## 2025-02-14 RX ADMIN — HEPARIN SODIUM 5000 UNITS: 5000 INJECTION INTRAVENOUS; SUBCUTANEOUS at 21:06

## 2025-02-14 RX ADMIN — ALBUTEROL SULFATE 2.5 MG: 2.5 SOLUTION RESPIRATORY (INHALATION) at 20:01

## 2025-02-14 RX ADMIN — CALCIUM ACETATE 667 MG: 667 CAPSULE ORAL at 08:29

## 2025-02-14 RX ADMIN — SODIUM CHLORIDE, PRESERVATIVE FREE 10 ML: 5 INJECTION INTRAVENOUS at 21:07

## 2025-02-14 RX ADMIN — CALCIUM ACETATE 667 MG: 667 CAPSULE ORAL at 12:15

## 2025-02-14 RX ADMIN — AZITHROMYCIN MONOHYDRATE 250 MG: 500 INJECTION, POWDER, LYOPHILIZED, FOR SOLUTION INTRAVENOUS at 10:07

## 2025-02-14 RX ADMIN — HEPARIN SODIUM 5000 UNITS: 5000 INJECTION INTRAVENOUS; SUBCUTANEOUS at 05:51

## 2025-02-14 RX ADMIN — SODIUM CHLORIDE, PRESERVATIVE FREE 10 ML: 5 INJECTION INTRAVENOUS at 08:30

## 2025-02-14 RX ADMIN — ALBUTEROL SULFATE 2.5 MG: 2.5 SOLUTION RESPIRATORY (INHALATION) at 08:59

## 2025-02-14 RX ADMIN — HEPARIN SODIUM 5000 UNITS: 5000 INJECTION INTRAVENOUS; SUBCUTANEOUS at 15:09

## 2025-02-14 RX ADMIN — CLOPIDOGREL BISULFATE 75 MG: 75 TABLET ORAL at 08:29

## 2025-02-14 RX ADMIN — CALCIUM ACETATE 667 MG: 667 CAPSULE ORAL at 17:13

## 2025-02-14 RX ADMIN — WATER 1000 MG: 1 INJECTION INTRAMUSCULAR; INTRAVENOUS; SUBCUTANEOUS at 15:09

## 2025-02-14 ASSESSMENT — PAIN SCALES - GENERAL: PAINLEVEL_OUTOF10: 0

## 2025-02-14 NOTE — PROGRESS NOTES
Physical Therapy  Initial Evaluation Attempt and Discharge    25    Name: Jeremías Bray   : 1945    MRN: [id number]    PT order noted. Patient moving independently in room. Denies any therapy concerns at this time. PT signing off.    Electronically signed by Conrado Barba PT on 2025 at 2:05 PM

## 2025-02-14 NOTE — PROGRESS NOTES
Department of Pharmacy  Herbal Policy    [facility] recognizes that herbal/dietary supplements are used by the public and health care professionals.  Herbal/dietary supplements fall under the definition of \"approaches to medical diagnosis and therapy that have not been developed by use of generally accepted methods of validating their effectiveness.\"  [facility] further recognizes that these products are not regulated by the Food and Drug Administration (FDA) as medications but instead viewed as dietary supplements.    The Department of Pharmacy does not provide herbal/dietary supplements for use.  Herbal/dietary supplements are not included on the formulary due to lack of safety and efficacy data.  Due to limited and conflicting literature on herbal/dietary supplements, pharmacy is not able to review herbal/natural products for drug-natural product interactions or disease-natural product interactions.  Due to the lack of safety and efficacy data, [facility] believes the risk of adverse medication events outweighs the benefit of allowing the patient to use their home supply of herbal/natural products.    Per hospital policy, the following herbal/dietary supplement(s) will be held during this hospitalization: Glucosamine    If there are any questions or concerns related to the use of herbal/dietary supplements, please do not hesitate to contact the pharmacy.    Sravani Mcwilliams RPH, PharmD 2/14/2025 5:58 PM

## 2025-02-14 NOTE — PROGRESS NOTES
Nephrology Progress Note   [facility]      Reason for consultation: ESRD on HD TTS     History of Present Illness: Jeremías Bray is a 80 yo male with a PMHx of ESRD, CHF, HTN, HLD, cancer, OA. Patient presents to  ED on 2025 with complaints of SOB. He tested positive for COVID-19 5 days ago. Currently on 5 L NC. CXR reveals multifocal airspace opacities. He is receiving azithromycin, rocephin, and decadron.     We have been consulted for ESRD on HD management. Last HD was yesterday. No acute electrolyte derangements noted. 3.4 kg above DW of 140 kg per weight obtained.     Subjective:    The patient has been seen and examined. Labs and chart reviewed. Sitting up in chair on 4 L NC. Received HD yesterday with net 4 L UF -- states this did not improve breathing    There were not complications overnight.    Patient review of systems: SOB and cough      Allergies:  Allergies   Allergen Reactions    No Known Allergies         Scheduled Meds:   calcium acetate  667 mg Oral TID WC    albuterol  2.5 mg Nebulization Q4H WA RT    amiodarone  200 mg Oral Daily    atorvastatin  20 mg Oral Nightly    clopidogrel  75 mg Oral Daily    metoprolol succinate  25 mg Oral QPM    sodium chloride flush  5-40 mL IntraVENous 2 times per day    heparin (porcine)  5,000 Units SubCUTAneous 3 times per day    cefTRIAXone (ROCEPHIN) IV  1,000 mg IntraVENous Q24H    dexAMETHasone  6 mg IntraVENous Q24H    torsemide  100 mg Oral Once per day on         sodium chloride 5 mL/hr at 25 0953       PRN Meds:ipratropium, sodium chloride flush, sodium chloride, ondansetron **OR** ondansetron, polyethylene glycol, acetaminophen **OR** acetaminophen, benzonatate, guaiFENesin-dextromethorphan    Physical Exam:    TEMPERATURE:  Current - Temp: 98 °F (36.7 °C); Max - Temp  Av.8 °F (36.6 °C)  Min: 97.6 °F (36.4 °C)  Max: 98 °F (36.7 °C)  RESPIRATIONS RANGE: Resp  Av.7  Min: 16  Max: 22  PULSE RANGE: Pulse

## 2025-02-14 NOTE — PROGRESS NOTES
Occupational Therapy Attempt/Discharge  Jeremías Bray    OT order noted and attempted to see pt for initial eval. Pt seated in recliner and denies need for therapy d/t being independent in the room. No concerns for home. Will sign off.    Electronically signed by Gwendolyn Cornejo OT on 2/14/25 at 1:58 PM EST

## 2025-02-14 NOTE — CARE COORDINATION
Chart review done, rounds completed. Likely dc tomorrow. [facility]   Will need home o2 yanet Cowan LMSW, [facility] Social Work Case Management   Phone: [phone]  Fax: [phone]

## 2025-02-14 NOTE — PLAN OF CARE
Problem: Chronic Conditions and Co-morbidities  Goal: Patient's chronic conditions and co-morbidity symptoms are monitored and maintained or improved  2/14/2025 1106 by Lopez Perera RN  Outcome: Progressing  2/14/2025 0047 by Susana Roman RN  Outcome: Progressing     Problem: Discharge Planning  Goal: Discharge to home or other facility with appropriate resources  2/14/2025 1106 by Lopez Perera RN  Outcome: Progressing  2/14/2025 0047 by Susana Roman RN  Outcome: Progressing     Problem: Safety - Adult  Goal: Free from fall injury  2/14/2025 1106 by Lopez Perera RN  Outcome: Progressing  2/14/2025 0047 by Susana Roman RN  Outcome: Progressing     Problem: Pain  Goal: Verbalizes/displays adequate comfort level or baseline comfort level  2/14/2025 1106 by Lopez Perera RN  Outcome: Progressing  2/14/2025 0047 by Susana Roman RN  Outcome: Progressing

## 2025-02-14 NOTE — PROGRESS NOTES
V2.0    [facility] [place]      Name:  Jeremías Bray /Age/Sex: 1945  (79 y.o. male)   MRN & CSN:  [id number] & [id number] Encounter Date/Time: 2025 12:21 PM EST   Location:  [id number] PCP: Diogo Maldonado MD     Attending:Neisha New MD       Hospital Day: 3    Assessment and Recommendations   Jeremías Bray is a 79 y.o. male with a pmh of aortic stenosis status post TAVR, CAD status post PCI, paroxysmal A-fib, end-stage renal disease on dialysis, hypertension who presents with shortness of breath.  Patient recently tested COVID-19 positive at home.    Plan:   Acute hypoxic respiratory failure due viral and superimposed bacterial pneumonia.  Continue treating underlying etiologies.  Patient does not use oxygen at home.  Likely require home oxygen evaluation on discharge.  On 2 L nasal cannula oxygen.  COVID-19 pneumonia, continue Decadron and COVID isolation  RSV respiratory infection, continue supportive care  Haemophilus influenza upper respiratory infection, tested positive for pneumonia molecular panel.  Continue ceftriaxone.  Total 5 days of antibiotics planned.   History of paroxysmal A-fib, cont metoprolol, amiodarone.  Patient is not on anticoagulation.  CAD cont aspirin, Plavix, Lipitor, beta-blocker  End-stage renal disease, continue dialysis per nephrology  Elevated troponin, abnormalities on EKG, patient seen by cardiology likely demand ischemia no further cardiac Workup recommended  History of prosthetic aortic valve, due for surveillance echo, completed results pending.      Diet ADULT DIET; Regular; Low Phosphorus (Less than 1000 mg)   DVT Prophylaxis [] Lovenox, [x]  Heparin, [] SCDs, [] Ambulation,  [] Eliquis, [] Xarelto  [] Coumadin   Code Status Full Code             Personally reviewed Lab Studies and Imaging   EKG rhythm strip reviewed showing sinus rhythm      Medical Decision Making:  The following items were considered in medical decision making:  Discussion of patient

## 2025-02-15 LAB
ALBUMIN SERPL-MCNC: 3.6 G/DL (ref 3.4–5)
ANION GAP SERPL CALCULATED.3IONS-SCNC: 17 MMOL/L (ref 3–16)
ANION GAP SERPL CALCULATED.3IONS-SCNC: 18 MMOL/L (ref 3–16)
BASOPHILS # BLD: 0 K/UL (ref 0–0.2)
BASOPHILS NFR BLD: 0.2 %
BUN SERPL-MCNC: 70 MG/DL (ref 7–20)
BUN SERPL-MCNC: 71 MG/DL (ref 7–20)
CALCIUM SERPL-MCNC: 9.5 MG/DL (ref 8.3–10.6)
CALCIUM SERPL-MCNC: 9.8 MG/DL (ref 8.3–10.6)
CHLORIDE SERPL-SCNC: 89 MMOL/L (ref 99–110)
CHLORIDE SERPL-SCNC: 90 MMOL/L (ref 99–110)
CO2 SERPL-SCNC: 24 MMOL/L (ref 21–32)
CO2 SERPL-SCNC: 26 MMOL/L (ref 21–32)
CREAT SERPL-MCNC: 7.8 MG/DL (ref 0.8–1.3)
CREAT SERPL-MCNC: 8 MG/DL (ref 0.8–1.3)
DEPRECATED RDW RBC AUTO: 15.8 % (ref 12.4–15.4)
EOSINOPHIL # BLD: 0 K/UL (ref 0–0.6)
EOSINOPHIL NFR BLD: 0 %
GFR SERPLBLD CREATININE-BSD FMLA CKD-EPI: 6 ML/MIN/{1.73_M2}
GFR SERPLBLD CREATININE-BSD FMLA CKD-EPI: 7 ML/MIN/{1.73_M2}
GLUCOSE SERPL-MCNC: 118 MG/DL (ref 70–99)
GLUCOSE SERPL-MCNC: 147 MG/DL (ref 70–99)
HCT VFR BLD AUTO: 37 % (ref 40.5–52.5)
HGB BLD-MCNC: 12.5 G/DL (ref 13.5–17.5)
LYMPHOCYTES # BLD: 0.3 K/UL (ref 1–5.1)
LYMPHOCYTES NFR BLD: 3.3 %
MCH RBC QN AUTO: 33.7 PG (ref 26–34)
MCHC RBC AUTO-ENTMCNC: 33.9 G/DL (ref 31–36)
MCV RBC AUTO: 99.4 FL (ref 80–100)
MONOCYTES # BLD: 0.5 K/UL (ref 0–1.3)
MONOCYTES NFR BLD: 5.8 %
NEUTROPHILS # BLD: 8.5 K/UL (ref 1.7–7.7)
NEUTROPHILS NFR BLD: 90.7 %
PHOSPHATE SERPL-MCNC: 6.5 MG/DL (ref 2.5–4.9)
PLATELET # BLD AUTO: 255 K/UL (ref 135–450)
PMV BLD AUTO: 8.5 FL (ref 5–10.5)
POTASSIUM SERPL-SCNC: 4.6 MMOL/L (ref 3.5–5.1)
POTASSIUM SERPL-SCNC: 5 MMOL/L (ref 3.5–5.1)
RBC # BLD AUTO: 3.72 M/UL (ref 4.2–5.9)
SODIUM SERPL-SCNC: 132 MMOL/L (ref 136–145)
SODIUM SERPL-SCNC: 132 MMOL/L (ref 136–145)
WBC # BLD AUTO: 9.3 K/UL (ref 4–11)

## 2025-02-15 PROCEDURE — 80069 RENAL FUNCTION PANEL: CPT

## 2025-02-15 PROCEDURE — 6360000002 HC RX W HCPCS: Performed by: INTERNAL MEDICINE

## 2025-02-15 PROCEDURE — 94761 N-INVAS EAR/PLS OXIMETRY MLT: CPT

## 2025-02-15 PROCEDURE — 85025 COMPLETE CBC W/AUTO DIFF WBC: CPT

## 2025-02-15 PROCEDURE — 2500000003 HC RX 250 WO HCPCS: Performed by: HOSPITALIST

## 2025-02-15 PROCEDURE — 2700000000 HC OXYGEN THERAPY PER DAY

## 2025-02-15 PROCEDURE — 90935 HEMODIALYSIS ONE EVALUATION: CPT

## 2025-02-15 PROCEDURE — 1200000000 HC SEMI PRIVATE

## 2025-02-15 PROCEDURE — 94640 AIRWAY INHALATION TREATMENT: CPT

## 2025-02-15 PROCEDURE — 2580000003 HC RX 258: Performed by: INTERNAL MEDICINE

## 2025-02-15 PROCEDURE — 6370000000 HC RX 637 (ALT 250 FOR IP): Performed by: INTERNAL MEDICINE

## 2025-02-15 PROCEDURE — 2500000003 HC RX 250 WO HCPCS: Performed by: INTERNAL MEDICINE

## 2025-02-15 PROCEDURE — 36415 COLL VENOUS BLD VENIPUNCTURE: CPT

## 2025-02-15 RX ORDER — ALBUTEROL SULFATE 0.83 MG/ML
SOLUTION RESPIRATORY (INHALATION)
Status: DISPENSED
Start: 2025-02-15 | End: 2025-02-16

## 2025-02-15 RX ADMIN — CALCIUM ACETATE 667 MG: 667 CAPSULE ORAL at 08:51

## 2025-02-15 RX ADMIN — CEFTRIAXONE SODIUM 2000 MG: 2 INJECTION, POWDER, FOR SOLUTION INTRAMUSCULAR; INTRAVENOUS at 09:38

## 2025-02-15 RX ADMIN — SODIUM CHLORIDE, PRESERVATIVE FREE 10 ML: 5 INJECTION INTRAVENOUS at 20:47

## 2025-02-15 RX ADMIN — METOPROLOL SUCCINATE 25 MG: 25 TABLET, EXTENDED RELEASE ORAL at 18:33

## 2025-02-15 RX ADMIN — AMIODARONE HYDROCHLORIDE 200 MG: 200 TABLET ORAL at 08:51

## 2025-02-15 RX ADMIN — HEPARIN SODIUM 5000 UNITS: 5000 INJECTION INTRAVENOUS; SUBCUTANEOUS at 05:34

## 2025-02-15 RX ADMIN — HEPARIN SODIUM 5000 UNITS: 5000 INJECTION INTRAVENOUS; SUBCUTANEOUS at 20:45

## 2025-02-15 RX ADMIN — SODIUM CHLORIDE, PRESERVATIVE FREE 10 ML: 5 INJECTION INTRAVENOUS at 13:27

## 2025-02-15 RX ADMIN — GUAIFENESIN SYRUP AND DEXTROMETHORPHAN 5 ML: 100; 10 SYRUP ORAL at 18:34

## 2025-02-15 RX ADMIN — CLOPIDOGREL BISULFATE 75 MG: 75 TABLET ORAL at 08:51

## 2025-02-15 RX ADMIN — CALCIUM ACETATE 667 MG: 667 CAPSULE ORAL at 18:33

## 2025-02-15 RX ADMIN — HEPARIN SODIUM 5000 UNITS: 5000 INJECTION INTRAVENOUS; SUBCUTANEOUS at 13:20

## 2025-02-15 RX ADMIN — ATORVASTATIN CALCIUM 20 MG: 20 TABLET, FILM COATED ORAL at 20:45

## 2025-02-15 RX ADMIN — DEXAMETHASONE SODIUM PHOSPHATE 6 MG: 4 INJECTION, SOLUTION INTRAMUSCULAR; INTRAVENOUS at 13:19

## 2025-02-15 RX ADMIN — GUAIFENESIN SYRUP AND DEXTROMETHORPHAN 5 ML: 100; 10 SYRUP ORAL at 10:10

## 2025-02-15 RX ADMIN — ALBUTEROL SULFATE 2.5 MG: 2.5 SOLUTION RESPIRATORY (INHALATION) at 08:42

## 2025-02-15 RX ADMIN — CALCIUM ACETATE 667 MG: 667 CAPSULE ORAL at 11:48

## 2025-02-15 NOTE — PROGRESS NOTES
Nephrology Progress Note   [facility]      Reason for consultation: ESRD on HD TTS     History of Present Illness: Jeremías Bray is a 80 yo male with a PMHx of ESRD, CHF, HTN, HLD, cancer, OA. Patient presents to  ED on 2025 with complaints of SOB. He tested positive for COVID-19 5 days ago. Currently on 5 L NC. CXR reveals multifocal airspace opacities. He is receiving azithromycin, rocephin, and decadron.     We have been consulted for ESRD on HD management. Last HD was yesterday. No acute electrolyte derangements noted. 3.4 kg above DW of 140 kg per weight obtained.     Subjective:    The patient has been seen and examined. Labs and chart reviewed. Sitting up in chair on 4 L NC.   There were not complications overnight.  Patient review of systems: SOB and cough      Allergies:  Allergies   Allergen Reactions    No Known Allergies         Scheduled Meds:   [START ON 2025] cefTRIAXone (ROCEPHIN) IV  2,000 mg IntraVENous Q24H    calcium acetate  667 mg Oral TID WC    albuterol  2.5 mg Nebulization Q4H WA RT    amiodarone  200 mg Oral Daily    atorvastatin  20 mg Oral Nightly    clopidogrel  75 mg Oral Daily    metoprolol succinate  25 mg Oral QPM    sodium chloride flush  5-40 mL IntraVENous 2 times per day    heparin (porcine)  5,000 Units SubCUTAneous 3 times per day    dexAMETHasone  6 mg IntraVENous Q24H    torsemide  100 mg Oral Once per day on         sodium chloride 5 mL/hr at 25 0953       PRN Meds:ipratropium, sodium chloride flush, sodium chloride, ondansetron **OR** ondansetron, polyethylene glycol, acetaminophen **OR** acetaminophen, benzonatate, guaiFENesin-dextromethorphan    Physical Exam:    TEMPERATURE:  Current - Temp: 97.9 °F (36.6 °C); Max - Temp  Av.8 °F (36.6 °C)  Min: 97.6 °F (36.4 °C)  Max: 98.2 °F (36.8 °C)  RESPIRATIONS RANGE: Resp  Av.3  Min: 16  Max: 22  PULSE RANGE: Pulse  Av.3  Min: 60  Max: 76  BLOOD PRESSURE RANGE:

## 2025-02-15 NOTE — PROGRESS NOTES
V2.0    [facility] [place] Progress Note      Name:  Jeremías Bray /Age/Sex: 1945  (79 y.o. male)   MRN & CSN:  [id number] & [id number] Encounter Date/Time: 2/15/2025 12:13 PM EST   Location:  [id number] PCP: Diogo Maldonado MD     Attending:Harry Grove MD       Hospital Day: 4    Assessment and Recommendations   Jeremías Bray is a 79 y.o. male with pmh of arthritis, CHF, CAD, HLD, HTN, end-stage renal disease on dialysis who presents with Pneumonia due to 2019 novel coronavirus    Patient was examined this morning.  Continues to be on nasal cannula oxygen.  Patient continues to report feels short of breath on ambulation most likely in setting of COVID, RSV, and haemophilus pneumonia  Patient is planned to undergo dialysis session today.  Labs this morning sodium 132 we will monitor, potassium 4.6, creatinine 7.8, GFR 7, glucose 118 will monitor, white blood cell count 9.3, H&H stable 12.5, 37.0    Plan:   Acute respiratory failure  Most likely in the setting of pneumonia versus COVID  Nasal cannula oxygen to maintain vital saturation  DuoNeb breathing treatment    2.  Pneumonia  COVID-19, haemophilus influenza, RSV  Nasal cannula oxygen to maintain vital saturation  DuoNeb breathing treatments  Rocephin daily x 5 days    3.  End-stage renal disease  Patient is a  dialysis  Will monitor kidney function and avoid nephrotoxic agent  Nephrology on board    Will continue to follow, monitor and manage chronic medical condition with medication listed below    Diet ADULT DIET; Regular; Low Phosphorus (Less than 1000 mg)   DVT Prophylaxis [] Lovenox, [x]  Heparin, [] SCDs, [] Ambulation,  [] Eliquis, [] Xarelto  [] Coumadin   Code Status Full Code   Disposition From: Home  Expected Disposition: Home  Estimated Date of Discharge: 1-2 days  Patient requires continued admission due to clinical improvement   Surrogate Decision Maker/ POA Self     Personally reviewed Lab Studies and Imaging

## 2025-02-15 NOTE — PLAN OF CARE
Problem: Chronic Conditions and Co-morbidities  Goal: Patient's chronic conditions and co-morbidity symptoms are monitored and maintained or improved  Outcome: Progressing     Problem: Discharge Planning  Goal: Discharge to home or other facility with appropriate resources  2/15/2025 0238 by Susana Roman RN  Outcome: Progressing     Problem: Safety - Adult  Goal: Free from fall injury  2/15/2025 0238 by Susana Roman RN  Outcome: Progressing     Problem: Pain  Goal: Verbalizes/displays adequate comfort level or baseline comfort level  2/15/2025 0238 by Susana Roman RN  Outcome: Progressing     Problem: Respiratory - Adult  Goal: Achieves optimal ventilation and oxygenation  Outcome: Progressing     Problem: Cardiovascular - Adult  Goal: Maintains optimal cardiac output and hemodynamic stability  Outcome: Progressing     Problem: Cardiovascular - Adult  Goal: Absence of cardiac dysrhythmias or at baseline  Outcome: Progressing

## 2025-02-15 NOTE — PROGRESS NOTES
Treatment time: 3 hours and 45 mins.    Net UF: 3200 ml    Pre weight: 140.6 kg  Post weight: 137.4 kg  EDW: TBD    Access used: YODIT AVF  Access function: Well    Medications or blood products given: NA    Regular outpatient schedule: TTS    Summary of response to treatment: Patient has completed 3 hour and 45 minute hemodialysis treatment. He tolerated treatment well and was able to remove  ml of fluid. Patient returned to her room per hospital bed.     Copy of dialysis treatment record placed in chart, to be scanned into EMR.

## 2025-02-15 NOTE — PLAN OF CARE
Problem: Chronic Conditions and Co-morbidities  Goal: Patient's chronic conditions and co-morbidity symptoms are monitored and maintained or improved  2/15/2025 1301 by Shawnee Anaya RN  Outcome: Progressing  2/15/2025 0238 by Susana Roman RN  Outcome: Progressing     Problem: Discharge Planning  Goal: Discharge to home or other facility with appropriate resources  2/15/2025 1301 by Shawnee Anaya RN  Outcome: Progressing  2/15/2025 0238 by Susana Roman RN  Outcome: Progressing  2/15/2025 0238 by Susana Roman RN  Outcome: Progressing     Problem: Safety - Adult  Goal: Free from fall injury  2/15/2025 1301 by Shawnee Anaya RN  Outcome: Progressing  2/15/2025 0238 by Susana Roman RN  Outcome: Progressing  2/15/2025 0238 by Susana Roman RN  Outcome: Progressing     Problem: Pain  Goal: Verbalizes/displays adequate comfort level or baseline comfort level  2/15/2025 1301 by Shawnee Anaya RN  Outcome: Progressing  2/15/2025 0238 by Susana Roman RN  Outcome: Progressing  2/15/2025 0238 by Susana Roman RN  Outcome: Progressing     Problem: Respiratory - Adult  Goal: Achieves optimal ventilation and oxygenation  2/15/2025 1301 by Shawnee Anaya RN  Outcome: Progressing  2/15/2025 0238 by Susana Roman RN  Outcome: Progressing     Problem: Cardiovascular - Adult  Goal: Maintains optimal cardiac output and hemodynamic stability  2/15/2025 1301 by Shawnee Anaya RN  Outcome: Progressing  2/15/2025 0238 by Susana Roman RN  Outcome: Progressing  Goal: Absence of cardiac dysrhythmias or at baseline  2/15/2025 1301 by Shawnee Anaya RN  Outcome: Progressing  2/15/2025 0238 by Susana Roman RN  Outcome: Progressing

## 2025-02-15 NOTE — PROGRESS NOTES
Patient states increased shortness of breath when walking to restroom.Patient up to chair with oxygen on the bridge of nose. Oxygen levels 88% on RA. Educated patient on benefits of properly wearing oxygen, use of incentive spirometer, and use of urinal, which was provided within reach of patient. Patient verbalizes understanding. Patient placed nasal cannula on properly, oxygen levels went up to 94% on 2L. Patient comfortably in chair playing on tablet. Safety precautions in place. Call light, urinal, and needed items within reach.

## 2025-02-16 LAB
ALBUMIN SERPL-MCNC: 3.5 G/DL (ref 3.4–5)
ALBUMIN/GLOB SERPL: 1 {RATIO} (ref 1.1–2.2)
ALP SERPL-CCNC: 60 U/L (ref 40–129)
ALT SERPL-CCNC: 44 U/L (ref 10–40)
ANION GAP SERPL CALCULATED.3IONS-SCNC: 19 MMOL/L (ref 3–16)
AST SERPL-CCNC: 29 U/L (ref 15–37)
BACTERIA BLD CULT ORG #2: NORMAL
BACTERIA BLD CULT: NORMAL
BASOPHILS # BLD: 0 K/UL (ref 0–0.2)
BASOPHILS NFR BLD: 0.2 %
BILIRUB SERPL-MCNC: <0.2 MG/DL (ref 0–1)
BUN SERPL-MCNC: 68 MG/DL (ref 7–20)
CALCIUM SERPL-MCNC: 9.9 MG/DL (ref 8.3–10.6)
CHLORIDE SERPL-SCNC: 91 MMOL/L (ref 99–110)
CO2 SERPL-SCNC: 25 MMOL/L (ref 21–32)
CREAT SERPL-MCNC: 6.7 MG/DL (ref 0.8–1.3)
DEPRECATED RDW RBC AUTO: 15.9 % (ref 12.4–15.4)
EOSINOPHIL # BLD: 0 K/UL (ref 0–0.6)
EOSINOPHIL NFR BLD: 0 %
GFR SERPLBLD CREATININE-BSD FMLA CKD-EPI: 8 ML/MIN/{1.73_M2}
GLUCOSE SERPL-MCNC: 131 MG/DL (ref 70–99)
HCT VFR BLD AUTO: 38.7 % (ref 40.5–52.5)
HGB BLD-MCNC: 13.1 G/DL (ref 13.5–17.5)
LYMPHOCYTES # BLD: 0.5 K/UL (ref 1–5.1)
LYMPHOCYTES NFR BLD: 5.1 %
MCH RBC QN AUTO: 33.7 PG (ref 26–34)
MCHC RBC AUTO-ENTMCNC: 34 G/DL (ref 31–36)
MCV RBC AUTO: 99.4 FL (ref 80–100)
MONOCYTES # BLD: 0.8 K/UL (ref 0–1.3)
MONOCYTES NFR BLD: 7.8 %
NEUTROPHILS # BLD: 8.4 K/UL (ref 1.7–7.7)
NEUTROPHILS NFR BLD: 86.9 %
PLATELET # BLD AUTO: 260 K/UL (ref 135–450)
PMV BLD AUTO: 8.1 FL (ref 5–10.5)
POTASSIUM SERPL-SCNC: 4.2 MMOL/L (ref 3.5–5.1)
PROT SERPL-MCNC: 7.1 G/DL (ref 6.4–8.2)
RBC # BLD AUTO: 3.89 M/UL (ref 4.2–5.9)
SODIUM SERPL-SCNC: 135 MMOL/L (ref 136–145)
WBC # BLD AUTO: 9.7 K/UL (ref 4–11)

## 2025-02-16 PROCEDURE — 94761 N-INVAS EAR/PLS OXIMETRY MLT: CPT

## 2025-02-16 PROCEDURE — 6370000000 HC RX 637 (ALT 250 FOR IP): Performed by: FAMILY MEDICINE

## 2025-02-16 PROCEDURE — 6360000002 HC RX W HCPCS: Performed by: INTERNAL MEDICINE

## 2025-02-16 PROCEDURE — 94640 AIRWAY INHALATION TREATMENT: CPT

## 2025-02-16 PROCEDURE — 6370000000 HC RX 637 (ALT 250 FOR IP): Performed by: INTERNAL MEDICINE

## 2025-02-16 PROCEDURE — 2500000003 HC RX 250 WO HCPCS: Performed by: INTERNAL MEDICINE

## 2025-02-16 PROCEDURE — 2700000000 HC OXYGEN THERAPY PER DAY

## 2025-02-16 PROCEDURE — 80053 COMPREHEN METABOLIC PANEL: CPT

## 2025-02-16 PROCEDURE — 2500000003 HC RX 250 WO HCPCS: Performed by: HOSPITALIST

## 2025-02-16 PROCEDURE — 36415 COLL VENOUS BLD VENIPUNCTURE: CPT

## 2025-02-16 PROCEDURE — 1200000000 HC SEMI PRIVATE

## 2025-02-16 PROCEDURE — 2580000003 HC RX 258: Performed by: FAMILY MEDICINE

## 2025-02-16 PROCEDURE — 6360000002 HC RX W HCPCS: Performed by: FAMILY MEDICINE

## 2025-02-16 PROCEDURE — 85025 COMPLETE CBC W/AUTO DIFF WBC: CPT

## 2025-02-16 PROCEDURE — 94669 MECHANICAL CHEST WALL OSCILL: CPT

## 2025-02-16 RX ORDER — GUAIFENESIN 600 MG/1
600 TABLET, EXTENDED RELEASE ORAL 2 TIMES DAILY
Status: DISCONTINUED | OUTPATIENT
Start: 2025-02-16 | End: 2025-02-21 | Stop reason: HOSPADM

## 2025-02-16 RX ADMIN — CALCIUM ACETATE 667 MG: 667 CAPSULE ORAL at 08:09

## 2025-02-16 RX ADMIN — ALBUTEROL SULFATE 2.5 MG: 2.5 SOLUTION RESPIRATORY (INHALATION) at 12:22

## 2025-02-16 RX ADMIN — CEFTRIAXONE SODIUM 2000 MG: 2 INJECTION, POWDER, FOR SOLUTION INTRAMUSCULAR; INTRAVENOUS at 09:15

## 2025-02-16 RX ADMIN — GUAIFENESIN SYRUP AND DEXTROMETHORPHAN 5 ML: 100; 10 SYRUP ORAL at 20:32

## 2025-02-16 RX ADMIN — ATORVASTATIN CALCIUM 20 MG: 20 TABLET, FILM COATED ORAL at 20:32

## 2025-02-16 RX ADMIN — AMIODARONE HYDROCHLORIDE 200 MG: 200 TABLET ORAL at 08:09

## 2025-02-16 RX ADMIN — CALCIUM ACETATE 667 MG: 667 CAPSULE ORAL at 11:54

## 2025-02-16 RX ADMIN — ALBUTEROL SULFATE 2.5 MG: 2.5 SOLUTION RESPIRATORY (INHALATION) at 21:21

## 2025-02-16 RX ADMIN — ALBUTEROL SULFATE 2.5 MG: 2.5 SOLUTION RESPIRATORY (INHALATION) at 08:29

## 2025-02-16 RX ADMIN — GUAIFENESIN SYRUP AND DEXTROMETHORPHAN 5 ML: 100; 10 SYRUP ORAL at 08:09

## 2025-02-16 RX ADMIN — ALBUTEROL SULFATE 2.5 MG: 2.5 SOLUTION RESPIRATORY (INHALATION) at 15:43

## 2025-02-16 RX ADMIN — HEPARIN SODIUM 5000 UNITS: 5000 INJECTION INTRAVENOUS; SUBCUTANEOUS at 20:36

## 2025-02-16 RX ADMIN — DEXAMETHASONE SODIUM PHOSPHATE 6 MG: 4 INJECTION, SOLUTION INTRAMUSCULAR; INTRAVENOUS at 14:56

## 2025-02-16 RX ADMIN — GUAIFENESIN 600 MG: 600 TABLET ORAL at 09:30

## 2025-02-16 RX ADMIN — METOPROLOL SUCCINATE 25 MG: 25 TABLET, EXTENDED RELEASE ORAL at 17:43

## 2025-02-16 RX ADMIN — SODIUM CHLORIDE, PRESERVATIVE FREE 10 ML: 5 INJECTION INTRAVENOUS at 20:38

## 2025-02-16 RX ADMIN — GUAIFENESIN 600 MG: 600 TABLET ORAL at 20:32

## 2025-02-16 RX ADMIN — HEPARIN SODIUM 5000 UNITS: 5000 INJECTION INTRAVENOUS; SUBCUTANEOUS at 05:50

## 2025-02-16 RX ADMIN — HEPARIN SODIUM 5000 UNITS: 5000 INJECTION INTRAVENOUS; SUBCUTANEOUS at 14:55

## 2025-02-16 RX ADMIN — CLOPIDOGREL BISULFATE 75 MG: 75 TABLET ORAL at 08:09

## 2025-02-16 RX ADMIN — CALCIUM ACETATE 667 MG: 667 CAPSULE ORAL at 17:43

## 2025-02-16 NOTE — PLAN OF CARE
Problem: Chronic Conditions and Co-morbidities  Goal: Patient's chronic conditions and co-morbidity symptoms are monitored and maintained or improved  2/16/2025 0227 by Chantal Esquivel RN  Outcome: Progressing  2/15/2025 1301 by Shawnee Anaya RN  Outcome: Progressing     Problem: Discharge Planning  Goal: Discharge to home or other facility with appropriate resources  2/16/2025 0227 by Chantal Esquivel RN  Outcome: Progressing  2/15/2025 1301 by Shawnee Anaya RN  Outcome: Progressing     Problem: Safety - Adult  Goal: Free from fall injury  2/16/2025 0227 by Chantal Esquivel RN  Outcome: Progressing  2/15/2025 1301 by Shawnee Anaya RN  Outcome: Progressing     Problem: Pain  Goal: Verbalizes/displays adequate comfort level or baseline comfort level  2/16/2025 0227 by Chantal Esquivel RN  Outcome: Progressing  2/15/2025 1301 by Shawnee Anaya RN  Outcome: Progressing     Problem: Respiratory - Adult  Goal: Achieves optimal ventilation and oxygenation  2/16/2025 0227 by Chantal Esquivel RN  Outcome: Progressing  2/15/2025 1301 by Shawnee Anaya RN  Outcome: Progressing     Problem: Cardiovascular - Adult  Goal: Maintains optimal cardiac output and hemodynamic stability  2/16/2025 0227 by Chantal Esquivel RN  Outcome: Progressing  2/15/2025 1301 by Shawnee Anaya RN  Outcome: Progressing  Goal: Absence of cardiac dysrhythmias or at baseline  2/16/2025 0227 by Chantal Esquivel RN  Outcome: Progressing  2/15/2025 1301 by Shawnee Anaya RN  Outcome: Progressing     Problem: Skin/Tissue Integrity - Adult  Goal: Skin integrity remains intact  Outcome: Progressing  Goal: Incisions, wounds, or drain sites healing without S/S of infection  Outcome: Progressing  Goal: Oral mucous membranes remain intact  Outcome: Progressing     Problem: Musculoskeletal - Adult  Goal: Return mobility to safest level of function  Outcome: Progressing  Goal: Maintain proper alignment of affected body part  Outcome: Progressing

## 2025-02-16 NOTE — PROGRESS NOTES
V2.0    [facility] [place]      Name:  Jeremías Bray /Age/Sex: 1945  (79 y.o. male)   MRN & CSN:  [id number] & [id number] Encounter Date/Time: 2025 12:13 PM EST   Location:  [id number] PCP: Diogo Maldonado MD     Attending:Harry Grove MD       Hospital Day: 5    Assessment and Recommendations   Jeremías Bray is a 79 y.o. male with pmh of arthritis, CHF, CAD, HLD, HTN, end-stage renal disease on dialysis who presents with Pneumonia due to 2019 novel coronavirus    Patient was examined this morning.  Continues to be on nasal cannula oxygen at 2 L, continues to feel short of breath with ambulation attributing most likely in setting of COVID, RSV, and haemophilus pneumonia  Patient underwent dialysis session yesterday tolerated well  Labs this morning sodium 135, potassium 4.2, creatinine improved to 6.7 from yesterday 7.8, GFR 8, glucose 131 we will continue to monitor, white blood cell count 9.7, H&H stable 13.1, 38.7 will continue to monitor    Plan:   Acute respiratory failure  Most likely in the setting of pneumonia versus COVID  Nasal cannula oxygen to maintain vital saturation  DuoNeb breathing treatment    2.  Pneumonia  COVID-19, haemophilus influenza, RSV  Nasal cannula oxygen to maintain vital saturation  DuoNeb breathing treatments  Rocephin daily x 5 days    3.  End-stage renal disease  Patient is a  dialysis  Will monitor kidney function and avoid nephrotoxic agent  Nephrology on board    Will continue to follow, monitor and manage chronic medical condition with medication listed below    Diet ADULT DIET; Regular; Low Phosphorus (Less than 1000 mg)   DVT Prophylaxis [] Lovenox, [x]  Heparin, [] SCDs, [] Ambulation,  [] Eliquis, [] Xarelto  [] Coumadin   Code Status Full Code   Disposition From: Home  Expected Disposition: Home  Estimated Date of Discharge: 1-2 days  Patient requires continued admission due to clinical improvement   Surrogate Decision Maker/  days ago Reason for Exam: Shortness of Breath, hypoxia, recent COVID diagnosis 5 days ago FINDINGS: The heart is enlarged.  Evidence of prior TAVR.  Moderate perihilar opacities have increased centrally from remote prior imaging.  Multifocal ground-glass opacities are present in the left mid and bilateral lower lung zones.  There are no significant skeletal findings.     Multifocal airspace opacities in the lower lung zones in a patient with significant cardiomegaly.  Pattern may represent pulmonary edema.  A viral pattern of pneumonia cannot be excluded given history.       CBC:   Recent Labs     02/15/25  0458 02/16/25  0944   WBC 9.3 9.7   HGB 12.5* 13.1*    260     BMP:    Recent Labs     02/15/25  0458 02/15/25  0750 02/16/25 0944   * 132* 135*   K 5.0 4.6 4.2   CL 89* 90* 91*   CO2 26 24 25   BUN 70* 71* 68*   CREATININE 8.0* 7.8* 6.7*   GLUCOSE 147* 118* 131*     Hepatic:   Recent Labs     02/16/25 0944   AST 29   ALT 44*   BILITOT <0.2   ALKPHOS 60     Lipids:   Lab Results   Component Value Date/Time    CHOL 107 09/26/2020 06:03 AM    HDL 34 09/26/2020 06:03 AM    TRIG 71 09/26/2020 06:03 AM     Hemoglobin A1C:   Lab Results   Component Value Date/Time    LABA1C 6.2 08/15/2019 07:04 AM     TSH:   Lab Results   Component Value Date/Time    TSH 1.60 09/25/2020 10:00 AM    TSH 1.70 01/27/2015 07:11 AM     Troponin: No results found for: \"TROPONINT\"  Lactic Acid: No results for input(s): \"LACTA\" in the last 72 hours.  BNP: No results for input(s): \"PROBNP\" in the last 72 hours.  UA:  Lab Results   Component Value Date/Time    NITRU Negative 11/05/2019 07:12 AM    COLORU YELLOW 11/05/2019 07:12 AM    PHUR 6.0 11/05/2019 07:12 AM    LABCAST 5-10 Hyaline 01/30/2015 04:17 PM    WBCUA 4 11/05/2019 07:12 AM    RBCUA 2 11/05/2019 07:12 AM    MUCUS 1+ 01/27/2015 10:36 AM    YEAST Present 01/27/2015 10:36 AM    BACTERIA RARE 11/05/2019 07:12 AM    CLARITYU Clear 11/05/2019 07:12 AM    LEUKOCYTESUR

## 2025-02-16 NOTE — PROGRESS NOTES
Nephrology Progress Note   [facility]      Reason for consultation: ESRD on HD TTS     History of Present Illness: Jeremías Bray is a 80 yo male with a PMHx of ESRD, CHF, HTN, HLD, cancer, OA. Patient presents to  ED on 2025 with complaints of SOB. He tested positive for COVID-19 5 days ago. Currently on 5 L NC. CXR reveals multifocal airspace opacities. He is receiving azithromycin, rocephin, and decadron.     We have been consulted for ESRD on HD management. Last HD was yesterday. No acute electrolyte derangements noted. 3.4 kg above DW of 140 kg per weight obtained.     Subjective:    The patient has been seen and examined. Labs and chart reviewed. Sitting up in chair   There were not complications overnight.  Patient review of systems: SOB and cough have improved      Allergies:  Allergies   Allergen Reactions    No Known Allergies         Scheduled Meds:   guaiFENesin  600 mg Oral BID    cefTRIAXone (ROCEPHIN) IV  2,000 mg IntraVENous Q24H    calcium acetate  667 mg Oral TID WC    albuterol  2.5 mg Nebulization Q4H WA RT    amiodarone  200 mg Oral Daily    atorvastatin  20 mg Oral Nightly    clopidogrel  75 mg Oral Daily    metoprolol succinate  25 mg Oral QPM    sodium chloride flush  5-40 mL IntraVENous 2 times per day    heparin (porcine)  5,000 Units SubCUTAneous 3 times per day    dexAMETHasone  6 mg IntraVENous Q24H    torsemide  100 mg Oral Once per day on         sodium chloride 5 mL/hr at 25 0953       PRN Meds:ipratropium, sodium chloride flush, sodium chloride, ondansetron **OR** ondansetron, polyethylene glycol, acetaminophen **OR** acetaminophen, benzonatate, guaiFENesin-dextromethorphan    Physical Exam:    TEMPERATURE:  Current - Temp: 98 °F (36.7 °C); Max - Temp  Av.9 °F (36.6 °C)  Min: 97.8 °F (36.6 °C)  Max: 98 °F (36.7 °C)  RESPIRATIONS RANGE: Resp  Av.8  Min: 18  Max: 22  PULSE RANGE: Pulse  Av.4  Min: 61  Max: 81  BLOOD PRESSURE

## 2025-02-17 LAB
ALBUMIN SERPL-MCNC: 3.5 G/DL (ref 3.4–5)
ALBUMIN/GLOB SERPL: 1 {RATIO} (ref 1.1–2.2)
ALP SERPL-CCNC: 58 U/L (ref 40–129)
ALT SERPL-CCNC: 43 U/L (ref 10–40)
ANION GAP SERPL CALCULATED.3IONS-SCNC: 22 MMOL/L (ref 3–16)
AST SERPL-CCNC: 29 U/L (ref 15–37)
BILIRUB SERPL-MCNC: <0.2 MG/DL (ref 0–1)
BUN SERPL-MCNC: 95 MG/DL (ref 7–20)
CALCIUM SERPL-MCNC: 9.7 MG/DL (ref 8.3–10.6)
CHLORIDE SERPL-SCNC: 90 MMOL/L (ref 99–110)
CO2 SERPL-SCNC: 21 MMOL/L (ref 21–32)
CREAT SERPL-MCNC: 8.6 MG/DL (ref 0.8–1.3)
DEPRECATED RDW RBC AUTO: 15.5 % (ref 12.4–15.4)
GFR SERPLBLD CREATININE-BSD FMLA CKD-EPI: 6 ML/MIN/{1.73_M2}
GLUCOSE BLD-MCNC: 115 MG/DL (ref 70–99)
GLUCOSE SERPL-MCNC: 159 MG/DL (ref 70–99)
HCT VFR BLD AUTO: 38.3 % (ref 40.5–52.5)
HGB BLD-MCNC: 13 G/DL (ref 13.5–17.5)
MCH RBC QN AUTO: 33.7 PG (ref 26–34)
MCHC RBC AUTO-ENTMCNC: 34 G/DL (ref 31–36)
MCV RBC AUTO: 99 FL (ref 80–100)
PERFORMED ON: ABNORMAL
PLATELET # BLD AUTO: 261 K/UL (ref 135–450)
PMV BLD AUTO: 8.2 FL (ref 5–10.5)
POTASSIUM SERPL-SCNC: 4.3 MMOL/L (ref 3.5–5.1)
PROT SERPL-MCNC: 7 G/DL (ref 6.4–8.2)
RBC # BLD AUTO: 3.87 M/UL (ref 4.2–5.9)
SODIUM SERPL-SCNC: 133 MMOL/L (ref 136–145)
WBC # BLD AUTO: 9.9 K/UL (ref 4–11)

## 2025-02-17 PROCEDURE — 6360000002 HC RX W HCPCS: Performed by: INTERNAL MEDICINE

## 2025-02-17 PROCEDURE — 6370000000 HC RX 637 (ALT 250 FOR IP): Performed by: FAMILY MEDICINE

## 2025-02-17 PROCEDURE — 6370000000 HC RX 637 (ALT 250 FOR IP): Performed by: INTERNAL MEDICINE

## 2025-02-17 PROCEDURE — 36415 COLL VENOUS BLD VENIPUNCTURE: CPT

## 2025-02-17 PROCEDURE — 94640 AIRWAY INHALATION TREATMENT: CPT

## 2025-02-17 PROCEDURE — 94761 N-INVAS EAR/PLS OXIMETRY MLT: CPT

## 2025-02-17 PROCEDURE — 85027 COMPLETE CBC AUTOMATED: CPT

## 2025-02-17 PROCEDURE — 80053 COMPREHEN METABOLIC PANEL: CPT

## 2025-02-17 PROCEDURE — 1200000000 HC SEMI PRIVATE

## 2025-02-17 PROCEDURE — 2700000000 HC OXYGEN THERAPY PER DAY

## 2025-02-17 PROCEDURE — 6360000002 HC RX W HCPCS: Performed by: FAMILY MEDICINE

## 2025-02-17 PROCEDURE — 94669 MECHANICAL CHEST WALL OSCILL: CPT

## 2025-02-17 PROCEDURE — 2500000003 HC RX 250 WO HCPCS: Performed by: INTERNAL MEDICINE

## 2025-02-17 PROCEDURE — 2500000003 HC RX 250 WO HCPCS: Performed by: HOSPITALIST

## 2025-02-17 PROCEDURE — 2580000003 HC RX 258: Performed by: FAMILY MEDICINE

## 2025-02-17 RX ADMIN — DEXAMETHASONE SODIUM PHOSPHATE 6 MG: 4 INJECTION, SOLUTION INTRAMUSCULAR; INTRAVENOUS at 14:34

## 2025-02-17 RX ADMIN — CLOPIDOGREL BISULFATE 75 MG: 75 TABLET ORAL at 09:05

## 2025-02-17 RX ADMIN — ALBUTEROL SULFATE 2.5 MG: 2.5 SOLUTION RESPIRATORY (INHALATION) at 08:01

## 2025-02-17 RX ADMIN — ATORVASTATIN CALCIUM 20 MG: 20 TABLET, FILM COATED ORAL at 20:11

## 2025-02-17 RX ADMIN — CEFTRIAXONE SODIUM 2000 MG: 2 INJECTION, POWDER, FOR SOLUTION INTRAMUSCULAR; INTRAVENOUS at 09:08

## 2025-02-17 RX ADMIN — ALBUTEROL SULFATE 2.5 MG: 2.5 SOLUTION RESPIRATORY (INHALATION) at 20:59

## 2025-02-17 RX ADMIN — SODIUM CHLORIDE, PRESERVATIVE FREE 10 ML: 5 INJECTION INTRAVENOUS at 20:12

## 2025-02-17 RX ADMIN — CALCIUM ACETATE 667 MG: 667 CAPSULE ORAL at 09:04

## 2025-02-17 RX ADMIN — HEPARIN SODIUM 5000 UNITS: 5000 INJECTION INTRAVENOUS; SUBCUTANEOUS at 20:11

## 2025-02-17 RX ADMIN — METOPROLOL SUCCINATE 25 MG: 25 TABLET, EXTENDED RELEASE ORAL at 17:14

## 2025-02-17 RX ADMIN — HEPARIN SODIUM 5000 UNITS: 5000 INJECTION INTRAVENOUS; SUBCUTANEOUS at 06:19

## 2025-02-17 RX ADMIN — HEPARIN SODIUM 5000 UNITS: 5000 INJECTION INTRAVENOUS; SUBCUTANEOUS at 14:34

## 2025-02-17 RX ADMIN — GUAIFENESIN 600 MG: 600 TABLET ORAL at 09:04

## 2025-02-17 RX ADMIN — ALBUTEROL SULFATE 2.5 MG: 2.5 SOLUTION RESPIRATORY (INHALATION) at 16:38

## 2025-02-17 RX ADMIN — GUAIFENESIN 600 MG: 600 TABLET ORAL at 20:11

## 2025-02-17 RX ADMIN — CALCIUM ACETATE 667 MG: 667 CAPSULE ORAL at 17:14

## 2025-02-17 RX ADMIN — AMIODARONE HYDROCHLORIDE 200 MG: 200 TABLET ORAL at 09:04

## 2025-02-17 RX ADMIN — ALBUTEROL SULFATE 2.5 MG: 2.5 SOLUTION RESPIRATORY (INHALATION) at 11:42

## 2025-02-17 NOTE — PLAN OF CARE
Problem: Chronic Conditions and Co-morbidities  Goal: Patient's chronic conditions and co-morbidity symptoms are monitored and maintained or improved  Outcome: Progressing     Problem: Discharge Planning  Goal: Discharge to home or other facility with appropriate resources  Outcome: Progressing     Problem: Safety - Adult  Goal: Free from fall injury  Outcome: Progressing     Problem: Pain  Goal: Verbalizes/displays adequate comfort level or baseline comfort level  Outcome: Progressing     Problem: Respiratory - Adult  Goal: Achieves optimal ventilation and oxygenation  Outcome: Progressing     Problem: Cardiovascular - Adult  Goal: Maintains optimal cardiac output and hemodynamic stability  Outcome: Progressing  Goal: Absence of cardiac dysrhythmias or at baseline  Outcome: Progressing     Problem: Skin/Tissue Integrity - Adult  Goal: Skin integrity remains intact  Outcome: Progressing  Goal: Incisions, wounds, or drain sites healing without S/S of infection  Outcome: Progressing  Goal: Oral mucous membranes remain intact  Outcome: Progressing     Problem: Musculoskeletal - Adult  Goal: Return mobility to safest level of function  Outcome: Progressing  Goal: Maintain proper alignment of affected body part  Outcome: Progressing

## 2025-02-17 NOTE — PROGRESS NOTES
SELINA SINGLETON IN SCHEDULING PT SCHEDULED 12/13/2024 ARRIVING AT 0830AM M[facility]AL[facility] PREP INFORMATION UPLOADED TO PT MY CHART OK FOR HUB TO RELAY   Exam: Shortness of Breath, hypoxia, recent COVID diagnosis 5 days ago FINDINGS: The heart is enlarged.  Evidence of prior TAVR.  Moderate perihilar opacities have increased centrally from remote prior imaging.  Multifocal ground-glass opacities are present in the left mid and bilateral lower lung zones.  There are no significant skeletal findings.     Multifocal airspace opacities in the lower lung zones in a patient with significant cardiomegaly.  Pattern may represent pulmonary edema.  A viral pattern of pneumonia cannot be excluded given history.       CBC:   Recent Labs     02/15/25  0458 02/16/25  0944 02/17/25  0903   WBC 9.3 9.7 9.9   HGB 12.5* 13.1* 13.0*    260 261     BMP:    Recent Labs     02/15/25  0750 02/16/25  0944 02/17/25  0903   * 135* 133*   K 4.6 4.2 4.3   CL 90* 91* 90*   CO2 24 25 21   BUN 71* 68* 95*   CREATININE 7.8* 6.7* 8.6*   GLUCOSE 118* 131* 159*     Hepatic:   Recent Labs     02/16/25  0944 02/17/25  0903   AST 29 29   ALT 44* 43*   BILITOT <0.2 <0.2   ALKPHOS 60 58     Lipids:   Lab Results   Component Value Date/Time    CHOL 107 09/26/2020 06:03 AM    HDL 34 09/26/2020 06:03 AM    TRIG 71 09/26/2020 06:03 AM     Hemoglobin A1C:   Lab Results   Component Value Date/Time    LABA1C 6.2 08/15/2019 07:04 AM     TSH:   Lab Results   Component Value Date/Time    TSH 1.60 09/25/2020 10:00 AM    TSH 1.70 01/27/2015 07:11 AM     Troponin: No results found for: \"TROPONINT\"  Lactic Acid: No results for input(s): \"LACTA\" in the last 72 hours.  BNP: No results for input(s): \"PROBNP\" in the last 72 hours.  UA:  Lab Results   Component Value Date/Time    NITRU Negative 11/05/2019 07:12 AM    COLORU YELLOW 11/05/2019 07:12 AM    PHUR 6.0 11/05/2019 07:12 AM    LABCAST 5-10 Hyaline 01/30/2015 04:17 PM    WBCUA 4 11/05/2019 07:12 AM    RBCUA 2 11/05/2019 07:12 AM    MUCUS 1+ 01/27/2015 10:36 AM    YEAST Present 01/27/2015 10:36 AM    BACTERIA RARE 11/05/2019 07:12 AM    CLARITYU Clear  11/05/2019 07:12 AM    LEUKOCYTESUR Negative 11/05/2019 07:12 AM    UROBILINOGEN 0.2 11/05/2019 07:12 AM    BILIRUBINUR Negative 11/05/2019 07:12 AM    BLOODU SMALL 11/05/2019 07:12 AM    GLUCOSEU Negative 11/05/2019 07:12 AM    KETUA Negative 11/05/2019 07:12 AM     Urine Cultures: No results found for: \"LABURIN\"  Blood Cultures:   Lab Results   Component Value Date/Time    BC No Growth after 4 days of incubation. 02/12/2025 08:52 AM     Lab Results   Component Value Date/Time    BLOODCULT2 No Growth after 4 days of incubation. 02/12/2025 01:50 PM     Organism:   Lab Results   Component Value Date/Time    ORG Haemophilus influenzae DNA Detected 02/12/2025 10:36 AM    ORG Respiratory syncytial virus by PCR 02/12/2025 10:36 AM         Electronically signed by Harry Grove MD on 2/17/2025 at 11:55 AM  Comment: Please note this report has been produced using speech recognition software and may contain errors related to that system including errors in grammar, punctuation, and spelling, as well as words and phrases that may be inappropriate. If there are any questions or concerns, please feel free to contact the dictating provider for clarification.

## 2025-02-17 NOTE — PROGRESS NOTES
Nephrology Progress Note   [facility]      Reason for consultation: ESRD on HD TTS     History of Present Illness: Jeremías Bray is a 80 yo male with a PMHx of ESRD, CHF, HTN, HLD, cancer, OA. Patient presents to  ED on 2025 with complaints of SOB. He tested positive for COVID-19 5 days ago. Currently on 5 L NC. CXR reveals multifocal airspace opacities. He is receiving azithromycin, rocephin, and decadron.     We have been consulted for ESRD on HD management. Last HD was yesterday. No acute electrolyte derangements noted. 3.4 kg above DW of 140 kg per weight obtained.     Subjective:    The patient has been seen and examined. Labs and chart reviewed. Sitting up in chair.     There were not complications overnight.    Patient review of systems: SOB and cough have improved but still present.      Allergies:  Allergies   Allergen Reactions    No Known Allergies         Scheduled Meds:   guaiFENesin  600 mg Oral BID    cefTRIAXone (ROCEPHIN) IV  2,000 mg IntraVENous Q24H    calcium acetate  667 mg Oral TID WC    albuterol  2.5 mg Nebulization Q4H WA RT    amiodarone  200 mg Oral Daily    atorvastatin  20 mg Oral Nightly    clopidogrel  75 mg Oral Daily    metoprolol succinate  25 mg Oral QPM    sodium chloride flush  5-40 mL IntraVENous 2 times per day    heparin (porcine)  5,000 Units SubCUTAneous 3 times per day    dexAMETHasone  6 mg IntraVENous Q24H    torsemide  100 mg Oral Once per day on         sodium chloride 5 mL/hr at 25 0953       PRN Meds:ipratropium, sodium chloride flush, sodium chloride, ondansetron **OR** ondansetron, polyethylene glycol, acetaminophen **OR** acetaminophen, benzonatate, guaiFENesin-dextromethorphan    Physical Exam:    TEMPERATURE:  Current - Temp: 97.5 °F (36.4 °C); Max - Temp  Av.8 °F (36.6 °C)  Min: 97.5 °F (36.4 °C)  Max: 98 °F (36.7 °C)  RESPIRATIONS RANGE: Resp  Av.8  Min: 15  Max: 18  PULSE RANGE: Pulse  Av.8  Min: 67

## 2025-02-18 LAB
ALBUMIN SERPL-MCNC: 3.5 G/DL (ref 3.4–5)
ALBUMIN SERPL-MCNC: 3.5 G/DL (ref 3.4–5)
ALBUMIN/GLOB SERPL: 1 {RATIO} (ref 1.1–2.2)
ALP SERPL-CCNC: 57 U/L (ref 40–129)
ALT SERPL-CCNC: 56 U/L (ref 10–40)
ANION GAP SERPL CALCULATED.3IONS-SCNC: 21 MMOL/L (ref 3–16)
ANION GAP SERPL CALCULATED.3IONS-SCNC: 22 MMOL/L (ref 3–16)
AST SERPL-CCNC: 34 U/L (ref 15–37)
BILIRUB SERPL-MCNC: <0.2 MG/DL (ref 0–1)
BUN SERPL-MCNC: 110 MG/DL (ref 7–20)
BUN SERPL-MCNC: 110 MG/DL (ref 7–20)
CALCIUM SERPL-MCNC: 9.8 MG/DL (ref 8.3–10.6)
CALCIUM SERPL-MCNC: 9.8 MG/DL (ref 8.3–10.6)
CHLORIDE SERPL-SCNC: 91 MMOL/L (ref 99–110)
CHLORIDE SERPL-SCNC: 91 MMOL/L (ref 99–110)
CO2 SERPL-SCNC: 22 MMOL/L (ref 21–32)
CO2 SERPL-SCNC: 22 MMOL/L (ref 21–32)
CREAT SERPL-MCNC: 9.8 MG/DL (ref 0.8–1.3)
CREAT SERPL-MCNC: 9.8 MG/DL (ref 0.8–1.3)
DEPRECATED RDW RBC AUTO: 15.7 % (ref 12.4–15.4)
GFR SERPLBLD CREATININE-BSD FMLA CKD-EPI: 5 ML/MIN/{1.73_M2}
GFR SERPLBLD CREATININE-BSD FMLA CKD-EPI: 5 ML/MIN/{1.73_M2}
GLUCOSE SERPL-MCNC: 133 MG/DL (ref 70–99)
GLUCOSE SERPL-MCNC: 134 MG/DL (ref 70–99)
HCT VFR BLD AUTO: 38.3 % (ref 40.5–52.5)
HGB BLD-MCNC: 12.9 G/DL (ref 13.5–17.5)
MCH RBC QN AUTO: 33.5 PG (ref 26–34)
MCHC RBC AUTO-ENTMCNC: 33.7 G/DL (ref 31–36)
MCV RBC AUTO: 99.2 FL (ref 80–100)
PHOSPHATE SERPL-MCNC: 8 MG/DL (ref 2.5–4.9)
PLATELET # BLD AUTO: 273 K/UL (ref 135–450)
PMV BLD AUTO: 8.2 FL (ref 5–10.5)
POTASSIUM SERPL-SCNC: 5.1 MMOL/L (ref 3.5–5.1)
POTASSIUM SERPL-SCNC: 5.2 MMOL/L (ref 3.5–5.1)
PROT SERPL-MCNC: 6.9 G/DL (ref 6.4–8.2)
RBC # BLD AUTO: 3.86 M/UL (ref 4.2–5.9)
SODIUM SERPL-SCNC: 134 MMOL/L (ref 136–145)
SODIUM SERPL-SCNC: 135 MMOL/L (ref 136–145)
WBC # BLD AUTO: 13.7 K/UL (ref 4–11)

## 2025-02-18 PROCEDURE — 6360000002 HC RX W HCPCS: Performed by: FAMILY MEDICINE

## 2025-02-18 PROCEDURE — 94640 AIRWAY INHALATION TREATMENT: CPT

## 2025-02-18 PROCEDURE — 2700000000 HC OXYGEN THERAPY PER DAY

## 2025-02-18 PROCEDURE — 94761 N-INVAS EAR/PLS OXIMETRY MLT: CPT

## 2025-02-18 PROCEDURE — 80053 COMPREHEN METABOLIC PANEL: CPT

## 2025-02-18 PROCEDURE — 6360000002 HC RX W HCPCS: Performed by: INTERNAL MEDICINE

## 2025-02-18 PROCEDURE — 5A0935A ASSISTANCE WITH RESPIRATORY VENTILATION, LESS THAN 24 CONSECUTIVE HOURS, HIGH NASAL FLOW/VELOCITY: ICD-10-PCS | Performed by: INTERNAL MEDICINE

## 2025-02-18 PROCEDURE — 2580000003 HC RX 258: Performed by: FAMILY MEDICINE

## 2025-02-18 PROCEDURE — 90935 HEMODIALYSIS ONE EVALUATION: CPT

## 2025-02-18 PROCEDURE — 6370000000 HC RX 637 (ALT 250 FOR IP): Performed by: FAMILY MEDICINE

## 2025-02-18 PROCEDURE — 6370000000 HC RX 637 (ALT 250 FOR IP): Performed by: INTERNAL MEDICINE

## 2025-02-18 PROCEDURE — 2500000003 HC RX 250 WO HCPCS: Performed by: HOSPITALIST

## 2025-02-18 PROCEDURE — 85027 COMPLETE CBC AUTOMATED: CPT

## 2025-02-18 PROCEDURE — 1200000000 HC SEMI PRIVATE

## 2025-02-18 PROCEDURE — 2500000003 HC RX 250 WO HCPCS: Performed by: INTERNAL MEDICINE

## 2025-02-18 PROCEDURE — 36415 COLL VENOUS BLD VENIPUNCTURE: CPT

## 2025-02-18 RX ORDER — AMOXICILLIN AND CLAVULANATE POTASSIUM 500; 125 MG/1; MG/1
1 TABLET, FILM COATED ORAL 2 TIMES DAILY
Qty: 10 TABLET | Refills: 0 | Status: SHIPPED | OUTPATIENT
Start: 2025-02-18 | End: 2025-02-23

## 2025-02-18 RX ORDER — BENZONATATE 100 MG/1
100 CAPSULE ORAL 3 TIMES DAILY PRN
Qty: 21 CAPSULE | Refills: 0 | Status: SHIPPED | OUTPATIENT
Start: 2025-02-18 | End: 2025-02-25

## 2025-02-18 RX ORDER — ALBUTEROL SULFATE 90 UG/1
2 INHALANT RESPIRATORY (INHALATION) 4 TIMES DAILY PRN
Qty: 18 G | Refills: 0 | Status: SHIPPED | OUTPATIENT
Start: 2025-02-18

## 2025-02-18 RX ORDER — GUAIFENESIN 600 MG/1
600 TABLET, EXTENDED RELEASE ORAL 2 TIMES DAILY
Qty: 20 TABLET | Refills: 0 | Status: SHIPPED | OUTPATIENT
Start: 2025-02-18

## 2025-02-18 RX ORDER — TORSEMIDE 100 MG/1
100 TABLET ORAL
Qty: 30 TABLET | Refills: 3 | Status: SHIPPED | OUTPATIENT
Start: 2025-02-18

## 2025-02-18 RX ORDER — GUAIFENESIN/DEXTROMETHORPHAN 100-10MG/5
5 SYRUP ORAL EVERY 4 HOURS PRN
Qty: 120 ML | Refills: 0 | Status: SHIPPED | OUTPATIENT
Start: 2025-02-18 | End: 2025-02-28

## 2025-02-18 RX ADMIN — DEXAMETHASONE SODIUM PHOSPHATE 6 MG: 4 INJECTION, SOLUTION INTRAMUSCULAR; INTRAVENOUS at 12:48

## 2025-02-18 RX ADMIN — ATORVASTATIN CALCIUM 20 MG: 20 TABLET, FILM COATED ORAL at 21:19

## 2025-02-18 RX ADMIN — HEPARIN SODIUM 5000 UNITS: 5000 INJECTION INTRAVENOUS; SUBCUTANEOUS at 15:15

## 2025-02-18 RX ADMIN — HEPARIN SODIUM 5000 UNITS: 5000 INJECTION INTRAVENOUS; SUBCUTANEOUS at 21:19

## 2025-02-18 RX ADMIN — CALCIUM ACETATE 667 MG: 667 CAPSULE ORAL at 12:48

## 2025-02-18 RX ADMIN — CEFTRIAXONE SODIUM 2000 MG: 2 INJECTION, POWDER, FOR SOLUTION INTRAMUSCULAR; INTRAVENOUS at 12:59

## 2025-02-18 RX ADMIN — SODIUM CHLORIDE, PRESERVATIVE FREE 10 ML: 5 INJECTION INTRAVENOUS at 12:53

## 2025-02-18 RX ADMIN — GUAIFENESIN 600 MG: 600 TABLET ORAL at 15:13

## 2025-02-18 RX ADMIN — AMIODARONE HYDROCHLORIDE 200 MG: 200 TABLET ORAL at 12:48

## 2025-02-18 RX ADMIN — METOPROLOL SUCCINATE 25 MG: 25 TABLET, EXTENDED RELEASE ORAL at 17:44

## 2025-02-18 RX ADMIN — ALBUTEROL SULFATE 2.5 MG: 2.5 SOLUTION RESPIRATORY (INHALATION) at 20:29

## 2025-02-18 RX ADMIN — SODIUM CHLORIDE, PRESERVATIVE FREE 10 ML: 5 INJECTION INTRAVENOUS at 21:23

## 2025-02-18 RX ADMIN — CLOPIDOGREL BISULFATE 75 MG: 75 TABLET ORAL at 12:48

## 2025-02-18 RX ADMIN — HEPARIN SODIUM 5000 UNITS: 5000 INJECTION INTRAVENOUS; SUBCUTANEOUS at 05:08

## 2025-02-18 RX ADMIN — ALBUTEROL SULFATE 2.5 MG: 2.5 SOLUTION RESPIRATORY (INHALATION) at 12:11

## 2025-02-18 RX ADMIN — CALCIUM ACETATE 667 MG: 667 CAPSULE ORAL at 17:44

## 2025-02-18 RX ADMIN — ALBUTEROL SULFATE 2.5 MG: 2.5 SOLUTION RESPIRATORY (INHALATION) at 16:48

## 2025-02-18 RX ADMIN — BENZONATATE 100 MG: 100 CAPSULE ORAL at 21:21

## 2025-02-18 RX ADMIN — GUAIFENESIN 600 MG: 600 TABLET ORAL at 21:19

## 2025-02-18 NOTE — PLAN OF CARE
Problem: Chronic Conditions and Co-morbidities  Goal: Patient's chronic conditions and co-morbidity symptoms are monitored and maintained or improved  Outcome: Progressing     Problem: Discharge Planning  Goal: Discharge to home or other facility with appropriate resources  Outcome: Progressing  Flowsheets (Taken 2/17/2025 1949)  Discharge to home or other facility with appropriate resources:   Identify barriers to discharge with patient and caregiver   Arrange for needed discharge resources and transportation as appropriate   Identify discharge learning needs (meds, wound care, etc)   Arrange for interpreters to assist at discharge as needed   Refer to discharge planning if patient needs post-hospital services based on physician order or complex needs related to functional status, cognitive ability or social support system

## 2025-02-18 NOTE — PROGRESS NOTES
[facility]phrology Progress Note   [facility]      Reason for consultation: ESRD on HD TTS     History of Present Illness: Jeremías Bray is a 78 yo male with a PMHx of ESRD, CHF, HTN, HLD, cancer, OA. Patient presents to  ED on 2025 with complaints of SOB. He tested positive for COVID-19 5 days ago. Currently on 5 L NC. CXR reveals multifocal airspace opacities. He is receiving azithromycin, rocephin, and decadron.     We have been consulted for ESRD on HD management. Last HD was yesterday. No acute electrolyte derangements noted. 3.4 kg above DW of 140 kg per weight obtained.     Subjective:    The patient has been seen and examined. Labs and chart reviewed. Seen in HD today with net 3.8 L UF and 2 K bath.     There were not complications overnight.    Patient review of systems: SOB and cough have improved but still present.      Allergies:  Allergies   Allergen Reactions    No Known Allergies         Scheduled Meds:   guaiFENesin  600 mg Oral BID    cefTRIAXone (ROCEPHIN) IV  2,000 mg IntraVENous Q24H    calcium acetate  667 mg Oral TID WC    albuterol  2.5 mg Nebulization Q4H WA RT    amiodarone  200 mg Oral Daily    atorvastatin  20 mg Oral Nightly    clopidogrel  75 mg Oral Daily    metoprolol succinate  25 mg Oral QPM    sodium chloride flush  5-40 mL IntraVENous 2 times per day    heparin (porcine)  5,000 Units SubCUTAneous 3 times per day    dexAMETHasone  6 mg IntraVENous Q24H    torsemide  100 mg Oral Once per day on         sodium chloride 5 mL/hr at 25 0953       PRN Meds:ipratropium, sodium chloride flush, sodium chloride, ondansetron **OR** ondansetron, polyethylene glycol, acetaminophen **OR** acetaminophen, benzonatate, guaiFENesin-dextromethorphan    Physical Exam:    TEMPERATURE:  Current - Temp: 97.8 °F (36.6 °C); Max - Temp  Av.9 °F (36.6 °C)  Min: 97.8 °F (36.6 °C)  Max: 97.9 °F (36.6 °C)  RESPIRATIONS RANGE: Resp  Av  Min: 16  Max: 20  PULSE RANGE:  Refill request for:    Disp Refills Start End     zolpidem (AMBIEN) 5 MG tablet 45 tablet 0 12/20/2019     Sig - Route: Take 1.5 tablets by mouth nightly as needed for Sleep.      Next Appointment:  5/11/20  Last Appointment:  8/12/19  Missed Appointments:  No Show 2/27/20    Per PDMP, last dispensed on 1/13/20.    Is the patient due for refill of this medication(s): Yes  PDMP review:  [x] Criteria MET. Rx pended and sent to provider for approval.  Juan, You denied this request on 2/28/20, Appt required.  Patient scheduled for 5/11/20, and wants to know if you will reconsider sending refills.

## 2025-02-18 NOTE — PROGRESS NOTES
Treatment time: 3 hours and 45 minutes    Net UF: 4 liters doctor Zoë was aware of it.    Pre weight: 141.2 kg  Post weight: 137.5 kg  EDW: 137.5 kg    Access used: Left AVF  Access function: Access site located on the Left upper arm had good bruit and thrill. No signs of infection noted. No redness, hematoma nor swelling was observed. No discharges was seen. Cleaned site aseptically and cannulation done without any problem.     Medications or blood products given: none    Regular outpatient schedule: Tuesday, Thursday, Saturday.    Summary of response to treatment: 08:05: Treatment set at 4 liters for 3 hours and 45 minutes as via Left AVF. Doctor Zoë was aware of it. Access site located on the left upper arm had good bruit and thrill. No signs of infection noted. No redness, hematoma nor swelling was observed. No discharges was seen. Cleaned site aseptically and cannulation done without any problem. Parameters set accordingly. Hooked to HD machine. Monitored from time to time. 11:53: Treatment completed. Returned blood aseptically. HD tolerated well.    Copy of dialysis treatment record placed in chart, to be scanned into EMR.

## 2025-02-18 NOTE — PROGRESS NOTES
Attempted to do Home O2 eval on Pt. Pt required increased O2 to 5L to stay above 90% SPO2 after Dialysis. Unable to complete home O2 eval at this time. RN aware. Will continue to monitor.  LAURENT MANCERAP

## 2025-02-18 NOTE — PROGRESS NOTES
Pt still needing 5L O2 to keep SPO2 above 90%, unable to do home O2 eval at this time. Will continue to monitor.  LAURENT MANCERAP

## 2025-02-18 NOTE — CARE COORDINATION
Chart review done, rounds completed. Likely dc soon, being seen by nephrology, dialysis today.   Will need home O2 eval, and brittani is following    Dillon Cowan LMSW, [facility] Social Work Case Management   Phone: [phone]  Fax: [phone]

## 2025-02-18 NOTE — PLAN OF CARE
Problem: Chronic Conditions and Co-morbidities  Goal: Patient's chronic conditions and co-morbidity symptoms are monitored and maintained or improved  2/18/2025 1554 by Adriane Smith RN  Outcome: Progressing  2/18/2025 0444 by Camille Garcia RN  Outcome: Progressing     Problem: Discharge Planning  Goal: Discharge to home or other facility with appropriate resources  2/18/2025 1554 by Adriane Smith RN  Outcome: Progressing  2/18/2025 0444 by Camille Garcia RN  Outcome: Progressing  Flowsheets (Taken 2/17/2025 1949)  Discharge to home or other facility with appropriate resources:   Identify barriers to discharge with patient and caregiver   Arrange for needed discharge resources and transportation as appropriate   Identify discharge learning needs (meds, wound care, etc)   Arrange for interpreters to assist at discharge as needed   Refer to discharge planning if patient needs post-hospital services based on physician order or complex needs related to functional status, cognitive ability or social support system     Problem: Safety - Adult  Goal: Free from fall injury  Outcome: Progressing     Problem: Pain  Goal: Verbalizes/displays adequate comfort level or baseline comfort level  Outcome: Progressing     Problem: Respiratory - Adult  Goal: Achieves optimal ventilation and oxygenation  Outcome: Progressing     Problem: Cardiovascular - Adult  Goal: Maintains optimal cardiac output and hemodynamic stability  Outcome: Progressing  Goal: Absence of cardiac dysrhythmias or at baseline  Outcome: Progressing     Problem: Skin/Tissue Integrity - Adult  Goal: Skin integrity remains intact  Outcome: Progressing  Goal: Incisions, wounds, or drain sites healing without S/S of infection  Outcome: Progressing  Goal: Oral mucous membranes remain intact  Outcome: Progressing     Problem: Musculoskeletal - Adult  Goal: Return mobility to safest level of function  Outcome: Progressing  Goal: Maintain proper alignment

## 2025-02-18 NOTE — PROGRESS NOTES
RT    amiodarone  200 mg Oral Daily    atorvastatin  20 mg Oral Nightly    clopidogrel  75 mg Oral Daily    metoprolol succinate  25 mg Oral QPM    sodium chloride flush  5-40 mL IntraVENous 2 times per day    heparin (porcine)  5,000 Units SubCUTAneous 3 times per day    dexAMETHasone  6 mg IntraVENous Q24H    torsemide  100 mg Oral Once per day on Sunday Tuesday Thursday Saturday      Infusions:    sodium chloride 5 mL/hr at 02/13/25 0953     PRN Meds: ipratropium, 0.5 mg, Q4H PRN  sodium chloride flush, 5-40 mL, PRN  sodium chloride, , PRN  ondansetron, 4 mg, Q8H PRN   Or  ondansetron, 4 mg, Q6H PRN  polyethylene glycol, 17 g, Daily PRN  acetaminophen, 650 mg, Q6H PRN   Or  acetaminophen, 650 mg, Q6H PRN  benzonatate, 100 mg, TID PRN  guaiFENesin-dextromethorphan, 5 mL, Q4H PRN        Labs and Imaging   Echo (TTE) complete (PRN contrast/bubble/strain/3D)    Result Date: 2/14/2025    Left Ventricle: Low normal left ventricular systolic function with a visually estimated EF of 50 - 55%. Left ventricle size is normal. Mildly increased wall thickness. Normal wall motion.   Right Ventricle: Right ventricle size is normal. Normal systolic function. TAPSE is 2.1 cm. RV Peak S' is 10.1 cm/s.   Aortic Valve: Terrell transcatheter bioprosthetic valve with a size of 26 mm. Normal prosthetic gradients. AV mean gradient is 27 mmHg. AV Velocity Ratio is 0.48.   Image quality is suboptimal. Contrast used: Lumason.     XR CHEST PORTABLE    Result Date: 2/12/2025  EXAMINATION: ONE XRAY VIEW OF THE CHEST 2/12/2025 6:53 am COMPARISON: 09/24/2024 radiograph HISTORY: ORDERING SYSTEM PROVIDED HISTORY: Shortness of Breath, hypoxia, recent COVID diagnosis 5 days ago TECHNOLOGIST PROVIDED HISTORY: Reason for exam:->Shortness of Breath, hypoxia, recent COVID diagnosis 5 days ago Reason for Exam: Shortness of Breath, hypoxia, recent COVID diagnosis 5 days ago FINDINGS: The heart is enlarged.  Evidence of prior TAVR.  Moderate perihilar  UROBILINOGEN 0.2 11/05/2019 07:12 AM    BILIRUBINUR Negative 11/05/2019 07:12 AM    BLOODU SMALL 11/05/2019 07:12 AM    GLUCOSEU Negative 11/05/2019 07:12 AM    KETUA Negative 11/05/2019 07:12 AM     Urine Cultures: No results found for: \"LABURIN\"  Blood Cultures:   Lab Results   Component Value Date/Time    BC No Growth after 4 days of incubation. 02/12/2025 08:52 AM     Lab Results   Component Value Date/Time    BLOODCULT2 No Growth after 4 days of incubation. 02/12/2025 01:50 PM     Organism:   Lab Results   Component Value Date/Time    ORG Haemophilus influenzae DNA Detected 02/12/2025 10:36 AM    ORG Respiratory syncytial virus by PCR 02/12/2025 10:36 AM         Electronically signed by Harry Grove MD on 2/18/2025 at 11:37 AM  Comment: Please note this report has been produced using speech recognition software and may contain errors related to that system including errors in grammar, punctuation, and spelling, as well as words and phrases that may be inappropriate. If there are any questions or concerns, please feel free to contact the dictating provider for clarification.

## 2025-02-19 LAB
ALBUMIN SERPL-MCNC: 3.7 G/DL (ref 3.4–5)
ALBUMIN/GLOB SERPL: 1 {RATIO} (ref 1.1–2.2)
ALP SERPL-CCNC: 61 U/L (ref 40–129)
ALT SERPL-CCNC: 57 U/L (ref 10–40)
ANION GAP SERPL CALCULATED.3IONS-SCNC: 19 MMOL/L (ref 3–16)
AST SERPL-CCNC: 37 U/L (ref 15–37)
BILIRUB SERPL-MCNC: 0.3 MG/DL (ref 0–1)
BUN SERPL-MCNC: 73 MG/DL (ref 7–20)
CALCIUM SERPL-MCNC: 9.8 MG/DL (ref 8.3–10.6)
CHLORIDE SERPL-SCNC: 92 MMOL/L (ref 99–110)
CO2 SERPL-SCNC: 25 MMOL/L (ref 21–32)
CREAT SERPL-MCNC: 7.7 MG/DL (ref 0.8–1.3)
DEPRECATED RDW RBC AUTO: 15.5 % (ref 12.4–15.4)
GFR SERPLBLD CREATININE-BSD FMLA CKD-EPI: 7 ML/MIN/{1.73_M2}
GLUCOSE SERPL-MCNC: 130 MG/DL (ref 70–99)
HCT VFR BLD AUTO: 39.2 % (ref 40.5–52.5)
HGB BLD-MCNC: 13.1 G/DL (ref 13.5–17.5)
MCH RBC QN AUTO: 33.4 PG (ref 26–34)
MCHC RBC AUTO-ENTMCNC: 33.3 G/DL (ref 31–36)
MCV RBC AUTO: 100.3 FL (ref 80–100)
PHOSPHATE SERPL-MCNC: 6.3 MG/DL (ref 2.5–4.9)
PLATELET # BLD AUTO: 279 K/UL (ref 135–450)
PMV BLD AUTO: 8.2 FL (ref 5–10.5)
POTASSIUM SERPL-SCNC: 4.5 MMOL/L (ref 3.5–5.1)
PROT SERPL-MCNC: 7.4 G/DL (ref 6.4–8.2)
RBC # BLD AUTO: 3.91 M/UL (ref 4.2–5.9)
SODIUM SERPL-SCNC: 136 MMOL/L (ref 136–145)
WBC # BLD AUTO: 14.9 K/UL (ref 4–11)

## 2025-02-19 PROCEDURE — 2580000003 HC RX 258: Performed by: FAMILY MEDICINE

## 2025-02-19 PROCEDURE — 94669 MECHANICAL CHEST WALL OSCILL: CPT

## 2025-02-19 PROCEDURE — 2500000003 HC RX 250 WO HCPCS: Performed by: INTERNAL MEDICINE

## 2025-02-19 PROCEDURE — 2500000003 HC RX 250 WO HCPCS: Performed by: HOSPITALIST

## 2025-02-19 PROCEDURE — 94640 AIRWAY INHALATION TREATMENT: CPT

## 2025-02-19 PROCEDURE — 6360000002 HC RX W HCPCS: Performed by: FAMILY MEDICINE

## 2025-02-19 PROCEDURE — 36415 COLL VENOUS BLD VENIPUNCTURE: CPT

## 2025-02-19 PROCEDURE — 6370000000 HC RX 637 (ALT 250 FOR IP): Performed by: INTERNAL MEDICINE

## 2025-02-19 PROCEDURE — 1200000000 HC SEMI PRIVATE

## 2025-02-19 PROCEDURE — 6360000002 HC RX W HCPCS: Performed by: INTERNAL MEDICINE

## 2025-02-19 PROCEDURE — 85027 COMPLETE CBC AUTOMATED: CPT

## 2025-02-19 PROCEDURE — 94761 N-INVAS EAR/PLS OXIMETRY MLT: CPT

## 2025-02-19 PROCEDURE — 80053 COMPREHEN METABOLIC PANEL: CPT

## 2025-02-19 PROCEDURE — 2700000000 HC OXYGEN THERAPY PER DAY

## 2025-02-19 PROCEDURE — 84100 ASSAY OF PHOSPHORUS: CPT

## 2025-02-19 PROCEDURE — 6370000000 HC RX 637 (ALT 250 FOR IP): Performed by: FAMILY MEDICINE

## 2025-02-19 RX ADMIN — GUAIFENESIN 600 MG: 600 TABLET ORAL at 09:02

## 2025-02-19 RX ADMIN — SODIUM CHLORIDE, PRESERVATIVE FREE 10 ML: 5 INJECTION INTRAVENOUS at 09:02

## 2025-02-19 RX ADMIN — CLOPIDOGREL BISULFATE 75 MG: 75 TABLET ORAL at 09:02

## 2025-02-19 RX ADMIN — CALCIUM ACETATE 667 MG: 667 CAPSULE ORAL at 09:02

## 2025-02-19 RX ADMIN — HEPARIN SODIUM 5000 UNITS: 5000 INJECTION INTRAVENOUS; SUBCUTANEOUS at 14:43

## 2025-02-19 RX ADMIN — DEXAMETHASONE SODIUM PHOSPHATE 6 MG: 4 INJECTION, SOLUTION INTRAMUSCULAR; INTRAVENOUS at 12:05

## 2025-02-19 RX ADMIN — HEPARIN SODIUM 5000 UNITS: 5000 INJECTION INTRAVENOUS; SUBCUTANEOUS at 05:55

## 2025-02-19 RX ADMIN — AMIODARONE HYDROCHLORIDE 200 MG: 200 TABLET ORAL at 09:02

## 2025-02-19 RX ADMIN — CEFTRIAXONE SODIUM 2000 MG: 2 INJECTION, POWDER, FOR SOLUTION INTRAMUSCULAR; INTRAVENOUS at 09:09

## 2025-02-19 RX ADMIN — CALCIUM ACETATE 667 MG: 667 CAPSULE ORAL at 12:05

## 2025-02-19 RX ADMIN — CALCIUM ACETATE 667 MG: 667 CAPSULE ORAL at 17:12

## 2025-02-19 RX ADMIN — ATORVASTATIN CALCIUM 20 MG: 20 TABLET, FILM COATED ORAL at 20:11

## 2025-02-19 RX ADMIN — SODIUM CHLORIDE, PRESERVATIVE FREE 10 ML: 5 INJECTION INTRAVENOUS at 20:07

## 2025-02-19 RX ADMIN — ALBUTEROL SULFATE 2.5 MG: 2.5 SOLUTION RESPIRATORY (INHALATION) at 16:49

## 2025-02-19 RX ADMIN — ALBUTEROL SULFATE 2.5 MG: 2.5 SOLUTION RESPIRATORY (INHALATION) at 09:09

## 2025-02-19 RX ADMIN — HEPARIN SODIUM 5000 UNITS: 5000 INJECTION INTRAVENOUS; SUBCUTANEOUS at 20:11

## 2025-02-19 RX ADMIN — GUAIFENESIN 600 MG: 600 TABLET ORAL at 20:11

## 2025-02-19 RX ADMIN — METOPROLOL SUCCINATE 25 MG: 25 TABLET, EXTENDED RELEASE ORAL at 17:12

## 2025-02-19 RX ADMIN — ALBUTEROL SULFATE 2.5 MG: 2.5 SOLUTION RESPIRATORY (INHALATION) at 21:12

## 2025-02-19 NOTE — PROGRESS NOTES
POA Self     Personally reviewed Lab Studies and Imaging         Medical Decision Making:  The following items were considered in medical decision making:  Discussion of patient care with other providers  Reviewed clinical lab tests  Reviewed radiology tests  Reviewed other diagnostic tests/interventions  Independent review of radiologic images  Microbiology cultures and other micro tests reviewed      Subjective:     Chief Complaint: Shortness of breath    Jeremías Bray is a 79 y.o. male who presents with shortness of breath      Review of Systems:      Pertinent positives and negatives discussed in HPI    Objective:     Intake/Output Summary (Last 24 hours) at 2/19/2025 1057  Last data filed at 2/19/2025 0600  Gross per 24 hour   Intake 950 ml   Output --   Net 950 ml        Vitals:   Vitals:    02/19/25 0545 02/19/25 0755 02/19/25 0902 02/19/25 0910   BP:  (!) 166/68     Pulse:  58 62 64   Resp:  18  18   Temp:  97.7 °F (36.5 °C)     TempSrc:  Oral     SpO2:  97%  97%   Weight: (!) 137.5 kg (303 lb 2.1 oz)      Height:             Physical Exam:      Physical Exam Performed:    BP (!) 166/68   Pulse 64   Temp 97.7 °F (36.5 °C) (Oral)   Resp 18   Ht 1.753 m (5' 9\")   Wt (!) 137.5 kg (303 lb 2.1 oz)   SpO2 97%   BMI 44.76 kg/m²     General appearance: No apparent distress,  cooperative.  HEENT: Pupils equal, round, and reactive to light.   Neck: Supple, with full range of motion. Trachea midline.  Respiratory:  Normal respiratory effort, rhonchi bilateral.   Cardiovascular: Regular rate and rhythm with normal S1/S2   Abdomen: Soft, non-tender, non-distended   Musculoskeletal: No edema bilaterally.  Full range of motion without deformity.  Skin: Skin color, texture, turgor normal.  No rashes or lesions.  Neurologic:  Neurovascularly intact   Psychiatric: Alert and oriented  Capillary Refill: Brisk, 3 seconds, normal   Peripheral Pulses: +2 palpable, equal bilaterally       Medications:   Medications:     11/05/2019 07:12 AM    CLARITYU Clear 11/05/2019 07:12 AM    LEUKOCYTESUR Negative 11/05/2019 07:12 AM    UROBILINOGEN 0.2 11/05/2019 07:12 AM    BILIRUBINUR Negative 11/05/2019 07:12 AM    BLOODU SMALL 11/05/2019 07:12 AM    GLUCOSEU Negative 11/05/2019 07:12 AM    KETUA Negative 11/05/2019 07:12 AM     Urine Cultures: No results found for: \"LABURIN\"  Blood Cultures:   Lab Results   Component Value Date/Time    BC No Growth after 4 days of incubation. 02/12/2025 08:52 AM     Lab Results   Component Value Date/Time    BLOODCULT2 No Growth after 4 days of incubation. 02/12/2025 01:50 PM     Organism:   Lab Results   Component Value Date/Time    ORG Haemophilus influenzae DNA Detected 02/12/2025 10:36 AM    ORG Respiratory syncytial virus by PCR 02/12/2025 10:36 AM         Electronically signed by Harry Grove MD on 2/19/2025 at 10:57 AM  Comment: Please note this report has been produced using speech recognition software and may contain errors related to that system including errors in grammar, punctuation, and spelling, as well as words and phrases that may be inappropriate. If there are any questions or concerns, please feel free to contact the dictating provider for clarification.

## 2025-02-19 NOTE — PROGRESS NOTES
02/19/25 1654   Resting (Room Air)   SpO2 87   HR 70   Resting (On O2)   SpO2 93   HR 71   O2 Device Nasal cannula   O2 Flow Rate (l/min) 1 l/min   FIO2 (%) 24   During Walk (On O2)   SpO2 86   HR 84   O2 Device Nasal cannula   O2 Flow Rate (l/min) 2 l/min   Need Additional O2 Flow Rate Rows Yes   Walk/Assistance Device Cane   Rate of Dyspnea 0   Comments paced in room for 4 minutes   After Walk   SpO2 92   HR 83   O2 Device Nasal cannula   O2 Flow Rate (l/min) 1 l/min   FIO2 (%) 24   Rate of Dyspnea 0   Does the Patient Qualify for Home O2 Yes   Liter Flow at Rest 1   Liter Flow on Exertion 2

## 2025-02-19 NOTE — PLAN OF CARE
Problem: Chronic Conditions and Co-morbidities  Goal: Patient's chronic conditions and co-morbidity symptoms are monitored and maintained or improved  2/19/2025 1010 by Isidro Torres RN  Outcome: Progressing  2/19/2025 0000 by Shay Andrade RN  Outcome: Progressing  Flowsheets (Taken 2/18/2025 2118)  Care Plan - Patient's Chronic Conditions and Co-Morbidity Symptoms are Monitored and Maintained or Improved:   Monitor and assess patient's chronic conditions and comorbid symptoms for stability, deterioration, or improvement   Collaborate with multidisciplinary team to address chronic and comorbid conditions and prevent exacerbation or deterioration   Update acute care plan with appropriate goals if chronic or comorbid symptoms are exacerbated and prevent overall improvement and discharge     Problem: Discharge Planning  Goal: Discharge to home or other facility with appropriate resources  2/19/2025 1010 by Isidro Torres RN  Outcome: Progressing  2/19/2025 0000 by Shay Andrade RN  Outcome: Progressing  Flowsheets (Taken 2/18/2025 2118)  Discharge to home or other facility with appropriate resources:   Identify barriers to discharge with patient and caregiver   Arrange for needed discharge resources and transportation as appropriate   Identify discharge learning needs (meds, wound care, etc)   Refer to discharge planning if patient needs post-hospital services based on physician order or complex needs related to functional status, cognitive ability or social support system     Problem: Safety - Adult  Goal: Free from fall injury  2/19/2025 1010 by Isidro Torres RN  Outcome: Progressing  2/19/2025 0000 by Shay Andrade RN  Outcome: Progressing     Problem: Pain  Goal: Verbalizes/displays adequate comfort level or baseline comfort level  2/19/2025 1010 by Isidro Torres RN  Outcome: Progressing  2/19/2025 0000 by Shay Andrade RN  Outcome: Progressing     Problem: Respiratory -

## 2025-02-19 NOTE — PLAN OF CARE
Problem: Chronic Conditions and Co-morbidities  Goal: Patient's chronic conditions and co-morbidity symptoms are monitored and maintained or improved  Outcome: Progressing  Flowsheets (Taken 2/18/2025 2118)  Care Plan - Patient's Chronic Conditions and Co-Morbidity Symptoms are Monitored and Maintained or Improved:   Monitor and assess patient's chronic conditions and comorbid symptoms for stability, deterioration, or improvement   Collaborate with multidisciplinary team to address chronic and comorbid conditions and prevent exacerbation or deterioration   Update acute care plan with appropriate goals if chronic or comorbid symptoms are exacerbated and prevent overall improvement and discharge     Problem: Discharge Planning  Goal: Discharge to home or other facility with appropriate resources  Outcome: Progressing  Flowsheets (Taken 2/18/2025 2118)  Discharge to home or other facility with appropriate resources:   Identify barriers to discharge with patient and caregiver   Arrange for needed discharge resources and transportation as appropriate   Identify discharge learning needs (meds, wound care, etc)   Refer to discharge planning if patient needs post-hospital services based on physician order or complex needs related to functional status, cognitive ability or social support system     Problem: Safety - Adult  Goal: Free from fall injury  Outcome: Progressing     Problem: Pain  Goal: Verbalizes/displays adequate comfort level or baseline comfort level  Outcome: Progressing     Problem: Respiratory - Adult  Goal: Achieves optimal ventilation and oxygenation  Outcome: Progressing  Flowsheets (Taken 2/18/2025 2118)  Achieves optimal ventilation and oxygenation:   Assess for changes in respiratory status   Assess for changes in mentation and behavior   Position to facilitate oxygenation and minimize respiratory effort   Oxygen supplementation based on oxygen saturation or arterial blood gases   Encourage  broncho-pulmonary hygiene including cough, deep breathe, incentive spirometry   Assess and instruct to report shortness of breath or any respiratory difficulty   Respiratory therapy support as indicated     Problem: Cardiovascular - Adult  Goal: Maintains optimal cardiac output and hemodynamic stability  Outcome: Progressing  Goal: Absence of cardiac dysrhythmias or at baseline  Outcome: Progressing     Problem: Skin/Tissue Integrity - Adult  Goal: Skin integrity remains intact  Outcome: Progressing  Flowsheets (Taken 2/18/2025 2118)  Skin Integrity Remains Intact:   Monitor for areas of redness and/or skin breakdown   Assess vascular access sites hourly  Goal: Incisions, wounds, or drain sites healing without S/S of infection  Outcome: Progressing  Goal: Oral mucous membranes remain intact  Outcome: Progressing     Problem: Musculoskeletal - Adult  Goal: Return mobility to safest level of function  Outcome: Progressing  Flowsheets (Taken 2/18/2025 2118)  Return Mobility to Safest Level of Function: Assess patient stability and activity tolerance for standing, transferring and ambulating with or without assistive devices  Goal: Maintain proper alignment of affected body part  Outcome: Progressing

## 2025-02-19 NOTE — PROGRESS NOTES
Nephrology Progress Note   [facility]      Reason for consultation: ESRD on HD TTS     History of Present Illness: Jeremías Bray is a 78 yo male with a PMHx of ESRD, CHF, HTN, HLD, cancer, OA. Patient presents to  ED on 2025 with complaints of SOB. He tested positive for COVID-19 5 days ago. Currently on 5 L NC. CXR reveals multifocal airspace opacities. He is receiving azithromycin, rocephin, and decadron.     We have been consulted for ESRD on HD management. Last HD was yesterday. No acute electrolyte derangements noted. 3.4 kg above DW of 140 kg per weight obtained.     Subjective:    The patient has been seen and examined. Labs and chart reviewed. Received HD yesterday with net 4 L UF. O2 requirements went up to 5 L yesterday afternoon. On 3 L NC now.     There were not complications overnight.    Patient review of systems: SOB improved      Allergies:  Allergies   Allergen Reactions    No Known Allergies         Scheduled Meds:   guaiFENesin  600 mg Oral BID    calcium acetate  667 mg Oral TID WC    albuterol  2.5 mg Nebulization Q4H WA RT    amiodarone  200 mg Oral Daily    atorvastatin  20 mg Oral Nightly    clopidogrel  75 mg Oral Daily    metoprolol succinate  25 mg Oral QPM    sodium chloride flush  5-40 mL IntraVENous 2 times per day    heparin (porcine)  5,000 Units SubCUTAneous 3 times per day    dexAMETHasone  6 mg IntraVENous Q24H    torsemide  100 mg Oral Once per day on         sodium chloride 5 mL/hr at 25 0953       PRN Meds:ipratropium, sodium chloride flush, sodium chloride, ondansetron **OR** ondansetron, polyethylene glycol, acetaminophen **OR** acetaminophen, benzonatate, guaiFENesin-dextromethorphan    Physical Exam:    TEMPERATURE:  Current - Temp: 97.7 °F (36.5 °C); Max - Temp  Av.9 °F (36.6 °C)  Min: 97.6 °F (36.4 °C)  Max: 98.1 °F (36.7 °C)  RESPIRATIONS RANGE: Resp  Av.3  Min: 16  Max: 20  PULSE RANGE: Pulse  Av.2  Min: 58   01/30/2015 04:17 PM    WBCUA 4 11/05/2019 07:12 AM    RBCUA 2 11/05/2019 07:12 AM    MUCUS 1+ 01/27/2015 10:36 AM    YEAST Present 01/27/2015 10:36 AM    BACTERIA RARE 11/05/2019 07:12 AM    CLARITYU Clear 11/05/2019 07:12 AM    LEUKOCYTESUR Negative 11/05/2019 07:12 AM    UROBILINOGEN 0.2 11/05/2019 07:12 AM    BILIRUBINUR Negative 11/05/2019 07:12 AM    BLOODU SMALL 11/05/2019 07:12 AM    GLUCOSEU Negative 11/05/2019 07:12 AM         IMPRESSION/RECOMMENDATIONS:      ESRD on HD TTS: 140 kg DW              - Out[facility]: [facility]               - Access: L AVF              - Last post-HD wt was 137.5 kg -- likely new dry weight   - Next HD will be tomorrow               - Continue torsemide on non-HD days               - TTS labs      2. Acute respiratory failure / PNA / recent COVID infection              - Resp panel positive for H. Flu and RSV              - Covid negative this admission              - S/p rocephin  - On decadron              - Management per primary     3. Hypertension              - BP intermittently elevated              - On metoprolol               - Monitor     4. CKD-BMD              - Phosphorus elevated   - On low phos diet   - Continue binder      5. Anemia of ESRD              - Hgb > target              - No indication for GOPAL    6. Hyperkalemia   - K+ WNL   - Low K+ diet     Will discuss with nephrology attending physician, Dr. Camp.  See attestation for additional recommendations.    Asia London, DOC - CNP

## 2025-02-19 NOTE — CARE COORDINATION
Chart review done, rounds completed. Likely dc soon, being seen by nephrology, dialysis today.   Will need home O2 eval, and [facility] is following- weaning.      Dillon Cowan LMSW, [facility] Social Work Case Management   Phone: [phone]  Fax: [phone]

## 2025-02-20 LAB
ALBUMIN SERPL-MCNC: 3.3 G/DL (ref 3.4–5)
ANION GAP SERPL CALCULATED.3IONS-SCNC: 18 MMOL/L (ref 3–16)
BUN SERPL-MCNC: 97 MG/DL (ref 7–20)
CALCIUM SERPL-MCNC: 9.7 MG/DL (ref 8.3–10.6)
CHLORIDE SERPL-SCNC: 93 MMOL/L (ref 99–110)
CO2 SERPL-SCNC: 23 MMOL/L (ref 21–32)
CREAT SERPL-MCNC: 9.1 MG/DL (ref 0.8–1.3)
DEPRECATED RDW RBC AUTO: 16 % (ref 12.4–15.4)
GFR SERPLBLD CREATININE-BSD FMLA CKD-EPI: 5 ML/MIN/{1.73_M2}
GLUCOSE SERPL-MCNC: 153 MG/DL (ref 70–99)
HCT VFR BLD AUTO: 36.7 % (ref 40.5–52.5)
HGB BLD-MCNC: 12.4 G/DL (ref 13.5–17.5)
MCH RBC QN AUTO: 33.4 PG (ref 26–34)
MCHC RBC AUTO-ENTMCNC: 33.7 G/DL (ref 31–36)
MCV RBC AUTO: 99.2 FL (ref 80–100)
PHOSPHATE SERPL-MCNC: 7.2 MG/DL (ref 2.5–4.9)
PLATELET # BLD AUTO: 250 K/UL (ref 135–450)
PMV BLD AUTO: 8.2 FL (ref 5–10.5)
POTASSIUM SERPL-SCNC: 4.9 MMOL/L (ref 3.5–5.1)
RBC # BLD AUTO: 3.7 M/UL (ref 4.2–5.9)
SODIUM SERPL-SCNC: 134 MMOL/L (ref 136–145)
WBC # BLD AUTO: 12 K/UL (ref 4–11)

## 2025-02-20 PROCEDURE — 2500000003 HC RX 250 WO HCPCS: Performed by: INTERNAL MEDICINE

## 2025-02-20 PROCEDURE — 6360000002 HC RX W HCPCS: Performed by: INTERNAL MEDICINE

## 2025-02-20 PROCEDURE — 80069 RENAL FUNCTION PANEL: CPT

## 2025-02-20 PROCEDURE — 94640 AIRWAY INHALATION TREATMENT: CPT

## 2025-02-20 PROCEDURE — 90935 HEMODIALYSIS ONE EVALUATION: CPT

## 2025-02-20 PROCEDURE — 94761 N-INVAS EAR/PLS OXIMETRY MLT: CPT

## 2025-02-20 PROCEDURE — 85027 COMPLETE CBC AUTOMATED: CPT

## 2025-02-20 PROCEDURE — 94669 MECHANICAL CHEST WALL OSCILL: CPT

## 2025-02-20 PROCEDURE — 6370000000 HC RX 637 (ALT 250 FOR IP): Performed by: FAMILY MEDICINE

## 2025-02-20 PROCEDURE — 36415 COLL VENOUS BLD VENIPUNCTURE: CPT

## 2025-02-20 PROCEDURE — 1200000000 HC SEMI PRIVATE

## 2025-02-20 PROCEDURE — 6370000000 HC RX 637 (ALT 250 FOR IP): Performed by: INTERNAL MEDICINE

## 2025-02-20 PROCEDURE — 2700000000 HC OXYGEN THERAPY PER DAY

## 2025-02-20 RX ORDER — ALBUTEROL SULFATE 90 UG/1
2 INHALANT RESPIRATORY (INHALATION) EVERY 4 HOURS PRN
Qty: 18 G | Refills: 3 | Status: SHIPPED | OUTPATIENT
Start: 2025-02-20

## 2025-02-20 RX ORDER — ALBUTEROL SULFATE 90 UG/1
2 INHALANT RESPIRATORY (INHALATION) EVERY 4 HOURS PRN
Status: DISCONTINUED | OUTPATIENT
Start: 2025-02-20 | End: 2025-02-21 | Stop reason: HOSPADM

## 2025-02-20 RX ORDER — ALBUTEROL SULFATE 0.83 MG/ML
2.5 SOLUTION RESPIRATORY (INHALATION)
Qty: 120 EACH | Refills: 3 | Status: SHIPPED | OUTPATIENT
Start: 2025-02-21

## 2025-02-20 RX ORDER — ALBUTEROL SULFATE 0.83 MG/ML
2.5 SOLUTION RESPIRATORY (INHALATION)
Status: DISCONTINUED | OUTPATIENT
Start: 2025-02-21 | End: 2025-02-21 | Stop reason: HOSPADM

## 2025-02-20 RX ADMIN — METOPROLOL SUCCINATE 25 MG: 25 TABLET, EXTENDED RELEASE ORAL at 20:49

## 2025-02-20 RX ADMIN — ALBUTEROL SULFATE 2.5 MG: 2.5 SOLUTION RESPIRATORY (INHALATION) at 08:26

## 2025-02-20 RX ADMIN — SODIUM CHLORIDE, PRESERVATIVE FREE 10 ML: 5 INJECTION INTRAVENOUS at 20:49

## 2025-02-20 RX ADMIN — ALBUTEROL SULFATE 2.5 MG: 2.5 SOLUTION RESPIRATORY (INHALATION) at 11:49

## 2025-02-20 RX ADMIN — HEPARIN SODIUM 5000 UNITS: 5000 INJECTION INTRAVENOUS; SUBCUTANEOUS at 20:49

## 2025-02-20 RX ADMIN — ATORVASTATIN CALCIUM 20 MG: 20 TABLET, FILM COATED ORAL at 20:49

## 2025-02-20 RX ADMIN — ALBUTEROL SULFATE 2.5 MG: 2.5 SOLUTION RESPIRATORY (INHALATION) at 20:31

## 2025-02-20 RX ADMIN — GUAIFENESIN 600 MG: 600 TABLET ORAL at 20:49

## 2025-02-20 ASSESSMENT — PAIN SCALES - GENERAL: PAINLEVEL_OUTOF10: 0

## 2025-02-20 NOTE — PROGRESS NOTES
PROVIDED HISTORY: Reason for exam:->Shortness of Breath, hypoxia, recent COVID diagnosis 5 days ago Reason for Exam: Shortness of Breath, hypoxia, recent COVID diagnosis 5 days ago FINDINGS: The heart is enlarged.  Evidence of prior TAVR.  Moderate perihilar opacities have increased centrally from remote prior imaging.  Multifocal ground-glass opacities are present in the left mid and bilateral lower lung zones.  There are no significant skeletal findings.     Multifocal airspace opacities in the lower lung zones in a patient with significant cardiomegaly.  Pattern may represent pulmonary edema.  A viral pattern of pneumonia cannot be excluded given history.       CBC:   Recent Labs     02/18/25  0545 02/19/25  0556 02/20/25  0405   WBC 13.7* 14.9* 12.0*   HGB 12.9* 13.1* 12.4*    279 250     BMP:    Recent Labs     02/18/25  0545 02/19/25  0556 02/20/25  0405   *  134* 136 134*   K 5.2*  5.1 4.5 4.9   CL 91*  91* 92* 93*   CO2 22  22 25 23   *  110* 73* 97*   CREATININE 9.8*  9.8* 7.7* 9.1*   GLUCOSE 133*  134* 130* 153*     Hepatic:   Recent Labs     02/18/25  0545 02/19/25  0556   AST 34 37   ALT 56* 57*   BILITOT <0.2 0.3   ALKPHOS 57 61     Lipids:   Lab Results   Component Value Date/Time    CHOL 107 09/26/2020 06:03 AM    HDL 34 09/26/2020 06:03 AM    TRIG 71 09/26/2020 06:03 AM     Hemoglobin A1C:   Lab Results   Component Value Date/Time    LABA1C 6.2 08/15/2019 07:04 AM     TSH:   Lab Results   Component Value Date/Time    TSH 1.60 09/25/2020 10:00 AM    TSH 1.70 01/27/2015 07:11 AM     Troponin: No results found for: \"TROPONINT\"  Lactic Acid: No results for input(s): \"LACTA\" in the last 72 hours.  BNP: No results for input(s): \"PROBNP\" in the last 72 hours.  UA:  Lab Results   Component Value Date/Time    NITRU Negative 11/05/2019 07:12 AM    COLORU YELLOW 11/05/2019 07:12 AM    PHUR 6.0 11/05/2019 07:12 AM    LABCAST 5-10 Hyaline 01/30/2015 04:17 PM    WBCUA 4 11/05/2019 07:12  AM    RBCUA 2 11/05/2019 07:12 AM    MUCUS 1+ 01/27/2015 10:36 AM    YEAST Present 01/27/2015 10:36 AM    BACTERIA RARE 11/05/2019 07:12 AM    CLARITYU Clear 11/05/2019 07:12 AM    LEUKOCYTESUR Negative 11/05/2019 07:12 AM    UROBILINOGEN 0.2 11/05/2019 07:12 AM    BILIRUBINUR Negative 11/05/2019 07:12 AM    BLOODU SMALL 11/05/2019 07:12 AM    GLUCOSEU Negative 11/05/2019 07:12 AM    KETUA Negative 11/05/2019 07:12 AM     Urine Cultures: No results found for: \"LABURIN\"  Blood Cultures:   Lab Results   Component Value Date/Time    BC No Growth after 4 days of incubation. 02/12/2025 08:52 AM     Lab Results   Component Value Date/Time    BLOODCULT2 No Growth after 4 days of incubation. 02/12/2025 01:50 PM     Organism:   Lab Results   Component Value Date/Time    ORG Haemophilus influenzae DNA Detected 02/12/2025 10:36 AM    ORG Respiratory syncytial virus by PCR 02/12/2025 10:36 AM         Electronically signed by Harry Grove MD on 2/20/2025 at 12:01 PM  Comment: Please note this report has been produced using speech recognition software and may contain errors related to that system including errors in grammar, punctuation, and spelling, as well as words and phrases that may be inappropriate. If there are any questions or concerns, please feel free to contact the dictating provider for clarification.

## 2025-02-20 NOTE — PROGRESS NOTES
Nephrology Progress Note   [facility]      Reason for consultation: ESRD on HD TTS     History of Present Illness: Jeremías Bray is a 78 yo male with a PMHx of ESRD, CHF, HTN, HLD, cancer, OA. Patient presents to  ED on 2025 with complaints of SOB. He tested positive for COVID-19 5 days ago. Currently on 5 L NC. CXR reveals multifocal airspace opacities. He is receiving azithromycin, rocephin, and decadron.     We have been consulted for ESRD on HD management. Last HD was yesterday. No acute electrolyte derangements noted. 3.4 kg above DW of 140 kg per weight obtained.     Subjective:    The patient has been seen and examined. Labs and chart reviewed. Planning for HD today.    There were not complications overnight.    Patient review of systems: SOB improved      Allergies:  Allergies   Allergen Reactions    No Known Allergies         Scheduled Meds:   guaiFENesin  600 mg Oral BID    calcium acetate  667 mg Oral TID WC    albuterol  2.5 mg Nebulization Q4H WA RT    amiodarone  200 mg Oral Daily    atorvastatin  20 mg Oral Nightly    clopidogrel  75 mg Oral Daily    metoprolol succinate  25 mg Oral QPM    sodium chloride flush  5-40 mL IntraVENous 2 times per day    heparin (porcine)  5,000 Units SubCUTAneous 3 times per day    dexAMETHasone  6 mg IntraVENous Q24H    torsemide  100 mg Oral Once per day on         sodium chloride 5 mL/hr at 25 0953       PRN Meds:ipratropium, sodium chloride flush, sodium chloride, ondansetron **OR** ondansetron, polyethylene glycol, acetaminophen **OR** acetaminophen, benzonatate, guaiFENesin-dextromethorphan    Physical Exam:    TEMPERATURE:  Current - Temp: 97.6 °F (36.4 °C); Max - Temp  Av.7 °F (36.5 °C)  Min: 97.6 °F (36.4 °C)  Max: 97.7 °F (36.5 °C)  RESPIRATIONS RANGE: Resp  Av.7  Min: 18  Max: 20  PULSE RANGE: Pulse  Av.5  Min: 59  Max: 79  BLOOD PRESSURE RANGE:  Systolic (24hrs), Av , Min:147 , Max:161   ;

## 2025-02-20 NOTE — PLAN OF CARE
Problem: Chronic Conditions and Co-morbidities  Goal: Patient's chronic conditions and co-morbidity symptoms are monitored and maintained or improved  Outcome: Progressing     Problem: Discharge Planning  Goal: Discharge to home or other facility with appropriate resources  Outcome: Progressing     Problem: Safety - Adult  Goal: Free from fall injury  Outcome: Progressing     Problem: Pain  Goal: Verbalizes/displays adequate comfort level or baseline comfort level  Outcome: Progressing     Problem: Respiratory - Adult  Goal: Achieves optimal ventilation and oxygenation  Outcome: Progressing     Problem: Cardiovascular - Adult  Goal: Maintains optimal cardiac output and hemodynamic stability  Outcome: Progressing  Goal: Absence of cardiac dysrhythmias or at baseline  Outcome: Progressing     Problem: Skin/Tissue Integrity - Adult  Goal: Skin integrity remains intact  Outcome: Progressing  Goal: Incisions, wounds, or drain sites healing without S/S of infection  Outcome: Progressing  Goal: Oral mucous membranes remain intact  Outcome: Progressing     Problem: Musculoskeletal - Adult  Goal: Return mobility to safest level of function  Outcome: Progressing  Goal: Maintain proper alignment of affected body part  Outcome: Progressing     Problem: ABCDS Injury Assessment  Goal: Absence of physical injury  Outcome: Progressing     Problem: Gastrointestinal - Adult  Goal: Maintains adequate nutritional intake  Outcome: Progressing

## 2025-02-20 NOTE — CARE COORDINATION
Chart review done, rounds completed. Likely dc soon, being seen by nephrology, dialysis today.   Qualified for O2, [facility] delivered.        Dillon Cowan LMSW, [facility] Social Work Case Management   Phone: [phone]  Fax: [phone]

## 2025-02-20 NOTE — PLAN OF CARE
Problem: Chronic Conditions and Co-morbidities  Goal: Patient's chronic conditions and co-morbidity symptoms are monitored and maintained or improved  Outcome: Progressing  Flowsheets (Taken 2/19/2025 2006)  Care Plan - Patient's Chronic Conditions and Co-Morbidity Symptoms are Monitored and Maintained or Improved:   Monitor and assess patient's chronic conditions and comorbid symptoms for stability, deterioration, or improvement   Collaborate with multidisciplinary team to address chronic and comorbid conditions and prevent exacerbation or deterioration   Update acute care plan with appropriate goals if chronic or comorbid symptoms are exacerbated and prevent overall improvement and discharge     Problem: Discharge Planning  Goal: Discharge to home or other facility with appropriate resources  Outcome: Progressing  Flowsheets (Taken 2/19/2025 2006)  Discharge to home or other facility with appropriate resources:   Identify barriers to discharge with patient and caregiver   Arrange for needed discharge resources and transportation as appropriate   Identify discharge learning needs (meds, wound care, etc)   Refer to discharge planning if patient needs post-hospital services based on physician order or complex needs related to functional status, cognitive ability or social support system     Problem: Safety - Adult  Goal: Free from fall injury  Outcome: Progressing     Problem: Pain  Goal: Verbalizes/displays adequate comfort level or baseline comfort level  Outcome: Progressing     Problem: Respiratory - Adult  Goal: Achieves optimal ventilation and oxygenation  Outcome: Progressing  Flowsheets (Taken 2/19/2025 2006)  Achieves optimal ventilation and oxygenation:   Assess for changes in respiratory status   Assess for changes in mentation and behavior   Position to facilitate oxygenation and minimize respiratory effort   Oxygen supplementation based on oxygen saturation or arterial blood gases   Encourage

## 2025-02-21 VITALS
DIASTOLIC BLOOD PRESSURE: 59 MMHG | RESPIRATION RATE: 18 BRPM | WEIGHT: 292.77 LBS | OXYGEN SATURATION: 96 % | HEIGHT: 69 IN | TEMPERATURE: 98.1 F | SYSTOLIC BLOOD PRESSURE: 117 MMHG | BODY MASS INDEX: 43.36 KG/M2 | HEART RATE: 59 BPM

## 2025-02-21 PROCEDURE — 6360000002 HC RX W HCPCS: Performed by: INTERNAL MEDICINE

## 2025-02-21 PROCEDURE — 2500000003 HC RX 250 WO HCPCS: Performed by: HOSPITALIST

## 2025-02-21 PROCEDURE — 6370000000 HC RX 637 (ALT 250 FOR IP): Performed by: INTERNAL MEDICINE

## 2025-02-21 PROCEDURE — 94761 N-INVAS EAR/PLS OXIMETRY MLT: CPT

## 2025-02-21 PROCEDURE — 94640 AIRWAY INHALATION TREATMENT: CPT

## 2025-02-21 PROCEDURE — 2700000000 HC OXYGEN THERAPY PER DAY

## 2025-02-21 PROCEDURE — 6370000000 HC RX 637 (ALT 250 FOR IP): Performed by: FAMILY MEDICINE

## 2025-02-21 PROCEDURE — 6360000002 HC RX W HCPCS: Performed by: FAMILY MEDICINE

## 2025-02-21 RX ADMIN — HEPARIN SODIUM 5000 UNITS: 5000 INJECTION INTRAVENOUS; SUBCUTANEOUS at 06:07

## 2025-02-21 RX ADMIN — DEXAMETHASONE SODIUM PHOSPHATE 6 MG: 4 INJECTION, SOLUTION INTRAMUSCULAR; INTRAVENOUS at 12:04

## 2025-02-21 RX ADMIN — CALCIUM ACETATE 667 MG: 667 CAPSULE ORAL at 08:31

## 2025-02-21 RX ADMIN — ALBUTEROL SULFATE 2.5 MG: 2.5 SOLUTION RESPIRATORY (INHALATION) at 08:46

## 2025-02-21 RX ADMIN — AMIODARONE HYDROCHLORIDE 200 MG: 200 TABLET ORAL at 08:31

## 2025-02-21 RX ADMIN — CLOPIDOGREL BISULFATE 75 MG: 75 TABLET ORAL at 08:31

## 2025-02-21 RX ADMIN — GUAIFENESIN 600 MG: 600 TABLET ORAL at 08:31

## 2025-02-21 RX ADMIN — CALCIUM ACETATE 667 MG: 667 CAPSULE ORAL at 12:04

## 2025-02-21 NOTE — PLAN OF CARE
Problem: Chronic Conditions and Co-morbidities  Goal: Patient's chronic conditions and co-morbidity symptoms are monitored and maintained or improved  2/21/2025 0334 by Urszula Alvarez RN  Outcome: Progressing  2/20/2025 1347 by Georgina Simms RN  Outcome: Progressing     Problem: Discharge Planning  Goal: Discharge to home or other facility with appropriate resources  2/21/2025 0334 by Urszula Alvarez RN  Outcome: Progressing  2/20/2025 1347 by Georgina Simms RN  Outcome: Progressing     Problem: Safety - Adult  Goal: Free from fall injury  2/21/2025 0334 by Urszula Alvarez RN  Outcome: Progressing  2/20/2025 1347 by Georgina Simms RN  Outcome: Progressing     Problem: Pain  Goal: Verbalizes/displays adequate comfort level or baseline comfort level  2/21/2025 0334 by Urszula Alvarez RN  Outcome: Progressing  2/20/2025 1347 by Georgina Simms RN  Outcome: Progressing     Problem: Respiratory - Adult  Goal: Achieves optimal ventilation and oxygenation  2/21/2025 0334 by Urszula Alvarez RN  Outcome: Progressing  2/20/2025 1347 by Georgina Simms RN  Outcome: Progressing     Problem: Cardiovascular - Adult  Goal: Maintains optimal cardiac output and hemodynamic stability  2/20/2025 1347 by Georgina Simms RN  Outcome: Progressing  Goal: Absence of cardiac dysrhythmias or at baseline  2/20/2025 1347 by Georgina Simms RN  Outcome: Progressing     Problem: Skin/Tissue Integrity - Adult  Goal: Skin integrity remains intact  2/20/2025 1347 by Georgina Simms RN  Outcome: Progressing  Goal: Incisions, wounds, or drain sites healing without S/S of infection  2/20/2025 1347 by Georgina Simms RN  Outcome: Progressing  Goal: Oral mucous membranes remain intact  2/20/2025 1347 by Georgina Simms RN  Outcome: Progressing     Problem: Musculoskeletal - Adult  Goal: Return mobility to safest level of function  2/20/2025 1347 by Georgina Simms RN  Outcome: Progressing  Goal: Maintain proper alignment of affected body

## 2025-02-21 NOTE — PROGRESS NOTES
Discharge paperwork discussed with and provided to pt. IV removed without complications. Pt wheeled down to lobby with belongings and assisted into lyft.    Electronically signed by Giovanna Bhakta RN on 2/21/2025 at 12:26 PM

## 2025-02-21 NOTE — CARE COORDINATION
CASE MANAGEMENT DISCHARGE SUMMARY:    DISCHARGE DATE: 2/21/25    DISCHARGED TO HOME     [facility]  [address] [place] [address]  Phone:  [phone]  Fax: [phone]     TRANSPORTATION: ly             TIME: 12:15pm     Dillon Cowan LMSW, [facility] Social Work Case Management   Phone: [phone]  Fax: [phone]   Electronically signed by ELSA Ayala on 2/21/2025 at 12:04 PM

## 2025-02-21 NOTE — PROGRESS NOTES
Pt informed of d/c order. Pt will need transport to get home d/t original ride, Em[place], being sick. Will reach out to the CM/SW.    Electronically signed by Giovanna Bhakta RN on 2/21/2025 at 10:33 AM

## 2025-02-21 NOTE — PROGRESS NOTES
Nephrology Progress Note   [facility]      Reason for consultation: ESRD on HD TTS     History of Present Illness: Jeremías Bray is a 78 yo male with a PMHx of ESRD, CHF, HTN, HLD, cancer, OA. Patient presents to  ED on 2025 with complaints of SOB. He tested positive for COVID-19 5 days ago. Currently on 5 L NC. CXR reveals multifocal airspace opacities. He is receiving azithromycin, rocephin, and decadron.     We have been consulted for ESRD on HD management. Last HD was yesterday. No acute electrolyte derangements noted. 3.4 kg above DW of 140 kg per weight obtained.     Subjective:    The patient has been seen and examined. Labs and chart reviewed. Resting in chair on 2 L NC. Received HD today with net 3.3 L UF. Plans for d/c today.     There were not complications overnight.    Patient review of systems: SOB improved      Allergies:  Allergies   Allergen Reactions    No Known Allergies         Scheduled Meds:   albuterol  2.5 mg Nebulization BID RT    guaiFENesin  600 mg Oral BID    calcium acetate  667 mg Oral TID WC    amiodarone  200 mg Oral Daily    atorvastatin  20 mg Oral Nightly    clopidogrel  75 mg Oral Daily    metoprolol succinate  25 mg Oral QPM    sodium chloride flush  5-40 mL IntraVENous 2 times per day    heparin (porcine)  5,000 Units SubCUTAneous 3 times per day    dexAMETHasone  6 mg IntraVENous Q24H    torsemide  100 mg Oral Once per day on         sodium chloride 5 mL/hr at 25 0953       PRN Meds:albuterol sulfate HFA, ipratropium, sodium chloride flush, sodium chloride, ondansetron **OR** ondansetron, polyethylene glycol, acetaminophen **OR** acetaminophen, benzonatate, guaiFENesin-dextromethorphan    Physical Exam:    TEMPERATURE:  Current - Temp: 98.1 °F (36.7 °C); Max - Temp  Av.9 °F (36.6 °C)  Min: 97.7 °F (36.5 °C)  Max: 98.1 °F (36.7 °C)  RESPIRATIONS RANGE: Resp  Av.6  Min: 18  Max: 20  PULSE RANGE: Pulse  Av.9  Min: 59

## 2025-02-21 NOTE — PLAN OF CARE
Problem: Chronic Conditions and Co-morbidities  Goal: Patient's chronic conditions and co-morbidity symptoms are monitored and maintained or improved  2/21/2025 1134 by Giovanna Bhakta RN  Outcome: Completed  2/21/2025 0334 by Urszula Alvarez RN  Outcome: Progressing     Problem: Discharge Planning  Goal: Discharge to home or other facility with appropriate resources  2/21/2025 1134 by Giovanna Bhakta RN  Outcome: Completed  2/21/2025 0334 by Urszula Alvarez RN  Outcome: Progressing     Problem: Safety - Adult  Goal: Free from fall injury  2/21/2025 1134 by Giovanna Bhakta RN  Outcome: Completed  2/21/2025 0334 by Urszula Alvarez RN  Outcome: Progressing     Problem: Pain  Goal: Verbalizes/displays adequate comfort level or baseline comfort level  2/21/2025 1134 by Giovanna Bhakta RN  Outcome: Completed  2/21/2025 0334 by Urszula Alvarez RN  Outcome: Progressing     Problem: Respiratory - Adult  Goal: Achieves optimal ventilation and oxygenation  2/21/2025 1134 by Giovanna Bhakta RN  Outcome: Completed  2/21/2025 0334 by Urszula Alvarez RN  Outcome: Progressing     Problem: Cardiovascular - Adult  Goal: Maintains optimal cardiac output and hemodynamic stability  Outcome: Completed  Goal: Absence of cardiac dysrhythmias or at baseline  Outcome: Completed     Problem: Skin/Tissue Integrity - Adult  Goal: Skin integrity remains intact  Outcome: Completed  Goal: Incisions, wounds, or drain sites healing without S/S of infection  Outcome: Completed  Goal: Oral mucous membranes remain intact  Outcome: Completed     Problem: Musculoskeletal - Adult  Goal: Return mobility to safest level of function  Outcome: Completed  Goal: Maintain proper alignment of affected body part  Outcome: Completed     Problem: ABCDS Injury Assessment  Goal: Absence of physical injury  Outcome: Completed     Problem: Gastrointestinal - Adult  Goal: Maintains adequate nutritional intake  Outcome: Completed

## 2025-02-21 NOTE — PROGRESS NOTES
Treatment time: 3.5 hours     Net UF: 3.3 L     Pre weight: 138.8 kg  Post weight: 135.5 kg      Access used: Left arm fistula   Access function: tolerated 350 BFR, 15g     Medications or blood products given: NA     Regular outpatient schedule: TTS     Summary of response to treatment: tolerated well. 2k bath used     Copy of dialysis treatment record placed in chart, to be scanned into EMR.

## 2025-02-21 NOTE — PROGRESS NOTES
CLINICAL PHARMACY NOTE: MEDS TO BEDS    Total # of Prescriptions Filled: 6   The following medications were delivered to the patient:    Mucus relief   Torsemide   Benzonatate  Nieves-tussin dm   Albuterol hfa   Amoxicillin-pot clavul      Additional Document:  put meds in pt bag

## 2025-02-23 NOTE — DISCHARGE SUMMARY
Skin color, texture, turgor normal.  No rashes or lesions.  Neurologic:  Neurovascularly intact   Psychiatric: Alert and oriented  Capillary Refill: Brisk, 3 seconds, normal   Peripheral Pulses: +2 palpable, equal bilaterally       Labs: For convenience and continuity at follow-up the following most recent labs are provided:      CBC:    Lab Results   Component Value Date/Time    WBC 12.0 02/20/2025 04:05 AM    HGB 12.4 02/20/2025 04:05 AM    HCT 36.7 02/20/2025 04:05 AM     02/20/2025 04:05 AM       Renal:    Lab Results   Component Value Date/Time     02/20/2025 04:05 AM    K 4.9 02/20/2025 04:05 AM    K 3.7 02/12/2025 06:38 AM    CL 93 02/20/2025 04:05 AM    CO2 23 02/20/2025 04:05 AM    BUN 97 02/20/2025 04:05 AM    CREATININE 9.1 02/20/2025 04:05 AM    CALCIUM 9.7 02/20/2025 04:05 AM    PHOS 7.2 02/20/2025 04:05 AM         Significant Diagnostic Studies    Radiology:   XR CHEST PORTABLE   Final Result   Multifocal airspace opacities in the lower lung zones in a patient with   significant cardiomegaly.  Pattern may represent pulmonary edema.  A viral   pattern of pneumonia cannot be excluded given history.                Consults:     IP CONSULT TO HOSPITALIST  IP CONSULT TO NEPHROLOGY  IP CONSULT TO CARDIOLOGY    Disposition: Home    Condition at Discharge: Stable    Discharge Instructions/Follow-up: PCP, nephrology, cardiology, dialysis    Code Status:  Prior     Activity: activity as tolerated    Diet: cardiac diet and renal diet      Discharge Medications:     Discharge Medication List as of 2/21/2025 12:14 PM             Details   albuterol (PROVENTIL) (2.5 MG/3ML) 0.083% nebulizer solution Take 3 mLs by nebulization in the morning and 3 mLs in the evening., Disp-120 each, R-3Normal      !! albuterol sulfate HFA (PROVENTIL;VENTOLIN;PROAIR) 108 (90 Base) MCG/ACT inhaler Inhale 2 puffs into the lungs every 4 hours as needed for Wheezing or Shortness of Breath, Disp-18 g, R-3Normal

## 2025-02-28 ENCOUNTER — OFFICE VISIT (OUTPATIENT)
Dept: CARDIOLOGY CLINIC | Age: 80
End: 2025-02-28
Payer: MEDICARE

## 2025-02-28 ENCOUNTER — TELEPHONE (OUTPATIENT)
Dept: CARDIOLOGY CLINIC | Age: 80
End: 2025-02-28

## 2025-02-28 VITALS
OXYGEN SATURATION: 93 % | SYSTOLIC BLOOD PRESSURE: 128 MMHG | WEIGHT: 303 LBS | DIASTOLIC BLOOD PRESSURE: 92 MMHG | BODY MASS INDEX: 44.88 KG/M2 | HEART RATE: 93 BPM | HEIGHT: 69 IN | RESPIRATION RATE: 16 BRPM

## 2025-02-28 DIAGNOSIS — I25.10 CORONARY ARTERY DISEASE INVOLVING NATIVE CORONARY ARTERY OF NATIVE HEART WITHOUT ANGINA PECTORIS: Primary | ICD-10-CM

## 2025-02-28 DIAGNOSIS — I35.0 NONRHEUMATIC AORTIC VALVE STENOSIS: ICD-10-CM

## 2025-02-28 DIAGNOSIS — I25.5 ISCHEMIC CARDIOMYOPATHY: ICD-10-CM

## 2025-02-28 DIAGNOSIS — I10 ESSENTIAL HYPERTENSION: ICD-10-CM

## 2025-02-28 DIAGNOSIS — Z95.2 S/P TAVR (TRANSCATHETER AORTIC VALVE REPLACEMENT): ICD-10-CM

## 2025-02-28 DIAGNOSIS — E78.2 MIXED HYPERLIPIDEMIA: ICD-10-CM

## 2025-02-28 PROCEDURE — 3074F SYST BP LT 130 MM HG: CPT | Performed by: INTERNAL MEDICINE

## 2025-02-28 PROCEDURE — 1159F MED LIST DOCD IN RCRD: CPT | Performed by: INTERNAL MEDICINE

## 2025-02-28 PROCEDURE — G8427 DOCREV CUR MEDS BY ELIG CLIN: HCPCS | Performed by: INTERNAL MEDICINE

## 2025-02-28 PROCEDURE — 99215 OFFICE O/P EST HI 40 MIN: CPT | Performed by: INTERNAL MEDICINE

## 2025-02-28 PROCEDURE — 3080F DIAST BP >= 90 MM HG: CPT | Performed by: INTERNAL MEDICINE

## 2025-02-28 PROCEDURE — 1111F DSCHRG MED/CURRENT MED MERGE: CPT | Performed by: INTERNAL MEDICINE

## 2025-02-28 PROCEDURE — G8417 CALC BMI ABV UP PARAM F/U: HCPCS | Performed by: INTERNAL MEDICINE

## 2025-02-28 PROCEDURE — 1123F ACP DISCUSS/DSCN MKR DOCD: CPT | Performed by: INTERNAL MEDICINE

## 2025-02-28 PROCEDURE — 1036F TOBACCO NON-USER: CPT | Performed by: INTERNAL MEDICINE

## 2025-02-28 RX ORDER — CYCLOBENZAPRINE HCL 10 MG
10 TABLET ORAL 3 TIMES DAILY PRN
COMMUNITY
Start: 2024-10-31

## 2025-02-28 RX ORDER — AMIODARONE HYDROCHLORIDE 200 MG/1
200 TABLET ORAL DAILY
Qty: 90 TABLET | Refills: 0 | Status: SHIPPED | OUTPATIENT
Start: 2025-02-28

## 2025-02-28 NOTE — TELEPHONE ENCOUNTER
Called and spoke to patient and let him know that he should be seen in the office for follow up with Dr Ag. He is agreeable to come to his appointment today. I have placed him back on the schedule.

## 2025-02-28 NOTE — PROGRESS NOTES
obesity  Encouraged increase aerobic exercise and heart healthy diet    Transient atrial fib    Follow up in 1 year.     Thank you very much for allowing me to participate in the care of your patient. Please do not hesitate to contact me if you have any questions.    Sincerely,    Preston Ag MD, MPH      [facility]  [address], [address]   [place] [address]  Ph: [phone]  Fax: [phone]    This note was scribed in the presence of Dr Ag, by Suha Deal RN  Physician Attestation:  The scribes documentation has been prepared under my direction and personally reviewed by me in its entirety.     I confirm that the note above accurately reflects all work, treatment, procedures, and medical decision making performed by me.

## 2025-02-28 NOTE — TELEPHONE ENCOUNTER
Jeremías is canceling his appt with Dr. Ag today 2/28/25 and did not want to r/s his appt.  Jeremías stated that last time he was in the hospital they told him he didn't need to be seen by his cardiologist anymore.    Jeremías phone # [phone]

## 2025-03-27 ENCOUNTER — APPOINTMENT (OUTPATIENT)
Dept: CT IMAGING | Age: 80
DRG: 640 | End: 2025-03-27
Payer: MEDICARE

## 2025-03-27 ENCOUNTER — HOSPITAL ENCOUNTER (INPATIENT)
Age: 80
LOS: 1 days | Discharge: HOME | DRG: 640 | End: 2025-03-28
Attending: EMERGENCY MEDICINE
Payer: MEDICARE

## 2025-03-27 ENCOUNTER — APPOINTMENT (OUTPATIENT)
Dept: GENERAL RADIOLOGY | Age: 80
DRG: 640 | End: 2025-03-27
Payer: MEDICARE

## 2025-03-27 DIAGNOSIS — R42 LIGHTHEADEDNESS: Primary | ICD-10-CM

## 2025-03-27 DIAGNOSIS — I95.9 HYPOTENSION, UNSPECIFIED HYPOTENSION TYPE: ICD-10-CM

## 2025-03-27 LAB
ALBUMIN SERPL-MCNC: 3.5 G/DL (ref 3.4–5)
ALBUMIN/GLOB SERPL: 1 {RATIO} (ref 1.1–2.2)
ALP SERPL-CCNC: 61 U/L (ref 40–129)
ALT SERPL-CCNC: 44 U/L (ref 10–40)
ANION GAP SERPL CALCULATED.3IONS-SCNC: 16 MMOL/L (ref 3–16)
AST SERPL-CCNC: 39 U/L (ref 15–37)
BASE EXCESS BLDV CALC-SCNC: 8 MMOL/L
BASOPHILS # BLD: 0.1 K/UL (ref 0–0.2)
BASOPHILS NFR BLD: 1.2 %
BILIRUB SERPL-MCNC: 0.4 MG/DL (ref 0–1)
BUN SERPL-MCNC: 24 MG/DL (ref 7–20)
CALCIUM SERPL-MCNC: 9.5 MG/DL (ref 8.3–10.6)
CHLORIDE SERPL-SCNC: 96 MMOL/L (ref 99–110)
CO2 BLDV-SCNC: 37 MMOL/L
CO2 SERPL-SCNC: 31 MMOL/L (ref 21–32)
COHGB MFR BLDV: 0.7 %
CREAT SERPL-MCNC: 3.9 MG/DL (ref 0.8–1.3)
DEPRECATED RDW RBC AUTO: 18 % (ref 12.4–15.4)
EKG ATRIAL RATE: 84 BPM
EKG DIAGNOSIS: NORMAL
EKG P AXIS: 67 DEGREES
EKG P-R INTERVAL: 188 MS
EKG Q-T INTERVAL: 426 MS
EKG QRS DURATION: 100 MS
EKG QTC CALCULATION (BAZETT): 503 MS
EKG R AXIS: -12 DEGREES
EKG T AXIS: 88 DEGREES
EKG VENTRICULAR RATE: 84 BPM
EOSINOPHIL # BLD: 0.3 K/UL (ref 0–0.6)
EOSINOPHIL NFR BLD: 3.8 %
FLUAV + FLUBV AG NOSE IA.RAPID: NOT DETECTED
FLUAV + FLUBV AG NOSE IA.RAPID: NOT DETECTED
GFR SERPLBLD CREATININE-BSD FMLA CKD-EPI: 15 ML/MIN/{1.73_M2}
GLUCOSE SERPL-MCNC: 147 MG/DL (ref 70–99)
HCO3 BLDV-SCNC: 35 MMOL/L (ref 23–29)
HCT VFR BLD AUTO: 34.2 % (ref 40.5–52.5)
HGB BLD-MCNC: 11.3 G/DL (ref 13.5–17.5)
LYMPHOCYTES # BLD: 0.8 K/UL (ref 1–5.1)
LYMPHOCYTES NFR BLD: 9.1 %
MAGNESIUM SERPL-MCNC: 2.13 MG/DL (ref 1.8–2.4)
MCH RBC QN AUTO: 33.2 PG (ref 26–34)
MCHC RBC AUTO-ENTMCNC: 33.1 G/DL (ref 31–36)
MCV RBC AUTO: 100.3 FL (ref 80–100)
METHGB MFR BLDV: 0.3 %
MONOCYTES # BLD: 0.4 K/UL (ref 0–1.3)
MONOCYTES NFR BLD: 5 %
NEUTROPHILS # BLD: 6.9 K/UL (ref 1.7–7.7)
NEUTROPHILS NFR BLD: 80.9 %
NT-PROBNP SERPL-MCNC: ABNORMAL PG/ML (ref 0–449)
O2 THERAPY: ABNORMAL
PCO2 BLDV: 53.1 MMHG (ref 40–50)
PH BLDV: 7.43 [PH] (ref 7.35–7.45)
PLATELET # BLD AUTO: 265 K/UL (ref 135–450)
PMV BLD AUTO: 8.4 FL (ref 5–10.5)
PO2 BLDV: 31 MMHG
POTASSIUM SERPL-SCNC: 3.2 MMOL/L (ref 3.5–5.1)
PROT SERPL-MCNC: 7 G/DL (ref 6.4–8.2)
RBC # BLD AUTO: 3.41 M/UL (ref 4.2–5.9)
SAO2 % BLDV: 53 %
SARS-COV-2 RDRP RESP QL NAA+PROBE: NOT DETECTED
SODIUM SERPL-SCNC: 143 MMOL/L (ref 136–145)
TROPONIN, HIGH SENSITIVITY: 82 NG/L (ref 0–22)
TROPONIN, HIGH SENSITIVITY: 85 NG/L (ref 0–22)
WBC # BLD AUTO: 8.6 K/UL (ref 4–11)

## 2025-03-27 PROCEDURE — 85025 COMPLETE CBC W/AUTO DIFF WBC: CPT

## 2025-03-27 PROCEDURE — G0378 HOSPITAL OBSERVATION PER HR: HCPCS

## 2025-03-27 PROCEDURE — 83880 ASSAY OF NATRIURETIC PEPTIDE: CPT

## 2025-03-27 PROCEDURE — 2580000003 HC RX 258: Performed by: PHYSICIAN ASSISTANT

## 2025-03-27 PROCEDURE — 80053 COMPREHEN METABOLIC PANEL: CPT

## 2025-03-27 PROCEDURE — 6360000004 HC RX CONTRAST MEDICATION: Performed by: PHYSICIAN ASSISTANT

## 2025-03-27 PROCEDURE — 93005 ELECTROCARDIOGRAM TRACING: CPT | Performed by: EMERGENCY MEDICINE

## 2025-03-27 PROCEDURE — 84484 ASSAY OF TROPONIN QUANT: CPT

## 2025-03-27 PROCEDURE — 71260 CT THORAX DX C+: CPT

## 2025-03-27 PROCEDURE — 71046 X-RAY EXAM CHEST 2 VIEWS: CPT

## 2025-03-27 PROCEDURE — 82803 BLOOD GASES ANY COMBINATION: CPT

## 2025-03-27 PROCEDURE — 36415 COLL VENOUS BLD VENIPUNCTURE: CPT

## 2025-03-27 PROCEDURE — 96360 HYDRATION IV INFUSION INIT: CPT

## 2025-03-27 PROCEDURE — 93010 ELECTROCARDIOGRAM REPORT: CPT | Performed by: INTERNAL MEDICINE

## 2025-03-27 PROCEDURE — 2500000003 HC RX 250 WO HCPCS

## 2025-03-27 PROCEDURE — 83735 ASSAY OF MAGNESIUM: CPT

## 2025-03-27 PROCEDURE — 1200000000 HC SEMI PRIVATE

## 2025-03-27 PROCEDURE — 96361 HYDRATE IV INFUSION ADD-ON: CPT

## 2025-03-27 PROCEDURE — 87502 INFLUENZA DNA AMP PROBE: CPT

## 2025-03-27 PROCEDURE — 87635 SARS-COV-2 COVID-19 AMP PRB: CPT

## 2025-03-27 PROCEDURE — 99285 EMERGENCY DEPT VISIT HI MDM: CPT

## 2025-03-27 RX ORDER — ACETAMINOPHEN 650 MG/1
650 SUPPOSITORY RECTAL EVERY 6 HOURS PRN
Status: DISCONTINUED | OUTPATIENT
Start: 2025-03-27 | End: 2025-03-28 | Stop reason: HOSPADM

## 2025-03-27 RX ORDER — CALCIUM ACETATE 667 MG/1
667 TABLET ORAL
Status: DISCONTINUED | OUTPATIENT
Start: 2025-03-27 | End: 2025-03-27 | Stop reason: DRUGHIGH

## 2025-03-27 RX ORDER — ENOXAPARIN SODIUM 100 MG/ML
30 INJECTION SUBCUTANEOUS DAILY
Status: DISCONTINUED | OUTPATIENT
Start: 2025-03-28 | End: 2025-03-28 | Stop reason: HOSPADM

## 2025-03-27 RX ORDER — ONDANSETRON 4 MG/1
4 TABLET, ORALLY DISINTEGRATING ORAL EVERY 8 HOURS PRN
Status: DISCONTINUED | OUTPATIENT
Start: 2025-03-27 | End: 2025-03-28 | Stop reason: HOSPADM

## 2025-03-27 RX ORDER — SODIUM CHLORIDE 0.9 % (FLUSH) 0.9 %
5-40 SYRINGE (ML) INJECTION PRN
Status: DISCONTINUED | OUTPATIENT
Start: 2025-03-27 | End: 2025-03-28 | Stop reason: HOSPADM

## 2025-03-27 RX ORDER — SODIUM CHLORIDE 9 MG/ML
INJECTION, SOLUTION INTRAVENOUS PRN
Status: DISCONTINUED | OUTPATIENT
Start: 2025-03-27 | End: 2025-03-28 | Stop reason: HOSPADM

## 2025-03-27 RX ORDER — AMIODARONE HYDROCHLORIDE 200 MG/1
200 TABLET ORAL DAILY
Status: DISCONTINUED | OUTPATIENT
Start: 2025-03-28 | End: 2025-03-28 | Stop reason: HOSPADM

## 2025-03-27 RX ORDER — CALCIUM ACETATE 667 MG/1
3 CAPSULE ORAL
Status: DISCONTINUED | OUTPATIENT
Start: 2025-03-27 | End: 2025-03-28 | Stop reason: HOSPADM

## 2025-03-27 RX ORDER — MIDODRINE HYDROCHLORIDE 5 MG/1
5 TABLET ORAL 3 TIMES DAILY PRN
Status: DISCONTINUED | OUTPATIENT
Start: 2025-03-27 | End: 2025-03-28 | Stop reason: HOSPADM

## 2025-03-27 RX ORDER — ACETAMINOPHEN 325 MG/1
650 TABLET ORAL EVERY 6 HOURS PRN
Status: DISCONTINUED | OUTPATIENT
Start: 2025-03-27 | End: 2025-03-28 | Stop reason: HOSPADM

## 2025-03-27 RX ORDER — 0.9 % SODIUM CHLORIDE 0.9 %
250 INTRAVENOUS SOLUTION INTRAVENOUS ONCE
Status: COMPLETED | OUTPATIENT
Start: 2025-03-27 | End: 2025-03-27

## 2025-03-27 RX ORDER — ALBUMIN (HUMAN) 12.5 G/50ML
25 SOLUTION INTRAVENOUS ONCE
Status: DISCONTINUED | OUTPATIENT
Start: 2025-03-27 | End: 2025-03-28 | Stop reason: HOSPADM

## 2025-03-27 RX ORDER — ALBUTEROL SULFATE 90 UG/1
2 INHALANT RESPIRATORY (INHALATION) EVERY 4 HOURS PRN
Status: DISCONTINUED | OUTPATIENT
Start: 2025-03-27 | End: 2025-03-28 | Stop reason: HOSPADM

## 2025-03-27 RX ORDER — IOPAMIDOL 755 MG/ML
75 INJECTION, SOLUTION INTRAVASCULAR
Status: COMPLETED | OUTPATIENT
Start: 2025-03-27 | End: 2025-03-27

## 2025-03-27 RX ORDER — POLYETHYLENE GLYCOL 3350 17 G/17G
17 POWDER, FOR SOLUTION ORAL DAILY PRN
Status: DISCONTINUED | OUTPATIENT
Start: 2025-03-27 | End: 2025-03-28 | Stop reason: HOSPADM

## 2025-03-27 RX ORDER — CLOPIDOGREL BISULFATE 75 MG/1
75 TABLET ORAL DAILY
Status: DISCONTINUED | OUTPATIENT
Start: 2025-03-28 | End: 2025-03-28 | Stop reason: HOSPADM

## 2025-03-27 RX ORDER — ONDANSETRON 2 MG/ML
4 INJECTION INTRAMUSCULAR; INTRAVENOUS EVERY 6 HOURS PRN
Status: DISCONTINUED | OUTPATIENT
Start: 2025-03-27 | End: 2025-03-28 | Stop reason: HOSPADM

## 2025-03-27 RX ORDER — SODIUM CHLORIDE 0.9 % (FLUSH) 0.9 %
5-40 SYRINGE (ML) INJECTION EVERY 12 HOURS SCHEDULED
Status: DISCONTINUED | OUTPATIENT
Start: 2025-03-27 | End: 2025-03-28 | Stop reason: HOSPADM

## 2025-03-27 RX ORDER — GUAIFENESIN 600 MG/1
600 TABLET, EXTENDED RELEASE ORAL 2 TIMES DAILY
Status: DISCONTINUED | OUTPATIENT
Start: 2025-03-27 | End: 2025-03-27

## 2025-03-27 RX ADMIN — CALCIUM ACETATE 2001 MG: 667 CAPSULE ORAL at 18:04

## 2025-03-27 RX ADMIN — SODIUM CHLORIDE 250 ML: 9 INJECTION, SOLUTION INTRAVENOUS at 13:35

## 2025-03-27 RX ADMIN — SODIUM CHLORIDE, PRESERVATIVE FREE 10 ML: 5 INJECTION INTRAVENOUS at 20:45

## 2025-03-27 RX ADMIN — IOPAMIDOL 75 ML: 755 INJECTION, SOLUTION INTRAVENOUS at 14:07

## 2025-03-27 ASSESSMENT — PAIN SCALES - GENERAL: PAINLEVEL_OUTOF10: 0

## 2025-03-27 NOTE — PROGRESS NOTES
Medication Reconciliation    List of medications patient is currently taking is complete.     Source of information: 1. Conversation with patient over the phone                                      2. EPIC records      Notes regarding home medications:   Pt reports taking his AM meds pta  2. Removed cyclobenzaprine, diclofenac, and Mucinex  3. Pt reported his metoprolol dose was recently increased to 50 mg but then he was having hypotension so they decreased it back to 25 mg daily, which is what he was taking pta  4. Updated dose on calcium acetate  5. Pt reports he I supposed to take torsemide on Sunday, Monday, Wednesday, and Friday, but he only takes it when he feels he needs it     Sravani Mcwilliams [facility], PharmD 3/27/2025 5:01 PM

## 2025-03-27 NOTE — ED TRIAGE NOTES
Patient to ED via [place] from home. Patient arrived home from dialysis about 1030 this morning and reported shortness of breath. Hx stents, aorta valve replaced. EMS gave 324 ASA

## 2025-03-27 NOTE — CARE COORDINATION
SW reached out to [employer] at [phone] (member service) and they transferred the call [phone] so we could begin the process of discussing an authorization that might be needed for discharge transportation.     Member services advised that he was eligible for 12 one way rides per calendar year.      Respectfully submitted,    Fara HUNTER, DIMITRI  [facility]   [phone]    Electronically signed by ELSA Ayers, THERESA on 3/27/2025 at 4:25 PM

## 2025-03-27 NOTE — ACP (ADVANCE CARE PLANNING)
Advance Care Planning   Healthcare Decision Maker:    Primary Decision Maker: BrayJuliano - Child - [phone]    Secondary Decision Maker (Active): Sumeet Bray (sister) - Brother/Sister - [phone]    Respectfully submitted,    Fara Haynes-Ady HUNTER, DIMITRI  [facility]   [phone]    Electronically signed by ELSA Ayers, LSW on 3/27/2025 at 4:08 PM

## 2025-03-27 NOTE — ED PROVIDER NOTES
[facility] EMERGENCY DEPARTMENT  EMERGENCY DEPARTMENT ENCOUNTER        Pt Name: Jeremías Bray  MRN: [id number]  Birthdate 1945  Date of evaluation: 3/27/2025  Provider: Clemencia Almeida PA-C  PCP: Diogo Maldonado MD  Note Started: 1:40 PM EDT 3/27/25       I have seen and evaluated this patient with my supervising physician Yady Rosario MD.      CHIEF COMPLAINT       Chief Complaint   Patient presents with    Shortness of Breath     Patient to ED via [place] from home. Patient arrived home from dialysis about 1030 this morning and reported shortness of breath. Hx stents, aorta valve replaced. EMS gave 324 ASA       HISTORY OF PRESENT ILLNESS: 1 or more Elements     History From: Patient            Chief Complaint: Shortness of breath    Jeremías Bray is a 79 y.o. male who presents This is a 79-year-old male who presents emergency department with complaint of increased shortness of breath that started around 1030.  He states he went to dialysis and was fine.  He was driving to get food and then noticed that he became short of breath and had associated lightheadedness.  He also endorses associated discomfort in his chest and neck that started around the same time.  In the middle of he does have recent viral URI over the past month, however he has been feeling well from this.  He denies any cough, congestion, sore throat fever, chills.  He did not take anything for his shortness of breath.      Nursing Notes were all reviewed and agreed with or any disagreements were addressed in the HPI.    REVIEW OF SYSTEMS :      Review of Systems   All other systems reviewed and are negative.      Positives and Pertinent negatives as per HPI.     SURGICAL HISTORY     Past Surgical History:   Procedure Laterality Date    AORTIC VALVE REPLACEMENT N/A 05/18/2021    TRANSCATHETER AORTIC VALVE REPLACEMENT FEMORAL APPROACH performed by Tristan Salazar MD at [facility]    AORTIC VALVE REPLACEMENT N/A 05/18/2021

## 2025-03-27 NOTE — DISCHARGE INSTR - COC
Continuity of Care Form    Patient Name: Jeremías Bray   :  1945  MRN:  [id number]    Admit date:  3/27/2025  Discharge date:  ***    Code Status Order: Full Code   Advance Directives:     Admitting Physician:  Ana Jamil MD  PCP: Diogo Maldonado MD    Discharging Nurse: ***  Discharging Hospital Unit/Room#: 032/B-32  Discharging Unit Phone Number: ***    Emergency Contact:   Extended Emergency Contact Information  Primary Emergency Contact: kelvin daly  Home Phone: [phone]  Mobile Phone: [phone]  Relation: Other  Preferred language: English   needed? No  Secondary Emergency Contact: Sumeet Bray (sister)  Home Phone: [phone]  Mobile Phone: [phone]  Relation: Brother/Sister    Past Surgical History:  Past Surgical History:   Procedure Laterality Date    AORTIC VALVE REPLACEMENT N/A 2021    TRANSCATHETER AORTIC VALVE REPLACEMENT FEMORAL APPROACH performed by Tristan Salazar MD at [facility]    AORTIC VALVE REPLACEMENT N/A 2021    TRANSCATHETER AORTIC VALVE REPLACEMENT FEMORAL APPROACH performed by Arnaldo Marley MD at [facility]    CARDIAC CATHETERIZATION  03/10/2021    x 1 Stent placement    COLONOSCOPY      CORONARY ANGIOPLASTY WITH STENT PLACEMENT  2017    [place] to LAD    DIALYSIS FISTULA CREATION Left 2023    LEFT RADIAL ARTERY TO CEPHALIC VEIN ARTERIOVENOUS FISTULA CREATION, POSSIBLE BRACHIAL ARTERY TO CEPHALIC VEIN ARTERIOVENOUS FISTULA CREATION performed by Dashawn Jain DO at [facility] OR    DIALYSIS FISTULA CREATION Left 2023    SUPERFICIALIZATION OF LEFT BRACHIOCEPHALIC ARTERIOVENOUS FISTULA performed by Dashawn Jain DO at [facility] OR    IR TUNNELED CVC PLACE WO SQ PORT/PUMP > 5 YEARS  2021    IR TUNNELED CATHETER PLACEMENT GREATER THAN 5 YEARS 2021 [facility] SPECIAL PROCEDURES    LUMBAR EPIDURAL INJECTION      2022    [facility]      TUNNELED VENOUS CATHETER PLACEMENT Right 2021    Permacath; RIJ access; 23cm; Dr. Louis

## 2025-03-27 NOTE — ED NOTES
ED TO INPATIENT SBAR HANDOFF    Patient Name: Jeremías Bray   Preferred Name: Jeermías  : 1945  79 y.o.   Family/Caregiver Present: no   Code Status Order: Full Code  PO Status: NPO:No  Telemetry Order:   C-SSRS: Risk of Suicide: No Risk  Sitter no  no  Restraints:     Sepsis Risk Score      Situation  Chief Complaint   Patient presents with    Shortness of Breath     Patient to ED via [place] from home. Patient arrived home from dialysis about 1030 this morning and reported shortness of breath. Hx stents, aorta valve replaced. EMS gave 324 ASA     Brief Description of Patient's Condition: patient with SOB. Alert and oriented. No distress noted. +cardiac hx. Fistula to left arm. T,Th,S  Mental Status: oriented and alert  Arrived from:Home  Imaging:   XR CHEST (2 VW)   Final Result   1. No acute cardiopulmonary process.   2. Cardiomegaly.   3. Pulmonary arterial hypertension.         CT CHEST PULMONARY EMBOLISM W CONTRAST   Final Result   1. Wall adherent web-like filling defect of a right lower lobe segmental   pulmonary artery is most consistent with chronic pulmonary embolism. No   evidence of right heart strain.   2. Marked enlargement of the main pulmonary artery is consistent with   pulmonary arterial hypertension.   3. Trace left pleural effusion with subsegmental atelectasis of the left lung   base.           Abnormal labs:   Abnormal Labs Reviewed   CBC WITH AUTO DIFFERENTIAL - Abnormal; Notable for the following components:       Result Value    RBC 3.41 (*)     Hemoglobin 11.3 (*)     Hematocrit 34.2 (*)     .3 (*)     RDW 18.0 (*)     Lymphocytes Absolute 0.8 (*)     All other components within normal limits   COMPREHENSIVE METABOLIC PANEL W/ REFLEX TO MG FOR LOW K - Abnormal; Notable for the following components:    Potassium reflex Magnesium 3.2 (*)     Chloride 96 (*)     Glucose 147 (*)     BUN 24 (*)     Creatinine 3.9 (*)     Est, Glom Filt Rate 15 (*)     Albumin/Globulin Ratio 1.0 (*)

## 2025-03-27 NOTE — ED PROVIDER NOTES
I independently examined and evaluated Jeremías Bray.    In brief, patient is a 79-year-old male presents to the emergency department for evaluation of increased shortness of breath.  He reports having dialysis today and after dialysis, felt lightheaded, describes \"seeing stars,\" as if he could pass out.  Denies having room spinning sensation.  He was answering questions appropriately, had symmetric smile, had focal weakness.  He was initially hypotensive to 86/45.  After 500 cc IV fluid, blood pressure improved.  He reports no longer having the lightheadedness.  Hospitalist consulted for admission for further evaluation and treatment.  Admit    All diagnostic, treatment, and disposition decisions were made by myself in conjunction with the advanced practice provider/resident physician.     I personally saw the patient and performed a substantive portion of the visit including aspects of the medical decision making. I approved management plan and take responsibility for the patient management.     I personally saw the patient and independently provided 10 minutes of non-concurrent critical care out of the total shared critical care time provided.    Comment: Please note this report has been produced using speech recognition software and may contain errors related to that system including errors in grammar, punctuation, and spelling, as well as words and phrases that may be inappropriate. If there are any questions or concerns please feel free to contact the dictating provider for clarification.    For all further details of the patient's emergency department visit, please see the advanced practice provider's documentation.       Yady Rosario MD  03/27/25 6439

## 2025-03-27 NOTE — H&P
Name:  Jeremías Bray /Age/Sex: 1945  (79 y.o. male)   MRN & CSN:  [id number] & [id number] Encounter Date/Time: 3/27/2025 3:07 PM EDT   [facility] PCP: Diogo Maldonado MD     Attending:Ana Jamil MD       Jeremías Bray is a 79 y.o. male who presented with     Chief Complaint   Patient presents with    Shortness of Breath     Patient to ED via [place] from home. Patient arrived home from dialysis about 1030 this morning and reported shortness of breath. Hx stents, aorta valve replaced. EMS gave 324 ASA          Assessment and Plan     Dizziness   Likely related to hypotension from fluid removal during HD   Hold home metoprolol and torsemide for now   Ordered one dose of albumin and started on midodrine with parameters   Monitor on telemetry     Chronic PE   As seen on imaging   Cardiology consulted, will defer anticoagulation recommendations to cardiology    CAD s/p PCI  Ischemic cardiomyopathy   Continue home aspirin, Plavix and statin    ESRD on HD   Nephrology consulted    HPI :      Jeremías Bray is a 79 y.o. male with  has a past medical history of Arthritis, Bell's palsy, Cancer (HCC), CHF (congestive heart failure) (HCC), Coronary artery disease involving native coronary artery of native heart without angina pectoris, Hyperlipidemia, Hypertension, Influenza B, Kidney failure, Neuropathy, Nosebleed, Radiation, and Sciatica. who presented with chief complaints of shortness of breath and dizziness. Patient reports having dialysis earlier today and he felt they removed excess fluid which made him hypotensive. After that he ate a lot of food and had a bowel movement which made it worse. He also had some minimal chest discomfort around the same time. He denies any active chest pain, shortness of breath, nausea or vomiting    Advance care planning was discussed with the patient for a total of 16 minutes. Patient was given the options of full code, DNR CCA, DNR CC or limited code and patient decided

## 2025-03-27 NOTE — FLOWSHEET NOTE
03/27/25 1330   Vital Signs   Pulse 77   Respirations 22   BP (!) 69/39   MAP (Calculated) 49   MAP (mmHg) (!) 48   Oxygen Therapy   SpO2 98 %     Patient reported dizziness. RN checked BP. Clemencia MATTA aware. Give ordered fluids.

## 2025-03-27 NOTE — PROGRESS NOTES
Patient admitted to room 5126, A/O x4. Admitted for dizziness and low blood pressure after dialysis which was treated in the ED with some IVF and blood pressure has risen to 126/50. Patient on 2L of oxygen per nasal cannula which patient normally only wears as needed. Patient no longer feels dizzy but is refusing alarms and non skid socks so RN had patient sign the fall contract. Patient ambulates with a cane at baseline and it is at the bedside. Oriented to the room and call light. No other needs at this time.

## 2025-03-27 NOTE — CARE COORDINATION
Case Management Assessment  Initial Evaluation    Date/Time of Evaluation: 3/27/2025 4:12 PM  Assessment Completed by: ELSA Ayers, THERESA    If patient is discharged prior to next notation, then this note serves as note for discharge by case management.    Patient Name: Jeremías Bray                   YOB: 1945  Diagnosis: Dizziness [R42]                   Date / Time: 3/27/2025  1:00 PM    Patient Admission Status: Inpatient   Readmission Risk (Low < 19, Mod (19-27), High > 27): Readmission Risk Score: 21.3    Current PCP: Diogo Maldonado MD  PCP verified by CM? Yes    Chart Reviewed: Yes      History Provided by: Patient  Patient Orientation: Alert and Oriented    Patient Cognition: Alert    Hospitalization in the last 30 days (Readmission):  No    If yes, Readmission Assessment in  Navigator will be completed.    Advance Directives:      Code Status: Full Code   Patient's Primary Decision Maker is: Legal Next of Kin    Primary Decision Maker: Juliano Bray - Child - [phone]    Secondary Decision Maker (Active): KatarinaSumeet (sister) - Brother/Sister - [phone]    Discharge Planning:    Patient lives with: Alone Type of Home: House  Primary Care Giver: Self  Patient Support Systems include: Family Members, Friends/Neighbors, /   Current Financial resources: Medicare  Current community resources: Other (Comment) ([facility])  Current services prior to admission: Durable Medical Equipment, Other (Comment) ([facility])            Current DME: Oxygen Therapy (Comment) ([facility])            Type of Home Care services:  None    ADLS  Prior functional level: Independent in ADLs/IADLs  Current functional level: Independent in ADLs/IADLs    PT AM-PAC:   /24  OT AM-PAC:   /24    Family can provide assistance at DC: Yes  Would you like Case Management to discuss the discharge plan with any other family members/significant others, and if so,

## 2025-03-27 NOTE — PROGRESS NOTES
4 Eyes Skin Assessment     NAME:  Jeremías Bray  YOB: 1945  MEDICAL RECORD NUMBER:  [id number]    The patient is being assessed for  Admission    I agree that at least one RN has performed a thorough Head to Toe Skin Assessment on the patient. ALL assessment sites listed below have been assessed.      Areas assessed by both nurses:    Head, Face, Ears, Shoulders, Back, Chest, Arms, Elbows, Hands, and Legs. Feet and Heels        Does the Patient have a Wound? No noted wound(s)       Bryn Prevention initiated by RN: No  Wound Care Orders initiated by RN: No    Pressure Injury (Stage 3,4, Unstageable, DTI, NWPT, and Complex wounds) if present, place Wound referral order by RN under : No    New Ostomies, if present place, Ostomy referral order under : No     Nurse 1 eSignature: Electronically signed by Oralia Carrasquillo RN on 3/27/25 at 6:14 PM EDT    **SHARE this note so that the co-signing nurse can place an eSignature**    Nurse 2 eSignature: Electronically signed by Delia Nuno RN on 3/27/25 at 6:24 PM EDT

## 2025-03-28 VITALS
SYSTOLIC BLOOD PRESSURE: 111 MMHG | WEIGHT: 302.47 LBS | HEIGHT: 69 IN | BODY MASS INDEX: 44.8 KG/M2 | OXYGEN SATURATION: 95 % | RESPIRATION RATE: 17 BRPM | TEMPERATURE: 98.2 F | DIASTOLIC BLOOD PRESSURE: 65 MMHG | HEART RATE: 80 BPM

## 2025-03-28 LAB
ANION GAP SERPL CALCULATED.3IONS-SCNC: 15 MMOL/L (ref 3–16)
BASOPHILS # BLD: 0.2 K/UL (ref 0–0.2)
BASOPHILS NFR BLD: 1.9 %
BUN SERPL-MCNC: 38 MG/DL (ref 7–20)
CALCIUM SERPL-MCNC: 9.4 MG/DL (ref 8.3–10.6)
CHLORIDE SERPL-SCNC: 94 MMOL/L (ref 99–110)
CO2 SERPL-SCNC: 28 MMOL/L (ref 21–32)
CREAT SERPL-MCNC: 5.4 MG/DL (ref 0.8–1.3)
DEPRECATED RDW RBC AUTO: 17 % (ref 12.4–15.4)
EOSINOPHIL # BLD: 0.4 K/UL (ref 0–0.6)
EOSINOPHIL NFR BLD: 4.1 %
GFR SERPLBLD CREATININE-BSD FMLA CKD-EPI: 10 ML/MIN/{1.73_M2}
GLUCOSE SERPL-MCNC: 101 MG/DL (ref 70–99)
HCT VFR BLD AUTO: 30.3 % (ref 40.5–52.5)
HGB BLD-MCNC: 10 G/DL (ref 13.5–17.5)
LYMPHOCYTES # BLD: 0.9 K/UL (ref 1–5.1)
LYMPHOCYTES NFR BLD: 9.3 %
MAGNESIUM SERPL-MCNC: 2.32 MG/DL (ref 1.8–2.4)
MCH RBC QN AUTO: 33.2 PG (ref 26–34)
MCHC RBC AUTO-ENTMCNC: 33 G/DL (ref 31–36)
MCV RBC AUTO: 100.8 FL (ref 80–100)
MONOCYTES # BLD: 0.7 K/UL (ref 0–1.3)
MONOCYTES NFR BLD: 8 %
NEUTROPHILS # BLD: 7 K/UL (ref 1.7–7.7)
NEUTROPHILS NFR BLD: 76.7 %
PLATELET # BLD AUTO: 251 K/UL (ref 135–450)
PMV BLD AUTO: 8.2 FL (ref 5–10.5)
POTASSIUM SERPL-SCNC: 3.5 MMOL/L (ref 3.5–5.1)
RBC # BLD AUTO: 3.01 M/UL (ref 4.2–5.9)
SODIUM SERPL-SCNC: 137 MMOL/L (ref 136–145)
WBC # BLD AUTO: 9.2 K/UL (ref 4–11)

## 2025-03-28 PROCEDURE — G0378 HOSPITAL OBSERVATION PER HR: HCPCS

## 2025-03-28 PROCEDURE — 36415 COLL VENOUS BLD VENIPUNCTURE: CPT

## 2025-03-28 PROCEDURE — 2500000003 HC RX 250 WO HCPCS

## 2025-03-28 PROCEDURE — 85025 COMPLETE CBC W/AUTO DIFF WBC: CPT

## 2025-03-28 PROCEDURE — 80048 BASIC METABOLIC PNL TOTAL CA: CPT

## 2025-03-28 PROCEDURE — 6370000000 HC RX 637 (ALT 250 FOR IP)

## 2025-03-28 PROCEDURE — 94760 N-INVAS EAR/PLS OXIMETRY 1: CPT

## 2025-03-28 PROCEDURE — 83735 ASSAY OF MAGNESIUM: CPT

## 2025-03-28 RX ADMIN — CLOPIDOGREL BISULFATE 75 MG: 75 TABLET, FILM COATED ORAL at 08:19

## 2025-03-28 RX ADMIN — AMIODARONE HYDROCHLORIDE 200 MG: 200 TABLET ORAL at 08:18

## 2025-03-28 RX ADMIN — CALCIUM ACETATE 2001 MG: 667 CAPSULE ORAL at 08:20

## 2025-03-28 RX ADMIN — SODIUM CHLORIDE, PRESERVATIVE FREE 10 ML: 5 INJECTION INTRAVENOUS at 08:20

## 2025-03-28 NOTE — DISCHARGE SUMMARY
Hospital Medicine Discharge Summary    Patient ID: Jeremías Bray      Patient's PCP: Diogo Maldonado MD    Admit Date: 3/27/2025     Discharge Date:   03/28/2025    Admitting Provider: No admitting provider for patient encounter.     Discharge Provider: Stephon Cordova MD     Discharge Diagnoses:       Active Hospital Problems    Diagnosis     Dizziness [R42]        The patient was seen and examined on day of discharge and this discharge summary is in conjunction with any daily progress note from day of discharge.    Hospital Course:       From HPI:\"Jeremías Bray is a 79 y.o. male with  has a past medical history of Arthritis, Bell's palsy, Cancer (HCC), CHF (congestive heart failure) ([facility]), Coronary artery disease involving native coronary artery of native heart without angina pectoris, Hyperlipidemia, Hypertension, Influenza B, Kidney failure, Neuropathy, Nosebleed, Radiation, and Sciatica. who presented with chief complaints of shortness of breath and dizziness. Patient reports having dialysis earlier today and he felt they removed excess fluid which made him hypotensive. After that he ate a lot of food and had a bowel movement which made it worse. He also had some minimal chest discomfort around the same time. He denies any active chest pain, shortness of breath, nausea or vomiting     Advance care planning was discussed with the patient for a total of 16 minutes. Patient was given the options of full code, DNR CCA, DNR CC or limited code and patient decided to be full code\"      Dizziness, resolved, due to fluid removal from dialysis.  Back to baseline no headache blurry vision double vision discussed with cardiology okay to discharge home patient improved faster than anticipated on admission  Possible old or chronic PE as seen on CT scan consulted cardiology on admission discussed with cardiology no need for anticoagulation at this time per cardiology  Coronary artery disease status post PCI continue  home regimen  End-stage renal disease continue dialysis        Physical Exam Performed:     /65   Pulse 80   Temp 98.2 °F (36.8 °C) (Oral)   Resp 17   Ht 1.753 m (5' 9\")   Wt (!) 137.2 kg (302 lb 7.5 oz)   SpO2 95%   BMI 44.67 kg/m²       General appearance:  No apparent distress  HEENT:  Normal cephalic  Respiratory:  Normal respiratory effort. Clear to auscultation, bilaterally without Rales/Wheezes/Rhonchi.  Cardiovascular:  Regular rate and rhythm with normal S1/S2 without murmurs, rubs or gallops.  Abdomen: Soft, non-tender, non-distended with normal bowel sounds.  Musculoskeletal:  No clubbing, cyanosis   Neurologic: No focal weakness  Psychiatric:  Alert and oriented  Capillary Refill: Brisk,< 3 seconds   Peripheral Pulses: +2 palpable, equal bilaterally       Labs: For convenience and continuity at follow-up the following most recent labs are provided:      CBC:    Lab Results   Component Value Date/Time    WBC 9.2 03/28/2025 07:30 AM    HGB 10.0 03/28/2025 07:30 AM    HCT 30.3 03/28/2025 07:30 AM     03/28/2025 07:30 AM       Renal:    Lab Results   Component Value Date/Time     03/28/2025 07:30 AM    K 3.5 03/28/2025 07:30 AM    CL 94 03/28/2025 07:30 AM    CO2 28 03/28/2025 07:30 AM    BUN 38 03/28/2025 07:30 AM    CREATININE 5.4 03/28/2025 07:30 AM    CALCIUM 9.4 03/28/2025 07:30 AM    PHOS 7.2 02/20/2025 04:05 AM         Significant Diagnostic Studies    Radiology:   XR CHEST (2 VW)   Final Result   1. No acute cardiopulmonary process.   2. Cardiomegaly.   3. Pulmonary arterial hypertension.         CT CHEST PULMONARY EMBOLISM W CONTRAST   Final Result   1. Wall adherent web-like filling defect of a right lower lobe segmental   pulmonary artery is most consistent with chronic pulmonary embolism. No   evidence of right heart strain.   2. Marked enlargement of the main pulmonary artery is consistent with   pulmonary arterial hypertension.   3. Trace left pleural effusion with

## 2025-03-28 NOTE — PROGRESS NOTES
Patient discharged home per Dr order. Discharge instructions were reviewed with the patient.   Hang CARPENTER

## 2025-03-28 NOTE — CARE COORDINATION
Case Management Discharge Note          Date / Time of Note: 3/28/2025 11:06 AM                  Patient Name: Jeremías Bray   YOB: 1945  Diagnosis: Dizziness [R42]  Lightheadedness [R42]  Hypotension, unspecified hypotension type [I95.9]   Date / Time: 3/27/2025  1:00 PM    Financial:  Payor: [facility] MEDICARE / Plan: [facility] MEDICARE COMPLETE / Product Type: *No Product type* /      Pharmacy:    [id number] - [place], [place] - [address] - P [phone] - F [phone]  [address]  [facility] [address]  Phone: [phone] Fax: [phone]    [facility] - [facility] [address] - P [phone] - F [phone]  [address] [facility] [address]  Phone: [phone] Fax: [phone]      Assistance purchasing medications?: Potential Assistance Purchasing Medications: No  Assistance provided by Case Management: None at this time    DISCHARGE Disposition: Home- No Services Needed    Dialysis:  Dialysis patient: Yes    Dialysis Center:  [facility]  Address: [address] [place] [address]  Phone: [phone]    Transportation:  Transportation PLAN for discharge:  Lyft/Uber    Mode of Transport: Lyft/Uber  Time of Transport: patient will schedule    IMM Completed:   Yes, Case management has presented and reviewed IMM letter #2.       IMM Letter date given:: 03/27/25   .   Patient and/or family/POA verbalized understanding of their medicare rights and appeal process if needed. Patient and/or family/POA has signed, initialed and placed the date and time on IMM letter #2 on the the appropriate lines. Copy of letter offered and they are aware that the original copy of IMM letter #2 is available prior to discharge from the paper chart on the unit.  Electronic documentation has been entered into epic for IMM letter #2 and original paper copy has been added to the paper chart at the nurses station.     Additional CM Notes:   Chucky

## 2025-03-28 NOTE — PLAN OF CARE
Problem: Chronic Conditions and Co-morbidities  Goal: Patient's chronic conditions and co-morbidity symptoms are monitored and maintained or improved  3/28/2025 0848 by Hang Lund, RN  Outcome: Progressing     Problem: Discharge Planning  Goal: Discharge to home or other facility with appropriate resources  3/28/2025 0848 by Hang Lund, RN  Outcome: Progressing     Problem: Safety - Adult  Goal: Free from fall injury  3/28/2025 0848 by Hang Lund, RN  Outcome: Progressing     Problem: Skin/Tissue Integrity - Adult  Goal: Skin integrity remains intact  3/28/2025 0848 by Hang Lund, RN  Outcome: Progressing

## 2025-03-28 NOTE — PROGRESS NOTES
Progress Note  Date:3/28/2025       Room:Y5Y-8747/5126-01  Patient Name:Jeremías Bray     YOB: 1945     Age:79 y.o.        Subjective    Admitted with dizziness   Objective         Vitals Last 24 Hours:  TEMPERATURE:  Temp  Av.1 °F (36.7 °C)  Min: 97.6 °F (36.4 °C)  Max: 98.6 °F (37 °C)  RESPIRATIONS RANGE: Resp  Av.3  Min: 17  Max: 22  PULSE OXIMETRY RANGE: SpO2  Av.7 %  Min: 92 %  Max: 99 %  PULSE RANGE: Pulse  Av.1  Min: 59  Max: 81  BLOOD PRESSURE RANGE: Systolic (24hrs), Av , Min:69 , Max:126 ; Diastolic (24hrs), Av, Min:39, Max:52  I/O (24Hr):    Intake/Output Summary (Last 24 hours) at 3/28/2025 0619  Last data filed at 3/28/2025 0425  Gross per 24 hour   Intake 488 ml   Output --   Net 488 ml     Objective  Labs/Imaging/Diagnostics    Labs:  CBC:  Recent Labs     25  1327   WBC 8.6   RBC 3.41*   HGB 11.3*   HCT 34.2*   .3*   RDW 18.0*        CHEMISTRIES:  Recent Labs     25  1327      K 3.2*   CL 96*   CO2 31   BUN 24*   CREATININE 3.9*   GLUCOSE 147*   MG 2.13     PT/INR:No results for input(s): \"PROTIME\", \"INR\" in the last 72 hours.  APTT:No results for input(s): \"APTT\" in the last 72 hours.  LIVER PROFILE:  Recent Labs     25  1327   AST 39*   ALT 44*   BILITOT 0.4   ALKPHOS 61       Imaging Last 24 Hours:  CT CHEST PULMONARY EMBOLISM W CONTRAST  Result Date: 3/27/2025  EXAMINATION: CTA OF THE CHEST 3/27/2025 2:01 pm TECHNIQUE: CTA of the chest was performed after the administration of intravenous contrast.  Multiplanar reformatted images are provided for review.  MIP images are provided for review. Automated exposure control, iterative reconstruction, and/or weight based adjustment of the mA/kV was utilized to reduce the radiation dose to as low as reasonably achievable. COMPARISON: CTA chest abdomen pelvis 2021 HISTORY: ORDERING SYSTEM PROVIDED HISTORY: shortness of breath, lightheadedness TECHNOLOGIST PROVIDED HISTORY:  Reason for exam:->shortness of breath, lightheadedness Additional Contrast?->1 Reason for Exam: SOB lightheaded hx of chf hx of soft tissue ca with xrt 2013 FINDINGS: Pulmonary Arteries: The pulmonary arteries are well opacified for evaluation. The main pulmonary artery is markedly enlarged.  There is a wall adherent web-like filling defect of a right lower lobe segmental pulmonary artery. There is no convex shaped filling defect.  The RV LV ratio is normal.. Mediastinum: The heart is normal in size with trace pericardial fluid.  There are 3 vessel coronary artery calcifications and aortic valve prosthesis. There are mitral annular calcifications.  The thoracic aorta is patent and normal in caliber.  Nonenlarged mediastinal lymph nodes are likely reactive. The esophagus is unremarkable. Lungs/pleura: The trachea and mainstem bronchi are patent and normal in caliber.  There is subsegmental atelectasis of the left lung base with a trace effusion.  No pneumothorax is present.  No suspicious mass. Upper Abdomen: No acute abnormality of the included upper abdomen. Soft Tissues/Bones: There is stable and chronic remodeling of the sternal manubrium.  There are multilevel degenerative changes of the spine without acute osseous abnormality.     1. Wall adherent web-like filling defect of a right lower lobe segmental pulmonary artery is most consistent with chronic pulmonary embolism. No evidence of right heart strain. 2. Marked enlargement of the main pulmonary artery is consistent with pulmonary arterial hypertension. 3. Trace left pleural effusion with subsegmental atelectasis of the left lung base.     XR CHEST (2 VW)  Result Date: 3/27/2025  EXAMINATION: TWO XRAY VIEWS OF THE CHEST 3/27/2025 2:16 pm COMPARISON: 02/12/2025 HISTORY: ORDERING SYSTEM PROVIDED HISTORY: sob TECHNOLOGIST PROVIDED HISTORY: Reason for exam:->sob FINDINGS: The lungs appear clear.  There is prominence of the central pulmonary arteries in keeping

## 2025-03-28 NOTE — PLAN OF CARE
Problem: Chronic Conditions and Co-morbidities  Goal: Patient's chronic conditions and co-morbidity symptoms are monitored and maintained or improved  Outcome: Progressing  Flowsheets (Taken 3/27/2025 2047)  Care Plan - Patient's Chronic Conditions and Co-Morbidity Symptoms are Monitored and Maintained or Improved: Monitor and assess patient's chronic conditions and comorbid symptoms for stability, deterioration, or improvement     Problem: Discharge Planning  Goal: Discharge to home or other facility with appropriate resources  Outcome: Progressing  Flowsheets (Taken 3/27/2025 2047)  Discharge to home or other facility with appropriate resources: Identify barriers to discharge with patient and caregiver     Problem: Safety - Adult  Goal: Free from fall injury  Outcome: Progressing     Problem: Skin/Tissue Integrity - Adult  Goal: Skin integrity remains intact  Outcome: Progressing

## 2025-03-30 NOTE — PROGRESS NOTES
Physician Progress Note      PATIENT:               LORENZO PEDRO  CSN #:                  [id number]  :                       1945  ADMIT DATE:       3/27/2025 1:00 PM  DISCH DATE:        3/28/2025 11:41 AM  RESPONDING  PROVIDER #:        Stephon Cordova MD          QUERY TEXT:    Patient admitted with \"dizziness Likely related to hypotension from fluid   removal during HD\". If possible, please document in progress notes and   discharge summary after study the etiology of the syncope:    The medical record reflects the following:  Risk Factors: ESRD on HD  Clinical Indicators: H/P \"Dizziness Likely related to hypotension from fluid   removal during HD  Ordered one dose of albumin and started on midodrine with   parameters\"  Treatment: CBC, CMP, CXR, meds- 25g Albumin x1, 250 NS IV bolus x1  Options provided:  -- Syncope due to dehydration  -- Other - I will add my own diagnosis  -- Disagree - Not applicable / Not valid  -- Disagree - Clinically unable to determine / Unknown  -- Refer to Clinical Documentation Reviewer    PROVIDER RESPONSE TEXT:    Syncope due to dehydration    Query created by: Gwendolyn Bowling on 3/28/2025 11:25 AM      Electronically signed by:  Stephon Cordova MD 3/30/2025 2:01 PM

## 2025-04-14 ENCOUNTER — APPOINTMENT (OUTPATIENT)
Dept: GENERAL RADIOLOGY | Age: 80
DRG: 314 | End: 2025-04-14
Payer: MEDICARE

## 2025-04-14 ENCOUNTER — HOSPITAL ENCOUNTER (INPATIENT)
Age: 80
LOS: 1 days | Discharge: HOME | DRG: 314 | End: 2025-04-16
Attending: EMERGENCY MEDICINE | Admitting: HOSPITALIST
Payer: MEDICARE

## 2025-04-14 DIAGNOSIS — Z99.2 ESRD ON HEMODIALYSIS (HCC): Primary | ICD-10-CM

## 2025-04-14 DIAGNOSIS — J96.21 ACUTE ON CHRONIC RESPIRATORY FAILURE WITH HYPOXIA: ICD-10-CM

## 2025-04-14 DIAGNOSIS — N18.6 ESRD ON HEMODIALYSIS (HCC): Primary | ICD-10-CM

## 2025-04-14 DIAGNOSIS — R06.02 SHORTNESS OF BREATH: ICD-10-CM

## 2025-04-14 DIAGNOSIS — J90 PLEURAL EFFUSION: ICD-10-CM

## 2025-04-14 DIAGNOSIS — I31.39 PERICARDIAL EFFUSION: ICD-10-CM

## 2025-04-14 DIAGNOSIS — R06.00 DYSPNEA, UNSPECIFIED TYPE: ICD-10-CM

## 2025-04-14 PROCEDURE — 87502 INFLUENZA DNA AMP PROBE: CPT

## 2025-04-14 PROCEDURE — 93005 ELECTROCARDIOGRAM TRACING: CPT | Performed by: EMERGENCY MEDICINE

## 2025-04-14 PROCEDURE — 5A1D70Z PERFORMANCE OF URINARY FILTRATION, INTERMITTENT, LESS THAN 6 HOURS PER DAY: ICD-10-PCS | Performed by: HOSPITALIST

## 2025-04-14 PROCEDURE — 87635 SARS-COV-2 COVID-19 AMP PRB: CPT

## 2025-04-14 PROCEDURE — 71046 X-RAY EXAM CHEST 2 VIEWS: CPT

## 2025-04-14 PROCEDURE — 82803 BLOOD GASES ANY COMBINATION: CPT

## 2025-04-14 PROCEDURE — 99285 EMERGENCY DEPT VISIT HI MDM: CPT

## 2025-04-15 ENCOUNTER — APPOINTMENT (OUTPATIENT)
Dept: CT IMAGING | Age: 80
DRG: 314 | End: 2025-04-15
Payer: MEDICARE

## 2025-04-15 ENCOUNTER — APPOINTMENT (OUTPATIENT)
Age: 80
DRG: 314 | End: 2025-04-15
Attending: HOSPITALIST
Payer: MEDICARE

## 2025-04-15 PROBLEM — R09.02 HYPOXIA: Status: ACTIVE | Noted: 2025-04-15

## 2025-04-15 PROBLEM — Z99.2 ESRD (END STAGE RENAL DISEASE) ON DIALYSIS (HCC): Status: ACTIVE | Noted: 2023-03-22

## 2025-04-15 PROBLEM — I31.39 PERICARDIAL EFFUSION: Status: ACTIVE | Noted: 2025-04-15

## 2025-04-15 PROBLEM — R06.00 DYSPNEA: Status: ACTIVE | Noted: 2025-04-15

## 2025-04-15 LAB
ALBUMIN SERPL-MCNC: 3.6 G/DL (ref 3.4–5)
ALBUMIN/GLOB SERPL: 1 {RATIO} (ref 1.1–2.2)
ALP SERPL-CCNC: 61 U/L (ref 40–129)
ALT SERPL-CCNC: 26 U/L (ref 10–40)
ANION GAP SERPL CALCULATED.3IONS-SCNC: 18 MMOL/L (ref 3–16)
AST SERPL-CCNC: 26 U/L (ref 15–37)
BASE EXCESS BLDV CALC-SCNC: 5.3 MMOL/L
BASOPHILS # BLD: 0.1 K/UL (ref 0–0.2)
BASOPHILS NFR BLD: 0.7 %
BILIRUB SERPL-MCNC: 0.6 MG/DL (ref 0–1)
BUN SERPL-MCNC: 54 MG/DL (ref 7–20)
CALCIUM SERPL-MCNC: 10 MG/DL (ref 8.3–10.6)
CHLORIDE SERPL-SCNC: 93 MMOL/L (ref 99–110)
CO2 BLDV-SCNC: 33 MMOL/L
CO2 SERPL-SCNC: 26 MMOL/L (ref 21–32)
COHGB MFR BLDV: 1.8 %
CREAT SERPL-MCNC: 7.6 MG/DL (ref 0.8–1.3)
D-DIMER QUANTITATIVE: 3.19 UG/ML FEU (ref 0–0.6)
DEPRECATED RDW RBC AUTO: 16.9 % (ref 12.4–15.4)
ECHO AV ACCELERATION TIME: 119 MS
ECHO AV AREA PEAK VELOCITY: 1.6 CM2
ECHO AV AREA VTI: 1.7 CM2
ECHO AV AREA/BSA PEAK VELOCITY: 0.7 CM2/M2
ECHO AV AREA/BSA VTI: 0.7 CM2/M2
ECHO AV MEAN GRADIENT: 24 MMHG
ECHO AV MEAN VELOCITY: 2.3 M/S
ECHO AV PEAK GRADIENT: 39 MMHG
ECHO AV PEAK VELOCITY: 3.1 M/S
ECHO AV VELOCITY RATIO: 0.48
ECHO AV VTI: 71.8 CM
ECHO BSA: 2.57 M2
ECHO IVC EXP: 1.9 CM
ECHO LV EF PHYSICIAN: 53 %
ECHO LV FRACTIONAL SHORTENING: 21 % (ref 28–44)
ECHO LV INTERNAL DIMENSION DIASTOLE INDEX: 2.37 CM/M2
ECHO LV INTERNAL DIMENSION DIASTOLIC: 5.8 CM (ref 4.2–5.9)
ECHO LV INTERNAL DIMENSION SYSTOLIC INDEX: 1.88 CM/M2
ECHO LV INTERNAL DIMENSION SYSTOLIC: 4.6 CM
ECHO LV IVSD: 1.2 CM (ref 0.6–1)
ECHO LV MASS 2D: 297 G (ref 88–224)
ECHO LV MASS INDEX 2D: 121.2 G/M2 (ref 49–115)
ECHO LV POSTERIOR WALL DIASTOLIC: 1.2 CM (ref 0.6–1)
ECHO LV RELATIVE WALL THICKNESS RATIO: 0.41
ECHO LVOT AREA: 3.1 CM2
ECHO LVOT AV VTI INDEX: 0.51
ECHO LVOT DIAM: 2 CM
ECHO LVOT MEAN GRADIENT: 6 MMHG
ECHO LVOT PEAK GRADIENT: 9 MMHG
ECHO LVOT PEAK VELOCITY: 1.5 M/S
ECHO LVOT STROKE VOLUME INDEX: 47 ML/M2
ECHO LVOT SV: 115.2 ML
ECHO LVOT VTI: 36.7 CM
ECHO RV BASAL DIMENSION: 4.7 CM
ECHO RV MID DIMENSION: 3.7 CM
ECHO RV TAPSE: 2.4 CM (ref 1.7–?)
EKG ATRIAL RATE: 74 BPM
EKG DIAGNOSIS: NORMAL
EKG P AXIS: 73 DEGREES
EKG P-R INTERVAL: 194 MS
EKG Q-T INTERVAL: 374 MS
EKG QRS DURATION: 98 MS
EKG QTC CALCULATION (BAZETT): 415 MS
EKG R AXIS: 73 DEGREES
EKG T AXIS: 74 DEGREES
EKG VENTRICULAR RATE: 74 BPM
EOSINOPHIL # BLD: 0.1 K/UL (ref 0–0.6)
EOSINOPHIL NFR BLD: 1 %
FLUAV + FLUBV AG NOSE IA.RAPID: NOT DETECTED
FLUAV + FLUBV AG NOSE IA.RAPID: NOT DETECTED
GFR SERPLBLD CREATININE-BSD FMLA CKD-EPI: 7 ML/MIN/{1.73_M2}
GLUCOSE SERPL-MCNC: 124 MG/DL (ref 70–99)
HAV IGM SERPL QL IA: NORMAL
HBV CORE IGM SERPL QL IA: NORMAL
HBV SURFACE AB SERPL IA-ACNC: <3.5 MIU/ML
HBV SURFACE AG SERPL QL IA: NORMAL
HCO3 BLDV-SCNC: 31 MMOL/L (ref 23–29)
HCT VFR BLD AUTO: 32.7 % (ref 40.5–52.5)
HCV AB SERPL QL IA: NORMAL
HGB BLD-MCNC: 10.9 G/DL (ref 13.5–17.5)
LACTATE BLDV-SCNC: 1.4 MMOL/L (ref 0.4–2)
LYMPHOCYTES # BLD: 0.5 K/UL (ref 1–5.1)
LYMPHOCYTES NFR BLD: 4.9 %
MCH RBC QN AUTO: 33.1 PG (ref 26–34)
MCHC RBC AUTO-ENTMCNC: 33.2 G/DL (ref 31–36)
MCV RBC AUTO: 99.5 FL (ref 80–100)
METHGB MFR BLDV: 0.9 %
MONOCYTES # BLD: 0.8 K/UL (ref 0–1.3)
MONOCYTES NFR BLD: 7.2 %
NEUTROPHILS # BLD: 9.1 K/UL (ref 1.7–7.7)
NEUTROPHILS NFR BLD: 86.2 %
NT-PROBNP SERPL-MCNC: ABNORMAL PG/ML (ref 0–449)
O2 THERAPY: ABNORMAL
PCO2 BLDV: 51.2 MMHG (ref 40–50)
PH BLDV: 7.39 [PH] (ref 7.35–7.45)
PLATELET # BLD AUTO: 328 K/UL (ref 135–450)
PMV BLD AUTO: 8.1 FL (ref 5–10.5)
PO2 BLDV: <30 MMHG
POTASSIUM SERPL-SCNC: 3.9 MMOL/L (ref 3.5–5.1)
PROCALCITONIN SERPL IA-MCNC: 0.46 NG/ML (ref 0–0.15)
PROT SERPL-MCNC: 7.1 G/DL (ref 6.4–8.2)
RBC # BLD AUTO: 3.28 M/UL (ref 4.2–5.9)
SAO2 % BLDV: 48 %
SARS-COV-2 RDRP RESP QL NAA+PROBE: NOT DETECTED
SODIUM SERPL-SCNC: 137 MMOL/L (ref 136–145)
TROPONIN, HIGH SENSITIVITY: 177 NG/L (ref 0–22)
TROPONIN, HIGH SENSITIVITY: 189 NG/L (ref 0–22)
WBC # BLD AUTO: 10.6 K/UL (ref 4–11)

## 2025-04-15 PROCEDURE — 71260 CT THORAX DX C+: CPT

## 2025-04-15 PROCEDURE — 99223 1ST HOSP IP/OBS HIGH 75: CPT | Performed by: INTERNAL MEDICINE

## 2025-04-15 PROCEDURE — 2700000000 HC OXYGEN THERAPY PER DAY

## 2025-04-15 PROCEDURE — 2500000003 HC RX 250 WO HCPCS: Performed by: HOSPITALIST

## 2025-04-15 PROCEDURE — 83605 ASSAY OF LACTIC ACID: CPT

## 2025-04-15 PROCEDURE — 94761 N-INVAS EAR/PLS OXIMETRY MLT: CPT

## 2025-04-15 PROCEDURE — 93308 TTE F-UP OR LMTD: CPT

## 2025-04-15 PROCEDURE — 85379 FIBRIN DEGRADATION QUANT: CPT

## 2025-04-15 PROCEDURE — 90935 HEMODIALYSIS ONE EVALUATION: CPT

## 2025-04-15 PROCEDURE — 84484 ASSAY OF TROPONIN QUANT: CPT

## 2025-04-15 PROCEDURE — 6360000004 HC RX CONTRAST MEDICATION: Performed by: EMERGENCY MEDICINE

## 2025-04-15 PROCEDURE — 85025 COMPLETE CBC W/AUTO DIFF WBC: CPT

## 2025-04-15 PROCEDURE — 93010 ELECTROCARDIOGRAM REPORT: CPT | Performed by: INTERNAL MEDICINE

## 2025-04-15 PROCEDURE — 36415 COLL VENOUS BLD VENIPUNCTURE: CPT

## 2025-04-15 PROCEDURE — 6370000000 HC RX 637 (ALT 250 FOR IP): Performed by: HOSPITALIST

## 2025-04-15 PROCEDURE — 2060000000 HC ICU INTERMEDIATE R&B

## 2025-04-15 PROCEDURE — 80053 COMPREHEN METABOLIC PANEL: CPT

## 2025-04-15 PROCEDURE — 83880 ASSAY OF NATRIURETIC PEPTIDE: CPT

## 2025-04-15 PROCEDURE — 6370000000 HC RX 637 (ALT 250 FOR IP)

## 2025-04-15 PROCEDURE — 86706 HEP B SURFACE ANTIBODY: CPT

## 2025-04-15 PROCEDURE — 80074 ACUTE HEPATITIS PANEL: CPT

## 2025-04-15 PROCEDURE — 84145 PROCALCITONIN (PCT): CPT

## 2025-04-15 RX ORDER — ONDANSETRON 2 MG/ML
4 INJECTION INTRAMUSCULAR; INTRAVENOUS EVERY 6 HOURS PRN
Status: DISCONTINUED | OUTPATIENT
Start: 2025-04-15 | End: 2025-04-16 | Stop reason: HOSPADM

## 2025-04-15 RX ORDER — ACETAMINOPHEN 325 MG/1
650 TABLET ORAL EVERY 6 HOURS PRN
Status: DISCONTINUED | OUTPATIENT
Start: 2025-04-15 | End: 2025-04-16 | Stop reason: HOSPADM

## 2025-04-15 RX ORDER — ONDANSETRON 4 MG/1
4 TABLET, ORALLY DISINTEGRATING ORAL EVERY 8 HOURS PRN
Status: DISCONTINUED | OUTPATIENT
Start: 2025-04-15 | End: 2025-04-16 | Stop reason: HOSPADM

## 2025-04-15 RX ORDER — SODIUM CHLORIDE 9 MG/ML
INJECTION, SOLUTION INTRAVENOUS PRN
Status: DISCONTINUED | OUTPATIENT
Start: 2025-04-15 | End: 2025-04-16 | Stop reason: HOSPADM

## 2025-04-15 RX ORDER — NEPHROCAP 1 MG
1 CAP ORAL NIGHTLY
Status: DISCONTINUED | OUTPATIENT
Start: 2025-04-15 | End: 2025-04-16 | Stop reason: HOSPADM

## 2025-04-15 RX ORDER — CLOPIDOGREL BISULFATE 75 MG/1
75 TABLET ORAL DAILY
Status: DISCONTINUED | OUTPATIENT
Start: 2025-04-15 | End: 2025-04-16 | Stop reason: HOSPADM

## 2025-04-15 RX ORDER — IOPAMIDOL 755 MG/ML
75 INJECTION, SOLUTION INTRAVASCULAR
Status: COMPLETED | OUTPATIENT
Start: 2025-04-15 | End: 2025-04-15

## 2025-04-15 RX ORDER — AMIODARONE HYDROCHLORIDE 200 MG/1
200 TABLET ORAL DAILY
Status: DISCONTINUED | OUTPATIENT
Start: 2025-04-15 | End: 2025-04-16 | Stop reason: HOSPADM

## 2025-04-15 RX ORDER — TORSEMIDE 100 MG/1
100 TABLET ORAL
Status: DISCONTINUED | OUTPATIENT
Start: 2025-04-16 | End: 2025-04-16 | Stop reason: HOSPADM

## 2025-04-15 RX ORDER — METOPROLOL SUCCINATE 25 MG/1
25 TABLET, EXTENDED RELEASE ORAL EVERY EVENING
Status: DISCONTINUED | OUTPATIENT
Start: 2025-04-15 | End: 2025-04-16 | Stop reason: HOSPADM

## 2025-04-15 RX ORDER — SODIUM CHLORIDE 0.9 % (FLUSH) 0.9 %
5-40 SYRINGE (ML) INJECTION EVERY 12 HOURS SCHEDULED
Status: DISCONTINUED | OUTPATIENT
Start: 2025-04-15 | End: 2025-04-16 | Stop reason: HOSPADM

## 2025-04-15 RX ORDER — CALCIUM ACETATE 667 MG/1
3 CAPSULE ORAL
Status: DISCONTINUED | OUTPATIENT
Start: 2025-04-15 | End: 2025-04-16 | Stop reason: HOSPADM

## 2025-04-15 RX ORDER — ACETAMINOPHEN 650 MG/1
650 SUPPOSITORY RECTAL EVERY 6 HOURS PRN
Status: DISCONTINUED | OUTPATIENT
Start: 2025-04-15 | End: 2025-04-16 | Stop reason: HOSPADM

## 2025-04-15 RX ORDER — POLYETHYLENE GLYCOL 3350 17 G/17G
17 POWDER, FOR SOLUTION ORAL DAILY PRN
Status: DISCONTINUED | OUTPATIENT
Start: 2025-04-15 | End: 2025-04-16 | Stop reason: HOSPADM

## 2025-04-15 RX ORDER — ALBUTEROL SULFATE 0.83 MG/ML
2.5 SOLUTION RESPIRATORY (INHALATION)
Status: DISCONTINUED | OUTPATIENT
Start: 2025-04-15 | End: 2025-04-16 | Stop reason: HOSPADM

## 2025-04-15 RX ORDER — SODIUM CHLORIDE 0.9 % (FLUSH) 0.9 %
5-40 SYRINGE (ML) INJECTION PRN
Status: DISCONTINUED | OUTPATIENT
Start: 2025-04-15 | End: 2025-04-16 | Stop reason: HOSPADM

## 2025-04-15 RX ORDER — ATORVASTATIN CALCIUM 20 MG/1
20 TABLET, FILM COATED ORAL NIGHTLY
Status: DISCONTINUED | OUTPATIENT
Start: 2025-04-15 | End: 2025-04-16 | Stop reason: HOSPADM

## 2025-04-15 RX ADMIN — METOPROLOL SUCCINATE 25 MG: 25 TABLET, EXTENDED RELEASE ORAL at 17:59

## 2025-04-15 RX ADMIN — SODIUM CHLORIDE, PRESERVATIVE FREE 10 ML: 5 INJECTION INTRAVENOUS at 09:01

## 2025-04-15 RX ADMIN — ATORVASTATIN CALCIUM 20 MG: 20 TABLET, FILM COATED ORAL at 21:16

## 2025-04-15 RX ADMIN — IOPAMIDOL 75 ML: 755 INJECTION, SOLUTION INTRAVENOUS at 01:06

## 2025-04-15 RX ADMIN — Medication 1 MG: at 21:16

## 2025-04-15 RX ADMIN — SODIUM CHLORIDE, PRESERVATIVE FREE 10 ML: 5 INJECTION INTRAVENOUS at 21:16

## 2025-04-15 NOTE — PLAN OF CARE
Problem: Chronic Conditions and Co-morbidities  Goal: Patient's chronic conditions and co-morbidity symptoms are monitored and maintained or improved  4/15/2025 1819 by Kathy Aguilar RN  Outcome: Progressing     Problem: Discharge Planning  Goal: Discharge to home or other facility with appropriate resources  4/15/2025 1819 by Kathy Aguilar RN  Outcome: Progressing     Problem: Safety - Adult  Goal: Free from fall injury  4/15/2025 1819 by Kathy Aguilar RN  Outcome: Progressing     Problem: Neurosensory - Adult  Goal: Achieves stable or improved neurological status  4/15/2025 1819 by Kathy Aguilar RN  Outcome: Progressing     Problem: Neurosensory - Adult  Goal: Achieves maximal functionality and self care  4/15/2025 1819 by Kathy Aguilar RN  Outcome: Progressing     Problem: Respiratory - Adult  Goal: Achieves optimal ventilation and oxygenation  4/15/2025 1819 by Kathy Aguilar RN  Outcome: Progressing     Problem: Skin/Tissue Integrity - Adult  Goal: Skin integrity remains intact  4/15/2025 1819 by Kathy Aguilar RN  Outcome: Progressing  Flowsheets (Taken 4/15/2025 0932)  Skin Integrity Remains Intact: Monitor for areas of redness and/or skin breakdown     Problem: Skin/Tissue Integrity - Adult  Goal: Oral mucous membranes remain intact  4/15/2025 1819 by Kathy Aguilar RN  Outcome: Progressing     Problem: Musculoskeletal - Adult  Goal: Return mobility to safest level of function  4/15/2025 1819 by Kathy Aguilar RN  Outcome: Progressing     Problem: Musculoskeletal - Adult  Goal: Return ADL status to a safe level of function  4/15/2025 1819 by Kathy Aguilar RN  Outcome: Progressing     Problem: Genitourinary - Adult  Goal: Absence of urinary retention  4/15/2025 1819 by Kathy Aguilar RN  Outcome: Progressing     Problem: Metabolic/Fluid and Electrolytes - Adult  Goal: Electrolytes maintained within normal limits  4/15/2025 1819 by Kathy Aguilar RN  Outcome: Progressing

## 2025-04-15 NOTE — ACP (ADVANCE CARE PLANNING)
Advance Care Planning   Healthcare Decision Maker:    Primary Decision Maker: Juliano Bray - Child - [phone]    Secondary Decision Maker (Active): Sumeet Bray (sister) - Brother/Sister - [phone]    Today we documented Decision Maker(s) consistent with ACP documents on file.       Electronically signed by Kathe Saldana RN on 4/15/2025 at 1:49 PM

## 2025-04-15 NOTE — PLAN OF CARE
Problem: Chronic Conditions and Co-morbidities  Goal: Patient's chronic conditions and co-morbidity symptoms are monitored and maintained or improved  Outcome: Progressing  Flowsheets  Taken 4/15/2025 0602  Care Plan - Patient's Chronic Conditions and Co-Morbidity Symptoms are Monitored and Maintained or Improved: Monitor and assess patient's chronic conditions and comorbid symptoms for stability, deterioration, or improvement  Taken 4/15/2025 0543  Care Plan - Patient's Chronic Conditions and Co-Morbidity Symptoms are Monitored and Maintained or Improved: Monitor and assess patient's chronic conditions and comorbid symptoms for stability, deterioration, or improvement     Problem: Discharge Planning  Goal: Discharge to home or other facility with appropriate resources  Outcome: Progressing  Flowsheets  Taken 4/15/2025 0602  Discharge to home or other facility with appropriate resources: Identify barriers to discharge with patient and caregiver  Taken 4/15/2025 0543  Discharge to home or other facility with appropriate resources: Identify barriers to discharge with patient and caregiver     Problem: Safety - Adult  Goal: Free from fall injury  Outcome: Progressing     Problem: Neurosensory - Adult  Goal: Achieves stable or improved neurological status  Outcome: Progressing  Flowsheets (Taken 4/15/2025 0602)  Achieves stable or improved neurological status: Assess for and report changes in neurological status  Goal: Achieves maximal functionality and self care  Outcome: Progressing  Flowsheets (Taken 4/15/2025 0602)  Achieves maximal functionality and self care: Monitor swallowing and airway patency with patient fatigue and changes in neurological status     Problem: Respiratory - Adult  Goal: Achieves optimal ventilation and oxygenation  Outcome: Progressing  Flowsheets (Taken 4/15/2025 0602)  Achieves optimal ventilation and oxygenation:   Assess for changes in respiratory status   Assess for changes in mentation

## 2025-04-15 NOTE — H&P
V2.0  History and Physical      Name:  Jeremías Bray /Age/Sex: 1945  (79 y.o. male)   MRN & CSN:  [id number] & [id number] Encounter Date/Time: 4/15/2025 4:11 AM EDT   Location:   PCP: Anoop Slater DO       Hospital Day: 2    Assessment and Plan:   Jeremías Bray is a 79 y.o. male with a pmh of non-STEMI, chronic combined systolic and diastolic heart failure, NSTEMI disease on dialysis, essential hypertension, class III obesity, MGUS, malignant neoplasm of bone, nonrheumatic valve disease, status post transcatheter aortic valve replacement who presents with Pericardial effusion    Hospital problems  Dyspnea (principal)  Possible Pericardial effusion   End-stage renal disease on dialysis  Hypoxia  Class III Morbid obesity    Plan:  Cardiology consult.  Echocardiogram on 2025 did not show any pericardial effusion.  Will repeat echocardiogram.  ESRD on dialysis-nephrology consult.  Renal diet.  Class III obesity-BMI of 44.60 kg/m².  Complicates care.  Lifestyle modification recommended.  Hypoxia has been requiring his home oxygen recently unlike previous.  Continue nasal cannula oxygen support.      Advance care planning:  Advanced care planning was discussed with patient for a total of  16 minutes.  Full code, DNR CC, DNR CCA, power of  and living will were discussed.  Patient elected to be  a full code.   Disposition:   Current Living situation: Home  Expected Disposition: Home  Estimated D/C: 3 days    Diet Renal  diet   DVT Prophylaxis [] Lovenox, []  Heparin, [x] SCDs, [] Ambulation,  [] Eliquis, [] Xarelto, [] Coumadin   Code Status Full code   Surrogate Decision Maker/ POA Sister, Genny Bray     Personally reviewed Lab Studies and Imaging     Discussed management of the case with  ED provider who recommended admission    EKG interpreted personally and results sinus rhythm, anterolateral infarct.    Imaging that was interpreted personally includes CT CHEST PULMONARY EMBOLISM W

## 2025-04-15 NOTE — CARE COORDINATION
Case Management Assessment  Initial Evaluation    Date/Time of Evaluation: 4/15/2025 1:26 PM  Assessment Completed by: Kathe Saldana RN    If patient is discharged prior to next notation, then this note serves as note for discharge by case management.    Patient Name: Jeremías Bray                   YOB: 1945  Diagnosis: Shortness of breath [R06.02]  Pericardial effusion [I31.39]  Pleural effusion [J90]  ESRD on hemodialysis (HCC) [N18.6, Z99.2]  Acute on chronic respiratory failure with hypoxia [J96.21]  Dyspnea, unspecified type [R06.00]                   Date / Time: 4/14/2025 10:57 PM    Patient Admission Status: Inpatient   Readmission Risk (Low < 19, Mod (19-27), High > 27): Readmission Risk Score: 25    Current PCP: Anoop Slater, DO  PCP verified by CM? Yes    Chart Reviewed: Yes      History Provided by: Patient  Patient Orientation: Alert and Oriented    Patient Cognition: Alert    Hospitalization in the last 30 days (Readmission):  Yes    If yes, Readmission Assessment in CM Navigator will be completed.    Advance Directives:      Code Status: Full Code   Patient's Primary Decision Maker is: Legal Next of Kin    Primary Decision Maker: Juliano Bray - Child - [phone]    Secondary Decision Maker (Active): Sumeet Bray (sister) - Brother/Sister - [phone]    Discharge Planning:    Patient lives with: Alone Type of Home: House  Primary Care Giver: Self  Patient Support Systems include: Family Members, Friends/Neighbors   Current Financial resources: Medicare  Current community resources: None  Current services prior to admission: Durable Medical Equipment, Other (Comment) (outpatient dialysis at [facility] TTS 0600)            Current DME: Cane, Walker, Other (Comment) (pulse oximeter)            Type of Home Care services:  None    ADLS  Prior functional level: Independent in ADLs/IADLs  Current functional level: Independent in ADLs/IADLs    PT AM-PAC:   /24  OT AM-PAC:

## 2025-04-15 NOTE — CARE COORDINATION
04/15/25 1343   Readmission Assessment   Number of Days since last admission? 8-30 days   Previous Disposition Home Alone   Who is being Interviewed Patient   What was the patient's/caregiver's perception as to why they think they needed to return back to the hospital? Other (Comment)  (shortness of breath, oxygen low)   Did you visit your Primary Care Physician after you left the hospital, before you returned this time? Yes   Did you see a specialist, such as Cardiac, Pulmonary, Orthopedic Physician, etc. after you left the hospital? No   Who advised the patient to return to the hospital? Self-referral   Does the patient report anything that got in the way of taking their medications? No   In our efforts to provide the best possible care to you and others like you, can you think of anything that we could have done to help you after you left the hospital the first time, so that you might not have needed to return so soon? Other (Comment)  (Patient says \"I don't know.\")

## 2025-04-15 NOTE — ED NOTES
ED TO INPATIENT SBAR HANDOFF    Patient Name: Jeremías Bray   Preferred Name: Jeremías  : 1945  79 y.o.   Family/Caregiver Present: no   Code Status Order: Prior  PO Status: NPO: unsure, he hasn't had anything to eat or drink in ED. Not sure if hospitalist wants to keep him NPO in case cardiology wants to do something   Telemetry Order:   C-SSRS: Risk of Suicide: No Risk  Sitter no     Restraints:     Sepsis Risk Score      Situation  Chief Complaint   Patient presents with    Shortness of Breath     Brief Description of Patient's Condition: Pt drove himself here for experiencing SOB. He hasn't worn oxygen for the last 2-3, but has it at home PRN. He is currently on 1L saturating at 94%. Cardiology consulted. He needs dialysis TODAY. He is Tues, Thurs, Sat schedule. PE ruled out. Trop's elevated. NO chest pain. Significant Heart hx with NSTEMI, systolic and diastolic HF, dialysis, Aortic VR. Tried to wean him to RA and he didn't feel well with it off. He desaturated once in [place], but he went down to 90% on RA and he wanted the O2 back on to 1L  Mental Status: oriented, alert, coherent, logical, thought processes intact, and able to concentrate and follow conversation  Arrived from:Home  Imaging:   CT CHEST PULMONARY EMBOLISM W CONTRAST   Preliminary Result   1. No acute pulmonary embolus.  Stable linear chronic emboli in the right   lower lobe.   2. Small left and trace right pleural effusion with adjacent atelectasis.   Findings have increased since 2025.   3. Moderate pericardial effusion, increased from prior.   4. Severe atherosclerosis.   5. Enlargement of the main pulmonary artery which can be seen in pulmonary   hypertension.         XR CHEST (2 VW)   Final Result   Mild cardiomegaly and mild central pulmonary congestion.      Questionable mild bibasilar interstitial edema vs atelectasis or small   infiltrates which is more prominent.      Small left pleural effusion and a questionable tiny right

## 2025-04-15 NOTE — DISCHARGE INSTR - COC
Continuity of Care Form    Patient Name: Jeremías Bray   :  1945  MRN:  [id number]    Admit date:  2025  Discharge date:  ***    Code Status Order: Full Code   Advance Directives:     Admitting Physician:  Gagandeep Dukes MD  PCP: Anoop Slater DO    Discharging Nurse: ***  Discharging Hospital Unit/Room#: D6W-5383/5252-01  Discharging Unit Phone Number: ***    Emergency Contact:   Extended Emergency Contact Information  Primary Emergency Contact: kelvin daly  Home Phone: [phone]  Mobile Phone: [phone]  Relation: Other  Preferred language: English   needed? No  Secondary Emergency Contact: Sumeet Bray (sister)  Home Phone: [phone]  Mobile Phone: [phone]  Relation: Brother/Sister    Past Surgical History:  Past Surgical History:   Procedure Laterality Date    AORTIC VALVE REPLACEMENT N/A 2021    TRANSCATHETER AORTIC VALVE REPLACEMENT FEMORAL APPROACH performed by Tristan Salazar MD at [facility] CVOR    AORTIC VALVE REPLACEMENT N/A 2021    TRANSCATHETER AORTIC VALVE REPLACEMENT FEMORAL APPROACH performed by Arnaldo Marley MD at [facility] CVOR    CARDIAC CATHETERIZATION  03/10/2021    x 1 Stent placement    COLONOSCOPY      CORONARY ANGIOPLASTY WITH STENT PLACEMENT  2017    EVERETT to LAD    DIALYSIS FISTULA CREATION Left 2023    LEFT RADIAL ARTERY TO CEPHALIC VEIN ARTERIOVENOUS FISTULA CREATION, POSSIBLE BRACHIAL ARTERY TO CEPHALIC VEIN ARTERIOVENOUS FISTULA CREATION performed by Dashawn Jain DO at [facility] OR    DIALYSIS FISTULA CREATION Left 2023    SUPERFICIALIZATION OF LEFT BRACHIOCEPHALIC ARTERIOVENOUS FISTULA performed by Dashawn Jain DO at [facility] OR    IR TUNNELED CVC PLACE WO SQ PORT/PUMP > 5 YEARS  2021    IR TUNNELED CATHETER PLACEMENT GREATER THAN 5 YEARS 2021 [facility] SPECIAL PROCEDURES    LUMBAR EPIDURAL INJECTION      2022    [facility]      TUNNELED VENOUS CATHETER PLACEMENT Right 2021    Permacath; RIJ access; 23cm;

## 2025-04-15 NOTE — CONSULTS
Nephrology Consult Note   [employer]      Reason for consultation: ESRD on HD TTS     History of Present Illness: Jeremías Bray is a 80 yo male with a PMHx of ESRD, CHF, HTN, HLD, cancer, OA. Patient presents to  ED on 4/14/2025 with complaints of SOB and hypoxia. States SpO2 was in the 80s. CT chest revealed moderate pericardial effusion and enlargement of the main pulmonary artery consistent with pulmonary hypertension. TTE is pending and cardiology has been consulted.     We have been consulted for ESRD on HD management. Patient has been compliant with HD. No acute electrolyte derangements are noted. He is currently 1.1 kg above his TW of 134.5 kg per weight obtained here.     Subjective:      Patient seen and examined. Labs and chart reviewed. Resting in chair on RA.     There were not complications last night.    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    CHF (congestive heart failure) (HCC)     Coronary artery disease involving native coronary artery of native heart without angina pectoris     Hyperlipidemia     Hypertension     Influenza B 04/02/2015    Kidney failure     sees Dr Camp    Neuropathy     Nosebleed     Radiation     Sciatica        Past Surgical History:   Procedure Laterality Date    AORTIC VALVE REPLACEMENT N/A 05/18/2021    TRANSCATHETER AORTIC VALVE REPLACEMENT FEMORAL APPROACH performed by Tristan Salazar MD at [facility] CVOR    AORTIC VALVE REPLACEMENT N/A 05/18/2021    TRANSCATHETER AORTIC VALVE REPLACEMENT FEMORAL APPROACH performed by Arnaldo Marley MD at [facility] CVOR    CARDIAC CATHETERIZATION  03/10/2021    x 1 Stent placement    COLONOSCOPY      CORONARY ANGIOPLASTY WITH STENT PLACEMENT  04/16/2017    EVERETT to LAD    DIALYSIS FISTULA CREATION Left 03/22/2023    LEFT RADIAL ARTERY TO CEPHALIC VEIN ARTERIOVENOUS FISTULA

## 2025-04-15 NOTE — ED PROVIDER NOTES
EMERGENCY DEPARTMENT ENCOUNTER     [facility]N PROGRESSIVE CARE     Pt Name: Jeremías Bray   MRN: [id number]   Birthdate 1945   Date of evaluation: 4/14/2025   Provider: Conrado Brody MD   PCP: Anoop Slater DO   Note Started: 5:39 AM EDT 4/15/25     CHIEF COMPLAINT     Chief Complaint   Patient presents with    Shortness of Breath        HISTORY OF PRESENT ILLNESS:  History from : Patient   Limitations to history : None     Jeremías Bray is a 79 y.o. male who presents for shortness of breath.  Patient reports that over the last few days he has had increasing shortness of breath particularly tonight and he turned his oxygen on.  He was discharged with oxygen after his recent admission but this is a new requirement for him to need occasionally.  He denies any fevers, chills or sweats.  He does have some chest discomfort.  He has an stage renal disease and is on hemodialysis Tuesday, Thursday and Saturday.    Nursing Notes were all reviewed and agreed with or any disagreements were addressed in the HPI.     ROS: Positives and Pertinent negatives as per HPI.    PAST MEDICAL HISTORY     Past medical history:  has a past medical history of Arthritis, Bell's palsy, Cancer ([facility]) (2012), CHF (congestive heart failure) ([facility]), Coronary artery disease involving native coronary artery of native heart without angina pectoris, Hyperlipidemia, Hypertension, Influenza B (04/02/2015), Kidney failure, Neuropathy, Nosebleed, Radiation, and Sciatica.    Past surgical history:  has a past surgical history that includes Tunneled venous catheter placement (Right, 01/25/2021); IR TUNNELED CVC PLACE WO SQ PORT/PUMP > 5 YEARS (01/25/2021); Coronary angioplasty with stent (04/16/2017); Percutaneous Transluminal Coronary Angio; Cardiac catheterization (03/10/2021); Aortic valve replacement (N/A, 05/18/2021); Aortic valve replacement (N/A, 05/18/2021); Lumbar epidural injection; Dialysis fistula creation (Left, 03/22/2023); Colonoscopy;

## 2025-04-15 NOTE — ED TRIAGE NOTES
Patient to the ER with complaints of shortness of breath that started today. He reports increasing SOB on exertion.   Hx of CHF and was given a PRN order for oxygen at home earlier this year after having flu and RSV.   He hasn't needed it recently but says today he checked his SpO2 and was sitting in the 80's.  He is also a dialysis patient and dialyzes Tuesday/Thursday/Saturday.     He is alert and oriented x 4 and ambulatory with a cane at time of triage.

## 2025-04-15 NOTE — CONSULTS
Cardiovascular Consultation     Attending Physician: Stephon Cordova MD    PATIENT: Jeremías Bray  : 1945  MRN: [id number]    Reason for Consultation:   Chief Complaint   Patient presents with    Shortness of Breath     History of present illness:  Mr. Jeremías Bray is a 79 y.o. male patient of Dr. Preston Ag, presented from home with concerns for worsening shortness of breath yesterday.  He reports no issues with routine  hemodialysis.  He last dialyzed on Saturday and started to feel short of breath on Monday afternoon.  He does keep pulse oximetry and noticed saturations into the 80s\".  States he had home oxygen still following February hospitalization for pneumonia.  He had been off of it for approximately 3 weeks.  No chest pain palpitations lightheadedness or syncope.  No fevers chills cough or sick contacts.  Today, consultation is for pericardial effusion by CT. Jeremías denies breathing concerns on room air during visit.     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    CHF (congestive heart failure) (HCC)     Coronary artery disease involving native coronary artery of native heart without angina pectoris     Hyperlipidemia     Hypertension     Influenza B 2015    Kidney failure     sees Dr Camp    Neuropathy     Nosebleed     Radiation     Sciatica      Surgical History:      Procedure Laterality Date    AORTIC VALVE REPLACEMENT N/A 2021    TRANSCATHETER AORTIC VALVE REPLACEMENT FEMORAL APPROACH performed by Tristan Salazar MD at [facility]    AORTIC VALVE REPLACEMENT N/A 2021    TRANSCATHETER AORTIC VALVE REPLACEMENT FEMORAL APPROACH performed by Arnaldo Marley MD at [facility]    CARDIAC CATHETERIZATION  03/10/2021    x 1 Stent placement    COLONOSCOPY      CORONARY ANGIOPLASTY WITH STENT PLACEMENT  2017    EVERETT to LAD

## 2025-04-16 VITALS
OXYGEN SATURATION: 94 % | DIASTOLIC BLOOD PRESSURE: 59 MMHG | RESPIRATION RATE: 16 BRPM | SYSTOLIC BLOOD PRESSURE: 100 MMHG | BODY MASS INDEX: 44.15 KG/M2 | HEIGHT: 69 IN | TEMPERATURE: 98.2 F | WEIGHT: 298.06 LBS | HEART RATE: 57 BPM

## 2025-04-16 LAB
ALBUMIN SERPL-MCNC: 3.3 G/DL (ref 3.4–5)
ANION GAP SERPL CALCULATED.3IONS-SCNC: 16 MMOL/L (ref 3–16)
BASOPHILS # BLD: 0.1 K/UL (ref 0–0.2)
BASOPHILS NFR BLD: 1.1 %
BUN SERPL-MCNC: 42 MG/DL (ref 7–20)
CALCIUM SERPL-MCNC: 9.3 MG/DL (ref 8.3–10.6)
CHLORIDE SERPL-SCNC: 95 MMOL/L (ref 99–110)
CO2 SERPL-SCNC: 24 MMOL/L (ref 21–32)
CREAT SERPL-MCNC: 6.6 MG/DL (ref 0.8–1.3)
DEPRECATED RDW RBC AUTO: 17.3 % (ref 12.4–15.4)
EOSINOPHIL # BLD: 0.3 K/UL (ref 0–0.6)
EOSINOPHIL NFR BLD: 2.9 %
GFR SERPLBLD CREATININE-BSD FMLA CKD-EPI: 8 ML/MIN/{1.73_M2}
GLUCOSE SERPL-MCNC: 110 MG/DL (ref 70–99)
HCT VFR BLD AUTO: 30.2 % (ref 40.5–52.5)
HGB BLD-MCNC: 10.2 G/DL (ref 13.5–17.5)
LYMPHOCYTES # BLD: 0.6 K/UL (ref 1–5.1)
LYMPHOCYTES NFR BLD: 5.9 %
MCH RBC QN AUTO: 33.1 PG (ref 26–34)
MCHC RBC AUTO-ENTMCNC: 33.8 G/DL (ref 31–36)
MCV RBC AUTO: 98.2 FL (ref 80–100)
MONOCYTES # BLD: 0.9 K/UL (ref 0–1.3)
MONOCYTES NFR BLD: 8.9 %
NEUTROPHILS # BLD: 8 K/UL (ref 1.7–7.7)
NEUTROPHILS NFR BLD: 81.2 %
PHOSPHATE SERPL-MCNC: 4.8 MG/DL (ref 2.5–4.9)
PLATELET # BLD AUTO: 317 K/UL (ref 135–450)
PMV BLD AUTO: 7.8 FL (ref 5–10.5)
POTASSIUM SERPL-SCNC: 3.7 MMOL/L (ref 3.5–5.1)
RBC # BLD AUTO: 3.08 M/UL (ref 4.2–5.9)
SODIUM SERPL-SCNC: 135 MMOL/L (ref 136–145)
WBC # BLD AUTO: 9.9 K/UL (ref 4–11)

## 2025-04-16 PROCEDURE — 2500000003 HC RX 250 WO HCPCS: Performed by: HOSPITALIST

## 2025-04-16 PROCEDURE — 94760 N-INVAS EAR/PLS OXIMETRY 1: CPT

## 2025-04-16 PROCEDURE — 36415 COLL VENOUS BLD VENIPUNCTURE: CPT

## 2025-04-16 PROCEDURE — 80069 RENAL FUNCTION PANEL: CPT

## 2025-04-16 PROCEDURE — 6370000000 HC RX 637 (ALT 250 FOR IP): Performed by: HOSPITALIST

## 2025-04-16 PROCEDURE — 85025 COMPLETE CBC W/AUTO DIFF WBC: CPT

## 2025-04-16 RX ADMIN — SODIUM CHLORIDE, PRESERVATIVE FREE 10 ML: 5 INJECTION INTRAVENOUS at 09:22

## 2025-04-16 RX ADMIN — AMIODARONE HYDROCHLORIDE 200 MG: 200 TABLET ORAL at 09:22

## 2025-04-16 RX ADMIN — CALCIUM ACETATE 2001 MG: 667 CAPSULE ORAL at 09:21

## 2025-04-16 RX ADMIN — CLOPIDOGREL BISULFATE 75 MG: 75 TABLET, FILM COATED ORAL at 09:22

## 2025-04-16 ASSESSMENT — PAIN SCALES - GENERAL: PAINLEVEL_OUTOF10: 0

## 2025-04-16 NOTE — PLAN OF CARE
Problem: Chronic Conditions and Co-morbidities  Goal: Patient's chronic conditions and co-morbidity symptoms are monitored and maintained or improved  4/15/2025 2323 by Abhishek Ferrer RN  Outcome: Progressing  Flowsheets  Taken 4/15/2025 2322  Care Plan - Patient's Chronic Conditions and Co-Morbidity Symptoms are Monitored and Maintained or Improved: Monitor and assess patient's chronic conditions and comorbid symptoms for stability, deterioration, or improvement  Taken 4/15/2025 2118  Care Plan - Patient's Chronic Conditions and Co-Morbidity Symptoms are Monitored and Maintained or Improved: Monitor and assess patient's chronic conditions and comorbid symptoms for stability, deterioration, or improvement  4/15/2025 1819 by Kathy Aguilar RN  Outcome: Progressing     Problem: Discharge Planning  Goal: Discharge to home or other facility with appropriate resources  4/15/2025 2323 by Abhishek Ferrer RN  Outcome: Progressing  Flowsheets  Taken 4/15/2025 2322  Discharge to home or other facility with appropriate resources: Identify barriers to discharge with patient and caregiver  Taken 4/15/2025 2118  Discharge to home or other facility with appropriate resources: Identify barriers to discharge with patient and caregiver  4/15/2025 1819 by Kathy Aguilar RN  Outcome: Progressing     Problem: Safety - Adult  Goal: Free from fall injury  4/15/2025 2323 by Abhishek Ferrer RN  Outcome: Progressing  4/15/2025 1819 by Kathy Aguilar RN  Outcome: Progressing     Problem: Neurosensory - Adult  Goal: Achieves stable or improved neurological status  4/15/2025 2323 by Abhishek Ferrer RN  Outcome: Progressing  Flowsheets  Taken 4/15/2025 2322  Achieves stable or improved neurological status: Assess for and report changes in neurological status  Taken 4/15/2025 2118  Achieves stable or improved neurological status: Assess for and report changes in neurological status  4/15/2025 1819 by Kathy Aguilar RN  Outcome:

## 2025-04-16 NOTE — PROGRESS NOTES
4 Eyes Skin Assessment     NAME:  Jeremías Bray  YOB: 1945  MEDICAL RECORD NUMBER:  [id number]    The patient is being assessed for  Admission    I agree that at least one RN has performed a thorough Head to Toe Skin Assessment on the patient. ALL assessment sites listed below have been assessed.      Areas assessed by both nurses:    Head, Face, Ears, Shoulders, Back, Chest, Arms, Elbows, Hands, Sacrum. Buttock, Coccyx, Ischium, Legs. Feet and Heels, and Under Medical Devices         Does the Patient have a Wound? No noted wound(s)       Bryn Prevention initiated by RN: No  Wound Care Orders initiated by RN: No    Pressure Injury (Stage 3,4, Unstageable, DTI, NWPT, and Complex wounds) if present, place Wound referral order by RN under : No    New Ostomies, if present place, Ostomy referral order under : No     Nurse 1 eSignature: Electronically signed by Abhishek Ferrer RN on 4/15/25 at 6:07 AM EDT    **SHARE this note so that the co-signing nurse can place an eSignature**    Nurse 2 eSignature: {Esignature:[id number]}    
Discharge orders acknowledged by RN . Discharge teaching completed with pt and family. AVS reviewed and all questions answered. Medication regimen reviewed and pt understands schedule. Follow up appointments also reviewed with pt and resources given for discharge. Pt was sent electronic to be filled and understands schedule. IV removed. Portable monitor removed from pt and returned to [facility] by this RN. 60+ minutes of education completed. Required core measures completed. Pt vitals WDL. Pt discharged with all belongings to home by self.. Pt transported off of unit via wheelchair to car in ED parking lot.. No complications.  Electronically signed by Stephane Dickey RN on 4/16/25 at 11:39 AM EDT   
Patient said he already took his morning meds that he brought with him - amiodarone and plavix. Patient counseled to only take medications given by RN for safety reasons.   
Patient starting dialysis in room.   
Pt arrived via stretcher from ED to room 3545.  Heart monitor connected and verified with [facility]. VS, assessment, and admission complete. 4 eyes assessment complete. Pt oriented to unit and room. Call light and bedside table in reach. All questions answered. Pt resting quietly in bed with no complaints or voiced needs at this time.     Electronically signed by Abhishek Ferrer RN on 4/15/2025 at 6:06 AM    
Pt refused to have chair/bed alarm on during stay. Pt uses a cane to ambulate independently. Nurse explained safety of chair alarm. Pt signed fall contract.    Electronically signed by Abhishek Ferrer RN on 4/15/2025 at 6:14 AM    
Treatment time: 3 hours  Net UF: 2000 ml     Pre weight: 135.6 kg  Post weight:133.6 kg      Access used: LAVF    Access function: well with  ml/min        Summary of response to treatment: Patient tolerated treatment well and without any complications. Patient remained stable throughout entire treatment.     Report given to Kathy Aguilar RN and copy of dialysis treatment record placed in chart, to be scanned into EMR.  
nausea or vomiting at this time      Review of Systems:      Pertinent positives and negatives discussed in HPI    Objective:   No intake or output data in the 24 hours ending 04/15/25 1318   Vitals:   Vitals:    04/15/25 0522 04/15/25 0700 04/15/25 0828 04/15/25 0943   BP:   129/63 129/63   Pulse:  54 56    Resp:   19    Temp:   98.2 °F (36.8 °C)    TempSrc:   Oral    SpO2:  94% 94%    Weight: 136 kg (299 lb 13.2 oz)   135.6 kg (299 lb)   Height:    1.753 m (5' 9\")         Physical Exam:      Physical Exam Performed:    /63   Pulse 56   Temp 98.2 °F (36.8 °C) (Oral)   Resp 19   Ht 1.753 m (5' 9\")   Wt 135.6 kg (299 lb)   SpO2 94%   BMI 44.15 kg/m²     General appearance: No apparent distress  Respiratory:  Normal respiratory effort. Clear to auscultation, bilaterally without Rales/Wheezes/Rhonchi.  Cardiovascular: Regular rate and rhythm with normal S1/S2 without murmurs, rubs or gallops.  Abdomen: Soft, non-tender  Musculoskeletal: No clubbing, cyanosis  Neurologic: No focal weakness  Psychiatric: Alert and oriented  Capillary Refill: Brisk, 3 seconds, normal   Peripheral Pulses: +2 palpable, equal bilaterally       Medications:   Medications:    amiodarone  200 mg Oral Daily    atorvastatin  20 mg Oral Nightly    Virt-Caps  1 capsule Oral Nightly    metoprolol succinate  25 mg Oral QPM    [START ON 4/16/2025] torsemide  100 mg Oral Once per day on Sunday Monday Wednesday Friday    sodium chloride flush  5-40 mL IntraVENous 2 times per day    calcium acetate  3 capsule Oral TID WC    clopidogrel  75 mg Oral Daily      Infusions:    sodium chloride       PRN Meds: sodium chloride flush, 5-40 mL, PRN  sodium chloride, , PRN  ondansetron, 4 mg, Q8H PRN   Or  ondansetron, 4 mg, Q6H PRN  polyethylene glycol, 17 g, Daily PRN  acetaminophen, 650 mg, Q6H PRN   Or  acetaminophen, 650 mg, Q6H PRN  albuterol, 2.5 mg, Q2H PRN  sulfur hexafluoride microspheres, 2 mL, ONCE PRN        Labs and Imaging   Echo (TTE) 
MUCUS 1+ 01/27/2015 10:36 AM    YEAST Present 01/27/2015 10:36 AM    BACTERIA RARE 11/05/2019 07:12 AM    CLARITYU Clear 11/05/2019 07:12 AM    LEUKOCYTESUR Negative 11/05/2019 07:12 AM    UROBILINOGEN 0.2 11/05/2019 07:12 AM    BILIRUBINUR Negative 11/05/2019 07:12 AM    BLOODU SMALL 11/05/2019 07:12 AM    GLUCOSEU Negative 11/05/2019 07:12 AM         IMPRESSION/RECOMMENDATIONS:      ESRD on HD TTS: 134.5 kg DW              - Outpatient HD[facility]: [facility]               - Access: L AVF   - Received HD yesterday -- wt challenged down to 133.6 kg              - Continue to challenge DW as tolerated. Next HD will be tomorrow.               - Continue PTA torsemide 100 mg daily on non-HD days    - OK to d/c from renal standpoint      2. Pericardial effusion              - CT chest reveals moderate pericardial effusion              - TTE (4/15/2025): small to moderate size pericardial effusion               - Volume removal with HD              - Low Na+ diet and 1.5 L fluid restriction              - Cardiology consulted -- no intervention needed      3. Hypertension              - BP intermittently soft              - Hold parameters added to metoprolol              - Monitor     4. CKD-BMD              - Monitor phosphorus              - Continue binder      5. Anemia of ESRD              - Hgb at target              - No indication for GOPAL at this time      Will discuss with nephrology attending physician, Dr. Camp.  See attestation for additional recommendations.    DOC Ramirez - CNP

## 2025-04-16 NOTE — DISCHARGE SUMMARY
Hospital Medicine Discharge Summary    Patient ID: Jeremías Bray      Patient's PCP: Anoop Slater DO    Admit Date: 4/14/2025     Discharge Date:   04/16/2025    Admitting Provider: Gagandeep Dukes MD     Discharge Provider: Stephon Cordova MD     Discharge Diagnoses:       Active Hospital Problems    Diagnosis     Pericardial effusion [I31.39]     Hypoxia [R09.02]     Dyspnea [R06.00]     ESRD (end stage renal disease) on dialysis (HCC) [N18.6, Z99.2]        The patient was seen and examined on day of discharge and this discharge summary is in conjunction with any daily progress note from day of discharge.    Hospital Course:     From HPI:\"Jeremías Bray is a 79 y.o. male with a pmh of non-STEMI, chronic combined systolic and diastolic heart failure, NSTEMI disease on dialysis, essential hypertension, class III obesity, MGUS, malignant neoplasm of bone, nonrheumatic valve disease, status post transcatheter aortic valve replacement who presents with a 1 day history of progressively worsening shortness of breath requiring him to use his as needed oxygen concentrator.  At the emergency department a CT of the chest showed a pericardial effusion described as well than before per imaging.  However a previous echocardiogram in February did not show any pericardial effusion. \"        Plan:   Dyspnea on admission with possible pericardial effusion cardiology consulted, echo noted with small to moderate pleural effusion seen by cardiology and okay to discharge home no more dyspnea patient advised to seek immediate medical help in case of any worsening  End-stage renal disease dialysis at bedside nephrology consulted  Obesity, complicating current presentation increasing risk for morbidity or mortality counseled for weight loss  Anemia, hemoglobin relatively flat due to end-stage renal disease        Physical Exam Performed:     BP (!) 100/59   Pulse 57   Temp 98.2 °F (36.8 °C) (Oral)   Resp 16   Ht 1.753 m (5' 9\")

## 2025-04-17 ENCOUNTER — APPOINTMENT (OUTPATIENT)
Dept: GENERAL RADIOLOGY | Age: 80
DRG: 149 | End: 2025-04-17
Payer: MEDICARE

## 2025-04-17 ENCOUNTER — HOSPITAL ENCOUNTER (EMERGENCY)
Age: 80
Discharge: HOME | DRG: 149 | End: 2025-04-17
Attending: STUDENT IN AN ORGANIZED HEALTH CARE EDUCATION/TRAINING PROGRAM
Payer: MEDICARE

## 2025-04-17 VITALS
HEIGHT: 69 IN | BODY MASS INDEX: 45.98 KG/M2 | WEIGHT: 310.41 LBS | SYSTOLIC BLOOD PRESSURE: 138 MMHG | OXYGEN SATURATION: 95 % | HEART RATE: 60 BPM | TEMPERATURE: 97.6 F | DIASTOLIC BLOOD PRESSURE: 71 MMHG | RESPIRATION RATE: 17 BRPM

## 2025-04-17 DIAGNOSIS — Z53.20 TREATMENT DECLINED BY PATIENT: ICD-10-CM

## 2025-04-17 DIAGNOSIS — R42 LIGHTHEADEDNESS: Primary | ICD-10-CM

## 2025-04-17 PROBLEM — R06.02 SHORTNESS OF BREATH: Status: ACTIVE | Noted: 2025-04-17

## 2025-04-17 LAB
ANION GAP SERPL CALCULATED.3IONS-SCNC: 19 MMOL/L (ref 3–16)
ANISOCYTOSIS BLD QL SMEAR: ABNORMAL
BASOPHILS # BLD: 0.1 K/UL (ref 0–0.2)
BASOPHILS NFR BLD: 1.2 %
BUN SERPL-MCNC: 56 MG/DL (ref 7–20)
CALCIUM SERPL-MCNC: 9.5 MG/DL (ref 8.3–10.6)
CHLORIDE SERPL-SCNC: 90 MMOL/L (ref 99–110)
CO2 SERPL-SCNC: 21 MMOL/L (ref 21–32)
CREAT SERPL-MCNC: 7.7 MG/DL (ref 0.8–1.3)
DACRYOCYTES BLD QL SMEAR: ABNORMAL
DEPRECATED RDW RBC AUTO: 17.2 % (ref 12.4–15.4)
EKG ATRIAL RATE: 61 BPM
EKG DIAGNOSIS: NORMAL
EKG P AXIS: 66 DEGREES
EKG P-R INTERVAL: 184 MS
EKG Q-T INTERVAL: 516 MS
EKG QRS DURATION: 94 MS
EKG QTC CALCULATION (BAZETT): 519 MS
EKG R AXIS: 13 DEGREES
EKG T AXIS: 54 DEGREES
EKG VENTRICULAR RATE: 61 BPM
EOSINOPHIL # BLD: 0.3 K/UL (ref 0–0.6)
EOSINOPHIL NFR BLD: 2.3 %
GFR SERPLBLD CREATININE-BSD FMLA CKD-EPI: 7 ML/MIN/{1.73_M2}
GLUCOSE SERPL-MCNC: 120 MG/DL (ref 70–99)
HCT VFR BLD AUTO: 28.5 % (ref 40.5–52.5)
HGB BLD-MCNC: 9.7 G/DL (ref 13.5–17.5)
LYMPHOCYTES # BLD: 0.5 K/UL (ref 1–5.1)
LYMPHOCYTES NFR BLD: 4.7 %
MAGNESIUM SERPL-MCNC: 2.38 MG/DL (ref 1.8–2.4)
MCH RBC QN AUTO: 33.6 PG (ref 26–34)
MCHC RBC AUTO-ENTMCNC: 34.2 G/DL (ref 31–36)
MCV RBC AUTO: 98.3 FL (ref 80–100)
MICROCYTES BLD QL SMEAR: ABNORMAL
MONOCYTES # BLD: 1 K/UL (ref 0–1.3)
MONOCYTES NFR BLD: 8.9 %
NEUTROPHILS # BLD: 9.3 K/UL (ref 1.7–7.7)
NEUTROPHILS NFR BLD: 82.9 %
NT-PROBNP SERPL-MCNC: ABNORMAL PG/ML (ref 0–449)
OVALOCYTES BLD QL SMEAR: ABNORMAL
PLATELET # BLD AUTO: 351 K/UL (ref 135–450)
PLATELET BLD QL SMEAR: ABNORMAL
PMV BLD AUTO: 8.1 FL (ref 5–10.5)
POIKILOCYTOSIS BLD QL SMEAR: ABNORMAL
POTASSIUM SERPL-SCNC: 4.1 MMOL/L (ref 3.5–5.1)
RBC # BLD AUTO: 2.89 M/UL (ref 4.2–5.9)
SODIUM SERPL-SCNC: 130 MMOL/L (ref 136–145)
TROPONIN, HIGH SENSITIVITY: 184 NG/L (ref 0–22)
TROPONIN, HIGH SENSITIVITY: 197 NG/L (ref 0–22)
WBC # BLD AUTO: 11.2 K/UL (ref 4–11)

## 2025-04-17 PROCEDURE — 1200000000 HC SEMI PRIVATE

## 2025-04-17 PROCEDURE — 93005 ELECTROCARDIOGRAM TRACING: CPT | Performed by: STUDENT IN AN ORGANIZED HEALTH CARE EDUCATION/TRAINING PROGRAM

## 2025-04-17 PROCEDURE — 80048 BASIC METABOLIC PNL TOTAL CA: CPT

## 2025-04-17 PROCEDURE — 93010 ELECTROCARDIOGRAM REPORT: CPT | Performed by: INTERNAL MEDICINE

## 2025-04-17 PROCEDURE — 83735 ASSAY OF MAGNESIUM: CPT

## 2025-04-17 PROCEDURE — 83880 ASSAY OF NATRIURETIC PEPTIDE: CPT

## 2025-04-17 PROCEDURE — 99285 EMERGENCY DEPT VISIT HI MDM: CPT

## 2025-04-17 PROCEDURE — 85025 COMPLETE CBC W/AUTO DIFF WBC: CPT

## 2025-04-17 PROCEDURE — 71045 X-RAY EXAM CHEST 1 VIEW: CPT

## 2025-04-17 PROCEDURE — 84484 ASSAY OF TROPONIN QUANT: CPT

## 2025-04-17 ASSESSMENT — PAIN SCALES - GENERAL: PAINLEVEL_OUTOF10: 0

## 2025-04-17 ASSESSMENT — LIFESTYLE VARIABLES
HOW OFTEN DO YOU HAVE A DRINK CONTAINING ALCOHOL: NEVER
HOW MANY STANDARD DRINKS CONTAINING ALCOHOL DO YOU HAVE ON A TYPICAL DAY: PATIENT DOES NOT DRINK

## 2025-04-17 ASSESSMENT — PAIN - FUNCTIONAL ASSESSMENT: PAIN_FUNCTIONAL_ASSESSMENT: NONE - DENIES PAIN

## 2025-04-17 NOTE — ED PROVIDER NOTES
Stability: Low Risk  (4/15/2025)    Housing Stability Vital Sign     Unable to Pay for Housing in the Last Year: No     Number of Times Moved in the Last Year: 0     Homeless in the Last Year: No       REVIEW OF SYSTEMS    As per HPI    PHYSICAL EXAM    /60   Pulse 57   Temp 97.6 °F (36.4 °C) (Oral)   Resp 26   Ht 1.753 m (5' 9\")   Wt (!) 140.8 kg (310 lb 6.5 oz)   SpO2 95%   BMI 45.84 kg/m²   Physical Exam  Constitutional:       Appearance: Normal appearance. He is not ill-appearing.   HENT:      Head: Normocephalic and atraumatic.   Eyes:      Conjunctiva/sclera: Conjunctivae normal.   Cardiovascular:      Rate and Rhythm: Normal rate.      Heart sounds: Normal heart sounds.   Pulmonary:      Effort: Pulmonary effort is normal. No respiratory distress.   Abdominal:      General: Abdomen is flat.      Palpations: Abdomen is soft.      Tenderness: There is no abdominal tenderness.   Musculoskeletal:      Cervical back: Neck supple.   Skin:     General: Skin is warm and dry.   Neurological:      General: No focal deficit present.      Mental Status: He is alert and oriented to person, place, and time.   Psychiatric:         Mood and Affect: Mood normal.         XR CHEST 1 VIEW   Final Result   1. Similar appearing chest with a small left-sided pleural effusion and   basilar airspace disease likely related to atelectasis as seen on the   comparison CT.   2. Mild central vascular congestion.             ED COURSE & MEDICAL DECISION MAKING    Jeremías Bray is a 79 y.o. male with past medical history significant for CHF, HLD, ESRD who presents with JESSI .    DDX includes: Pericardial effusion, PE, pneumonia, pulmonary edema, ACS.  Plan:  Orders Placed This Encounter   Procedures    XR CHEST 1 VIEW    Basic Metabolic Panel    CBC with Auto Differential    Troponin    Brain Natriuretic Peptide    Magnesium    Telemetry Monitoring - 48 Hours    Inpatient consult to Hospitalist    EKG 12 Lead    EKG 12 Lead    ADMIT  TO INPATIENT        Medical reasoning and significant workup findings:   No acute distress, vital signs here are reassuring  79-year-old male with significant cardiac and renal history here today with difficulty breathing after recent admission to the hospital for heart failure and pericardial effusion.  He is back here again with difficulty in breathing.  The differential for this is very similar to what he is admitted for previously.  Do not believe is likely here today PE he is not tachycardic.  He otherwise resting comfortably in his symptoms.  More less exertional.  Certainly concern is ability of ACS, aortic pathologies, such as his aortic stenosis, and just simply worsening of his underlying heart failure and/or pericardial effusion.  Will plan for basic laboratory studies and readmission to the hospitalist where he can have cardiology consultation done.  Laboratory studies near baseline, patient to be admitted to medicine         Impression: dyspnea       Patient's results were discussed with the patient. Patient's questions were answered. I reviewed the patients medical records and noted there allergies, past medical history, and previous visits, pertinent information summarized in HPI. I reviewed the nursing notes.    Feel that patient requires admission to medicine for further workup and management.         Yuko Foley MD  04/17/25 4532

## 2025-04-17 NOTE — ED NOTES
Called to bedside as inpatient medicine attending was meeting with Mr. Bray and he stated that he does not want to proceed with inpatient hospitalization.  Mr. Bray tells me that he was feeling lightheaded and a little bit fatigued earlier in the morning.  He states that he has been drinking fluids throughout the morning and he feels \"perfectly fine\" now.  States that he is asymptomatic currently.    On examination now:  BP (!) 122/57   Pulse 57   Temp 97.6 °F (36.4 °C) (Oral)   Resp 15   SpO2 94%  Awake, alert, not acutely ill-appearing, not distressed  CTAB, RR and WOB are normal  Minimal bradycardia, 2+ radial pulse, brisk capillary refill to finger  Abdomen non-tender  Ambulatory independently    Discussed exam findings, test results, and expected clinical course with him at length  He is requesting to be discharged home now  Return precautions reviewed in detail and outpatient follow up advised    1. Lightheadedness    2. Treatment declined by patient (inpatient hospitalization)        Jacob Bennett MD  04/17/25 0875

## 2025-04-17 NOTE — DISCHARGE INSTRUCTIONS
Call your dialysis clinic this morning to schedule a make-up session.    Keep your previously scheduled hospital follow-up appointments.    If you have any new or worsening issues after going home don't hesitate to return here for reevaluation at any time 24/7!

## 2025-04-18 NOTE — ED PROVIDER NOTES
Called to bedside as inpatient medicine attending was meeting with Mr. Bray and he stated that he does not want to proceed with inpatient hospitalization.  Mr. Bray tells me that he was feeling lightheaded and a little bit fatigued earlier in the morning.  He states that he has been drinking fluids throughout the morning and he feels \"perfectly fine\" now.  States that he is asymptomatic currently.     On examination now:  BP (!) 122/57   Pulse 57   Temp 97.6 °F (36.4 °C) (Oral)   Resp 15   SpO2 94%  Awake, alert, not acutely ill-appearing, not distressed  CTAB, RR and WOB are normal  Minimal bradycardia, 2+ radial pulse, brisk capillary refill to finger  Abdomen non-tender  Ambulatory independently     Discussed exam findings, test results, and expected clinical course with him at length  He is requesting to be discharged home now  Return precautions reviewed in detail and outpatient follow up advised     1. Lightheadedness    2. Treatment declined by patient (inpatient hospitalization)         Jacob Bennett MD  04/17/25 0805         Jacob Bennett MD  04/18/25 3237

## 2025-04-23 ENCOUNTER — HOSPITAL ENCOUNTER (OUTPATIENT)
Age: 80
Setting detail: OBSERVATION
Discharge: HOME | End: 2025-04-24
Attending: EMERGENCY MEDICINE | Admitting: INTERNAL MEDICINE
Payer: MEDICARE

## 2025-04-23 ENCOUNTER — APPOINTMENT (OUTPATIENT)
Dept: GENERAL RADIOLOGY | Age: 80
End: 2025-04-23
Payer: MEDICARE

## 2025-04-23 DIAGNOSIS — I31.39 PERICARDIAL EFFUSION: ICD-10-CM

## 2025-04-23 DIAGNOSIS — I48.91 NEW ONSET ATRIAL FIBRILLATION (HCC): Primary | ICD-10-CM

## 2025-04-23 DIAGNOSIS — Z99.2 ESRD ON DIALYSIS (HCC): ICD-10-CM

## 2025-04-23 DIAGNOSIS — N18.6 ESRD ON DIALYSIS (HCC): ICD-10-CM

## 2025-04-23 PROBLEM — R00.2 PALPITATION: Status: ACTIVE | Noted: 2025-04-23

## 2025-04-23 LAB
ALBUMIN SERPL-MCNC: 3.4 G/DL (ref 3.4–5)
ALBUMIN/GLOB SERPL: 1 {RATIO} (ref 1.1–2.2)
ALP SERPL-CCNC: 64 U/L (ref 40–129)
ALT SERPL-CCNC: 37 U/L (ref 10–40)
ANION GAP SERPL CALCULATED.3IONS-SCNC: 15 MMOL/L (ref 3–16)
AST SERPL-CCNC: 22 U/L (ref 15–37)
BASOPHILS # BLD: 0.1 K/UL (ref 0–0.2)
BASOPHILS NFR BLD: 0.8 %
BILIRUB SERPL-MCNC: 0.4 MG/DL (ref 0–1)
BUN SERPL-MCNC: 36 MG/DL (ref 7–20)
CALCIUM SERPL-MCNC: 10.1 MG/DL (ref 8.3–10.6)
CHLORIDE SERPL-SCNC: 98 MMOL/L (ref 99–110)
CO2 SERPL-SCNC: 30 MMOL/L (ref 21–32)
CREAT SERPL-MCNC: 6 MG/DL (ref 0.8–1.3)
DEPRECATED RDW RBC AUTO: 17.3 % (ref 12.4–15.4)
EKG DIAGNOSIS: NORMAL
EKG Q-T INTERVAL: 424 MS
EKG QRS DURATION: 154 MS
EKG QTC CALCULATION (BAZETT): 584 MS
EKG R AXIS: 172 DEGREES
EKG T AXIS: 58 DEGREES
EKG VENTRICULAR RATE: 114 BPM
EOSINOPHIL # BLD: 0.3 K/UL (ref 0–0.6)
EOSINOPHIL NFR BLD: 2.8 %
GFR SERPLBLD CREATININE-BSD FMLA CKD-EPI: 9 ML/MIN/{1.73_M2}
GLUCOSE SERPL-MCNC: 120 MG/DL (ref 70–99)
HCT VFR BLD AUTO: 32 % (ref 40.5–52.5)
HGB BLD-MCNC: 10.8 G/DL (ref 13.5–17.5)
LYMPHOCYTES # BLD: 0.4 K/UL (ref 1–5.1)
LYMPHOCYTES NFR BLD: 4.4 %
MAGNESIUM SERPL-MCNC: 2.12 MG/DL (ref 1.8–2.4)
MCH RBC QN AUTO: 32.9 PG (ref 26–34)
MCHC RBC AUTO-ENTMCNC: 33.7 G/DL (ref 31–36)
MCV RBC AUTO: 97.4 FL (ref 80–100)
MONOCYTES # BLD: 0.7 K/UL (ref 0–1.3)
MONOCYTES NFR BLD: 7.5 %
NEUTROPHILS # BLD: 8 K/UL (ref 1.7–7.7)
NEUTROPHILS NFR BLD: 84.5 %
NT-PROBNP SERPL-MCNC: ABNORMAL PG/ML (ref 0–449)
PLATELET # BLD AUTO: 369 K/UL (ref 135–450)
PMV BLD AUTO: 7.4 FL (ref 5–10.5)
POTASSIUM SERPL-SCNC: 3.5 MMOL/L (ref 3.5–5.1)
PROT SERPL-MCNC: 6.9 G/DL (ref 6.4–8.2)
RBC # BLD AUTO: 3.29 M/UL (ref 4.2–5.9)
SODIUM SERPL-SCNC: 143 MMOL/L (ref 136–145)
TROPONIN, HIGH SENSITIVITY: 92 NG/L (ref 0–22)
TROPONIN, HIGH SENSITIVITY: 96 NG/L (ref 0–22)
TSH SERPL DL<=0.005 MIU/L-ACNC: 0.96 UIU/ML (ref 0.27–4.2)
WBC # BLD AUTO: 9.5 K/UL (ref 4–11)

## 2025-04-23 PROCEDURE — 83880 ASSAY OF NATRIURETIC PEPTIDE: CPT

## 2025-04-23 PROCEDURE — 93005 ELECTROCARDIOGRAM TRACING: CPT | Performed by: EMERGENCY MEDICINE

## 2025-04-23 PROCEDURE — 6360000002 HC RX W HCPCS: Performed by: INTERNAL MEDICINE

## 2025-04-23 PROCEDURE — 36415 COLL VENOUS BLD VENIPUNCTURE: CPT

## 2025-04-23 PROCEDURE — G0378 HOSPITAL OBSERVATION PER HR: HCPCS

## 2025-04-23 PROCEDURE — 71045 X-RAY EXAM CHEST 1 VIEW: CPT

## 2025-04-23 PROCEDURE — 96372 THER/PROPH/DIAG INJ SC/IM: CPT

## 2025-04-23 PROCEDURE — 94640 AIRWAY INHALATION TREATMENT: CPT

## 2025-04-23 PROCEDURE — 99285 EMERGENCY DEPT VISIT HI MDM: CPT

## 2025-04-23 PROCEDURE — 85025 COMPLETE CBC W/AUTO DIFF WBC: CPT

## 2025-04-23 PROCEDURE — 84443 ASSAY THYROID STIM HORMONE: CPT

## 2025-04-23 PROCEDURE — 83735 ASSAY OF MAGNESIUM: CPT

## 2025-04-23 PROCEDURE — 93010 ELECTROCARDIOGRAM REPORT: CPT | Performed by: INTERNAL MEDICINE

## 2025-04-23 PROCEDURE — 80053 COMPREHEN METABOLIC PANEL: CPT

## 2025-04-23 PROCEDURE — 6370000000 HC RX 637 (ALT 250 FOR IP): Performed by: INTERNAL MEDICINE

## 2025-04-23 PROCEDURE — 2500000003 HC RX 250 WO HCPCS: Performed by: INTERNAL MEDICINE

## 2025-04-23 PROCEDURE — 94761 N-INVAS EAR/PLS OXIMETRY MLT: CPT

## 2025-04-23 PROCEDURE — 84484 ASSAY OF TROPONIN QUANT: CPT

## 2025-04-23 RX ORDER — ONDANSETRON 2 MG/ML
4 INJECTION INTRAMUSCULAR; INTRAVENOUS EVERY 6 HOURS PRN
Status: DISCONTINUED | OUTPATIENT
Start: 2025-04-23 | End: 2025-04-24 | Stop reason: HOSPADM

## 2025-04-23 RX ORDER — ACETAMINOPHEN 325 MG/1
650 TABLET ORAL EVERY 6 HOURS PRN
Status: DISCONTINUED | OUTPATIENT
Start: 2025-04-23 | End: 2025-04-24 | Stop reason: HOSPADM

## 2025-04-23 RX ORDER — ALBUTEROL SULFATE 0.83 MG/ML
2.5 SOLUTION RESPIRATORY (INHALATION)
Status: DISCONTINUED | OUTPATIENT
Start: 2025-04-23 | End: 2025-04-23

## 2025-04-23 RX ORDER — ATORVASTATIN CALCIUM 20 MG/1
20 TABLET, FILM COATED ORAL NIGHTLY
Status: DISCONTINUED | OUTPATIENT
Start: 2025-04-24 | End: 2025-04-24 | Stop reason: HOSPADM

## 2025-04-23 RX ORDER — METOPROLOL SUCCINATE 25 MG/1
25 TABLET, EXTENDED RELEASE ORAL EVERY EVENING
Status: DISCONTINUED | OUTPATIENT
Start: 2025-04-24 | End: 2025-04-24

## 2025-04-23 RX ORDER — ONDANSETRON 4 MG/1
4 TABLET, ORALLY DISINTEGRATING ORAL EVERY 8 HOURS PRN
Status: DISCONTINUED | OUTPATIENT
Start: 2025-04-23 | End: 2025-04-24 | Stop reason: HOSPADM

## 2025-04-23 RX ORDER — AMIODARONE HYDROCHLORIDE 200 MG/1
200 TABLET ORAL DAILY
Status: DISCONTINUED | OUTPATIENT
Start: 2025-04-24 | End: 2025-04-24 | Stop reason: HOSPADM

## 2025-04-23 RX ORDER — CLOPIDOGREL BISULFATE 75 MG/1
75 TABLET ORAL DAILY
Status: DISCONTINUED | OUTPATIENT
Start: 2025-04-24 | End: 2025-04-24

## 2025-04-23 RX ORDER — SODIUM CHLORIDE 0.9 % (FLUSH) 0.9 %
5-40 SYRINGE (ML) INJECTION EVERY 12 HOURS SCHEDULED
Status: DISCONTINUED | OUTPATIENT
Start: 2025-04-23 | End: 2025-04-24 | Stop reason: HOSPADM

## 2025-04-23 RX ORDER — NEPHROCAP 1 MG
1 CAP ORAL NIGHTLY
Status: DISCONTINUED | OUTPATIENT
Start: 2025-04-23 | End: 2025-04-24 | Stop reason: HOSPADM

## 2025-04-23 RX ORDER — ALBUTEROL SULFATE 0.83 MG/ML
2.5 SOLUTION RESPIRATORY (INHALATION) EVERY 4 HOURS PRN
Status: DISCONTINUED | OUTPATIENT
Start: 2025-04-23 | End: 2025-04-24 | Stop reason: HOSPADM

## 2025-04-23 RX ORDER — HEPARIN SODIUM 5000 [USP'U]/ML
5000 INJECTION, SOLUTION INTRAVENOUS; SUBCUTANEOUS EVERY 8 HOURS SCHEDULED
Status: DISCONTINUED | OUTPATIENT
Start: 2025-04-23 | End: 2025-04-23 | Stop reason: DRUGHIGH

## 2025-04-23 RX ORDER — SODIUM CHLORIDE 0.9 % (FLUSH) 0.9 %
5-40 SYRINGE (ML) INJECTION PRN
Status: DISCONTINUED | OUTPATIENT
Start: 2025-04-23 | End: 2025-04-24 | Stop reason: HOSPADM

## 2025-04-23 RX ORDER — HEPARIN SODIUM 5000 [USP'U]/ML
5000 INJECTION, SOLUTION INTRAVENOUS; SUBCUTANEOUS EVERY 8 HOURS SCHEDULED
Status: DISCONTINUED | OUTPATIENT
Start: 2025-04-23 | End: 2025-04-24 | Stop reason: ALTCHOICE

## 2025-04-23 RX ORDER — ALBUTEROL SULFATE 90 UG/1
2 INHALANT RESPIRATORY (INHALATION) EVERY 4 HOURS PRN
Status: DISCONTINUED | OUTPATIENT
Start: 2025-04-23 | End: 2025-04-24 | Stop reason: HOSPADM

## 2025-04-23 RX ORDER — POLYETHYLENE GLYCOL 3350 17 G/17G
17 POWDER, FOR SOLUTION ORAL DAILY PRN
Status: DISCONTINUED | OUTPATIENT
Start: 2025-04-23 | End: 2025-04-24 | Stop reason: HOSPADM

## 2025-04-23 RX ORDER — SODIUM CHLORIDE 9 MG/ML
INJECTION, SOLUTION INTRAVENOUS PRN
Status: DISCONTINUED | OUTPATIENT
Start: 2025-04-23 | End: 2025-04-24 | Stop reason: HOSPADM

## 2025-04-23 RX ORDER — TORSEMIDE 100 MG/1
100 TABLET ORAL
Status: DISCONTINUED | OUTPATIENT
Start: 2025-04-24 | End: 2025-04-24 | Stop reason: HOSPADM

## 2025-04-23 RX ORDER — ACETAMINOPHEN 650 MG/1
650 SUPPOSITORY RECTAL EVERY 6 HOURS PRN
Status: DISCONTINUED | OUTPATIENT
Start: 2025-04-23 | End: 2025-04-24 | Stop reason: HOSPADM

## 2025-04-23 RX ADMIN — HEPARIN SODIUM 5000 UNITS: 5000 INJECTION INTRAVENOUS; SUBCUTANEOUS at 21:13

## 2025-04-23 RX ADMIN — ALBUTEROL SULFATE 2.5 MG: 2.5 SOLUTION RESPIRATORY (INHALATION) at 21:08

## 2025-04-23 RX ADMIN — SODIUM CHLORIDE, PRESERVATIVE FREE 10 ML: 5 INJECTION INTRAVENOUS at 21:13

## 2025-04-23 RX ADMIN — Medication 1 MG: at 21:13

## 2025-04-23 ASSESSMENT — PAIN SCALES - GENERAL
PAINLEVEL_OUTOF10: 0
PAINLEVEL_OUTOF10: 0

## 2025-04-23 ASSESSMENT — PAIN - FUNCTIONAL ASSESSMENT: PAIN_FUNCTIONAL_ASSESSMENT: 0-10

## 2025-04-23 NOTE — ED PROVIDER NOTES
[facility] EMERGENCY DEPARTMENT  EMERGENCY DEPARTMENT ENCOUNTER        Pt Name: Jeremías Bray  MRN: [id number]  Birthdate 1945  Date of evaluation: 4/23/2025  Provider: Violet Mckenna PA-C  PCP: Anoop Slater DO  Note Started: 3:35 PM EDT 4/23/25       I have seen and evaluated this patient with my supervising physician Macario Felipe MD.      CHIEF COMPLAINT       Chief Complaint   Patient presents with    Palpitations     Pt states palpitations and fast heart rate while reading a book today. Denies any chest pain at this time. Endorses intermittent SOB.        HISTORY OF PRESENT ILLNESS: 1 or more Elements     History From: Patient    Jeremías Bray is a 79 y.o. male who presents for palpitations that lasted for about 45 minutes about 1 hour prior to ED arrival.  It is resolved now.  He denies chest pain or shortness of breath.  Denies any associated nausea or diaphoresis.  Denies prior episodes of this.  Does report he is a history of pericardial effusion.  He has ESRD and is on dialysis Tuesday, Thursday, Saturday.  Last went yesterday..  Goes to [facility] in [facility].  Nephrologist is .  He denies any fever or cough.  Does have a history of coronary artery disease, hypertension, hyperlipidemia.  Does not smoke and denies history of diabetes.  Reports he was incidentally found to have \"old appearing\" PEs years ago.  Did not require treatment.    Nursing Notes were reviewed and agreed with or any disagreements were addressed in the HPI.    REVIEW OF SYSTEMS :      Review of Systems    Positives and Pertinent negatives as per HPI.     SURGICAL HISTORY     Past Surgical History:   Procedure Laterality Date    AORTIC VALVE REPLACEMENT N/A 05/18/2021    TRANSCATHETER AORTIC VALVE REPLACEMENT FEMORAL APPROACH performed by Tristan Salazar MD at [facility]    AORTIC VALVE REPLACEMENT N/A 05/18/2021    TRANSCATHETER AORTIC VALVE REPLACEMENT FEMORAL APPROACH performed by  Decision To Admit 04/23/2025 03:41:57 PM   DISPOSITION CONDITION Stable           PATIENT REFERRED TO:  No follow-up provider specified.    DISCHARGE MEDICATIONS:  New Prescriptions    No medications on file       DISCONTINUED MEDICATIONS:  Discontinued Medications    No medications on file              (Please note that portions of this note were completed with a voice recognition program.  Efforts were made to edit the dictations but occasionally words are mis-transcribed.)    Violet Mckenna PA-C (electronically signed)            Violet Mckenna PA-C  04/23/25 155

## 2025-04-23 NOTE — PROGRESS NOTES
4 Eyes Skin Assessment     NAME:  Jeremías Bray  YOB: 1945  MEDICAL RECORD NUMBER:  [id number]    The patient is being assessed for  Admission    I agree that at least one RN has performed a thorough Head to Toe Skin Assessment on the patient. ALL assessment sites listed below have been assessed.      Areas assessed by both nurses:    Head, Face, Ears, Shoulders, Back, Chest, Arms, Elbows, Hands, Sacrum. Buttock, Coccyx, Ischium, Legs. Feet and Heels, and Under Medical Devices         Does the Patient have a Wound? No noted wound(s)       Bryn Prevention initiated by RN: No  Wound Care Orders initiated by RN: No    Pressure Injury (Stage 3,4, Unstageable, DTI, NWPT, and Complex wounds) if present, place Wound referral order by RN under : No    New Ostomies, if present place, Ostomy referral order under : No     Nurse 1 eSignature: Electronically signed by Giovanna Bhakta RN on 4/23/25 at 6:22 PM EDT    **SHARE this note so that the co-signing nurse can place an eSignature**    Nurse 2 eSignature: Electronically signed by Lopez Perera RN on 4/23/25 at 6:23 PM EDT

## 2025-04-23 NOTE — PROGRESS NOTES
Pt admitted to room 4128 from ED. Ambulatory to chair with cane. VSS on RA. Pt A&Ox4, oriented to room and call light. Provided water, no medications to be administered at this time. Telemetry placed and confirmed with [facility] per orders. Admission questions and assessment completed. Pt encouraged to call for needs. No needs voiced at this time.    Electronically signed by Giovanna Bhakta RN on 4/23/2025 at 6:22 PM

## 2025-04-23 NOTE — H&P
Hospital Medicine History & Physical      PCP: Anoop Slater DO    Date of Admission: 4/23/2025    Date of Service: Pt seen/examined on 04/23/2025 and placed in Observation.    Chief Complaint: Palpitation      History Of Present Illness:      79 y.o. 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    AORTIC VALVE REPLACEMENT N/A 05/18/2021    TRANSCATHETER AORTIC VALVE REPLACEMENT FEMORAL APPROACH performed by Arnaldo Marley MD at [facility]    CARDIAC CATHETERIZATION  03/10/2021    x 1 Stent placement    COLONOSCOPY      CORONARY ANGIOPLASTY WITH STENT PLACEMENT  04/16/2017    EVERETT to LAD    DIALYSIS FISTULA CREATION Left 03/22/2023    LEFT RADIAL ARTERY TO CEPHALIC VEIN ARTERIOVENOUS FISTULA CREATION, POSSIBLE BRACHIAL ARTERY TO CEPHALIC VEIN ARTERIOVENOUS FISTULA CREATION performed by Dashawn Jain DO at [facility] OR    DIALYSIS  22   ALT 37   BILITOT 0.4   ALKPHOS 64     No results for input(s): \"INR\" in the last 72 hours.  Recent Labs     04/23/25  1230 04/23/25  1504   TROPHS 92* 96*       Urinalysis:      Lab Results   Component Value Date/Time    NITRU Negative 11/05/2019 07:12 AM    WBCUA 4 11/05/2019 07:12 AM    BACTERIA RARE 11/05/2019 07:12 AM    RBCUA 2 11/05/2019 07:12 AM    BLOODU SMALL 11/05/2019 07:12 AM    GLUCOSEU Negative 11/05/2019 07:12 AM       Radiology:       EKG:  I have reviewed the EKG with the following interpretation: A-fib    XR CHEST PORTABLE   Final Result   1. No significant change in small left effusion and opacification of the   underlying lung.   2. Cardiomegaly.             Consults:    IP CONSULT TO CARDIOLOGY  IP CONSULT TO NEPHROLOGY    ASSESSMENT:    Active Hospital Problems    Diagnosis Date Noted    Palpitation [R00.2] 04/23/2025         PLAN:    Possible A-fib with mild RVR on presentation, placed in observation improved now, telemetry monitoring, CBC CMP in a.m., cardiology consulted will hold on ordering anticoagulation at this time pending cardiology recommendation  End-stage renal disease dialysis nephrology consulted  Elevated troponin and BNP due to end-stage renal disease no chest pain  Obesity complicating current presentation counseled for weight loss  Anemia of chronic disease no immediate need for transfusion  DVT Prophylaxis: Heparin  Diet: No diet orders on file  Code Status: Prior      Dispo -observation       Stephon Cordova MD    Thank you Anoop Slater DO for the opportunity to be involved in this patient's care. If you have any questions or concerns please feel free to contact me at [phone].    Comment: Please note this report has been produced using speech recognition software and may contain errors related to that system including errors in grammar, punctuation, and spelling, as well as words and phrases that may be inappropriate. If there are any questions or

## 2025-04-23 NOTE — ED NOTES
This RN introduced self to pt. Pt a/ox4, Call light in reach, pt educated on use , and to alert staff for needs. All questions answered at this time. Patient made aware of VERONICA process and status of obtaining a room for tele monitoring

## 2025-04-23 NOTE — ED PROVIDER NOTES
Emergency Department Attending Provider Note  Location: [address] MED SURG  4/23/2025   Note Started: 12:39 PM EDT 4/23/25     THIS IS MY DENIA SUPERVISORY AND SHARED VISIT NOTE:    Patient Identification  Jeremías Bray is a 79 y.o. male      HPI:Jeremías Bray was evaluated in the Emergency Department for palpitations that lasted for approximately 1 hour prior to arrival to the ED.  Patient denies any chest pain or palpitations currently.  Intermittent shortness of breath with exertion.  No nausea or diaphoresis.  Denies ever having palpitations similar to this in the past.  Does state he has a history of pericardial effusion.  No recent pericardiocentesis.  He does have a history of ESRD on HD and had dialysis yesterday.  Normally goes Tuesday Thursday Saturday.  He denies any fevers.  No cough.  Normal bowel movements.  No abdominal pain.   Although initial history and physical exam information was obtained by DENIA/NPP (who also dictated a record of this visit), I personally saw the patient and made/approved the management plan and take responsibility for the patient management.       PHYSICAL EXAM:     CONSTITUTIONAL: AOx4, cooperative with exam, afebrile   HEAD: normocephalic, atraumatic   EYES: PERRL, EOMI, anicteric sclera   ENT: Moist mucous membranes, uvula midline   NECK: Supple, symmetric, trachea midline   BACK: Symmetric, no deformity   LUNGS: Bilateral breath sounds, CTAB, no rales/ronchi/wheezes   CARDIOVASCULAR: Tachycardic, irregularly irregular rhythm, normal S1/S2, no m/r/g, 2+ pulses throughout   ABDOMEN: Soft, non-tender, non-distended, +BS   NEUROLOGIC:  MAEx4, 5/5 strength throughout; fine touch sensation intact throughout; normal gait   MUSCULOSKELETAL: No clubbing, cyanosis or edema, no calf tenderness bilaterally, no palpable cord,   SKIN: No rash, pallor or wounds         EKG Interpretation by myself  A-fib with RVR at a rate of 114, indeterminate axis, , QTc prolonged 584, right bundle branch  block present, T wave inversions anterior leads, when compared EKG from 4/17/2025 rhythm change of normal sinus rhythm with poor R wave progression to A-fib with RVR with T wave inversion in the inferior leads    Patient seen and evaluated.  Relevant records reviewed.  [facility]  Patient seen and evaluated.  History and physical as above.  Presents with palpitations over the past hour.  Patient has no palpitations currently at rest.  He had no chest pain.  No lightheadedness or dizziness.  History of ESRD on HD.  No missed dialysis.  Last dialysis yesterday.  No signs of fluid overload state.  He is afebrile without any tachypnea and tolerating room air.  He is tachycardic with irregularly irregular rhythm on exam.    Patient's EKG on evaluation interpreted by myself showed A-fib with RVR.    Patient has normal lung sounds bilaterally no increased work of breathing.      He was recently admitted to the hospital for 4/14/25 for dyspnea and possible pericardial effusion.  At that time he had a echocardiogram which showed a small to moderate size pericardial effusion with no cardiac tamponade.  Normal right LV size and function and low to normal left LV function with EF of 50 to 55%.  Normal wall motion and left ventricle size.    Today patient is chest x-ray interpreted by myself shows small left pleural effusion which is unchanged from prior.    Patient metabolic panel with normal sodium and potassium.  Creatinine 6.0 with BUN of 36 consistent with patient's history of ESRD.  His BNP is elevated as well at 25,869 which is up from 10,008 days ago.  Troponin 92->96 slightly better than his baseline troponin.    Plan for admission for patient's A-fib with RVR as well as his pericardial effusion.  Patient is hemodynamically stable at time of admission.  Admission accepted by Dr. Cordova.     CLINICAL IMPRESSION  1. New onset atrial fibrillation (HCC)    2. Pericardial effusion    3. ESRD on dialysis (HCC)      DISPOSITION

## 2025-04-23 NOTE — ED NOTES
ED TO INPATIENT SBAR HANDOFF    Patient Name: Jeremías Bray   Preferred Name: Jeremías  : 1945  79 y.o.   Family/Caregiver Present: no   Code Status Order: Prior  PO Status: NPO:No  Telemetry Order: Yes  C-SSRS: Risk of Suicide: No Risk  Sitter no     Restraints:     Sepsis Risk Score      Situation  Chief Complaint   Patient presents with    Palpitations     Pt states palpitations and fast heart rate while reading a book today. Denies any chest pain at this time. Endorses intermittent SOB.      Brief Description of Patient's Condition: stable on RA   Mental Status: oriented, alert, coherent, logical, thought processes intact, and able to concentrate and follow conversation  Arrived from:Home  Imaging:   XR CHEST PORTABLE   Final Result   1. No significant change in small left effusion and opacification of the   underlying lung.   2. Cardiomegaly.           Abnormal labs:   Abnormal Labs Reviewed   CBC WITH AUTO DIFFERENTIAL - Abnormal; Notable for the following components:       Result Value    RBC 3.29 (*)     Hemoglobin 10.8 (*)     Hematocrit 32.0 (*)     RDW 17.3 (*)     Neutrophils Absolute 8.0 (*)     Lymphocytes Absolute 0.4 (*)     All other components within normal limits   COMPREHENSIVE METABOLIC PANEL W/ REFLEX TO MG FOR LOW K - Abnormal; Notable for the following components:    Chloride 98 (*)     Glucose 120 (*)     BUN 36 (*)     Creatinine 6.0 (*)     Est, Glom Filt Rate 9 (*)     Albumin/Globulin Ratio 1.0 (*)     All other components within normal limits    Narrative:     CALL  [employer] tel. [phone],  Chemistry results called to and read back by PAULINE Helm, 2025 14:05,  by MERLY   TROPONIN - Abnormal; Notable for the following components:    Troponin, High Sensitivity 92 (*)     All other components within normal limits    Narrative:     CALL  [employer] tel. [phone],  Chemistry results called to and read back by PAULINE Helm, 2025 14:05,  by MERLY   TROPONIN - Abnormal; Notable  for the following components:    Troponin, High Sensitivity 96 (*)     All other components within normal limits   BRAIN NATRIURETIC PEPTIDE - Abnormal; Notable for the following components:    NT Pro-BNP 25,869 (*)     All other components within normal limits    Narrative:     CALL  [facility] tel. 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)     Coronary artery disease involving native coronary artery of native heart without angina pectoris     Hyperlipidemia     Hypertension     Influenza B 04/02/2015    Kidney failure     sees Dr Camp    Neuropathy     Nosebleed     Radiation     Sciatica        Assessment  Vitals: MEWS Score: 1  Level of Consciousness: Alert (0)   Vitals:    04/23/25 1530 04/23/25 1545 04/23/25 1600 04/23/25 1615   BP: (!) 141/65  128/72    Pulse: 64 64 64 68   Resp: 24 24 23 22   Temp:       TempSrc:       SpO2: 94% 95%  94%   Height:         Deterioration Index (DI): Deterioration Index: 28.61  Deterioration Index (DI) Interventions Performed:    O2 Flow Rate:    O2 Device: O2 Device: None (Room air)  Cardiac Rhythm:    Critical Lab Results: [unfilled]  Cultures: Cultures:Blood  NIH Score: NIH     Active LDA's:   Peripheral IV 04/23/25 Right Antecubital (Active)     Active Central Lines:                          Active Wounds:    Active Simms's:    Active Feeding Tubes:      Administered Medications: Medications - No data to display  Last documented pain medication administration:   Pertinent or High Risk Medications/Drips: no   If Yes, please provide details:   Blood Product Administration:   If Yes, please provide details:   Process

## 2025-04-24 VITALS
HEART RATE: 68 BPM | SYSTOLIC BLOOD PRESSURE: 136 MMHG | HEIGHT: 69 IN | WEIGHT: 297.84 LBS | RESPIRATION RATE: 16 BRPM | BODY MASS INDEX: 44.11 KG/M2 | DIASTOLIC BLOOD PRESSURE: 70 MMHG | OXYGEN SATURATION: 92 % | TEMPERATURE: 98.1 F

## 2025-04-24 LAB
ALBUMIN SERPL-MCNC: 3.2 G/DL (ref 3.4–5)
ALBUMIN/GLOB SERPL: 1 {RATIO} (ref 1.1–2.2)
ALP SERPL-CCNC: 60 U/L (ref 40–129)
ALT SERPL-CCNC: 32 U/L (ref 10–40)
ANION GAP SERPL CALCULATED.3IONS-SCNC: 15 MMOL/L (ref 3–16)
AST SERPL-CCNC: 20 U/L (ref 15–37)
BASOPHILS # BLD: 0.1 K/UL (ref 0–0.2)
BASOPHILS NFR BLD: 1.3 %
BILIRUB SERPL-MCNC: 0.3 MG/DL (ref 0–1)
BUN SERPL-MCNC: 40 MG/DL (ref 7–20)
CALCIUM SERPL-MCNC: 9.7 MG/DL (ref 8.3–10.6)
CHLORIDE SERPL-SCNC: 96 MMOL/L (ref 99–110)
CO2 SERPL-SCNC: 28 MMOL/L (ref 21–32)
CREAT SERPL-MCNC: 6.7 MG/DL (ref 0.8–1.3)
DEPRECATED RDW RBC AUTO: 17.4 % (ref 12.4–15.4)
EOSINOPHIL # BLD: 0.3 K/UL (ref 0–0.6)
EOSINOPHIL NFR BLD: 3.2 %
GFR SERPLBLD CREATININE-BSD FMLA CKD-EPI: 8 ML/MIN/{1.73_M2}
GLUCOSE SERPL-MCNC: 112 MG/DL (ref 70–99)
HCT VFR BLD AUTO: 30.6 % (ref 40.5–52.5)
HGB BLD-MCNC: 10.1 G/DL (ref 13.5–17.5)
LYMPHOCYTES # BLD: 0.8 K/UL (ref 1–5.1)
LYMPHOCYTES NFR BLD: 9.5 %
MAGNESIUM SERPL-MCNC: 2.26 MG/DL (ref 1.8–2.4)
MCH RBC QN AUTO: 32.4 PG (ref 26–34)
MCHC RBC AUTO-ENTMCNC: 32.9 G/DL (ref 31–36)
MCV RBC AUTO: 98.3 FL (ref 80–100)
MONOCYTES # BLD: 0.7 K/UL (ref 0–1.3)
MONOCYTES NFR BLD: 8.5 %
NEUTROPHILS # BLD: 6.3 K/UL (ref 1.7–7.7)
NEUTROPHILS NFR BLD: 77.5 %
PLATELET # BLD AUTO: 347 K/UL (ref 135–450)
PMV BLD AUTO: 7.6 FL (ref 5–10.5)
POTASSIUM SERPL-SCNC: 3.3 MMOL/L (ref 3.5–5.1)
PROT SERPL-MCNC: 6.5 G/DL (ref 6.4–8.2)
RBC # BLD AUTO: 3.11 M/UL (ref 4.2–5.9)
SODIUM SERPL-SCNC: 139 MMOL/L (ref 136–145)
WBC # BLD AUTO: 8.2 K/UL (ref 4–11)

## 2025-04-24 PROCEDURE — 96372 THER/PROPH/DIAG INJ SC/IM: CPT

## 2025-04-24 PROCEDURE — 80053 COMPREHEN METABOLIC PANEL: CPT

## 2025-04-24 PROCEDURE — 6370000000 HC RX 637 (ALT 250 FOR IP): Performed by: INTERNAL MEDICINE

## 2025-04-24 PROCEDURE — 36415 COLL VENOUS BLD VENIPUNCTURE: CPT

## 2025-04-24 PROCEDURE — 2500000003 HC RX 250 WO HCPCS: Performed by: INTERNAL MEDICINE

## 2025-04-24 PROCEDURE — G0378 HOSPITAL OBSERVATION PER HR: HCPCS

## 2025-04-24 PROCEDURE — 6360000002 HC RX W HCPCS: Performed by: INTERNAL MEDICINE

## 2025-04-24 PROCEDURE — 83735 ASSAY OF MAGNESIUM: CPT

## 2025-04-24 PROCEDURE — 94760 N-INVAS EAR/PLS OXIMETRY 1: CPT

## 2025-04-24 PROCEDURE — 85025 COMPLETE CBC W/AUTO DIFF WBC: CPT

## 2025-04-24 PROCEDURE — 90935 HEMODIALYSIS ONE EVALUATION: CPT

## 2025-04-24 PROCEDURE — 99222 1ST HOSP IP/OBS MODERATE 55: CPT | Performed by: INTERNAL MEDICINE

## 2025-04-24 RX ORDER — POTASSIUM CHLORIDE 1500 MG/1
20 TABLET, EXTENDED RELEASE ORAL ONCE
Status: COMPLETED | OUTPATIENT
Start: 2025-04-24 | End: 2025-04-24

## 2025-04-24 RX ORDER — METOPROLOL SUCCINATE 50 MG/1
50 TABLET, EXTENDED RELEASE ORAL EVERY EVENING
Qty: 30 TABLET | Refills: 0 | Status: SHIPPED | OUTPATIENT
Start: 2025-04-24

## 2025-04-24 RX ORDER — METOPROLOL SUCCINATE 50 MG/1
50 TABLET, EXTENDED RELEASE ORAL EVERY EVENING
Status: DISCONTINUED | OUTPATIENT
Start: 2025-04-24 | End: 2025-04-24 | Stop reason: HOSPADM

## 2025-04-24 RX ORDER — CALCIUM ACETATE 667 MG/1
1 CAPSULE ORAL
Status: DISCONTINUED | OUTPATIENT
Start: 2025-04-24 | End: 2025-04-24

## 2025-04-24 RX ORDER — CALCIUM ACETATE 667 MG/1
3 CAPSULE ORAL
Status: DISCONTINUED | OUTPATIENT
Start: 2025-04-24 | End: 2025-04-24 | Stop reason: HOSPADM

## 2025-04-24 RX ADMIN — TORSEMIDE 100 MG: 100 TABLET ORAL at 08:33

## 2025-04-24 RX ADMIN — AMIODARONE HYDROCHLORIDE 200 MG: 200 TABLET ORAL at 08:33

## 2025-04-24 RX ADMIN — HEPARIN SODIUM 5000 UNITS: 5000 INJECTION INTRAVENOUS; SUBCUTANEOUS at 06:18

## 2025-04-24 RX ADMIN — CALCIUM ACETATE 667 MG: 667 CAPSULE ORAL at 08:59

## 2025-04-24 RX ADMIN — CLOPIDOGREL BISULFATE 75 MG: 75 TABLET, FILM COATED ORAL at 08:32

## 2025-04-24 RX ADMIN — POTASSIUM CHLORIDE 20 MEQ: 1500 TABLET, EXTENDED RELEASE ORAL at 08:32

## 2025-04-24 NOTE — CONSULTS
Nephrology Consult Note   [employer]      Reason for Consultation: ESRD    History of Present Illness: Mr Bray is a 78 yo morbidly obese male with a past medical history significant for ESRD HD TTS ([facility]), anemia CKD, CKD-MBD, AS s/p TAVR 21, CAD S/P PCI, PAF that presented with palpitations associated with shortness of breath. Noted to be in A Fib. Nephrology consulted to address patients dialysis needs. Patient eager to be discharged    Subjective:    In recliner; NAD    ROS: + palpitations and shortness of breath both of which have resolved. No chest pain. All other ROS negative    Scheduled Meds:   calcium acetate  3 capsule Oral TID WC    amiodarone  200 mg Oral Daily    atorvastatin  20 mg Oral Nightly    Virt-Caps  1 capsule Oral Nightly    clopidogrel  75 mg Oral Daily    metoprolol succinate  25 mg Oral QPM    torsemide  100 mg Oral Once per day on     sodium chloride flush  5-40 mL IntraVENous 2 times per day    heparin (porcine)  5,000 Units SubCUTAneous 3 times per day        sodium chloride         PRN Meds:.albuterol sulfate HFA, sodium chloride flush, sodium chloride, [Held by provider] ondansetron **OR** [Held by provider] ondansetron, polyethylene glycol, acetaminophen **OR** acetaminophen, albuterol    Physical Exam:    TEMPERATURE:  Current - Temp: 98.1 °F (36.7 °C); Max - Temp  Av.1 °F (36.7 °C)  Min: 98.1 °F (36.7 °C)  Max: 98.2 °F (36.8 °C)  RESPIRATIONS RANGE: Resp  Av.1  Min: 15  Max: 25  PULSE RANGE: Pulse  Av.3  Min: 58  Max: 76  BLOOD PRESSURE RANGE:  Systolic (24hrs), Av , Min:110 , Max:141   ; Diastolic (24hrs), Av, Min:38, Max:99    24HR INTAKE/OUTPUT:    Intake/Output Summary (Last 24 hours) at 2025 1032  Last data filed at 2025 0835  Gross per 24 hour   Intake 240 ml   Output --   Net 240 ml     Patient Vitals for the past 96 hrs (Last 3 readings):   Weight   25 0456 135.1 kg (297 lb 13.5 oz)

## 2025-04-24 NOTE — CONSULTS
[facility]   Electrophysiology Consultation     Date: 4/24/2025  Reason for Consultation: Atrial fibrillation    Consult Requesting Physician: Stephon Cordova MD     CC: Palpitations     HPI:   Jeremías Bray is a 79 y.o. male history of the hypertension, MGUS, paroxysmal atrial fibrillation, small to moderate pericardial effusion, TAVR, ESRD on hemodialysis, initially presenting to the emergency department for palpitations that lasted approximately 1 hour, resolving in the emergency department.  Initial EKG in the emergency department demonstrated atrial fibrillation with RVR.  Patient states his heart rates at home are in the 120s to 130s which is consistent with the EKG findings here.  Telemetry was reviewed and unfortunately only goes back to 1700, since admission he has not had recurrence of atrial fibrillation.  He does not recall symptoms with atrial fibrillation in the past and in fact it was not clear to him that he carries this diagnosis.  He has been maintained on amiodarone chronically.     Review of System:  Complete 10 point ROS performed and negative unless noted in above HPI or below    Prior to Admission medications    Medication Sig Start Date End Date Taking? Authorizing Provider   amiodarone (CORDARONE) 200 MG tablet Take 1 tablet by mouth daily 2/28/25  Yes Preston Ag MD   torsemide (DEMADEX) 100 MG tablet Take 1 tablet by mouth four times a week  Patient taking differently: Take 1 tablet by mouth four times a week Non dialysis days- Sunday, Monday, Wednesday, and Friday 2/18/25  Yes Harry Grove MD   clopidogrel (PLAVIX) 75 MG tablet TAKE 1 TABLET BY MOUTH EVERY DAY 12/2/24  Yes Preston Ag MD   B Complex-C-Folic Acid (DIALYVITE 800) 0.8 MG TABS Take 1 tablet by mouth nightly 2/10/23  Yes Gricelda Smith MD   calcium acetate 667 MG TABS Take 3 tablets by mouth 3 times daily (with meals) 4/7/21  Yes Gricelda Smith MD   GLUCOSAMINE HCL PO Take 3,000 mg by mouth 2

## 2025-04-24 NOTE — PROGRESS NOTES
Treatment time: only run for 2 hours and 40 minutes due to machine malfunction doctor Jazmyne was aware of it.     Net UF: 1540 due to machine malfunction.    Pre weight: 135.1 kg  Post weight: 133. 6 kg  EDW: 133.5 kg    Access used: Let AVF  Access function: Access site located on the Left upper arm had good bruit and thrill. No signs of infection noted. o redness, hematoma nor swelling was observed. No discharges were seen. Cleaned site aseptically and cannulation done without any problem.     Medications or blood products given: none    Regular outpatient schedule: Tues,Thurs,Sat.    Summary of response to treatment: 11:55: Treatment set at 2 liters for 3 hour via Left AVF. Access site located on the Left upper arm had good bruit and thrill. No signs of infection noted. o redness, hematoma nor swelling was observed. No discharges were seen. Cleaned site aseptically and cannulation done without any problem. WCM. Parameters set accordingly. Hooked to Hd machine. Monitored from time to time. 14:30 Had machine malfunction issue. Patient refused to continue HD. Informed doctor Jazmyne. Treatment completed Returned blood aseptically by L[facility].    Copy of dialysis treatment record placed in chart, to be scanned into EMR.

## 2025-04-24 NOTE — PLAN OF CARE
Problem: Chronic Conditions and Co-morbidities  Goal: Patient's chronic conditions and co-morbidity symptoms are monitored and maintained or improved  Outcome: Progressing  Flowsheets (Taken 4/23/2025 2025)  Care Plan - Patient's Chronic Conditions and Co-Morbidity Symptoms are Monitored and Maintained or Improved: Monitor and assess patient's chronic conditions and comorbid symptoms for stability, deterioration, or improvement     Problem: Discharge Planning  Goal: Discharge to home or other facility with appropriate resources  Outcome: Progressing  Flowsheets (Taken 4/23/2025 2025)  Discharge to home or other facility with appropriate resources:   Identify barriers to discharge with patient and caregiver   Arrange for needed discharge resources and transportation as appropriate     Problem: Pain  Goal: Verbalizes/displays adequate comfort level or baseline comfort level  Outcome: Progressing     Problem: ABCDS Injury Assessment  Goal: Absence of physical injury  Outcome: Progressing     Problem: Safety - Adult  Goal: Free from fall injury  Outcome: Progressing     Problem: Cardiovascular - Adult  Goal: Maintains optimal cardiac output and hemodynamic stability  Outcome: Progressing  Goal: Absence of cardiac dysrhythmias or at baseline  Outcome: Progressing     Problem: Musculoskeletal - Adult  Goal: Return mobility to safest level of function  Outcome: Progressing  Flowsheets (Taken 4/23/2025 2025)  Return Mobility to Safest Level of Function:   Assess patient stability and activity tolerance for standing, transferring and ambulating with or without assistive devices   Assist with transfers and ambulation using safe patient handling equipment as needed  Goal: Maintain proper alignment of affected body part  Outcome: Progressing     Problem: Genitourinary - Adult  Goal: Absence of urinary retention  Outcome: Progressing  Flowsheets (Taken 4/23/2025 2025)  Absence of urinary retention: Assess patient’s ability to

## 2025-04-24 NOTE — PROGRESS NOTES
Pt. A & O x4. VSS. Assessment completed and medications administered as ordered. Pt. encouraged to call for needs. Pt. slept in chair most of the night, says he is \"more comfortable in chair.\" No concerns voiced at this time. Call light within reach.

## 2025-04-24 NOTE — PLAN OF CARE
Problem: Chronic Conditions and Co-morbidities  Goal: Patient's chronic conditions and co-morbidity symptoms are monitored and maintained or improved  4/24/2025 1718 by Chelsea Kapoor RN  Outcome: Completed  4/24/2025 1101 by Chelsea Kapoor RN  Outcome: Progressing     Problem: Discharge Planning  Goal: Discharge to home or other facility with appropriate resources  4/24/2025 1718 by Chelsea Kapoor RN  Outcome: Completed  4/24/2025 1101 by Chelsea Kapoor RN  Outcome: Progressing     Problem: Pain  Goal: Verbalizes/displays adequate comfort level or baseline comfort level  4/24/2025 1718 by Chelsea Kapoor RN  Outcome: Completed  4/24/2025 1101 by Chelsea Kapoor RN  Outcome: Progressing     Problem: ABCDS Injury Assessment  Goal: Absence of physical injury  4/24/2025 1718 by Chelsea Kapoor RN  Outcome: Completed  4/24/2025 1101 by Chelsea Kapoor RN  Outcome: Progressing     Problem: Safety - Adult  Goal: Free from fall injury  4/24/2025 1718 by Chelsea Kapoor RN  Outcome: Completed  4/24/2025 1101 by Chelsea Kapoor RN  Outcome: Progressing     Problem: Cardiovascular - Adult  Goal: Maintains optimal cardiac output and hemodynamic stability  4/24/2025 1718 by Chelsea Kapoor RN  Outcome: Completed  4/24/2025 1101 by Chelsea Kapoor RN  Outcome: Progressing  Goal: Absence of cardiac dysrhythmias or at baseline  4/24/2025 1718 by Chelsea Kapoor RN  Outcome: Completed  4/24/2025 1101 by Chelsea Kapoor RN  Outcome: Progressing     Problem: Musculoskeletal - Adult  Goal: Return mobility to safest level of function  4/24/2025 1718 by Chelsea Kapoor RN  Outcome: Completed  4/24/2025 1101 by Chelsea Kapoor RN  Outcome: Progressing  Goal: Maintain proper alignment of affected body part  4/24/2025 1718 by Chelsea Kapoor RN  Outcome: Completed  4/24/2025 1101 by Chelsea Kapoor RN  Outcome: Progressing     Problem: Genitourinary - Adult  Goal: Absence of

## 2025-04-24 NOTE — PLAN OF CARE
Problem: Chronic Conditions and Co-morbidities  Goal: Patient's chronic conditions and co-morbidity symptoms are monitored and maintained or improved  4/24/2025 1101 by Chelsea Kapoor RN  Outcome: Progressing  4/23/2025 2318 by Edgar Steiner RN  Outcome: Progressing  Flowsheets (Taken 4/23/2025 2025)  Care Plan - Patient's Chronic Conditions and Co-Morbidity Symptoms are Monitored and Maintained or Improved: Monitor and assess patient's chronic conditions and comorbid symptoms for stability, deterioration, or improvement     Problem: Discharge Planning  Goal: Discharge to home or other facility with appropriate resources  4/24/2025 1101 by Chelsea Kapoor RN  Outcome: Progressing  4/23/2025 2318 by Edgar Steiner RN  Outcome: Progressing  Flowsheets (Taken 4/23/2025 2025)  Discharge to home or other facility with appropriate resources:   Identify barriers to discharge with patient and caregiver   Arrange for needed discharge resources and transportation as appropriate     Problem: Pain  Goal: Verbalizes/displays adequate comfort level or baseline comfort level  4/24/2025 1101 by Chelsea Kapoor RN  Outcome: Progressing  4/23/2025 2318 by Edgar Steiner RN  Outcome: Progressing     Problem: ABCDS Injury Assessment  Goal: Absence of physical injury  4/24/2025 1101 by Chelsea Kapoor RN  Outcome: Progressing  4/23/2025 2318 by Edgar Steiner RN  Outcome: Progressing     Problem: Safety - Adult  Goal: Free from fall injury  4/24/2025 1101 by Chelsea Kapoor RN  Outcome: Progressing  4/23/2025 2318 by Edgar Steiner RN  Outcome: Progressing     Problem: Cardiovascular - Adult  Goal: Maintains optimal cardiac output and hemodynamic stability  4/24/2025 1101 by Chelsea Kapoor RN  Outcome: Progressing  4/23/2025 2318 by Edgar Steiner RN  Outcome: Progressing  Goal: Absence of cardiac dysrhythmias or at baseline  4/24/2025 1101 by

## 2025-04-24 NOTE — DISCHARGE SUMMARY
non-tender  Musculoskeletal:  No clubbing, cyanosis  Neurologic:  No focal weakness  Psychiatric:  Alert and oriented  Capillary Refill: Brisk,< 3 seconds   Peripheral Pulses: +2 palpable, equal bilaterally       Labs: For convenience and continuity at follow-up the following most recent labs are provided:      CBC:    Lab Results   Component Value Date/Time    WBC 8.2 04/24/2025 04:03 AM    HGB 10.1 04/24/2025 04:03 AM    HCT 30.6 04/24/2025 04:03 AM     04/24/2025 04:03 AM       Renal:    Lab Results   Component Value Date/Time     04/24/2025 04:03 AM    K 3.3 04/24/2025 04:03 AM    CL 96 04/24/2025 04:03 AM    CO2 28 04/24/2025 04:03 AM    BUN 40 04/24/2025 04:03 AM    CREATININE 6.7 04/24/2025 04:03 AM    CALCIUM 9.7 04/24/2025 04:03 AM    PHOS 4.8 04/16/2025 05:31 AM         Significant Diagnostic Studies    Radiology:   XR CHEST PORTABLE   Final Result   1. No significant change in small left effusion and opacification of the   underlying lung.   2. Cardiomegaly.                Consults:     IP CONSULT TO CARDIOLOGY  IP CONSULT TO NEPHROLOGY    Disposition: Home    Condition at Discharge: Stable    Discharge Instructions/Follow-up: PCP cardiology    Code Status:  Full Code     Activity: activity as tolerated    Diet: Cardiac      Discharge Medications:     Current Discharge Medication List             Details   apixaban (ELIQUIS) 5 MG TABS tablet Take 1 tablet by mouth 2 times daily  Qty: 60 tablet, Refills: 0                Details   metoprolol succinate (TOPROL XL) 50 MG extended release tablet Take 1 tablet by mouth every evening  Qty: 30 tablet, Refills: 0                Details   amiodarone (CORDARONE) 200 MG tablet Take 1 tablet by mouth daily  Qty: 90 tablet, Refills: 0    Associated Diagnoses: Coronary artery disease involving native coronary artery of native heart without angina pectoris      torsemide (DEMADEX) 100 MG tablet Take 1 tablet by mouth four times a week  Qty: 30 tablet,

## 2025-04-25 ENCOUNTER — TELEPHONE (OUTPATIENT)
Dept: CARDIOLOGY CLINIC | Age: 80
End: 2025-04-25

## 2025-04-25 NOTE — TELEPHONE ENCOUNTER
Medication Question/Concern    What is the name of the medication you need to speak with someone about?  apixaban (ELIQUIS)     Was this prescribed by your cardiologist?   Yes    Dosage of the medication:  5 MG TABS tablet     How are you taking this medication (QD, BID, TID, QID, PRN):  Take 1 tablet by mouth 2 times daily     What issues/concerns are you having with this medication?  Lon called the office to report his Eliquis is too expensive, he was told for a 30 day supply, it would cost him $200. Jeremías states that he is not willing to take medication, would like an alternate.     Jeremías's callback: [phone]

## 2025-04-25 NOTE — TELEPHONE ENCOUNTER
We can send in Prescription for Xarelto to see if its is better covered under insurance plan and more affordable/ lf Xarelto is not affordable. The only other option would be for Warfarin, which would require routine lab monitoring to make sure he is within the therapeutic range and protected against stroke or that blood is not too \"thin\".   If he chose this option we will refer him to [facility] anticoagulation clinic for management.     Please call patient to discuss.

## 2025-04-25 NOTE — TELEPHONE ENCOUNTER
Called pt regarding below to see if he would be interested in pt assistance and he states he is not interested and feels that the paper work is too much and would rather be switched to something else. Please advise.

## 2025-04-25 NOTE — TELEPHONE ENCOUNTER
Pt called back. Advised per notes below. Pt would like to try the Xarelto. Pt will like a call back when script is sent to the pharmacy.

## 2025-04-28 NOTE — TELEPHONE ENCOUNTER
RX for Xarelto sent.    Please notify patient and instruct he to let us know if medication is not affordable for her.

## 2025-06-26 DIAGNOSIS — I25.10 CORONARY ARTERY DISEASE INVOLVING NATIVE CORONARY ARTERY OF NATIVE HEART WITHOUT ANGINA PECTORIS: ICD-10-CM

## 2025-06-26 RX ORDER — AMIODARONE HYDROCHLORIDE 200 MG/1
200 TABLET ORAL DAILY
Qty: 90 TABLET | Refills: 0 | Status: SHIPPED | OUTPATIENT
Start: 2025-06-26

## 2025-06-26 NOTE — TELEPHONE ENCOUNTER
Requested Prescriptions     Pending Prescriptions Disp Refills    amiodarone (CORDARONE) 200 MG tablet [Pharmacy Med Name: AMIODARONE  MG TABLET] 90 tablet 0     Sig: TAKE 1 TABLET BY MOUTH EVERY DAY        Last OV: 2/28/2025  Next OV: 7/29/2025  Last refill:2/28/2025  Most recent Labs: 4/24/25 CMP 4/23/25 TSH  Last EKG (if needed):4/23/2025

## 2025-07-24 ENCOUNTER — TELEPHONE (OUTPATIENT)
Dept: CARDIOLOGY CLINIC | Age: 80
End: 2025-07-24

## 2025-07-24 NOTE — TELEPHONE ENCOUNTER
Patient called in.   He states his pulse has either been in the lower or upper 40s. When that happens he feels lightheaded.     He wants to know what he should do or if there is a medication he can take.   Has an appt on 7/29.     Please advise     Callback: [phone]

## 2025-07-24 NOTE — TELEPHONE ENCOUNTER
Spoke with patient he reports that his heart rate has been running in the high 40's to 50 bpm range.    He reports that he has been experiencing lightheadedness which occurs, when sitting, when lying in bed, and when he was driving. I asked if noticed his heart rate to be low during those episodes and he stated he was not sure but figured it probably was.     He checked heart rate while I was on phone and he stated it was 55 bpm and denies LH at that time.  Denies syncope.    Moved follow up appointment up to tomorrow at 9 am.

## 2025-07-25 ENCOUNTER — OFFICE VISIT (OUTPATIENT)
Dept: CARDIOLOGY CLINIC | Age: 80
End: 2025-07-25
Payer: MEDICARE

## 2025-07-25 VITALS
OXYGEN SATURATION: 92 % | BODY MASS INDEX: 42.21 KG/M2 | HEIGHT: 69 IN | SYSTOLIC BLOOD PRESSURE: 132 MMHG | HEART RATE: 68 BPM | DIASTOLIC BLOOD PRESSURE: 60 MMHG | WEIGHT: 285 LBS

## 2025-07-25 DIAGNOSIS — R42 LIGHTHEADEDNESS: ICD-10-CM

## 2025-07-25 DIAGNOSIS — I31.39 PERICARDIAL EFFUSION: ICD-10-CM

## 2025-07-25 DIAGNOSIS — N18.6 ESRD (END STAGE RENAL DISEASE) ON DIALYSIS (HCC): ICD-10-CM

## 2025-07-25 DIAGNOSIS — I10 ESSENTIAL HYPERTENSION: ICD-10-CM

## 2025-07-25 DIAGNOSIS — I25.10 CORONARY ARTERY DISEASE INVOLVING NATIVE CORONARY ARTERY OF NATIVE HEART WITHOUT ANGINA PECTORIS: ICD-10-CM

## 2025-07-25 DIAGNOSIS — Z99.2 ESRD (END STAGE RENAL DISEASE) ON DIALYSIS (HCC): ICD-10-CM

## 2025-07-25 DIAGNOSIS — Z95.2 S/P TAVR (TRANSCATHETER AORTIC VALVE REPLACEMENT): ICD-10-CM

## 2025-07-25 DIAGNOSIS — Z86.39 HISTORY OF TYPE 2 DIABETES MELLITUS: ICD-10-CM

## 2025-07-25 DIAGNOSIS — I45.10 RBBB: ICD-10-CM

## 2025-07-25 DIAGNOSIS — Z79.899 ON AMIODARONE THERAPY: ICD-10-CM

## 2025-07-25 DIAGNOSIS — Z79.01 CURRENT USE OF ANTICOAGULANT THERAPY: ICD-10-CM

## 2025-07-25 DIAGNOSIS — I35.0 NONRHEUMATIC AORTIC VALVE STENOSIS: ICD-10-CM

## 2025-07-25 DIAGNOSIS — I48.91 ATRIAL FIBRILLATION WITH RVR (HCC): Primary | ICD-10-CM

## 2025-07-25 PROCEDURE — 1123F ACP DISCUSS/DSCN MKR DOCD: CPT | Performed by: INTERNAL MEDICINE

## 2025-07-25 PROCEDURE — 1036F TOBACCO NON-USER: CPT | Performed by: INTERNAL MEDICINE

## 2025-07-25 PROCEDURE — 3078F DIAST BP <80 MM HG: CPT | Performed by: INTERNAL MEDICINE

## 2025-07-25 PROCEDURE — 93000 ELECTROCARDIOGRAM COMPLETE: CPT | Performed by: INTERNAL MEDICINE

## 2025-07-25 PROCEDURE — 1159F MED LIST DOCD IN RCRD: CPT | Performed by: INTERNAL MEDICINE

## 2025-07-25 PROCEDURE — 99214 OFFICE O/P EST MOD 30 MIN: CPT | Performed by: INTERNAL MEDICINE

## 2025-07-25 PROCEDURE — G8427 DOCREV CUR MEDS BY ELIG CLIN: HCPCS | Performed by: INTERNAL MEDICINE

## 2025-07-25 PROCEDURE — G2211 COMPLEX E/M VISIT ADD ON: HCPCS | Performed by: INTERNAL MEDICINE

## 2025-07-25 PROCEDURE — G8417 CALC BMI ABV UP PARAM F/U: HCPCS | Performed by: INTERNAL MEDICINE

## 2025-07-25 PROCEDURE — 3075F SYST BP GE 130 - 139MM HG: CPT | Performed by: INTERNAL MEDICINE

## 2025-07-25 RX ORDER — METOPROLOL SUCCINATE 25 MG/1
25 TABLET, EXTENDED RELEASE ORAL EVERY EVENING
Qty: 90 TABLET | Refills: 3 | Status: SHIPPED | OUTPATIENT
Start: 2025-07-25

## 2025-07-25 RX ORDER — METOPROLOL SUCCINATE 25 MG/1
25 TABLET, EXTENDED RELEASE ORAL EVERY EVENING
Qty: 90 TABLET | Refills: 3
Start: 2025-07-25 | End: 2025-07-25

## 2025-08-06 ENCOUNTER — TELEPHONE (OUTPATIENT)
Dept: CARDIOLOGY CLINIC | Age: 80
End: 2025-08-06

## (undated) DEVICE — AGENT HEMSTAT W4XL4IN OXIDIZED REGENERATED CELOS ABSRB SFT

## (undated) DEVICE — LOOP VES W25MM THK1MM MAXI RED SIL FLD REPELLENT 100 PER

## (undated) DEVICE — GLOVE SURG SZ 8 L11.77IN FNGR THK9.8MIL STRW LTX POLYMER

## (undated) DEVICE — STOCKINETTE,IMPERVIOUS,12X48,TABS: Brand: MEDLINE

## (undated) DEVICE — SUTURE VCRL + SZ 2-0 L18IN ABSRB UD CT1 L36MM 1/2 CIR VCP839D

## (undated) DEVICE — 3M™ IOBAN™ 2 ANTIMICROBIAL INCISE DRAPE 6650EZ: Brand: IOBAN™ 2

## (undated) DEVICE — APPLIER CLP L9.375IN APER 2.1MM CLS L3.8MM 20 SM TI CLP

## (undated) DEVICE — BAG,DRAINAGE,4L,A/R TOWER,LL,SLIDE TAP: Brand: MEDLINE

## (undated) DEVICE — APPLIER CLP L9.38IN M LIG TI DISP STR RNG HNDL LIGACLP

## (undated) DEVICE — DRESSING WND SM W2.5XL4IN BRTH W/ CATH SECUREMENT AND

## (undated) DEVICE — EXTENSION SET, 2 INJECTION SITES, MALE LUER LOCK ADAPTER WITH RETRACTABLE COLLAR: Brand: INTERLINK

## (undated) DEVICE — INTENDED TO BE USED TO OCCLUDE, RETRACT AND IDENTIFY ARTERIES, VEINS, TENDONS AND NERVES IN SURGICAL PROCEDURES: Brand: STERION®  VESSEL LOOP

## (undated) DEVICE — SUTURE PROL SZ 6-0 L30IN NONABSORBABLE BLU L11MM BV 3/8 CIR 8776H

## (undated) DEVICE — SUPPORT WRST AD W3.5XL9IN DIA14.5IN ART SFT ADJ HK AND LOOP

## (undated) DEVICE — SUTURE VCRL + SZ 3-0 L18IN CT 1 CR ABSRB VCP838D

## (undated) DEVICE — SPONGE GZ W4XL4IN COT 12 PLY TYP VII WVN C FLD DSGN STERILE

## (undated) DEVICE — SUTURE NONABSORBABLE MONOFILAMENT 7-0 BV-1 1X24 IN PROLENE 8702H

## (undated) DEVICE — STOCKINETTE,IMPERVIOUS,12X48,STERILE: Brand: MEDLINE

## (undated) DEVICE — GLOVE SURG SZ 7 L11.33IN FNGR THK9.8MIL STRW LTX POLYMER

## (undated) DEVICE — 3M™ TEGADERM™ TRANSPARENT FILM DRESSING FRAME STYLE, 1626W, 4 IN X 4-3/4 IN (10 CM X 12 CM), 50/CT 4CT/CASE: Brand: 3M™ TEGADERM™

## (undated) DEVICE — GOWN SIRUS NONREIN LG W/TWL: Brand: MEDLINE INDUSTRIES, INC.

## (undated) DEVICE — SET CATH 20GA L1.75IN RAD ART POLYUR RADPQ W/ INTEGR

## (undated) DEVICE — 3M™ COBAN™ NL STERILE NON-LATEX SELF-ADHERENT WRAP, 2084S, 4 IN X 5 YD (10 CM X 4,5 M), 18 ROLLS/CASE: Brand: 3M™ COBAN™

## (undated) DEVICE — SOLUTION IV IRRIG POUR BRL 0.9% SODIUM CHL 2F7124

## (undated) DEVICE — FOGARTY - HYDRAGRIP SURGICAL - CLAMP INSERTS: Brand: FOGARTY SOFTJAW

## (undated) DEVICE — CATHETER EXT URIN DIA35MM STD M SELF ADH WATERTIGHT SEAL

## (undated) DEVICE — SUTURE MCRYL + SZ 4-0 L27IN ABSRB UD L19MM PS-2 3/8 CIR MCP426H

## (undated) DEVICE — BANDAGE COMPR COHESIVE 5 YDX4 IN SELF ADH TAN NS COBAN

## (undated) DEVICE — MERCY HEALTH WEST TURNOVER: Brand: MEDLINE INDUSTRIES, INC.

## (undated) DEVICE — LOOP VES W1.3MM THK0.9MM MINI WHT SIL FLD REPELLENT

## (undated) DEVICE — OPTIFOAM GENTLE LIQUITRAP, SACRUM, 7"X7": Brand: MEDLINE

## (undated) DEVICE — NEEDLE HYPO 18GA L1.5IN THN WALL PIVOTING SHLD BVL ORIENTED

## (undated) DEVICE — SUTURE VCRL UD BR CT 3-0 18IN CT1 J838D